# Patient Record
Sex: FEMALE | Race: BLACK OR AFRICAN AMERICAN | NOT HISPANIC OR LATINO | Employment: OTHER | RURAL
[De-identification: names, ages, dates, MRNs, and addresses within clinical notes are randomized per-mention and may not be internally consistent; named-entity substitution may affect disease eponyms.]

---

## 2017-02-17 ENCOUNTER — OFFICE VISIT (OUTPATIENT)
Dept: FAMILY MEDICINE CLINIC | Facility: CLINIC | Age: 42
End: 2017-02-17

## 2017-02-17 VITALS
HEART RATE: 83 BPM | WEIGHT: 289.8 LBS | BODY MASS INDEX: 51.35 KG/M2 | OXYGEN SATURATION: 96 % | DIASTOLIC BLOOD PRESSURE: 80 MMHG | SYSTOLIC BLOOD PRESSURE: 150 MMHG | HEIGHT: 63 IN | TEMPERATURE: 98.3 F

## 2017-02-17 DIAGNOSIS — Z23 ENCOUNTER FOR IMMUNIZATION: ICD-10-CM

## 2017-02-17 DIAGNOSIS — D50.9 IRON DEFICIENCY ANEMIA, UNSPECIFIED IRON DEFICIENCY ANEMIA TYPE: ICD-10-CM

## 2017-02-17 DIAGNOSIS — E78.5 HYPERLIPIDEMIA, UNSPECIFIED HYPERLIPIDEMIA TYPE: ICD-10-CM

## 2017-02-17 DIAGNOSIS — IMO0002 UNCONTROLLED TYPE 2 DIABETES MELLITUS WITH COMPLICATION, WITH LONG-TERM CURRENT USE OF INSULIN: Primary | ICD-10-CM

## 2017-02-17 DIAGNOSIS — Z23 NEED FOR VACCINATION: ICD-10-CM

## 2017-02-17 DIAGNOSIS — E66.01 MORBID OBESITY DUE TO EXCESS CALORIES (HCC): ICD-10-CM

## 2017-02-17 DIAGNOSIS — I10 ESSENTIAL HYPERTENSION: ICD-10-CM

## 2017-02-17 DIAGNOSIS — E55.9 VITAMIN D DEFICIENCY: ICD-10-CM

## 2017-02-17 LAB
25(OH)D3 SERPL-MCNC: 50.6 NG/ML (ref 30–100)
ALBUMIN SERPL-MCNC: 4 G/DL (ref 3.4–4.8)
ALBUMIN UR-MCNC: <0.6 MG/L
ALBUMIN/GLOB SERPL: 1.1 G/DL (ref 1.1–1.8)
ALP SERPL-CCNC: 80 U/L (ref 38–126)
ALT SERPL W P-5'-P-CCNC: 63 U/L (ref 9–52)
ANION GAP SERPL CALCULATED.3IONS-SCNC: 10 MMOL/L (ref 5–15)
ARTICHOKE IGE QN: 43 MG/DL (ref 1–129)
AST SERPL-CCNC: 32 U/L (ref 14–36)
BILIRUB SERPL-MCNC: 0.6 MG/DL (ref 0.2–1.3)
BUN BLD-MCNC: 13 MG/DL (ref 7–21)
BUN/CREAT SERPL: 16.7 (ref 7–25)
CALCIUM SPEC-SCNC: 10 MG/DL (ref 8.4–10.2)
CHLORIDE SERPL-SCNC: 97 MMOL/L (ref 95–110)
CHOLEST SERPL-MCNC: 109 MG/DL (ref 0–199)
CO2 SERPL-SCNC: 33 MMOL/L (ref 22–31)
CREAT BLD-MCNC: 0.78 MG/DL (ref 0.5–1)
CREAT UR-MCNC: 74.3 MG/DL
FERRITIN SERPL-MCNC: 135 NG/ML (ref 6.2–137)
FOLATE SERPL-MCNC: 9.03 NG/ML (ref 2.76–21)
GFR SERPL CREATININE-BSD FRML MDRD: 98 ML/MIN/1.73 (ref 58–135)
GLOBULIN UR ELPH-MCNC: 3.5 GM/DL (ref 2.3–3.5)
GLUCOSE BLD-MCNC: 126 MG/DL (ref 60–100)
HBA1C MFR BLD: 7.87 % (ref 4–5.6)
HDLC SERPL-MCNC: 44 MG/DL (ref 60–200)
IRON 24H UR-MRATE: 84 MCG/DL (ref 37–170)
IRON SATN MFR SERPL: 29 % (ref 15–50)
LDLC/HDLC SERPL: 1.2 {RATIO} (ref 0–3.22)
MICROALBUMIN/CREAT UR: NORMAL MG/G (ref 0–30)
POTASSIUM BLD-SCNC: 4.4 MMOL/L (ref 3.5–5.1)
PROT SERPL-MCNC: 7.5 G/DL (ref 6.3–8.6)
SODIUM BLD-SCNC: 140 MMOL/L (ref 137–145)
TIBC SERPL-MCNC: 286 MCG/DL (ref 265–497)
TRIGL SERPL-MCNC: 61 MG/DL (ref 20–199)
VIT B12 BLD-MCNC: 850 PG/ML (ref 239–931)

## 2017-02-17 PROCEDURE — 80053 COMPREHEN METABOLIC PANEL: CPT | Performed by: FAMILY MEDICINE

## 2017-02-17 PROCEDURE — 83036 HEMOGLOBIN GLYCOSYLATED A1C: CPT | Performed by: FAMILY MEDICINE

## 2017-02-17 PROCEDURE — 82570 ASSAY OF URINE CREATININE: CPT | Performed by: FAMILY MEDICINE

## 2017-02-17 PROCEDURE — 82043 UR ALBUMIN QUANTITATIVE: CPT | Performed by: FAMILY MEDICINE

## 2017-02-17 PROCEDURE — 99213 OFFICE O/P EST LOW 20 MIN: CPT | Performed by: FAMILY MEDICINE

## 2017-02-17 PROCEDURE — 90746 HEPB VACCINE 3 DOSE ADULT IM: CPT | Performed by: FAMILY MEDICINE

## 2017-02-17 PROCEDURE — 90715 TDAP VACCINE 7 YRS/> IM: CPT | Performed by: FAMILY MEDICINE

## 2017-02-17 PROCEDURE — 82607 VITAMIN B-12: CPT | Performed by: FAMILY MEDICINE

## 2017-02-17 PROCEDURE — 83550 IRON BINDING TEST: CPT | Performed by: FAMILY MEDICINE

## 2017-02-17 PROCEDURE — 80061 LIPID PANEL: CPT | Performed by: FAMILY MEDICINE

## 2017-02-17 PROCEDURE — 82306 VITAMIN D 25 HYDROXY: CPT | Performed by: FAMILY MEDICINE

## 2017-02-17 PROCEDURE — 82746 ASSAY OF FOLIC ACID SERUM: CPT | Performed by: FAMILY MEDICINE

## 2017-02-17 PROCEDURE — 90471 IMMUNIZATION ADMIN: CPT | Performed by: FAMILY MEDICINE

## 2017-02-17 PROCEDURE — 82728 ASSAY OF FERRITIN: CPT | Performed by: FAMILY MEDICINE

## 2017-02-17 PROCEDURE — 90472 IMMUNIZATION ADMIN EACH ADD: CPT | Performed by: FAMILY MEDICINE

## 2017-02-17 PROCEDURE — 83540 ASSAY OF IRON: CPT | Performed by: FAMILY MEDICINE

## 2017-02-17 PROCEDURE — 85025 COMPLETE CBC W/AUTO DIFF WBC: CPT | Performed by: FAMILY MEDICINE

## 2017-02-17 RX ORDER — LISINOPRIL AND HYDROCHLOROTHIAZIDE 12.5; 1 MG/1; MG/1
2 TABLET ORAL DAILY
Qty: 60 TABLET | Refills: 11 | Status: SHIPPED | OUTPATIENT
Start: 2017-02-17 | End: 2018-03-04 | Stop reason: SDUPTHER

## 2017-02-17 NOTE — PROGRESS NOTES
Subjective   Lizzy Restrepo is a 41 y.o. female.     Problem List  1. DM type 2 insulin dependent, uncontrolled  2. Obesity  3. Hyperrtension  4. GERD  5. Iron deficiency anemia  6. GERD  7. Vitamin D deficiency  8. Hyperlipidemia/dysplipidemia    Patient is 40 yo AAF with the above medical issues. Is here for recheck    States her sugars have been better controlled . Was started on invokana 300 mg PO q daily. Continues to take Levemir 30 units subq qhs along with humalog 15 units before meals.  States metformin causing GI upset and diarrhea.  Is requesting today to stop it.  Sugars now running  before breakfast and <180 2 hrs after meal.  Last hga1c was >8.0 on last lab draw. Is due for diabetic eye examination.    BP elevated today.  And is running >140/90.  Currently taking lisinopril-HCTZ 10-12.5 mg PO q daily.  Compliant with medication. No chest pain, has occasional headaches, no dizziness. States she has been under stress with work    Regarding iron deficiency anemia.  Currently on iron pills.  Is due for recheck.     Also on Vitamin D pills for deficiency    Pt is due for 3rd Hepatitis B vaccination today as well as Tetanus vaccination     Is currently on aspirin and lipitor and tolerating both medications well. ASCVD risk elevated     Diabetes   She presents for her follow-up diabetic visit. She has type 2 diabetes mellitus. Her disease course has been improving. Hypoglycemia symptoms include headaches. Pertinent negatives for hypoglycemia include no confusion, dizziness, hunger, mood changes, nervousness/anxiousness, pallor, seizures, sleepiness, speech difficulty, sweats or tremors. Pertinent negatives for diabetes include no blurred vision, no chest pain, no fatigue, no foot paresthesias, no foot ulcerations, no polydipsia, no polyphagia, no polyuria, no visual change, no weakness and no weight loss. Pertinent negatives for hypoglycemia complications include no blackouts, no hospitalization, no  nocturnal hypoglycemia, no required assistance and no required glucagon injection. Pertinent negatives for diabetic complications include no autonomic neuropathy, CVA, heart disease, impotence, nephropathy, peripheral neuropathy or retinopathy. Risk factors for coronary artery disease include diabetes mellitus, sedentary lifestyle and post-menopausal. Current diabetic treatment includes insulin injections and oral agent (dual therapy). She is compliant with treatment most of the time. Her weight is stable. She is following a generally unhealthy diet. When asked about meal planning, she reported none. She has not had a previous visit with a dietitian. She never participates in exercise. There is no change in her home blood glucose trend. An ACE inhibitor/angiotensin II receptor blocker is being taken. She does not see a podiatrist.Eye exam is not current.   Hypertension   This is a chronic problem. The current episode started more than 1 year ago. The problem is unchanged. The problem is uncontrolled. Associated symptoms include headaches. Pertinent negatives include no anxiety, blurred vision, chest pain, malaise/fatigue, neck pain, orthopnea, palpitations, peripheral edema, PND, shortness of breath or sweats. There are no associated agents to hypertension. Risk factors for coronary artery disease include obesity, sedentary lifestyle, stress, diabetes mellitus and dyslipidemia. Past treatments include ACE inhibitors and diuretics. The current treatment provides mild improvement. There are no compliance problems.  There is no history of angina, kidney disease, CAD/MI, CVA, heart failure, left ventricular hypertrophy, renovascular disease, retinopathy or a thyroid problem. There is no history of chronic renal disease, coarctation of the aorta, hyperaldosteronism, hypercortisolism, hyperparathyroidism, a hypertension causing med, pheochromocytoma or sleep apnea.          The following portions of the patient's history  "were reviewed and updated as appropriate: allergies, current medications, past family history, past medical history, past social history, past surgical history and problem list.    Review of Systems   Constitutional: Negative.  Negative for fatigue, malaise/fatigue and weight loss.   Eyes: Negative.  Negative for blurred vision.   Respiratory: Negative.  Negative for shortness of breath.    Cardiovascular: Negative.  Negative for chest pain, palpitations, orthopnea and PND.   Gastrointestinal: Positive for diarrhea.   Endocrine: Negative.  Negative for polydipsia, polyphagia and polyuria.   Genitourinary: Negative.  Negative for impotence.   Musculoskeletal: Negative.  Negative for neck pain.   Skin: Negative.  Negative for pallor.   Allergic/Immunologic: Negative.    Neurological: Positive for headaches. Negative for dizziness, tremors, seizures, speech difficulty and weakness.   Hematological: Negative.    Psychiatric/Behavioral: Negative.  Negative for confusion. The patient is not nervous/anxious.        Objective    Visit Vitals   • /80 (BP Location: Right arm, Patient Position: Sitting, Cuff Size: Adult)   • Pulse 83   • Temp 98.3 °F (36.8 °C) (Oral)   • Ht 63\" (160 cm)   • Wt 289 lb 12.8 oz (131 kg)   • SpO2 96%   • BMI 51.34 kg/m2           Chemistry        Component Value Date/Time     11/03/2016 1232    K 4.4 11/03/2016 1232    CL 97 11/03/2016 1232    CO2 33 (H) 11/03/2016 1232    BUN 14 11/03/2016 1232    CREATININE 0.8 11/03/2016 1232        Component Value Date/Time    CALCIUM 9.3 11/03/2016 1232    ALKPHOS 70 11/03/2016 1232    AST 24 11/03/2016 1232    ALT 38 11/03/2016 1232    BILITOT 0.3 11/03/2016 1232        Lab Results   Component Value Date    WBC 10.3 (H) 11/03/2016    HGB 11.7 (L) 11/03/2016    HCT 37.9 11/03/2016    MCV 68.2 (L) 11/03/2016     11/03/2016     No results found for: CHOL  Lab Results   Component Value Date    TRIG 73 11/03/2016    TRIG 114 07/20/2016    TRIG " 128 09/29/2015     Lab Results   Component Value Date    HDL 45 (L) 11/03/2016    HDL 43 (L) 07/20/2016    HDL 40 (L) 09/29/2015     Lab Results   Component Value Date    LDLCALC 45 11/03/2016    LDLCALC 93 07/20/2016    LDLCALC 73 09/29/2015     No results found for: LDL  No results found for: HDLLDLRATIO  No components found for: CHOLHDL  Lab Results   Component Value Date    HGBA1C 8.6 (H) 11/03/2016     Lab Results   Component Value Date    TSH 1.45 07/20/2016     Physical Exam   Constitutional: She is oriented to person, place, and time. She appears well-developed and well-nourished. No distress.   HENT:   Head: Normocephalic and atraumatic.   Right Ear: External ear normal.   Left Ear: External ear normal.   Eyes: Conjunctivae and EOM are normal. Pupils are equal, round, and reactive to light. Right eye exhibits no discharge. Left eye exhibits no discharge. No scleral icterus.   Neck: Normal range of motion. Neck supple. No JVD present. No tracheal deviation present. No thyromegaly present.   Cardiovascular: Normal rate, regular rhythm and normal heart sounds.    Pulmonary/Chest: Effort normal and breath sounds normal. No stridor. No respiratory distress. She has no wheezes.   Abdominal: Soft. Bowel sounds are normal. She exhibits no distension and no mass. There is no tenderness. There is no rebound and no guarding. No hernia.   Obese abdomen    Musculoskeletal: Normal range of motion. She exhibits no edema, tenderness or deformity.   Lymphadenopathy:     She has no cervical adenopathy.   Neurological: She is alert and oriented to person, place, and time. She has normal reflexes. No cranial nerve deficit. Coordination normal.   Skin: Skin is warm and dry. No rash noted. She is not diaphoretic. No erythema. No pallor.   Psychiatric: She has a normal mood and affect. Her behavior is normal. Thought content normal.   Nursing note and vitals reviewed.      Assessment/Plan   Problems Addressed this Visit         Cardiovascular and Mediastinum    Essential hypertension    Relevant Medications    lisinopril-hydrochlorothiazide (PRINZIDE,ZESTORETIC) 10-12.5 MG per tablet    Hyperlipidemia       Digestive    Morbid obesity    Vitamin D deficiency       Endocrine    Diabetes type 2, uncontrolled - Primary    Relevant Orders    CBC Auto Differential    Comprehensive Metabolic Panel    Hemoglobin A1c    Lipid Panel    Vitamin B12    Folate    Ferritin    Iron Profile    Vitamin D 25 Hydroxy    Microalbumin / Creatinine Urine Ratio       Other    Encounter for immunization      Other Visit Diagnoses     Need for vaccination        Relevant Orders    Tdap Vaccine Greater Than or Equal To 8yo IM (Completed)    Hepatitis B Vaccine Adult IM (Completed)    Iron deficiency anemia, unspecified iron deficiency anemia type            -For hypertension will go up on lisionpril -HCTZ 10-12.5 to 20-25 mg PO q daily. Pt to take 2 pills of 10-12.5 mg PO  qdaily.  Advised pt to check BP daily and bring on next visit  - for hyperlipidemia - continue with aspirin and statin medication. Recheck lipid panel  - morbid obesity - discussed high healthy fat diet, low sugar and low carb diet  - DM type 2 - recheck hga1c, microalbumin creatnine ratio, b12,  Lipid panel. Stop metformin since causing GI upset  - iron deficiency anemia - check iron panel, ferritin, folate, b12.  Continue with Iron pills BID  - Vitamin D deficiency check Vitamin D levels  - Pt advised to get diabetic eye examination through regular eye doctor  - Tetanus vaccination today. 3rd Hep B vaccination today. Pt done with series

## 2017-02-17 NOTE — PATIENT INSTRUCTIONS
Take 2 pills of lisionpril- HCTZ 10-12.5 mg daily.  Then  new medicaiton. And continue taking 2 pills a day of lisionpril-HCTZ.  Bring BP readings on next visit  - get labwork  -recheck in 1 month

## 2017-02-20 ENCOUNTER — TELEPHONE (OUTPATIENT)
Dept: FAMILY MEDICINE CLINIC | Facility: CLINIC | Age: 42
End: 2017-02-20

## 2017-02-20 NOTE — TELEPHONE ENCOUNTER
Spoke with the pt , she will be calling her insurance to find out which they cover januvia or tradjenta and then give me a call back to call it into her pharmacy.     Call pt.  Hga1c improved from 8.6 to 7.8.  In addition to Lantus, Humalog and Invokana. Add Januvia 100 mg PO q daily or Tradjenta 5 mg PO q daily. Give 30 pills of either and 11 refills.  May help with weight loss.  Let pt know if this okay.  Have her call me back with any questions. Rest of labs okay except ALT liver enzyme elevated. Will need to observe this. Thanks

## 2017-03-17 ENCOUNTER — OFFICE VISIT (OUTPATIENT)
Dept: FAMILY MEDICINE CLINIC | Facility: CLINIC | Age: 42
End: 2017-03-17

## 2017-03-17 VITALS
SYSTOLIC BLOOD PRESSURE: 120 MMHG | TEMPERATURE: 97.9 F | RESPIRATION RATE: 16 BRPM | WEIGHT: 288 LBS | HEART RATE: 83 BPM | BODY MASS INDEX: 51.03 KG/M2 | DIASTOLIC BLOOD PRESSURE: 80 MMHG | HEIGHT: 63 IN

## 2017-03-17 DIAGNOSIS — E55.9 VITAMIN D DEFICIENCY: ICD-10-CM

## 2017-03-17 DIAGNOSIS — IMO0002 UNCONTROLLED TYPE 2 DIABETES MELLITUS WITH COMPLICATION, WITH LONG-TERM CURRENT USE OF INSULIN: Primary | ICD-10-CM

## 2017-03-17 DIAGNOSIS — E66.01 MORBID OBESITY DUE TO EXCESS CALORIES (HCC): ICD-10-CM

## 2017-03-17 DIAGNOSIS — I10 ESSENTIAL HYPERTENSION: ICD-10-CM

## 2017-03-17 DIAGNOSIS — E78.5 HYPERLIPIDEMIA, UNSPECIFIED HYPERLIPIDEMIA TYPE: ICD-10-CM

## 2017-03-17 PROCEDURE — 99213 OFFICE O/P EST LOW 20 MIN: CPT | Performed by: FAMILY MEDICINE

## 2017-03-17 RX ORDER — PANTOPRAZOLE SODIUM 40 MG/1
40 TABLET, DELAYED RELEASE ORAL DAILY
Qty: 30 TABLET | Refills: 11 | Status: SHIPPED | OUTPATIENT
Start: 2017-03-17 | End: 2017-12-06

## 2017-03-17 NOTE — PATIENT INSTRUCTIONS
"C    DASH Eating Plan  DASH stands for \"Dietary Approaches to Stop Hypertension.\" The DASH eating plan is a healthy eating plan that has been shown to reduce high blood pressure (hypertension). Additional health benefits may include reducing the risk of type 2 diabetes mellitus, heart disease, and stroke. The DASH eating plan may also help with weight loss.  WHAT DO I NEED TO KNOW ABOUT THE DASH EATING PLAN?  For the DASH eating plan, you will follow these general guidelines:  · Choose foods with a percent daily value for sodium of less than 5% (as listed on the food label).  · Use salt-free seasonings or herbs instead of table salt or sea salt.  · Check with your health care provider or pharmacist before using salt substitutes.  · Eat lower-sodium products, often labeled as \"lower sodium\" or \"no salt added.\"  · Eat fresh foods.  · Eat more vegetables, fruits, and low-fat dairy products.  · Choose whole grains. Look for the word \"whole\" as the first word in the ingredient list.  · Choose fish and skinless chicken or turkey more often than red meat. Limit fish, poultry, and meat to 6 oz (170 g) each day.  · Limit sweets, desserts, sugars, and sugary drinks.  · Choose heart-healthy fats.  · Limit cheese to 1 oz (28 g) per day.  · Eat more home-cooked food and less restaurant, buffet, and fast food.  · Limit fried foods.  · Cook foods using methods other than frying.  · Limit canned vegetables. If you do use them, rinse them well to decrease the sodium.  · When eating at a restaurant, ask that your food be prepared with less salt, or no salt if possible.  WHAT FOODS CAN I EAT?  Seek help from a dietitian for individual calorie needs.  Grains  Whole grain or whole wheat bread. Brown rice. Whole grain or whole wheat pasta. Quinoa, bulgur, and whole grain cereals. Low-sodium cereals. Corn or whole wheat flour tortillas. Whole grain cornbread. Whole grain crackers. Low-sodium crackers.  Vegetables  Fresh or frozen " vegetables (raw, steamed, roasted, or grilled). Low-sodium or reduced-sodium tomato and vegetable juices. Low-sodium or reduced-sodium tomato sauce and paste. Low-sodium or reduced-sodium canned vegetables.   Fruits  All fresh, canned (in natural juice), or frozen fruits.  Meat and Other Protein Products  Ground beef (85% or leaner), grass-fed beef, or beef trimmed of fat. Skinless chicken or turkey. Ground chicken or turkey. Pork trimmed of fat. All fish and seafood. Eggs. Dried beans, peas, or lentils. Unsalted nuts and seeds. Unsalted canned beans.  Dairy  Low-fat dairy products, such as skim or 1% milk, 2% or reduced-fat cheeses, low-fat ricotta or cottage cheese, or plain low-fat yogurt. Low-sodium or reduced-sodium cheeses.  Fats and Oils  Tub margarines without trans fats. Light or reduced-fat mayonnaise and salad dressings (reduced sodium). Avocado. Safflower, olive, or canola oils. Natural peanut or almond butter.  Other  Unsalted popcorn and pretzels.  The items listed above may not be a complete list of recommended foods or beverages. Contact your dietitian for more options.  WHAT FOODS ARE NOT RECOMMENDED?  Grains  White bread. White pasta. White rice. Refined cornbread. Bagels and croissants. Crackers that contain trans fat.  Vegetables  Creamed or fried vegetables. Vegetables in a cheese sauce. Regular canned vegetables. Regular canned tomato sauce and paste. Regular tomato and vegetable juices.  Fruits  Dried fruits. Canned fruit in light or heavy syrup. Fruit juice.  Meat and Other Protein Products  Fatty cuts of meat. Ribs, chicken wings, fernandes, sausage, bologna, salami, chitterlings, fatback, hot dogs, bratwurst, and packaged luncheon meats. Salted nuts and seeds. Canned beans with salt.  Dairy  Whole or 2% milk, cream, half-and-half, and cream cheese. Whole-fat or sweetened yogurt. Full-fat cheeses or blue cheese. Nondairy creamers and whipped toppings. Processed cheese, cheese spreads, or cheese  curds.  Condiments  Onion and garlic salt, seasoned salt, table salt, and sea salt. Canned and packaged gravies. Worcestershire sauce. Tartar sauce. Barbecue sauce. Teriyaki sauce. Soy sauce, including reduced sodium. Steak sauce. Fish sauce. Oyster sauce. Cocktail sauce. Horseradish. Ketchup and mustard. Meat flavorings and tenderizers. Bouillon cubes. Hot sauce. Tabasco sauce. Marinades. Taco seasonings. Relishes.  Fats and Oils  Butter, stick margarine, lard, shortening, ghee, and fernandes fat. Coconut, palm kernel, or palm oils. Regular salad dressings.  Other  Pickles and olives. Salted popcorn and pretzels.  The items listed above may not be a complete list of foods and beverages to avoid. Contact your dietitian for more information.  WHERE CAN I FIND MORE INFORMATION?  [employer] [place]: www.nhlbi.nih.gov/health/health-topics/topics/dash/     This information is not intended to replace advice given to you by your health care provider. Make sure you discuss any questions you have with your health care provider.     Document Released: 12/06/2012 Document Revised: 01/08/2016 Document Reviewed: 10/22/2014  [employer] Patient Education ©2016 [employer]

## 2017-03-17 NOTE — PROGRESS NOTES
Subjective   Lizzy Restrepo is a 41 y.o. female.     Problem List  1. DM type 2 insulin dependent, uncontrolled  2. Obesity  3. Hyperrtension  4. GERD  5. Iron deficiency anemia  6. GERD  7. Vitamin D deficiency  8. Hyperlipidemia/dysplipidemia  9. Elevated ALT    Patient is 40 yo AAF with the above medical issues. She is here for followup. States she is doing well. Regarding DM type 2 is taking Januvia 100 mg PO q daily along with Invokana 300 mg PO q daily.  Still on Lantus 20 units subq qhs along with Humalog 15 units before lunch and dinner. States her morning sugars have been in 90s and 2 hours after meal <180.  Has felt better. Has lost 1 lbs since last visit. BMI >30.  States she is going to start exercising soon. Pt has yet to get diabetic eye examination.  Would like to establish with Ophthamologist.  Last hga1c recently was 7.8 down from 8.6.  Currently on aspirin and statin as well.  BP is at goal today as well. Went up on lisinopril-HCTZ 10-12.5 mg one pill a day to twice a day.  BP <140/90 today       Diabetes   She presents for her follow-up diabetic visit. She has type 2 diabetes mellitus. Her disease course has been improving. Pertinent negatives for hypoglycemia include no confusion, dizziness, headaches, hunger, mood changes, nervousness/anxiousness, pallor, seizures, sleepiness, speech difficulty, sweats or tremors. Pertinent negatives for diabetes include no blurred vision, no chest pain, no fatigue, no foot paresthesias, no foot ulcerations, no polydipsia, no polyphagia, no polyuria, no visual change, no weakness and no weight loss. Pertinent negatives for hypoglycemia complications include no blackouts, no hospitalization, no nocturnal hypoglycemia, no required assistance and no required glucagon injection. Symptoms are stable. Pertinent negatives for diabetic complications include no autonomic neuropathy, CVA, heart disease, impotence, nephropathy, peripheral neuropathy, PVD or retinopathy.  Risk factors for coronary artery disease include diabetes mellitus, hypertension, male sex and dyslipidemia. Current diabetic treatment includes insulin injections and oral agent (dual therapy). Her weight is stable. She is following a generally healthy diet. When asked about meal planning, she reported none. She has not had a previous visit with a dietitian. She participates in exercise intermittently. Her home blood glucose trend is decreasing steadily. Her breakfast blood glucose is taken between 8-9 am. Her breakfast blood glucose range is generally  mg/dl. Her lunch blood glucose range is generally 130-140 mg/dl. Her dinner blood glucose range is generally 140-180 mg/dl. An ACE inhibitor/angiotensin II receptor blocker is being taken. She does not see a podiatrist.Eye exam is not current.   Hypertension   This is a chronic problem. The current episode started more than 1 year ago. The problem has been resolved since onset. The problem is controlled. Pertinent negatives include no anxiety, blurred vision, chest pain, headaches, malaise/fatigue, neck pain, orthopnea, palpitations, peripheral edema, PND, shortness of breath or sweats. Risk factors for coronary artery disease include diabetes mellitus, obesity, sedentary lifestyle and dyslipidemia. Past treatments include ACE inhibitors and diuretics. The current treatment provides significant improvement. There are no compliance problems.  There is no history of angina, kidney disease, CAD/MI, CVA, heart failure, left ventricular hypertrophy, PVD, renovascular disease, retinopathy or a thyroid problem. There is no history of chronic renal disease, coarctation of the aorta, hyperaldosteronism, hypercortisolism, hyperparathyroidism, a hypertension causing med, pheochromocytoma or sleep apnea.        The following portions of the patient's history were reviewed and updated as appropriate: allergies, current medications, past family history, past medical  "history, past social history, past surgical history and problem list.    Review of Systems   Constitutional: Negative.  Negative for fatigue, malaise/fatigue and weight loss.   HENT: Negative.    Eyes: Negative.  Negative for blurred vision.   Respiratory: Negative.  Negative for shortness of breath.    Cardiovascular: Negative.  Negative for chest pain, palpitations, orthopnea and PND.   Gastrointestinal: Negative.    Endocrine: Negative.  Negative for polydipsia, polyphagia and polyuria.   Genitourinary: Negative.  Negative for impotence.   Musculoskeletal: Negative for neck pain.   Skin: Negative.  Negative for pallor.   Allergic/Immunologic: Negative.    Neurological: Negative.  Negative for dizziness, tremors, seizures, speech difficulty, weakness and headaches.   Hematological: Negative.    Psychiatric/Behavioral: Negative.  Negative for confusion. The patient is not nervous/anxious.        Objective    Visit Vitals   • /80   • Pulse 83   • Temp 97.9 °F (36.6 °C)   • Resp 16   • Ht 63\" (160 cm)   • Wt 288 lb (131 kg)   • BMI 51.02 kg/m2       Office Visit on 02/17/2017   Component Date Value Ref Range Status   • Glucose 02/17/2017 126* 60 - 100 mg/dL Final   • BUN 02/17/2017 13  7 - 21 mg/dL Final   • Creatinine 02/17/2017 0.78  0.50 - 1.00 mg/dL Final   • Sodium 02/17/2017 140  137 - 145 mmol/L Final   • Potassium 02/17/2017 4.4  3.5 - 5.1 mmol/L Final   • Chloride 02/17/2017 97  95 - 110 mmol/L Final   • CO2 02/17/2017 33.0* 22.0 - 31.0 mmol/L Final   • Calcium 02/17/2017 10.0  8.4 - 10.2 mg/dL Final   • Total Protein 02/17/2017 7.5  6.3 - 8.6 g/dL Final   • Albumin 02/17/2017 4.00  3.40 - 4.80 g/dL Final   • ALT (SGPT) 02/17/2017 63* 9 - 52 U/L Final   • AST (SGOT) 02/17/2017 32  14 - 36 U/L Final   • Alkaline Phosphatase 02/17/2017 80  38 - 126 U/L Final   • Total Bilirubin 02/17/2017 0.6  0.2 - 1.3 mg/dL Final   • eGFR   Amer 02/17/2017 98  58 - 135 mL/min/1.73 Final   • Globulin 02/17/2017 " 3.5  2.3 - 3.5 gm/dL Final   • A/G Ratio 02/17/2017 1.1  1.1 - 1.8 g/dL Final   • BUN/Creatinine Ratio 02/17/2017 16.7  7.0 - 25.0 Final   • Anion Gap 02/17/2017 10.0  5.0 - 15.0 mmol/L Final   • Hemoglobin A1C 02/17/2017 7.87* 4 - 5.6 % Final   • Total Cholesterol 02/17/2017 109  0 - 199 mg/dL Final   • Triglycerides 02/17/2017 61  20 - 199 mg/dL Final   • HDL Cholesterol 02/17/2017 44* 60 - 200 mg/dL Final   • LDL Cholesterol  02/17/2017 43  1 - 129 mg/dL Final   • LDL/HDL Ratio 02/17/2017 1.20  0.00 - 3.22 Final   • Vitamin B-12 02/17/2017 850  239 - 931 pg/mL Final   • Folate 02/17/2017 9.03  2.76 - 21.00 ng/mL Final   • Ferritin 02/17/2017 135.00  6.20 - 137.00 ng/mL Final   • Iron 02/17/2017 84  37 - 170 mcg/dL Final   • TIBC 02/17/2017 286  265 - 497 mcg/dL Final   • Iron Saturation 02/17/2017 29  15 - 50 % Final   • 25 Hydroxy, Vitamin D 02/17/2017 50.6  30.0 - 100.0 ng/ml Final   • Microalbumin/Creatinine Ratio 02/17/2017   0.0 - 30.0 mg/g Final    Unable to calculate   • Creatinine, Urine 02/17/2017 74.3  mg/dL Final   • Microalbumin, Urine 02/17/2017 <0.6  mg/L Final         Chemistry        Component Value Date/Time     02/17/2017 1122     11/03/2016 1232    K 4.4 02/17/2017 1122    K 4.4 11/03/2016 1232    CL 97 02/17/2017 1122    CL 97 11/03/2016 1232    CO2 33.0 (H) 02/17/2017 1122    CO2 33 (H) 11/03/2016 1232    BUN 13 02/17/2017 1122    BUN 14 11/03/2016 1232    CREATININE 0.78 02/17/2017 1122    CREATININE 0.8 11/03/2016 1232        Component Value Date/Time    CALCIUM 10.0 02/17/2017 1122    CALCIUM 9.3 11/03/2016 1232    ALKPHOS 80 02/17/2017 1122    ALKPHOS 70 11/03/2016 1232    AST 32 02/17/2017 1122    AST 24 11/03/2016 1232    ALT 63 (H) 02/17/2017 1122    ALT 38 11/03/2016 1232    BILITOT 0.6 02/17/2017 1122    BILITOT 0.3 11/03/2016 1232        Lab Results   Component Value Date    WBC 9.63 02/17/2017    HGB 12.9 02/17/2017    HCT 41.5 02/17/2017    MCV 68.4 (L) 02/17/2017      02/17/2017     Lab Results   Component Value Date    CHOL 109 02/17/2017     Lab Results   Component Value Date    TRIG 61 02/17/2017    TRIG 73 11/03/2016    TRIG 114 07/20/2016     Lab Results   Component Value Date    HDL 44 (L) 02/17/2017    HDL 45 (L) 11/03/2016    HDL 43 (L) 07/20/2016     Lab Results   Component Value Date    LDLCALC 45 11/03/2016    LDLCALC 93 07/20/2016    LDLCALC 73 09/29/2015     No results found for: LDL  No results found for: HDLLDLRATIO  No components found for: CHOLHDL   Lab Results   Component Value Date    TSH 1.45 07/20/2016     Lab Results   Component Value Date    HGBA1C 7.87 (H) 02/17/2017     Physical Exam   Constitutional: She is oriented to person, place, and time. She appears well-developed and well-nourished. No distress.   HENT:   Head: Normocephalic and atraumatic.   Right Ear: External ear normal.   Eyes: Conjunctivae and EOM are normal. Pupils are equal, round, and reactive to light. Right eye exhibits no discharge. Left eye exhibits no discharge. No scleral icterus.   Neck: Normal range of motion. Neck supple. No JVD present. No tracheal deviation present. No thyromegaly present.   Cardiovascular: Normal rate, regular rhythm and normal heart sounds.    Pulmonary/Chest: Effort normal and breath sounds normal. No stridor. No respiratory distress. She has no wheezes.   Abdominal: Soft. Bowel sounds are normal. She exhibits no distension and no mass. There is no tenderness. There is no rebound and no guarding. No hernia.   Obese abdomen    Musculoskeletal: Normal range of motion. She exhibits no edema, tenderness or deformity.   Lymphadenopathy:     She has no cervical adenopathy.   Neurological: She is alert and oriented to person, place, and time. She has normal reflexes. No cranial nerve deficit. Coordination normal.   Skin: Skin is warm and dry. No rash noted. She is not diaphoretic. No erythema. No pallor.   Psychiatric: She has a normal mood and affect. Her  behavior is normal. Judgment and thought content normal.   Nursing note and vitals reviewed.      Assessment/Plan   Problems Addressed this Visit        Cardiovascular and Mediastinum    Essential hypertension    Hyperlipidemia       Digestive    Morbid obesity    Vitamin D deficiency       Endocrine    Diabetes type 2, uncontrolled - Primary    Relevant Orders    Ambulatory Referral to Ophthalmology        - Essential hypertension - BP at goal continue with lisinopril-HCTZ 10-12.5 mg 2 pills daily  - hyperlipidemia - continue with statin medication. ASCVD Risk high. Continue with aspirin  - morbid obesity - BMI >30.  Pt advised to go see Dietician. Referral was made. DASH diet information given. Counseled on weight loss diet and exercise. Recommend high healthy fat diet, low sugar and low carbs.    - Vitamin D deficiency - continue with Vitamin D once a week  - DM type 2 - will refer to Dr. Kaiser for diabetic eye examination. Also continue with Lantus 20 units at bedtime along with short acting 15 units before meals. Continue with Invokana 300 mg PO q daily along with Januvia 100 mg PO q daily.  - recheck in 3 months for recheck on labs

## 2017-06-05 RX ORDER — INSULIN GLARGINE 100 [IU]/ML
20 INJECTION, SOLUTION SUBCUTANEOUS NIGHTLY
Qty: 3 ML | Refills: 11 | Status: SHIPPED | OUTPATIENT
Start: 2017-06-05 | End: 2017-06-16

## 2017-06-16 ENCOUNTER — OFFICE VISIT (OUTPATIENT)
Dept: FAMILY MEDICINE CLINIC | Facility: CLINIC | Age: 42
End: 2017-06-16

## 2017-06-16 VITALS
SYSTOLIC BLOOD PRESSURE: 120 MMHG | BODY MASS INDEX: 50.99 KG/M2 | WEIGHT: 287.8 LBS | RESPIRATION RATE: 16 BRPM | TEMPERATURE: 97.8 F | DIASTOLIC BLOOD PRESSURE: 80 MMHG | HEART RATE: 83 BPM | HEIGHT: 63 IN

## 2017-06-16 DIAGNOSIS — I10 ESSENTIAL HYPERTENSION: ICD-10-CM

## 2017-06-16 DIAGNOSIS — E78.5 HYPERLIPIDEMIA, UNSPECIFIED HYPERLIPIDEMIA TYPE: ICD-10-CM

## 2017-06-16 DIAGNOSIS — E55.9 VITAMIN D DEFICIENCY: Primary | ICD-10-CM

## 2017-06-16 DIAGNOSIS — E66.01 MORBID OBESITY DUE TO EXCESS CALORIES (HCC): ICD-10-CM

## 2017-06-16 DIAGNOSIS — IMO0002 UNCONTROLLED TYPE 2 DIABETES MELLITUS WITH COMPLICATION, WITH LONG-TERM CURRENT USE OF INSULIN: ICD-10-CM

## 2017-06-16 PROCEDURE — 99213 OFFICE O/P EST LOW 20 MIN: CPT | Performed by: FAMILY MEDICINE

## 2017-06-16 RX ORDER — INSULIN GLARGINE 100 [IU]/ML
20 INJECTION, SOLUTION SUBCUTANEOUS NIGHTLY
Qty: 3 ML | Refills: 11 | Status: SHIPPED | OUTPATIENT
Start: 2017-06-16 | End: 2017-08-04 | Stop reason: SDUPTHER

## 2017-06-16 NOTE — PROGRESS NOTES
Subjective   Lizzy Restrepo is a 41 y.o. female.       Problem List  1. DM type 2 insulin dependent, uncontrolled  2. Obesity  3. Hyperrtension  4. GERD  5. Iron deficiency anemia  6. GERD  7. Vitamin D deficiency  8. Hyperlipidemia/dysplipidemia  9. Elevated ALT    Pt is 42 yo AAF with the above medical issues. Is here for recheck. States her sugars are well controlled with Januvia, Lantus 20 untis at bedtime and Humalog 10 units before meals but does not take invokana 300 mg PO q daily. States it is giving her yeast infections. Last hga1c <7.5.   Takes Invokana 3 times a week.  Recently was switch to Basaglar 20 units at bedtime.  Is due for new labwork today    Has issues with weight has only lost 1 lb since last visit     Still taking Ferrous sulfate for iron deficiency anemia. Is due for recheck    BP at goal today with lisionpril/hctz combination      Diabetes   She presents for her follow-up diabetic visit. She has type 2 diabetes mellitus. Her disease course has been stable. Pertinent negatives for hypoglycemia include no confusion, dizziness, headaches, hunger, mood changes, nervousness/anxiousness, pallor, seizures, sleepiness, speech difficulty, sweats or tremors. Pertinent negatives for diabetes include no blurred vision, no chest pain, no fatigue, no foot paresthesias, no foot ulcerations, no polydipsia, no polyphagia, no polyuria, no visual change, no weakness and no weight loss. Pertinent negatives for hypoglycemia complications include no blackouts, no hospitalization, no nocturnal hypoglycemia, no required assistance and no required glucagon injection. Symptoms are stable. Pertinent negatives for diabetic complications include no autonomic neuropathy, CVA, heart disease, impotence, nephropathy, peripheral neuropathy, PVD or retinopathy. Risk factors for coronary artery disease include diabetes mellitus, obesity, sedentary lifestyle and stress. Current diabetic treatment includes insulin injections  "and oral agent (dual therapy). She is compliant with treatment most of the time. She is following a generally unhealthy diet. When asked about meal planning, she reported none. She has not had a previous visit with a dietitian. An ACE inhibitor/angiotensin II receptor blocker is being taken. She does not see a podiatrist.Eye exam is current.          The following portions of the patient's history were reviewed and updated as appropriate: allergies, current medications, past family history, past medical history, past social history, past surgical history and problem list.    Review of Systems   Constitutional: Negative.  Negative for fatigue and weight loss.   HENT: Negative.    Eyes: Negative.  Negative for blurred vision.   Respiratory: Negative.    Cardiovascular: Negative.  Negative for chest pain.   Gastrointestinal: Negative.    Endocrine: Negative.  Negative for polydipsia, polyphagia and polyuria.   Genitourinary: Negative.  Negative for impotence.   Musculoskeletal: Negative.    Skin: Negative.  Negative for pallor.   Allergic/Immunologic: Negative.    Neurological: Negative.  Negative for dizziness, tremors, seizures, speech difficulty, weakness and headaches.   Hematological: Negative.    Psychiatric/Behavioral: Negative.  Negative for confusion. The patient is not nervous/anxious.        Objective    /80  Pulse 83  Temp 97.8 °F (36.6 °C)  Resp 16  Ht 63\" (160 cm)  Wt 287 lb 12.8 oz (131 kg)  BMI 50.98 kg/m2      Chemistry        Component Value Date/Time     02/17/2017 1122    K 4.4 02/17/2017 1122    CL 97 02/17/2017 1122    CO2 33.0 (H) 02/17/2017 1122    BUN 13 02/17/2017 1122    CREATININE 0.78 02/17/2017 1122        Component Value Date/Time    CALCIUM 10.0 02/17/2017 1122    ALKPHOS 80 02/17/2017 1122    AST 32 02/17/2017 1122    ALT 63 (H) 02/17/2017 1122    BILITOT 0.6 02/17/2017 1122        Lab Results   Component Value Date    WBC 9.63 02/17/2017    HGB 12.9 02/17/2017    HCT " 41.5 02/17/2017    MCV 68.4 (L) 02/17/2017     02/17/2017     Lab Results   Component Value Date    CHOL 109 02/17/2017     Lab Results   Component Value Date    TRIG 61 02/17/2017    TRIG 73 11/03/2016    TRIG 114 07/20/2016     Lab Results   Component Value Date    HDL 44 (L) 02/17/2017    HDL 45 (L) 11/03/2016    HDL 43 (L) 07/20/2016     Lab Results   Component Value Date    LDLCALC 45 11/03/2016    LDLCALC 93 07/20/2016    LDLCALC 73 09/29/2015     No results found for: LDL  No results found for: HDLLDLRATIO  No components found for: CHOLHDL  Lab Results   Component Value Date    HGBA1C 7.87 (H) 02/17/2017     Lab Results   Component Value Date    TSH 1.45 07/20/2016     Physical Exam   Constitutional: She is oriented to person, place, and time. She appears well-developed and well-nourished. No distress.   HENT:   Head: Normocephalic and atraumatic.   Right Ear: External ear normal.   Left Ear: External ear normal.   Eyes: Conjunctivae and EOM are normal. Pupils are equal, round, and reactive to light. Right eye exhibits no discharge. Left eye exhibits no discharge. No scleral icterus.   Neck: Normal range of motion. Neck supple. No JVD present. No tracheal deviation present. No thyromegaly present.   Cardiovascular: Normal rate, regular rhythm and normal heart sounds.    Pulmonary/Chest: Effort normal and breath sounds normal. No stridor. No respiratory distress. She has no wheezes.   Abdominal: Soft. Bowel sounds are normal. She exhibits no distension and no mass. There is no tenderness. There is no rebound and no guarding. No hernia.   Obese abdomen    Musculoskeletal: Normal range of motion. She exhibits no edema, tenderness or deformity.   Lymphadenopathy:     She has no cervical adenopathy.   Neurological: She is alert and oriented to person, place, and time. She has normal reflexes. No cranial nerve deficit. Coordination normal.   Skin: Skin is warm and dry. No rash noted. She is not diaphoretic.  No erythema. No pallor.   Psychiatric: She has a normal mood and affect. Her behavior is normal.   Nursing note and vitals reviewed.      Assessment/Plan   Problems Addressed this Visit        Cardiovascular and Mediastinum    Essential hypertension    Hyperlipidemia       Digestive    Morbid obesity    Vitamin D deficiency - Primary    Relevant Orders    Vitamin D 25 Hydroxy       Endocrine    Diabetes type 2, uncontrolled    Relevant Orders    CBC Auto Differential    Comprehensive Metabolic Panel    Hemoglobin A1c    Lipid Panel    Microalbumin / Creatinine Urine Ratio        - will get labwork recheck hga1c microalbumin creatinine ratio for DM type 2.    - hyperlipidemia - recheck lipid panel. Continue with aspirin and statin  - BP at goal today. Continue with lisionpril - HCTZ  - DM type 2 - continue Basaglar 20 untis at bedtime.  Humalog 10 units before meals and Januvia 100 mg PO q daily and Invokana 300 mg PO q daily.  Advised pt to take Invokana everyday. If pt continues to have persistent yeast infections will stop and start on possible GLP 1 agonist or just discontinue  - recheck in 1 month  - Vitamin D deficiency - recheck Vitamin D levels

## 2017-07-17 NOTE — TELEPHONE ENCOUNTER
Should be self limiting. Push fluids. BRAT diet. Could be viral in nature. If worsens or unresolved, needs to be seen. If no abd pain, does not need ov. UNABLE TO REACH PT L/M

## 2017-07-21 ENCOUNTER — OFFICE VISIT (OUTPATIENT)
Dept: FAMILY MEDICINE CLINIC | Facility: CLINIC | Age: 42
End: 2017-07-21

## 2017-07-21 VITALS
WEIGHT: 292.6 LBS | BODY MASS INDEX: 51.84 KG/M2 | SYSTOLIC BLOOD PRESSURE: 128 MMHG | HEART RATE: 68 BPM | HEIGHT: 63 IN | RESPIRATION RATE: 16 BRPM | TEMPERATURE: 98.6 F | DIASTOLIC BLOOD PRESSURE: 86 MMHG

## 2017-07-21 DIAGNOSIS — E78.5 HYPERLIPIDEMIA, UNSPECIFIED HYPERLIPIDEMIA TYPE: ICD-10-CM

## 2017-07-21 DIAGNOSIS — M79.601 RIGHT ARM PAIN: ICD-10-CM

## 2017-07-21 DIAGNOSIS — M77.01 MEDIAL EPICONDYLITIS, RIGHT: ICD-10-CM

## 2017-07-21 DIAGNOSIS — IMO0002 UNCONTROLLED TYPE 2 DIABETES MELLITUS WITH COMPLICATION, WITH LONG-TERM CURRENT USE OF INSULIN: Primary | ICD-10-CM

## 2017-07-21 DIAGNOSIS — E78.5 HYPERLIPIDEMIA ASSOCIATED WITH TYPE 2 DIABETES MELLITUS (HCC): ICD-10-CM

## 2017-07-21 DIAGNOSIS — E11.69 HYPERLIPIDEMIA ASSOCIATED WITH TYPE 2 DIABETES MELLITUS (HCC): ICD-10-CM

## 2017-07-21 DIAGNOSIS — M25.521 RIGHT ELBOW PAIN: ICD-10-CM

## 2017-07-21 DIAGNOSIS — I10 ESSENTIAL HYPERTENSION: ICD-10-CM

## 2017-07-21 DIAGNOSIS — M25.511 ACUTE PAIN OF RIGHT SHOULDER: ICD-10-CM

## 2017-07-21 DIAGNOSIS — E55.9 VITAMIN D DEFICIENCY: ICD-10-CM

## 2017-07-21 DIAGNOSIS — M77.11 LATERAL EPICONDYLITIS OF RIGHT ELBOW: ICD-10-CM

## 2017-07-21 PROBLEM — M77.00 MEDIAL EPICONDYLITIS: Status: ACTIVE | Noted: 2017-07-21

## 2017-07-21 PROCEDURE — 99214 OFFICE O/P EST MOD 30 MIN: CPT | Performed by: FAMILY MEDICINE

## 2017-07-21 RX ORDER — NAPROXEN 500 MG/1
500 TABLET ORAL 2 TIMES DAILY WITH MEALS
Qty: 60 TABLET | Refills: 6 | Status: SHIPPED | OUTPATIENT
Start: 2017-07-21 | End: 2017-12-06

## 2017-07-21 NOTE — PROGRESS NOTES
Subjective   Lizzy Restrepo is a 41 y.o. female.     Problem List  1. DM type 2 insulin dependent, uncontrolled  2. Obesity  3. Hyperrtension  4. GERD  5. Iron deficiency anemia  6. GERD  7. Vitamin D deficiency  8. Hyperlipidemia/dysplipidemia  9. Elevated ALT    Pt is 40 yo AAF with the above medical issues. Is here for recheck.  Did not get labwork done yet. Has DM type 2 and is on Basaglar 20 units at bedtime along with humalog 10 units before meals. Also takes invokana 300 mg PO q daily along with januvia 100 mg PO q daily.  Regarding hyperlipidemia is on lipitor 40 mg PO q daily.  Also has vitamin D deficiency and is on 50,000 units of vitamin D once a week.      For B currently on lisinopril 10-12.5 mg PO q daily.     Pt also has been having pain in right shoulder, right arm and elbow for past month. Did heavy duty work at a [employer] last few months and lifted heavy objects.  Denies any trauma but pain is mainly on epicondyle on right elbow. Pain is about 6/10 on severity. States she has tried Tylenol with no relief. Is willing to try PT/OT      Diabetes   She presents for her follow-up diabetic visit. She has type 2 diabetes mellitus. Her disease course has been fluctuating. Pertinent negatives for hypoglycemia include no confusion, dizziness, headaches, hunger, mood changes, nervousness/anxiousness, pallor, seizures, sleepiness, speech difficulty, sweats or tremors. Pertinent negatives for diabetes include no blurred vision, no chest pain, no fatigue, no foot paresthesias, no foot ulcerations, no polydipsia, no polyphagia, no polyuria, no visual change and no weakness. Risk factors for coronary artery disease include diabetes mellitus, hypertension, obesity, dyslipidemia and sedentary lifestyle. Current diabetic treatment includes insulin injections and oral agent (dual therapy). She is compliant with treatment most of the time. Her weight is stable. She is following a generally unhealthy diet. When asked  about meal planning, she reported none. She has not had a previous visit with a dietitian. She participates in exercise intermittently. An ACE inhibitor/angiotensin II receptor blocker is being taken. She does not see a podiatrist.Eye exam is current.   Upper Extremity Issue   This is a new problem. The current episode started more than 1 month ago. The problem occurs daily. The problem has been unchanged. Associated symptoms include arthralgias and numbness. Pertinent negatives include no abdominal pain, anorexia, change in bowel habit, chest pain, chills, congestion, coughing, diaphoresis, fatigue, fever, headaches, joint swelling, nausea, neck pain, rash, sore throat, swollen glands, urinary symptoms, vertigo, visual change, vomiting or weakness. The symptoms are aggravated by twisting. She has tried acetaminophen for the symptoms. The treatment provided no relief.   Shoulder Injury    The incident occurred at work. There was no injury mechanism. The quality of the pain is described as aching, cramping and shooting. The pain radiates to the right arm. The pain is at a severity of 5/10. The pain is moderate. Associated symptoms include numbness. Pertinent negatives include no chest pain. The symptoms are aggravated by movement and overhead lifting. She has tried nothing for the symptoms. The treatment provided no relief.   Arm Pain    The incident occurred at work. The injury mechanism is unknown. The pain is present in the right elbow. The quality of the pain is described as aching. The pain is at a severity of 6/10. The pain is moderate. Associated symptoms include numbness. Pertinent negatives include no chest pain. The symptoms are aggravated by movement, lifting and palpation. She has tried acetaminophen for the symptoms. The treatment provided no relief.        The following portions of the patient's history were reviewed and updated as appropriate: allergies, current medications, past family history, past  "medical history, past social history, past surgical history and problem list.    Review of Systems   Constitutional: Negative.  Negative for chills, diaphoresis, fatigue and fever.   HENT: Negative.  Negative for congestion and sore throat.    Eyes: Negative.  Negative for blurred vision.   Respiratory: Negative.  Negative for cough.    Cardiovascular: Negative.  Negative for chest pain.   Gastrointestinal: Negative.  Negative for abdominal pain, anorexia, change in bowel habit, nausea and vomiting.   Endocrine: Negative.  Negative for polydipsia, polyphagia and polyuria.   Genitourinary: Negative.    Musculoskeletal: Positive for arthralgias. Negative for joint swelling and neck pain.   Skin: Negative.  Negative for pallor and rash.   Allergic/Immunologic: Negative.    Neurological: Positive for numbness. Negative for dizziness, vertigo, tremors, seizures, speech difficulty, weakness and headaches.   Hematological: Negative.    Psychiatric/Behavioral: Negative.  Negative for confusion. The patient is not nervous/anxious.        Objective    /86  Pulse 68  Temp 98.6 °F (37 °C)  Resp 16  Ht 63\" (160 cm)  Wt 292 lb 9.6 oz (133 kg)  BMI 51.83 kg/m2      Chemistry        Component Value Date/Time     02/17/2017 1122    K 4.4 02/17/2017 1122    CL 97 02/17/2017 1122    CO2 33.0 (H) 02/17/2017 1122    BUN 13 02/17/2017 1122    CREATININE 0.78 02/17/2017 1122        Component Value Date/Time    CALCIUM 10.0 02/17/2017 1122    ALKPHOS 80 02/17/2017 1122    AST 32 02/17/2017 1122    ALT 63 (H) 02/17/2017 1122    BILITOT 0.6 02/17/2017 1122        Lab Results   Component Value Date    CHOL 109 02/17/2017     Lab Results   Component Value Date    TRIG 61 02/17/2017    TRIG 73 11/03/2016    TRIG 114 07/20/2016     Lab Results   Component Value Date    HDL 44 (L) 02/17/2017    HDL 45 (L) 11/03/2016    HDL 43 (L) 07/20/2016     Lab Results   Component Value Date    LDLCALC 45 11/03/2016    LDLCALC 93 07/20/2016 "    LDLCALC 73 09/29/2015     No results found for: LDL  No results found for: HDLLDLRATIO  No components found for: CHOLHDL  Lab Results   Component Value Date    HGBA1C 7.87 (H) 02/17/2017     Lab Results   Component Value Date    WBC 9.63 02/17/2017    HGB 12.9 02/17/2017    HCT 41.5 02/17/2017    MCV 68.4 (L) 02/17/2017     02/17/2017       Physical Exam   Constitutional: She is oriented to person, place, and time. She appears well-developed and well-nourished. No distress.   HENT:   Head: Normocephalic and atraumatic.   Right Ear: External ear normal.   Left Ear: External ear normal.   Eyes: Conjunctivae and EOM are normal. Pupils are equal, round, and reactive to light. Right eye exhibits no discharge. Left eye exhibits no discharge. No scleral icterus.   Neck: Normal range of motion. Neck supple. No JVD present. No tracheal deviation present. No thyromegaly present.   Cardiovascular: Normal rate, regular rhythm and normal heart sounds.    Pulmonary/Chest: Effort normal and breath sounds normal. No stridor. No respiratory distress. She has no wheezes.   Abdominal: Soft. Bowel sounds are normal. She exhibits no distension and no mass. There is no tenderness. There is no rebound and no guarding. No hernia.   Musculoskeletal: She exhibits tenderness. She exhibits no edema or deformity.        Right shoulder: She exhibits decreased range of motion, tenderness, bony tenderness, pain and spasm.        Right elbow: She exhibits swelling and effusion. Tenderness found. Medial epicondyle and lateral epicondyle tenderness noted.   Lymphadenopathy:     She has no cervical adenopathy.   Neurological: She is alert and oriented to person, place, and time. She has normal reflexes. No cranial nerve deficit. Coordination normal.   Skin: Skin is warm and dry. No rash noted. She is not diaphoretic. No erythema. No pallor.   Psychiatric: She has a normal mood and affect. Her behavior is normal.   Nursing note and vitals  reviewed.      Assessment/Plan   Problems Addressed this Visit        Cardiovascular and Mediastinum    Hyperlipidemia associated with type 2 diabetes mellitus    Essential hypertension    Hyperlipidemia       Digestive    Vitamin D deficiency       Endocrine    Diabetes type 2, uncontrolled - Primary    Relevant Orders    Comprehensive Metabolic Panel    Hemoglobin A1c    CBC Auto Differential    Lipid Panel    Vitamin D 25 Hydroxy    Microalbumin / Creatinine Urine Ratio       Nervous and Auditory    Acute pain of right shoulder    Relevant Orders    XR Shoulder 2+ View Right    Ambulatory Referral to Physical Therapy    Right arm pain    Relevant Orders    Ambulatory Referral to Physical Therapy    Right elbow pain    Relevant Orders    XR elbow 3+ vw right    Ambulatory Referral to Physical Therapy       Musculoskeletal and Integument    Lateral epicondylitis of right elbow    Medial epicondylitis        - Pt did not get labwork today. Advised pt to get that done soon  - DM type 2 - continue insulin Basaglar 20 untis at bedtime and Humalog 10 units before meals.  Continue invokana 300 mg PO q daily along with Januvia 100 mg PO q daily.    - Vitamin D deficiency - recheck Vitamin D levels  - Essential hypertension - continue lisinopril - HCTZ  - hyperlipidemia - recheck lipid panel   -right shoulder pain, right elbow pain/right arm  - pain - will get x-rays of shoulder and elbow. Likely lateral or medial epicondylitis. Will refer to PT/OT for further evaluation.  Gave information on lateral/medial epicondylitis.  Naproxen prescribed 500 mg PO BID. Drug information given. Recommend rest, ice, elevation, compression  - recheck in 2-4 weeks

## 2017-07-21 NOTE — PATIENT INSTRUCTIONS
Medial Epicondylitis With Rehab  Medial epicondylitis involves inflammation and pain around the inner (medial) portion of the elbow. This pain is caused by inflammation of the tendons in the forearm that flex (bring down) the wrist. Medial epicondylitis is also called golfer's elbow, because it is common among golfers. However, it may occur in any individual who flexes the wrist regularly. If medial epicondylitis is left untreated, it may become a chronic problem.  SYMPTOMS   · Pain, tenderness, or inflammation over the inner (medial) side of the elbow.  · Pain or weakness with gripping activities.  · Pain that increases with wrist twisting motions (using a screwdriver, playing golf, bowling).  CAUSES   Medial epicondylitis is caused by inflammation of the tendons that flex the wrist. Causes of injury may include:  · Chronic, repetitive stress and strain to the tendons that run from the wrist and forearm to the elbow.  · Sudden strain on the forearm, including wrist snap when serving balls with racquet sports, or throwing a baseball.  RISK INCREASES WITH:  · Sports or occupations that require repetitive and/or strenuous forearm and wrist movements (pitching a baseball, golfing, carpentry).  · Poor wrist and forearm strength and flexibility.  · Failure to warm up properly before activity.  · Resuming activity before healing, rehabilitation, and conditioning are complete.  PREVENTION   · Warm up and stretch properly before activity.  · Maintain physical fitness:    Strength, flexibility, and endurance.    Cardiovascular fitness.  · Wear and use properly fitted equipment.  · Learn and use proper technique and have a  correct improper technique.  · Wear a tennis elbow (counterforce) brace.  PROGNOSIS   The course of this condition depends on the degree of the injury. If treated properly, acute cases (symptoms lasting less than 4 weeks) are often resolved in 2 to 6 weeks. Chronic (longer lasting cases) often resolve  in 3 to 6 months, but may require physical therapy.  RELATED COMPLICATIONS   · Frequently recurring symptoms, resulting in a chronic problem. Properly treating the problem the first time decreases frequency of recurrence.  · Chronic inflammation, scarring, and partial tendon tear, requiring surgery.  · Delayed healing or resolution of symptoms.  TREATMENT   Treatment first involves the use of ice and medicine, to reduce pain and inflammation. Strengthening and stretching exercises may reduce discomfort, if performed regularly. These exercises may be performed at home, if the condition is an acute injury. Chronic cases may require a referral to a physical therapist for evaluation and treatment. Your caregiver may advise a corticosteroid injection to help reduce inflammation. Rarely, surgery is needed.  MEDICATION  · If pain medicine is needed, nonsteroidal anti-inflammatory medicines (aspirin and ibuprofen), or other minor pain relievers (acetaminophen), are often advised.  · Do not take pain medicine for 7 days before surgery.  · Prescription pain relievers may be given, if your caregiver thinks they are needed. Use only as directed and only as much as you need.  · Corticosteroid injections may be recommended. These injections should be reserved only for the most severe cases, because they can only be given a certain number of times.  HEAT AND COLD  · Cold treatment (icing) should be applied for 10 to 15 minutes every 2 to 3 hours for inflammation and pain, and immediately after activity that aggravates your symptoms. Use ice packs or an ice massage.  · Heat treatment may be used before performing stretching and strengthening activities prescribed by your caregiver, physical therapist, or . Use a heat pack or a warm water soak.  SEEK MEDICAL CARE IF:  Symptoms get worse or do not improve in 2 weeks, despite treatment.  EXERCISES   RANGE OF MOTION (ROM) AND STRETCHING EXERCISES - Epicondylitis, Medial  (Golfer's Elbow)  These exercises may help you when beginning to rehabilitate your injury. Your symptoms may go away with or without further involvement from your physician, physical therapist or . While completing these exercises, remember:   · Restoring tissue flexibility helps normal motion to return to the joints. This allows healthier, less painful movement and activity.  · An effective stretch should be held for at least 30 seconds.  · A stretch should never be painful. You should only feel a gentle lengthening or release in the stretched tissue.  RANGE OF MOTION - Wrist Flexion, Active-Assisted  · Extend your right / left elbow with your fingers pointing down.*  · Gently pull the back of your hand towards you, until you feel a gentle stretch on the top of your forearm.  · Hold this position for __________ seconds.  Repeat __________ times. Complete this exercise __________ times per day.   *If directed by your physician, physical therapist or , complete this stretch with your elbow bent, rather than extended.  RANGE OF MOTION - Wrist Extension, Active-Assisted  · Extend your right / left elbow and turn your palm upwards.*  · Gently pull your palm and fingertips back, so your wrist extends and your fingers point more toward the ground.  · You should feel a gentle stretch on the inside of your forearm.  · Hold this position for __________ seconds.  Repeat __________ times. Complete this exercise __________ times per day.  *If directed by your physician, physical therapist or , complete this stretch with your elbow bent, rather than extended.  STRETCH - Wrist Extension   · Place your right / left fingertips on a tabletop leaving your elbow slightly bent. Your fingers should point backwards.  · Gently press your fingers and palm down onto the table, by straightening your elbow. You should feel a stretch on the inside of your forearm.  · Hold this position for  __________ seconds.  Repeat __________ times. Complete this stretch __________ times per day.   STRENGTHENING EXERCISES - Epicondylitis, Medial (Golfer's Elbow)  These exercises may help you when beginning to rehabilitate your injury. They may resolve your symptoms with or without further involvement from your physician, physical therapist or . While completing these exercises, remember:   · Muscles can gain both the endurance and the strength needed for everyday activities through controlled exercises.  · Complete these exercises as instructed by your physician, physical therapist or . Increase the resistance and repetitions only as guided.  · You may experience muscle soreness or fatigue, but the pain or discomfort you are trying to eliminate should never worsen during these exercises. If this pain does get worse, stop and make sure you are following the directions exactly. If the pain is still present after adjustments, discontinue the exercise until you can discuss the trouble with your caregiver.  STRENGTH - Wrist Flexors  · Sit with your right / left forearm palm-up, and fully supported on a table or countertop. Your elbow should be resting below the height of your shoulder. Allow your wrist to extend over the edge of the surface.  · Loosely holding a __________ weight, or a piece of rubber exercise band or tubing, slowly curl your hand up toward your forearm.  · Hold this position for __________ seconds. Slowly lower the wrist back to the starting position in a controlled manner.  Repeat __________ times. Complete this exercise __________ times per day.   STRENGTH - Wrist Extensors  · Sit with your right / left forearm palm-down and fully supported. Your elbow should be resting below the height of your shoulder. Allow your wrist to extend over the edge of the surface.  · Loosely holding a __________ weight, or a piece of rubber exercise band or tubing, slowly curl your hand up  "toward your forearm.  · Hold this position for __________ seconds. Slowly lower the wrist back to the starting position in a controlled manner.  Repeat __________ times. Complete this exercise __________ times per day.   STRENGTH - Ulnar Deviators  · Stand with a ____________________ weight in your right / left hand, or sit while holding a rubber exercise band or tubing, with your healthy arm supported on a table or countertop.  · Move your wrist so that your pinkie travels toward your forearm and your thumb moves away from your forearm.  · Hold this position for __________ seconds and then slowly lower the wrist back to the starting position.  Repeat __________ times. Complete this exercise __________ times per day  STRENGTH -    · Grasp a tennis ball, a dense sponge, or a large, rolled sock in your hand.  · Squeeze as hard as you can, without increasing any pain.  · Hold this position for __________ seconds. Release your  slowly.  Repeat __________ times. Complete this exercise __________ times per day.   STRENGTH - Forearm Supinators   · Sit with your right / left forearm supported on a table, keeping your elbow below shoulder height. Rest your hand over the edge, palm down.  · Gently  a hammer or a soup ladle.  · Without moving your elbow, slowly turn your palm and hand upward to a \"thumbs-up\" position.  · Hold this position for __________ seconds. Slowly return to the starting position.  Repeat __________ times. Complete this exercise __________ times per day.   STRENGTH - Forearm Pronators  · Sit with your right / left forearm supported on a table, keeping your elbow below shoulder height. Rest your hand over the edge, palm up.  · Gently  a hammer or a soup ladle.  · Without moving your elbow, slowly turn your palm and hand upward to a \"thumbs-up\" position.  · Hold this position for __________ seconds. Slowly return to the starting position.  Repeat __________ times. Complete this exercise " __________ times per day.      This information is not intended to replace advice given to you by your health care provider. Make sure you discuss any questions you have with your health care provider.     Document Released: 12/18/2006 Document Revised: 01/08/2016 Document Reviewed: 08/29/2016  [employer] Interactive Patient Education ©2017 [employer]    Lateral Epicondylitis With Rehab  Lateral epicondylitis involves inflammation and pain around the outer portion of the elbow. The pain is caused by inflammation of the tendons in the forearm that bring back (extend) the wrist. Lateral epicondylitis is also called tennis elbow, because it is very common in tennis players. However, it may occur in any individual who extends the wrist repetitively. If lateral epicondylitis is left untreated, it may become a chronic problem.  SYMPTOMS   · Pain, tenderness, and inflammation on the outer (lateral) side of the elbow.  · Pain or weakness with gripping activities.  · Pain that increases with wrist-twisting motions (playing tennis, using a screwdriver, opening a door or a jar).  · Pain with lifting objects, including a coffee cup.  CAUSES   Lateral epicondylitis is caused by inflammation of the tendons that extend the wrist. Causes of injury may include:  · Repetitive stress and strain on the muscles and tendons that extend the wrist.  · Sudden change in activity level or intensity.  · Incorrect  in racquet sports.  · Incorrect  size of racquet (often too large).  · Incorrect hitting position or technique (usually backhand, leading with the elbow).  · Using a racket that is too heavy.  RISK INCREASES WITH:  · Sports or occupations that require repetitive and/or strenuous forearm and wrist movements (tennis, squash, racquetball, carpentry).  · Poor wrist and forearm strength and flexibility.  · Failure to warm up properly before activity.  · Resuming activity before healing, rehabilitation, and conditioning are  complete.  PREVENTION   · Warm up and stretch properly before activity.  · Maintain physical fitness:    Strength, flexibility, and endurance.    Cardiovascular fitness.  · Wear and use properly fitted equipment.  · Learn and use proper technique and have a  correct improper technique.  · Wear a tennis elbow (counterforce) brace.  PROGNOSIS   The course of this condition depends on the degree of the injury. If treated properly, acute cases (symptoms lasting less than 4 weeks) are often resolved in 2 to 6 weeks. Chronic (longer lasting cases) often resolve in 3 to 6 months but may require physical therapy.  RELATED COMPLICATIONS   · Frequently recurring symptoms, resulting in a chronic problem. Properly treating the problem the first time decreases frequency of recurrence.  · Chronic inflammation, scarring tendon degeneration, and partial tendon tear, requiring surgery.  · Delayed healing or resolution of symptoms.  TREATMENT   Treatment first involves the use of ice and medicine to reduce pain and inflammation. Strengthening and stretching exercises may help reduce discomfort if performed regularly. These exercises may be performed at home if the condition is an acute injury. Chronic cases may require a referral to a physical therapist for evaluation and treatment. Your caregiver may advise a corticosteroid injection to help reduce inflammation. Rarely, surgery is needed.  MEDICATION  · If pain medicine is needed, nonsteroidal anti-inflammatory medicines (aspirin and ibuprofen), or other minor pain relievers (acetaminophen), are often advised.  · Do not take pain medicine for 7 days before surgery.  · Prescription pain relievers may be given, if your caregiver thinks they are needed. Use only as directed and only as much as you need.  · Corticosteroid injections may be recommended. These injections should be reserved only for the most severe cases, because they can only be given a certain number of times.  HEAT  AND COLD  · Cold treatment (icing) should be applied for 10 to 15 minutes every 2 to 3 hours for inflammation and pain, and immediately after activity that aggravates your symptoms. Use ice packs or an ice massage.  · Heat treatment may be used before performing stretching and strengthening activities prescribed by your caregiver, physical therapist, or . Use a heat pack or a warm water soak.  SEEK MEDICAL CARE IF:  Symptoms get worse or do not improve in 2 weeks, despite treatment.  EXERCISES   RANGE OF MOTION (ROM) AND STRETCHING EXERCISES - Epicondylitis, Lateral (Tennis Elbow)  These exercises may help you when beginning to rehabilitate your injury. Your symptoms may go away with or without further involvement from your physician, physical therapist, or . While completing these exercises, remember:   · Restoring tissue flexibility helps normal motion to return to the joints. This allows healthier, less painful movement and activity.  · An effective stretch should be held for at least 30 seconds.  · A stretch should never be painful. You should only feel a gentle lengthening or release in the stretched tissue.  RANGE OF MOTION - Wrist Flexion, Active-Assisted  · Extend your right / left elbow with your fingers pointing down.*  · Gently pull the back of your hand towards you, until you feel a gentle stretch on the top of your forearm.  · Hold this position for __________ seconds.  Repeat __________ times. Complete this exercise __________ times per day.   *If directed by your physician, physical therapist or , complete this stretch with your elbow bent, rather than extended.  RANGE OF MOTION - Wrist Extension, Active-Assisted  · Extend your right / left elbow and turn your palm upwards.*  · Gently pull your palm and fingertips back, so your wrist extends and your fingers point more toward the ground.  · You should feel a gentle stretch on the inside of your  forearm.  · Hold this position for __________ seconds.  Repeat __________ times. Complete this exercise __________ times per day.  *If directed by your physician, physical therapist or , complete this stretch with your elbow bent, rather than extended.  STRETCH - Wrist Flexion  · Place the back of your right / left hand on a tabletop, leaving your elbow slightly bent. Your fingers should point away from your body.  · Gently press the back of your hand down onto the table by straightening your elbow. You should feel a stretch on the top of your forearm.  · Hold this position for __________ seconds.  Repeat __________ times. Complete this stretch __________ times per day.   STRETCH - Wrist Extension   · Place your right / left fingertips on a tabletop, leaving your elbow slightly bent. Your fingers should point backwards.  · Gently press your fingers and palm down onto the table by straightening your elbow. You should feel a stretch on the inside of your forearm.  · Hold this position for __________ seconds.  Repeat __________ times. Complete this stretch __________ times per day.   STRENGTHENING EXERCISES - Epicondylitis, Lateral (Tennis Elbow)  These exercises may help you when beginning to rehabilitate your injury. They may resolve your symptoms with or without further involvement from your physician, physical therapist, or . While completing these exercises, remember:   · Muscles can gain both the endurance and the strength needed for everyday activities through controlled exercises.  · Complete these exercises as instructed by your physician, physical therapist or . Increase the resistance and repetitions only as guided.  · You may experience muscle soreness or fatigue, but the pain or discomfort you are trying to eliminate should never worsen during these exercises. If this pain does get worse, stop and make sure you are following the directions exactly. If the  pain is still present after adjustments, discontinue the exercise until you can discuss the trouble with your caregiver.  STRENGTH - Wrist Flexors  · Sit with your right / left forearm palm-up and fully supported on a table or countertop. Your elbow should be resting below the height of your shoulder. Allow your wrist to extend over the edge of the surface.  · Loosely holding a __________ weight, or a piece of rubber exercise band or tubing, slowly curl your hand up toward your forearm.  · Hold this position for __________ seconds. Slowly lower the wrist back to the starting position in a controlled manner.  Repeat __________ times. Complete this exercise __________ times per day.   STRENGTH - Wrist Extensors  · Sit with your right / left forearm palm-down and fully supported on a table or countertop. Your elbow should be resting below the height of your shoulder. Allow your wrist to extend over the edge of the surface.  · Loosely holding a __________ weight, or a piece of rubber exercise band or tubing, slowly curl your hand up toward your forearm.  · Hold this position for __________ seconds. Slowly lower the wrist back to the starting position in a controlled manner.  Repeat __________ times. Complete this exercise __________ times per day.   STRENGTH - Ulnar Deviators  · Stand with a ____________________ weight in your right / left hand, or sit while holding a rubber exercise band or tubing, with your healthy arm supported on a table or countertop.  · Move your wrist, so that your pinkie travels toward your forearm and your thumb moves away from your forearm.  · Hold this position for __________ seconds and then slowly lower the wrist back to the starting position.  Repeat __________ times. Complete this exercise __________ times per day  STRENGTH - Radial Deviators  · Stand with a ____________________ weight in your right / left hand, or sit while holding a rubber exercise band or tubing, with your injured arm  "supported on a table or countertop.  · Raise your hand upward in front of you or pull up on the rubber tubing.  · Hold this position for __________ seconds and then slowly lower the wrist back to the starting position.  Repeat __________ times. Complete this exercise __________ times per day.  STRENGTH - Forearm Supinators   · Sit with your right / left forearm supported on a table, keeping your elbow below shoulder height. Rest your hand over the edge, palm down.  · Gently  a hammer or a soup ladle.  · Without moving your elbow, slowly turn your palm and hand upward to a \"thumbs-up\" position.  · Hold this position for __________ seconds. Slowly return to the starting position.  Repeat __________ times. Complete this exercise __________ times per day.   STRENGTH - Forearm Pronators   · Sit with your right / left forearm supported on a table, keeping your elbow below shoulder height. Rest your hand over the edge, palm up.  · Gently  a hammer or a soup ladle.  · Without moving your elbow, slowly turn your palm and hand upward to a \"thumbs-up\" position.  · Hold this position for __________ seconds. Slowly return to the starting position.  Repeat __________ times. Complete this exercise __________ times per day.   STRENGTH -   · Grasp a tennis ball, a dense sponge, or a large, rolled sock in your hand.  · Squeeze as hard as you can, without increasing any pain.  · Hold this position for __________ seconds. Release your  slowly.  Repeat __________ times. Complete this exercise __________ times per day.   STRENGTH - Elbow Extensors, Isometric  · Stand or sit upright, on a firm surface. Place your right / left arm so that your palm faces your stomach, and it is at the height of your waist.  · Place your opposite hand on the underside of your forearm. Gently push up as your right / left arm resists. Push as hard as you can with both arms, without causing any pain or movement at your right / left elbow. Hold " this stationary position for __________ seconds.  Gradually release the tension in both arms. Allow your muscles to relax completely before repeating.     This information is not intended to replace advice given to you by your health care provider. Make sure you discuss any questions you have with your health care provider.     Document Released: 12/18/2006 Document Revised: 01/08/2016 Document Reviewed: 09/15/2016  [employer] Patient Education ©2017 [employer]  Naproxen Sodium oral tablet, extended-release  What is this medicine?  NAPROXEN (na PROX en) is a non-steroidal anti-inflammatory drug (NSAID). It is used to reduce swelling and to treat pain. This medicine may be used for dental pain, headache, or painful monthly periods. It is also used for painful joint and muscular problems such as arthritis, tendinitis, bursitis, and gout.  This medicine may be used for other purposes; ask your health care provider or pharmacist if you have questions.  COMMON BRAND NAME(S): Midol Extended Relief, Naprelan Dose Card  What should I tell my health care provider before I take this medicine?  They need to know if you have any of these conditions:  -asthma  -cigarette smoker  -drink more than 3 alcohol containing drinks a day  -heart disease or circulation problems such as heart failure or leg edema (fluid retention)  -high blood pressure  -kidney disease  -liver disease  -stomach bleeding or ulcers  -an unusual or allergic reaction to naproxen, aspirin, other NSAIDs, other medicines, foods, dyes, or preservatives  -pregnant or trying to get pregnant  -breast-feeding  How should I use this medicine?  Take this medicine by mouth with a glass of water. Follow the directions on the prescription label. Take this medicine with food if it upsets your stomach. Try to not lie down for at least 10 minutes after you take it. Take your medicine at regular intervals. Do not take your medicine more often than directed.  Long-term, continuous use may increase the risk of heart attack or stroke.  A special MedGuide will be given to you by the pharmacist with each prescription and refill. Be sure to read this information carefully each time.  Talk to your pediatrician regarding the use of this medicine in children. Special care may be needed.  Overdosage: If you think you have taken too much of this medicine contact a poison control center or emergency room at once.  NOTE: This medicine is only for you. Do not share this medicine with others.  What if I miss a dose?  If you miss a dose, take it as soon as you can. If it is almost time for your next dose, take only that dose. Do not take double or extra doses.  What may interact with this medicine?  -alcohol  -aspirin  -cidofovir  -diuretics  -lithium  -methotrexate  -other drugs for inflammation like ketorolac or prednisone  -pemetrexed  -probenecid  -warfarin  This list may not describe all possible interactions. Give your health care provider a list of all the medicines, herbs, non-prescription drugs, or dietary supplements you use. Also tell them if you smoke, drink alcohol, or use illegal drugs. Some items may interact with your medicine.  What should I watch for while using this medicine?  Tell your doctor or health care professional if your pain does not get better. Talk to your doctor before taking another medicine for pain. Do not treat yourself.  This medicine does not prevent heart attack or stroke. In fact, this medicine may increase the chance of a heart attack or stroke. The chance may increase with longer use of this medicine and in people who have heart disease. If you take aspirin to prevent heart attack or stroke, talk with your doctor or health care professional.  Do not take other medicines that contain aspirin, ibuprofen, or naproxen with this medicine. Side effects such as stomach upset, nausea, or ulcers may be more likely to occur. Many medicines available  without a prescription should not be taken with this medicine.  This medicine can cause ulcers and bleeding in the stomach and intestines at any time during treatment. Do not smoke cigarettes or drink alcohol. These increase irritation to your stomach and can make it more susceptible to damage from this medicine. Ulcers and bleeding can happen without warning symptoms and can cause death.  You may get drowsy or dizzy. Do not drive, use machinery, or do anything that needs mental alertness until you know how this medicine affects you. Do not stand or sit up quickly, especially if you are an older patient. This reduces the risk of dizzy or fainting spells.  This medicine can cause you to bleed more easily. Try to avoid damage to your teeth and gums when you brush or floss your teeth.  What side effects may I notice from receiving this medicine?  Side effects that you should report to your doctor or health care professional as soon as possible:  -black or bloody stools, blood in the urine or vomit  -blurred vision  -chest pain  -difficulty breathing or wheezing  -nausea or vomiting  -severe stomach pain  -skin rash, skin redness, blistering or peeling skin, hives, or itching  -slurred speech or weakness on one side of the body  -swelling of eyelids, throat, lips  -unexplained weight gain or swelling  -unusually weak or tired  -yellowing of eyes or skin  Side effects that usually do not require medical attention (report to your doctor or health care professional if they continue or are bothersome):  -constipation  -headache  -heartburn  This list may not describe all possible side effects. Call your doctor for medical advice about side effects. You may report side effects to FDA at 7-634-FDA-9917.  Where should I keep my medicine?  Keep out of the reach of children.  Store at room temperature between 20 and 25 degrees C (68 and 77 degrees F). Keep container tightly closed. Throw away any unused medicine after the  expiration date.  NOTE: This sheet is a summary. It may not cover all possible information. If you have questions about this medicine, talk to your doctor, pharmacist, or health care provider.     © 2017, [employer]. (2010-12-20 20:26:54)

## 2017-07-24 PROCEDURE — 83036 HEMOGLOBIN GLYCOSYLATED A1C: CPT | Performed by: FAMILY MEDICINE

## 2017-07-24 PROCEDURE — 85025 COMPLETE CBC W/AUTO DIFF WBC: CPT | Performed by: FAMILY MEDICINE

## 2017-07-24 PROCEDURE — 80053 COMPREHEN METABOLIC PANEL: CPT | Performed by: FAMILY MEDICINE

## 2017-07-24 PROCEDURE — 36415 COLL VENOUS BLD VENIPUNCTURE: CPT | Performed by: FAMILY MEDICINE

## 2017-07-24 PROCEDURE — 82043 UR ALBUMIN QUANTITATIVE: CPT | Performed by: FAMILY MEDICINE

## 2017-07-24 PROCEDURE — 82570 ASSAY OF URINE CREATININE: CPT | Performed by: FAMILY MEDICINE

## 2017-07-24 PROCEDURE — 82306 VITAMIN D 25 HYDROXY: CPT | Performed by: FAMILY MEDICINE

## 2017-07-24 PROCEDURE — 80061 LIPID PANEL: CPT | Performed by: FAMILY MEDICINE

## 2017-07-24 PROCEDURE — 85007 BL SMEAR W/DIFF WBC COUNT: CPT | Performed by: FAMILY MEDICINE

## 2017-07-24 RX ORDER — ALBUTEROL SULFATE 90 UG/1
AEROSOL, METERED RESPIRATORY (INHALATION)
Qty: 18 G | Refills: 0 | Status: SHIPPED | OUTPATIENT
Start: 2017-07-24 | End: 2017-08-29 | Stop reason: SDUPTHER

## 2017-07-25 LAB
25(OH)D3 SERPL-MCNC: 55.3 NG/ML (ref 30–100)
ALBUMIN SERPL-MCNC: 3.9 G/DL (ref 3.4–4.8)
ALBUMIN UR-MCNC: 0.7 MG/L
ALBUMIN/GLOB SERPL: 1.2 G/DL (ref 1.1–1.8)
ALP SERPL-CCNC: 64 U/L (ref 38–126)
ALT SERPL W P-5'-P-CCNC: 45 U/L (ref 9–52)
ANION GAP SERPL CALCULATED.3IONS-SCNC: 6 MMOL/L (ref 5–15)
ARTICHOKE IGE QN: 55 MG/DL (ref 1–129)
AST SERPL-CCNC: 26 U/L (ref 14–36)
BASOPHILS # BLD AUTO: 0.07 10*3/MM3 (ref 0–0.2)
BASOPHILS NFR BLD AUTO: 0.8 % (ref 0–2)
BILIRUB SERPL-MCNC: 0.3 MG/DL (ref 0.2–1.3)
BUN BLD-MCNC: 12 MG/DL (ref 7–21)
BUN/CREAT SERPL: 15.6 (ref 7–25)
CALCIUM SPEC-SCNC: 9.4 MG/DL (ref 8.4–10.2)
CHLORIDE SERPL-SCNC: 102 MMOL/L (ref 95–110)
CHOLEST SERPL-MCNC: 120 MG/DL (ref 0–199)
CO2 SERPL-SCNC: 31 MMOL/L (ref 22–31)
CREAT BLD-MCNC: 0.77 MG/DL (ref 0.5–1)
CREAT UR-MCNC: 191.8 MG/DL
DEPRECATED RDW RBC AUTO: 41 FL (ref 36.4–46.3)
EOSINOPHIL # BLD AUTO: 0.57 10*3/MM3 (ref 0–0.7)
EOSINOPHIL NFR BLD AUTO: 6.6 % (ref 0–7)
ERYTHROCYTE [DISTWIDTH] IN BLOOD BY AUTOMATED COUNT: 16.4 % (ref 11.5–14.5)
GFR SERPL CREATININE-BSD FRML MDRD: 100 ML/MIN/1.73 (ref 58–135)
GLOBULIN UR ELPH-MCNC: 3.3 GM/DL (ref 2.3–3.5)
GLUCOSE BLD-MCNC: 134 MG/DL (ref 60–100)
HBA1C MFR BLD: 8.57 % (ref 4–5.6)
HCT VFR BLD AUTO: 38.2 % (ref 35–45)
HDLC SERPL-MCNC: 46 MG/DL (ref 60–200)
HGB BLD-MCNC: 11.2 G/DL (ref 12–15.5)
IMM GRANULOCYTES # BLD: 0.04 10*3/MM3 (ref 0–0.02)
IMM GRANULOCYTES NFR BLD: 0.5 % (ref 0–0.5)
LDLC/HDLC SERPL: 1.2 {RATIO} (ref 0–3.22)
LYMPHOCYTES # BLD AUTO: 3.28 10*3/MM3 (ref 0.6–4.2)
LYMPHOCYTES NFR BLD AUTO: 37.9 % (ref 10–50)
MCH RBC QN AUTO: 20.6 PG (ref 26.5–34)
MCHC RBC AUTO-ENTMCNC: 29.3 G/DL (ref 31.4–36)
MCV RBC AUTO: 70.1 FL (ref 80–98)
MICROALBUMIN/CREAT UR: 3.6 MG/G (ref 0–30)
MONOCYTES # BLD AUTO: 0.53 10*3/MM3 (ref 0–0.9)
MONOCYTES NFR BLD AUTO: 6.1 % (ref 0–12)
NEUTROPHILS # BLD AUTO: 4.16 10*3/MM3 (ref 2–8.6)
NEUTROPHILS NFR BLD AUTO: 48.1 % (ref 37–80)
PLAT MORPH BLD: NORMAL
PLATELET # BLD AUTO: 281 10*3/MM3 (ref 150–450)
PMV BLD AUTO: 11 FL (ref 8–12)
POTASSIUM BLD-SCNC: 4.3 MMOL/L (ref 3.5–5.1)
PROT SERPL-MCNC: 7.2 G/DL (ref 6.3–8.6)
RBC # BLD AUTO: 5.45 10*6/MM3 (ref 3.77–5.16)
RBC MORPH BLD: NORMAL
SODIUM BLD-SCNC: 139 MMOL/L (ref 137–145)
TRIGL SERPL-MCNC: 95 MG/DL (ref 20–199)
WBC MORPH BLD: NORMAL
WBC NRBC COR # BLD: 8.65 10*3/MM3 (ref 3.2–9.8)

## 2017-08-04 ENCOUNTER — OFFICE VISIT (OUTPATIENT)
Dept: FAMILY MEDICINE CLINIC | Facility: CLINIC | Age: 42
End: 2017-08-04

## 2017-08-04 VITALS
WEIGHT: 291.4 LBS | BODY MASS INDEX: 51.63 KG/M2 | HEART RATE: 90 BPM | RESPIRATION RATE: 16 BRPM | HEIGHT: 63 IN | TEMPERATURE: 98.6 F | DIASTOLIC BLOOD PRESSURE: 86 MMHG | SYSTOLIC BLOOD PRESSURE: 126 MMHG

## 2017-08-04 DIAGNOSIS — E55.9 VITAMIN D DEFICIENCY: ICD-10-CM

## 2017-08-04 DIAGNOSIS — I10 ESSENTIAL HYPERTENSION: ICD-10-CM

## 2017-08-04 DIAGNOSIS — Z12.31 ENCOUNTER FOR SCREENING MAMMOGRAM FOR MALIGNANT NEOPLASM OF BREAST: ICD-10-CM

## 2017-08-04 DIAGNOSIS — E78.5 HYPERLIPIDEMIA, UNSPECIFIED HYPERLIPIDEMIA TYPE: ICD-10-CM

## 2017-08-04 DIAGNOSIS — IMO0002 UNCONTROLLED TYPE 2 DIABETES MELLITUS WITH COMPLICATION, WITH LONG-TERM CURRENT USE OF INSULIN: Primary | ICD-10-CM

## 2017-08-04 DIAGNOSIS — D50.9 IRON DEFICIENCY ANEMIA, UNSPECIFIED IRON DEFICIENCY ANEMIA TYPE: ICD-10-CM

## 2017-08-04 PROCEDURE — 99214 OFFICE O/P EST MOD 30 MIN: CPT | Performed by: FAMILY MEDICINE

## 2017-08-04 RX ORDER — LANOLIN ALCOHOL/MO/W.PET/CERES
325 CREAM (GRAM) TOPICAL
Qty: 90 TABLET | Refills: 11 | Status: SHIPPED | OUTPATIENT
Start: 2017-08-04 | End: 2018-03-21 | Stop reason: SDUPTHER

## 2017-08-04 RX ORDER — INSULIN GLARGINE 100 [IU]/ML
20 INJECTION, SOLUTION SUBCUTANEOUS NIGHTLY
Qty: 3 ML | Refills: 11 | Status: SHIPPED | OUTPATIENT
Start: 2017-08-04 | End: 2017-09-14

## 2017-08-04 NOTE — PATIENT INSTRUCTIONS
Take iron pills three times a day. Take with orange juice or vitamin C for better absorption.  May cause black stools or constipation     Bring blood sugar readings on next visit.   Check in mornings and 2 hours after eating twice a day.  Record and bring on next visit    Recheck in 1 month    Iron Deficiency Anemia, Adult  Anemia is a condition in which there are less red blood cells or hemoglobin in the blood than normal. Hemoglobin is the part of red blood cells that carries oxygen. Iron deficiency anemia is anemia caused by too little iron. It is the most common type of anemia. It may leave you tired and short of breath.  CAUSES   · Lack of iron in the diet.  · Poor absorption of iron, as seen with intestinal disorders.  · Intestinal bleeding.  · Heavy periods.  SIGNS AND SYMPTOMS   Mild anemia may not be noticeable. Symptoms may include:  · Fatigue.  · Headache.  · Pale skin.  · Weakness.  · Tiredness.  · Shortness of breath.  · Dizziness.  · Cold hands and feet.  · Fast or irregular heartbeat.  DIAGNOSIS   Diagnosis requires a thorough evaluation and physical exam by your health care provider. Blood tests are generally used to confirm iron deficiency anemia. Additional tests may be done to find the underlying cause of your anemia. These may include:  · Testing for blood in the stool (fecal occult blood test).  · A procedure to see inside the colon and rectum (colonoscopy).  · A procedure to see inside the esophagus and stomach (endoscopy).  TREATMENT   Iron deficiency anemia is treated by correcting the cause of the deficiency. Treatment may involve:  · Adding iron-rich foods to your diet.  · Taking iron supplements. Pregnant or breastfeeding women need to take extra iron because their normal diet usually does not provide the required amount.  · Taking vitamins. Vitamin C improves the absorption of iron. Your health care provider may recommend that you take your iron tablets with a glass of orange juice or  vitamin C supplement.  · Medicines to make heavy menstrual flow lighter.  · Surgery.  HOME CARE INSTRUCTIONS   · Take iron as directed by your health care provider.    If you cannot tolerate taking iron supplements by mouth, talk to your health care provider about taking them through a vein (intravenously) or an injection into a muscle.    For the best iron absorption, iron supplements should be taken on an empty stomach. If you cannot tolerate them on an empty stomach, you may need to take them with food.    Do not drink milk or take antacids at the same time as your iron supplements. Milk and antacids may interfere with the absorption of iron.    Iron supplements can cause constipation. Make sure to include fiber in your diet to prevent constipation. A stool softener may also be recommended.  · Take vitamins as directed by your health care provider.  · Eat a diet rich in iron. Foods high in iron include liver, lean beef, whole-grain bread, eggs, dried fruit, and dark green leafy vegetables.  SEEK IMMEDIATE MEDICAL CARE IF:   · You faint. If this happens, do not drive. Call your local emergency services (911 in [place] if no other help is available.  · You have chest pain.  · You feel nauseous or vomit.  · You have severe or increased shortness of breath with activity.  · You feel weak.  · You have a rapid heartbeat.  · You have unexplained sweating.  · You become light-headed when getting up from a chair or bed.  MAKE SURE YOU:   · Understand these instructions.  · Will watch your condition.  · Will get help right away if you are not doing well or get worse.     This information is not intended to replace advice given to you by your health care provider. Make sure you discuss any questions you have with your health care provider.     Document Released: 12/15/2001 Document Revised: 01/08/2016 Document Reviewed: 08/25/2014  [employer] Patient Education ©2017 [employer]

## 2017-08-04 NOTE — PROGRESS NOTES
Subjective   Lizzy Restrepo is a 41 y.o. female.     History of Present Illness       Problem List  1. DM type 2 insulin dependent, uncontrolled  2. Obesity  3. Hyperrtension  4. GERD  5. Iron deficiency anemia  6. GERD  7. Vitamin D deficiency  8. Hyperlipidemia/dysplipidemia  9. Elevated ALT    Pt is 40 yo AAF who is here for recheck on DM type 2.  Last hga1c recently was 8.5.  Pt states that during June she was not taking Inovkana and insulin regularly.  Prior to this recent hga1c it was 7.8 back in February.  Pt continues  To take invokana 300 mg PO q daily and now drinks a lot of water.  Has not had recent vaginal itching or yeast infections. Pt also takes Januvia 100 mg PO q daily along with Lantus 20 units at bedtime and Novolog 15 units before meals. Pt checks blood sugar and is <130 in mornings now and <180 about 2 hours after meal. Did not bring recordings today    Pt continues to be anemic and is only taking iron pill once a day.  Was supposed to be taking iron pill twice a day. Denies fatigue, dizzines or headaches currently    Pt also due for mammogram screening today    On rest of labwork Vitamin D levels at goal. Takes Vitamin D once a week. Pt has low HDL but LDL at goal. Pt tolerating statin medication     The following portions of the patient's history were reviewed and updated as appropriate: allergies, current medications, past family history, past medical history, past social history, past surgical history and problem list.    Review of Systems   Constitutional: Negative.    HENT: Negative.    Eyes: Negative.    Respiratory: Negative.    Cardiovascular: Negative.    Gastrointestinal: Negative.    Endocrine: Negative.    Genitourinary: Negative.    Musculoskeletal: Negative.    Skin: Negative.    Allergic/Immunologic: Negative.    Neurological: Negative.    Hematological: Negative.    Psychiatric/Behavioral: Negative.        Objective          Chemistry        Component Value Date/Time      07/24/2017 1325    K 4.3 07/24/2017 1325     07/24/2017 1325    CO2 31.0 07/24/2017 1325    BUN 12 07/24/2017 1325    CREATININE 0.77 07/24/2017 1325        Component Value Date/Time    CALCIUM 9.4 07/24/2017 1325    ALKPHOS 64 07/24/2017 1325    AST 26 07/24/2017 1325    ALT 45 07/24/2017 1325    BILITOT 0.3 07/24/2017 1325        Lab Results   Component Value Date    WBC 8.65 07/24/2017    HGB 11.2 (L) 07/24/2017    HCT 38.2 07/24/2017    MCV 70.1 (L) 07/24/2017     07/24/2017     Lab Results   Component Value Date    CHOL 120 07/24/2017    CHOL 109 02/17/2017     Lab Results   Component Value Date    TRIG 95 07/24/2017    TRIG 61 02/17/2017    TRIG 73 11/03/2016     Lab Results   Component Value Date    HDL 46 (L) 07/24/2017    HDL 44 (L) 02/17/2017    HDL 45 (L) 11/03/2016     Lab Results   Component Value Date    LDLCALC 45 11/03/2016    LDLCALC 93 07/20/2016    LDLCALC 73 09/29/2015     No results found for: LDL  No results found for: HDLLDLRATIO  No components found for: CHOLHDL  Lab Results   Component Value Date    HGBA1C 8.57 (H) 07/24/2017     Lab Results   Component Value Date    TSH 1.45 07/20/2016     Office Visit on 06/16/2017   Component Date Value Ref Range Status   • WBC 07/24/2017 8.65  3.20 - 9.80 10*3/mm3 Final   • RBC 07/24/2017 5.45* 3.77 - 5.16 10*6/mm3 Final   • Hemoglobin 07/24/2017 11.2* 12.0 - 15.5 g/dL Final   • Hematocrit 07/24/2017 38.2  35.0 - 45.0 % Final   • MCV 07/24/2017 70.1* 80.0 - 98.0 fL Final   • MCH 07/24/2017 20.6* 26.5 - 34.0 pg Final   • MCHC 07/24/2017 29.3* 31.4 - 36.0 g/dL Final   • RDW 07/24/2017 16.4* 11.5 - 14.5 % Final   • RDW-SD 07/24/2017 41.0  36.4 - 46.3 fl Final   • MPV 07/24/2017 11.0  8.0 - 12.0 fL Final   • Platelets 07/24/2017 281  150 - 450 10*3/mm3 Final   • Neutrophil % 07/24/2017 48.1  37.0 - 80.0 % Final   • Lymphocyte % 07/24/2017 37.9  10.0 - 50.0 % Final   • Monocyte % 07/24/2017 6.1  0.0 - 12.0 % Final   • Eosinophil % 07/24/2017 6.6   0.0 - 7.0 % Final   • Basophil % 07/24/2017 0.8  0.0 - 2.0 % Final   • Immature Grans % 07/24/2017 0.5  0.0 - 0.5 % Final   • Neutrophils, Absolute 07/24/2017 4.16  2.00 - 8.60 10*3/mm3 Final   • Lymphocytes, Absolute 07/24/2017 3.28  0.60 - 4.20 10*3/mm3 Final   • Monocytes, Absolute 07/24/2017 0.53  0.00 - 0.90 10*3/mm3 Final   • Eosinophils, Absolute 07/24/2017 0.57  0.00 - 0.70 10*3/mm3 Final   • Basophils, Absolute 07/24/2017 0.07  0.00 - 0.20 10*3/mm3 Final   • Immature Grans, Absolute 07/24/2017 0.04* 0.00 - 0.02 10*3/mm3 Final   • Glucose 07/24/2017 134* 60 - 100 mg/dL Final   • BUN 07/24/2017 12  7 - 21 mg/dL Final   • Creatinine 07/24/2017 0.77  0.50 - 1.00 mg/dL Final   • Sodium 07/24/2017 139  137 - 145 mmol/L Final   • Potassium 07/24/2017 4.3  3.5 - 5.1 mmol/L Final   • Chloride 07/24/2017 102  95 - 110 mmol/L Final   • CO2 07/24/2017 31.0  22.0 - 31.0 mmol/L Final   • Calcium 07/24/2017 9.4  8.4 - 10.2 mg/dL Final   • Total Protein 07/24/2017 7.2  6.3 - 8.6 g/dL Final   • Albumin 07/24/2017 3.90  3.40 - 4.80 g/dL Final   • ALT (SGPT) 07/24/2017 45  9 - 52 U/L Final   • AST (SGOT) 07/24/2017 26  14 - 36 U/L Final   • Alkaline Phosphatase 07/24/2017 64  38 - 126 U/L Final   • Total Bilirubin 07/24/2017 0.3  0.2 - 1.3 mg/dL Final   • eGFR   Amer 07/24/2017 100  58 - 135 mL/min/1.73 Final   • Globulin 07/24/2017 3.3  2.3 - 3.5 gm/dL Final   • A/G Ratio 07/24/2017 1.2  1.1 - 1.8 g/dL Final   • BUN/Creatinine Ratio 07/24/2017 15.6  7.0 - 25.0 Final   • Anion Gap 07/24/2017 6.0  5.0 - 15.0 mmol/L Final   • Hemoglobin A1C 07/24/2017 8.57* 4 - 5.6 % Final   • Total Cholesterol 07/24/2017 120  0 - 199 mg/dL Final   • Triglycerides 07/24/2017 95  20 - 199 mg/dL Final   • HDL Cholesterol 07/24/2017 46* 60 - 200 mg/dL Final   • LDL Cholesterol  07/24/2017 55  1 - 129 mg/dL Final   • LDL/HDL Ratio 07/24/2017 1.20  0.00 - 3.22 Final   • Microalbumin/Creatinine Ratio 07/24/2017 3.6  0.0 - 30.0 mg/g Final   •  Creatinine, Urine 07/24/2017 191.8  mg/dL Final   • Microalbumin, Urine 07/24/2017 0.7  mg/L Final   • 25 Hydroxy, Vitamin D 07/24/2017 55.3  30.0 - 100.0 ng/ml Final   • RBC Morphology 07/24/2017 Normal  Normal Final   • WBC Morphology 07/24/2017 Normal  Normal Final   • Platelet Morphology 07/24/2017 Normal  Normal Final       Physical Exam   Constitutional: She is oriented to person, place, and time. She appears well-developed and well-nourished. No distress.   HENT:   Head: Normocephalic and atraumatic.   Right Ear: External ear normal.   Left Ear: External ear normal.   Eyes: Conjunctivae and EOM are normal. Pupils are equal, round, and reactive to light. Right eye exhibits no discharge. Left eye exhibits no discharge. No scleral icterus.   Neck: Normal range of motion. Neck supple. No JVD present. No tracheal deviation present. No thyromegaly present.   Cardiovascular: Normal rate, regular rhythm and normal heart sounds.    Pulmonary/Chest: Effort normal. No stridor. No respiratory distress. She has no wheezes.   Abdominal: Soft. Bowel sounds are normal. She exhibits no distension and no mass. There is no tenderness. There is no rebound and no guarding. No hernia.   Obese abdomen    Musculoskeletal: Normal range of motion. She exhibits no edema, tenderness or deformity.   Lymphadenopathy:     She has no cervical adenopathy.   Neurological: She is alert and oriented to person, place, and time. She has normal reflexes. No cranial nerve deficit. Coordination normal.   Skin: Skin is warm and dry. No rash noted. She is not diaphoretic. No erythema. No pallor.   Psychiatric: She has a normal mood and affect. Her behavior is normal.   Nursing note and vitals reviewed.      Assessment/Plan   Problems Addressed this Visit        Cardiovascular and Mediastinum    Essential hypertension    Hyperlipidemia       Digestive    Vitamin D deficiency       Endocrine    Diabetes type 2, uncontrolled - Primary       Hematopoietic  and Hemostatic    Iron deficiency anemia    Relevant Medications    ferrous sulfate 325 (65 FE) MG EC tablet       Other    Encounter for screening mammogram for malignant neoplasm of breast    Relevant Orders    Mammo Screening Bilateral With CAD      - BP at goal today. Continue antihypertensive medication  - will get mammogram screening [facility]  - Vitamin D deficiency - stable - continue with Vitamin D pill once a week  - DM type 2-  Continue with invokana 300 mg PO q daily,  Lantus 20 units at bedtime, Novolog 15 units before meals and januiva 100 mg PO q daily. Pt advised to bring blood sugar recordings on next visit  - recheck in 1 month to see how Blood sugars trending  - for iron deficiency anemia - advised pt to take ferrous sulfate 325 mg PO TID. Refilled prescription.  Advised pt to take with VItamin C for better absorption.  Recheck CBC in 3 months

## 2017-08-09 ENCOUNTER — APPOINTMENT (OUTPATIENT)
Dept: PHYSICAL THERAPY | Facility: HOSPITAL | Age: 42
End: 2017-08-09

## 2017-08-18 ENCOUNTER — APPOINTMENT (OUTPATIENT)
Dept: PHYSICAL THERAPY | Facility: HOSPITAL | Age: 42
End: 2017-08-18

## 2017-08-29 RX ORDER — ALBUTEROL SULFATE 90 UG/1
AEROSOL, METERED RESPIRATORY (INHALATION)
Qty: 1 INHALER | Refills: 0 | Status: SHIPPED | OUTPATIENT
Start: 2017-08-29 | End: 2017-09-25 | Stop reason: SDUPTHER

## 2017-08-31 RX ORDER — ATORVASTATIN CALCIUM 40 MG/1
TABLET, FILM COATED ORAL
Qty: 30 TABLET | Refills: 5 | Status: SHIPPED | OUTPATIENT
Start: 2017-08-31 | End: 2017-11-02

## 2017-09-14 ENCOUNTER — OFFICE VISIT (OUTPATIENT)
Dept: FAMILY MEDICINE CLINIC | Facility: CLINIC | Age: 42
End: 2017-09-14

## 2017-09-14 VITALS
RESPIRATION RATE: 16 BRPM | SYSTOLIC BLOOD PRESSURE: 130 MMHG | TEMPERATURE: 98.6 F | HEIGHT: 63 IN | DIASTOLIC BLOOD PRESSURE: 86 MMHG | HEART RATE: 85 BPM | BODY MASS INDEX: 51.77 KG/M2 | WEIGHT: 292.2 LBS

## 2017-09-14 DIAGNOSIS — IMO0002 UNCONTROLLED TYPE 2 DIABETES MELLITUS WITH COMPLICATION, WITH LONG-TERM CURRENT USE OF INSULIN: Primary | ICD-10-CM

## 2017-09-14 PROCEDURE — 99213 OFFICE O/P EST LOW 20 MIN: CPT | Performed by: FAMILY MEDICINE

## 2017-09-14 RX ORDER — INSULIN GLARGINE 100 [IU]/ML
16 INJECTION, SOLUTION SUBCUTANEOUS NIGHTLY
Qty: 3 ML | Refills: 11 | Status: SHIPPED | OUTPATIENT
Start: 2017-09-14 | End: 2017-11-02 | Stop reason: SDUPTHER

## 2017-09-14 RX ORDER — MONTELUKAST SODIUM 10 MG/1
10 TABLET ORAL NIGHTLY
Qty: 30 TABLET | Refills: 11 | Status: SHIPPED | OUTPATIENT
Start: 2017-09-14 | End: 2018-03-21 | Stop reason: SDUPTHER

## 2017-09-14 RX ORDER — FLUTICASONE PROPIONATE 50 MCG
2 SPRAY, SUSPENSION (ML) NASAL DAILY
Qty: 16 G | Refills: 11 | Status: SHIPPED | OUTPATIENT
Start: 2017-09-14 | End: 2018-11-20 | Stop reason: SDUPTHER

## 2017-09-14 NOTE — PATIENT INSTRUCTIONS
Get influenza vaccine at pharmacy I  Check sugars daily. Record and bring on next visit. Take before breakfast and 2 hours after meal until next visit in 1 month    Stop Lantus Start on Basaglar 16 units at bedtime.     Goal of sugars <130 before breakfast and 2 hours after meal <180    Start flonase 2 sprays in each nostril daily.    Take singulair 10 mg at bedtime    Read drug information on both  nsulin Glargine injection    EAT FAT GET THIN  10 DAY DETOX DIET   BLOOD SUGAR SOLUTION  BY JENNIFER ACEVES MD   Can get on [employer] or ipad or bookstore    Go to [employer].  Search JENNIFER ACEVES FOOD IS MOST POWERFUL DRUG  What is this medicine?  INSULIN GLARGINE (IN watkins iqra GLAR geen) is a human-made form of insulin. This drug lowers the amount of sugar in your blood. It is a long-acting insulin that is usually given once a day.  This medicine may be used for other purposes; ask your health care provider or pharmacist if you have questions.  COMMON BRAND NAME(S): BASAGLAR, Lantus, Lantus SoloStar, Toujeo SoloStar  What should I tell my health care provider before I take this medicine?  They need to know if you have any of these conditions:  -episodes of hypoglycemia  -kidney disease  -liver disease  -an unusual or allergic reaction to insulin, metacresol, other medicines, foods, dyes, or preservatives  -pregnant or trying to get pregnant  -breast-feeding  How should I use this medicine?  This medicine is for injection under the skin. Use this medicine at the same time each day. Use exactly as directed. This insulin should never be mixed in the same syringe with other insulins before injection. Do not vigorously shake before use. You will be taught how to use this medicine and how to adjust doses for activities and illness. Do not use more insulin than prescribed.  Always check the appearance of your insulin before using it. This medicine should be clear and colorless like water. Do not use it if it is cloudy, thickened, colored, or  has solid particles in it.  It is important that you put your used needles and syringes in a special sharps container. Do not put them in a trash can. If you do not have a sharps container, call your pharmacist or healthcare provider to get one.  Talk to your pediatrician regarding the use of this medicine in children. Special care may be needed.  Overdosage: If you think you have taken too much of this medicine contact a poison control center or emergency room at once.  NOTE: This medicine is only for you. Do not share this medicine with others.  What if I miss a dose?  It is important not to miss a dose. Your health care professional or doctor should discuss a plan for missed doses with you. If you do miss a dose, follow their plan. Do not take double doses.  What may interact with this medicine?  -other medicines for diabetes  Many medications may cause an increase or decrease in blood sugar, these include:  -alcohol containing beverages  -aspirin and aspirin-like drugs  -chloramphenicol  -chromium  -diuretics  -female hormones, like estrogens or progestins and birth control pills  -heart medicines  -isoniazid  -male hormones or anabolic steroids  -medicines for weight loss  -medicines for allergies, asthma, cold, or cough  -medicines for mental problems  -medicines called MAO Inhibitors like Nardil, Parnate, Marplan, Eldepryl  -niacin  -NSAIDs, medicines for pain and inflammation, like ibuprofen or naproxen  -pentamidine  -phenytoin  -probenecid  -quinolone antibiotics like ciprofloxacin, levofloxacin, ofloxacin  -some herbal dietary supplements  -steroid medicines like prednisone or cortisone  -thyroid medicine  Some medications can hide the warning symptoms of low blood sugar. You may need to monitor your blood sugar more closely if you are taking one of these medications. These include:  -beta-blockers such as atenolol, metoprolol, propranolol  -clonidine  -guanethidine  -reserpine  This list may not describe  all possible interactions. Give your health care provider a list of all the medicines, herbs, non-prescription drugs, or dietary supplements you use. Also tell them if you smoke, drink alcohol, or use illegal drugs. Some items may interact with your medicine.  What should I watch for while using this medicine?  Visit your health care professional or doctor for regular checks on your progress.  Do not drive, use machinery, or do anything that needs mental alertness until you know how this medicine affects you. Alcohol may interfere with the effect of this medicine. Avoid alcoholic drinks.  A test called the HbA1C (A1C) will be monitored. This is a simple blood test. It measures your blood sugar control over the last 2 to 3 months. You will receive this test every 3 to 6 months.  Learn how to check your blood sugar. Learn the symptoms of low and high blood sugar and how to manage them.  Always carry a quick-source of sugar with you in case you have symptoms of low blood sugar. Examples include hard sugar candy or glucose tablets. Make sure others know that you can choke if you eat or drink when you develop serious symptoms of low blood sugar, such as seizures or unconsciousness. They must get medical help at once.  Tell your doctor or health care professional if you have high blood sugar. You might need to change the dose of your medicine. If you are sick or exercising more than usual, you might need to change the dose of your medicine.  Do not skip meals. Ask your doctor or health care professional if you should avoid alcohol. Many nonprescription cough and cold products contain sugar or alcohol. These can affect blood sugar.  Make sure that you have the right kind of syringe for the type of insulin you use. Try not to change the brand and type of insulin or syringe unless your health care professional or doctor tells you to. Switching insulin brand or type can cause dangerously high or low blood sugar. Always keep  an extra supply of insulin, syringes, and needles on hand. Use a syringe one time only. Throw away syringe and needle in a closed container to prevent accidental needle sticks.  Insulin pens and cartridges should never be shared. Even if the needle is changed, sharing may result in passing of viruses like hepatitis or HIV.  Wear a medical ID bracelet or chain, and carry a card that describes your disease and details of your medicine and dosage times.  What side effects may I notice from receiving this medicine?  Side effects that you should report to your doctor or health care professional as soon as possible:  -allergic reactions like skin rash, itching or hives, swelling of the face, lips, or tongue  -breathing problems  -signs and symptoms of high blood sugar such as dizziness, dry mouth, dry skin, fruity breath, nausea, stomach pain, increased hunger or thirst, increased urination  -signs and symptoms of low blood sugar such as feeling anxious, confusion, dizziness, increased hunger, unusually weak or tired, sweating, shakiness, cold, irritable, headache, blurred vision, fast heartbeat, loss of consciousness  Side effects that usually do not require medical attention (report to your doctor or health care professional if they continue or are bothersome):  -increase or decrease in fatty tissue under the skin due to overuse of a particular injection site  -itching, burning, swelling, or rash at site where injected  This list may not describe all possible side effects. Call your doctor for medical advice about side effects. You may report side effects to FDA at 7-479-FDA-6079.  Where should I keep my medicine?  Keep out of the reach of children.  Store unopened vials in a refrigerator between 2 and 8 degrees C (36 and 46 degrees F). Do not freeze or use if the insulin has been frozen. Opened vials (vials currently in use) may be stored in the refrigerator or at room temperature, at approximately 25 degrees C (77  degrees F) or cooler. Keeping your insulin at room temperature decreases the amount of pain during injection. Once opened, your insulin can be used for 28 days. After 28 days, the vial should be thrown away.  Store Lantus Solostar Pens or Basaglar KwikPens in a refrigerator between 2 and 8 degrees C (36 and 46 degrees F) or at room temperature below 30 degrees C (86 degrees F). Do not freeze or use if the insulin has been frozen. Once opened, the pens should be kept at room temperature. Do not store in the refrigerator once opened. Once opened, the insulin can be used for 28 days. After 28 days, the Lantus Solostar Pen or Basaglar KwikPen should be thrown away.  Store Toujeo Solostar Pens in a refrigerator between 2 and 8 degrees C (36 and 46 degrees F). Do not freeze or use if the insulin has been frozen. Once opened, the pens should be kept at room temperature below 30 degrees C (86 degrees F). Do not store in the refrigerator once opened. Once opened, the insulin can be used for 42 days. After 42 days, the Toujeo Solostar Pen should be thrown away.  Protect all insulin vials and pens from light and excessive heat. Throw away any unused medicine after the expiration date or after the specified time for room temperature storage has passed.  NOTE: This sheet is a summary. It may not cover all possible information. If you have questions about this medicine, talk to your doctor, pharmacist, or health care provider.     © 2017, [employer]. (2017-01-19 10:19:14)  Fluticasone nasal spray  What is this medicine?  FLUTICASONE (floo TIK a sone) is a corticosteroid. This medicine is used to treat the symptoms of allergies like sneezing, itchy red eyes, and itchy, runny, or stuffy nose.  This medicine may be used for other purposes; ask your health care provider or pharmacist if you have questions.  COMMON BRAND NAME(S): Flonase, Flonase Allergy Relief, Flonase Sensimist, Veramyst  What should I tell my health  care provider before I take this medicine?  They need to know if you have any of these conditions:  -infection, like tuberculosis, herpes, or fungal infection  -recent surgery on nose or sinuses  -taking corticosteroid by mouth  -an unusual or allergic reaction to fluticasone, steroids, other medicines, foods, dyes, or preservatives  -pregnant or trying to get pregnant  -breast-feeding  How should I use this medicine?  This medicine is for use in the nose. Follow the directions on your product or prescription label. This medicine works best if used at regular intervals. Do not use more often than directed. Make sure that you are using your nasal spray correctly. After 6 months of daily use without a prescription, talk to your doctor or health care professional before using it for a longer time. Ask your doctor or health care professional if you have any questions.  Talk to your pediatrician regarding the use of this medicine in children. Special care may be needed. This medicine has been used in children as young as 2 years. After two months of daily use without a prescription in a child, talk to your pediatrician before using it for a longer time.  Overdosage: If you think you have taken too much of this medicine contact a poison control center or emergency room at once.  NOTE: This medicine is only for you. Do not share this medicine with others.  What if I miss a dose?  If you miss a dose, use it as soon as you remember. If it is almost time for your next dose, use only that dose and continue with your regular schedule. Do not use double or extra doses.  What may interact with this medicine?  -ketoconazole  -metyrapone  -some medicines for HIV  -vaccines  This list may not describe all possible interactions. Give your health care provider a list of all the medicines, herbs, non-prescription drugs, or dietary supplements you use. Also tell them if you smoke, drink alcohol, or use illegal drugs. Some items may  interact with your medicine.  What should I watch for while using this medicine?  Visit your doctor or health care professional for regular checks on your progress. Some symptoms may improve within 12 hours after starting use. Check with your doctor or health care professional if there is no improvement in your condition after 3 weeks of use.  Do not come in contact with people who have chickenpox or the measles while you are taking this medicine. If you do, call your doctor right away.  What side effects may I notice from receiving this medicine?  Side effects that you should report to your doctor or health care professional as soon as possible:  -allergic reactions like skin rash, itching or hives, swelling of the face, lips, or tongue  -changes in vision  -flu-like symptoms  -white patches or sores in the mouth or nose  Side effects that usually do not require medical attention (report to your doctor or health care professional if they continue or are bothersome):  -burning or irritation inside the nose or throat  -cough  -headache  -nosebleed  -unusual taste or smell  This list may not describe all possible side effects. Call your doctor for medical advice about side effects. You may report side effects to FDA at 1-812-FDA-5890.  Where should I keep my medicine?  Keep out of the reach of children.  Store at room temperature between 15 and 30 degrees C (59 and 86 degrees F). Throw away any unused medicine after the expiration date.  NOTE: This sheet is a summary. It may not cover all possible information. If you have questions about this medicine, talk to your doctor, pharmacist, or health care provider.     © 2017, [employer] Standard. (2015-05-13 09:07:53)  Montelukast oral tablets  What is this medicine?  MONTELUKAST (mon te LOO kast) is used to prevent and treat the symptoms of asthma. It is also used to treat allergies. Do not use for an acute asthma attack.  This medicine may be used for other purposes;  ask your health care provider or pharmacist if you have questions.  COMMON BRAND NAME(S): Singulair  What should I tell my health care provider before I take this medicine?  They need to know if you have any of these conditions:  -liver disease  -an unusual or allergic reaction to montelukast, other medicines, foods, dyes, or preservatives  -pregnant or trying to get pregnant  -breast-feeding  How should I use this medicine?  This medicine should be given by mouth. Follow the directions on the prescription label. Take this medicine at the same time every day. You may take this medicine with or without meals. Do not chew the tablets. Do not stop taking your medicine unless your doctor tells you to.  Talk to your pediatrician regarding the use of this medicine in children. Special care may be needed. While this drug may be prescribed for children as young as 15 years of age for selected conditions, precautions do apply.  Overdosage: If you think you have taken too much of this medicine contact a poison control center or emergency room at once.  NOTE: This medicine is only for you. Do not share this medicine with others.  What if I miss a dose?  If you miss a dose, take it as soon as you can. If it is almost time for your next dose, take only that dose. Do not take double or extra doses.  What may interact with this medicine?  -anti-infectives like rifampin and rifabutin  -medicines for diabetes like rosiglitazone and repaglinide  -medicines for seizures like phenytoin, phenobarbital, and carbamazepine  -paclitaxel  This list may not describe all possible interactions. Give your health care provider a list of all the medicines, herbs, non-prescription drugs, or dietary supplements you use. Also tell them if you smoke, drink alcohol, or use illegal drugs. Some items may interact with your medicine.  What should I watch for while using this medicine?  Visit your doctor or health care professional for regular checks on  your progress. Tell your doctor or health care professional if your allergy or asthma symptoms do not improve. Take your medicine even when you do not have symptoms. Do not stop taking any of your medicine(s) unless your doctor tells you to.  If you have asthma, talk to your doctor about what to do in an acute asthma attack. Always have your inhaled rescue medicine for asthma attacks with you.  Patients and their families should watch for new or worsening thoughts of suicide or depression. Also watch for sudden changes in feelings such as feeling anxious, agitated, panicky, irritable, hostile, aggressive, impulsive, severely restless, overly excited and hyperactive, or not being able to sleep. Any worsening of mood or thoughts of suicide or dying should be reported to your health care professional right away.  What side effects may I notice from receiving this medicine?  Side effects that you should report to your doctor or health care professional as soon as possible:  -allergic reactions like skin rash or hives, or swelling of the face, lips, or tongue  -breathing problems  -confusion  -dark urine  -fever or infection  -flu-like symptoms  -hallucinations  -painful lumps under the skin  -pain, tingling, numbness in the hands or feet  -sinus pain or swelling  -suicidal thoughts or other mood changes  -tremors  -trouble sleeping  -uncontrolled muscle movements  -unusual bleeding or bruising  -yellowing of the eyes or skin  Side effects that usually do not require medical attention (report to your doctor or health care professional if they continue or are bothersome):  -cough  -dizziness  -drowsiness  -headache  -nightmares  -stomach upset  -stuffy nose  This list may not describe all possible side effects. Call your doctor for medical advice about side effects. You may report side effects to FDA at 2-304-FDA-5119.  Where should I keep my medicine?  Keep out of the reach of children.  Store at room temperature between  15 and 30 degrees C (59 and 86 degrees F). Protect from light and moisture. Keep this medicine in the original bottle. Throw away any unused medicine after the expiration date.  NOTE: This sheet is a summary. It may not cover all possible information. If you have questions about this medicine, talk to your doctor, pharmacist, or health care provider.     © 2017, [employer]. (2016-12-19 09:40:44)

## 2017-09-14 NOTE — PROGRESS NOTES
"Subjective   Lizzy Restrepo is a 42 y.o. female.     History of Present Illness     Problem List  1. DM type 2 insulin dependent, uncontrolled  2. Obesity  3. Hyperrtension  4. GERD  5. Iron deficiency anemia  6. GERD  7. Vitamin D deficiency  8. Hyperlipidemia/dysplipidemia  9. Elevated ALT    Pt is 41 yo AAF with the above medical issues. Is here for recheck. Has DM type 2 and last hga1c was 8.5 in July 2017.  Pt currently vibha inovkana 300 mg PO q daily along with Lantus 20 units at bedtime and Humalog 15 units before meals.  Also takes januvia 100 mg PO q daily. States Sugars before breakfast<130 and 2 hours after meal <180.  Has not changed diet and is not lowing weight     For Vitamin D deficiency, pt takes vitamin D once a week    For anemia pt takes ferous sulfate 325 mg PO TID with meal.      The following portions of the patient's history were reviewed and updated as appropriate: allergies, current medications, past family history, past medical history, past social history, past surgical history and problem list.    Review of Systems   Constitutional: Negative.    HENT: Negative.    Eyes: Negative.    Respiratory: Negative.    Cardiovascular: Negative.    Gastrointestinal: Negative.    Endocrine: Negative.    Genitourinary: Negative.    Musculoskeletal: Negative.    Skin: Negative.    Allergic/Immunologic: Negative.    Neurological: Negative.    Hematological: Negative.    Psychiatric/Behavioral: Negative.        Objective    /86  Pulse 85  Temp 98.6 °F (37 °C)  Resp 16  Ht 63\" (160 cm)  Wt 292 lb 3.2 oz (133 kg)  BMI 51.76 kg/m2    Chemistry        Component Value Date/Time     07/24/2017 1325    K 4.3 07/24/2017 1325     07/24/2017 1325    CO2 31.0 07/24/2017 1325    BUN 12 07/24/2017 1325    CREATININE 0.77 07/24/2017 1325        Component Value Date/Time    CALCIUM 9.4 07/24/2017 1325    ALKPHOS 64 07/24/2017 1325    AST 26 07/24/2017 1325    ALT 45 07/24/2017 1325    BILITOT " 0.3 07/24/2017 1325        Lab Results   Component Value Date    WBC 8.65 07/24/2017    HGB 11.2 (L) 07/24/2017    HCT 38.2 07/24/2017    MCV 70.1 (L) 07/24/2017     07/24/2017     Lab Results   Component Value Date    CHOL 120 07/24/2017    CHOL 109 02/17/2017     Lab Results   Component Value Date    TRIG 95 07/24/2017    TRIG 61 02/17/2017    TRIG 73 11/03/2016     Lab Results   Component Value Date    HDL 46 (L) 07/24/2017    HDL 44 (L) 02/17/2017    HDL 45 (L) 11/03/2016     Lab Results   Component Value Date    LDLCALC 45 11/03/2016    LDLCALC 93 07/20/2016    LDLCALC 73 09/29/2015     No results found for: LDL  No results found for: HDLLDLRATIO  No components found for: CHOLHDL  Lab Results   Component Value Date    TSH 1.45 07/20/2016     Lab Results   Component Value Date    HGBA1C 8.57 (H) 07/24/2017     Physical Exam   Constitutional: She is oriented to person, place, and time. She appears well-developed and well-nourished. No distress.   HENT:   Head: Normocephalic and atraumatic.   Right Ear: External ear normal.   Left Ear: External ear normal.   Eyes: Conjunctivae and EOM are normal. Pupils are equal, round, and reactive to light. Right eye exhibits no discharge. Left eye exhibits no discharge. No scleral icterus.   Neck: Normal range of motion. Neck supple. No JVD present. No tracheal deviation present. No thyromegaly present.   Cardiovascular: Normal rate and regular rhythm.    Pulmonary/Chest: Effort normal and breath sounds normal. No stridor. No respiratory distress. She has no wheezes.   Abdominal: Soft. She exhibits no distension and no mass. There is no tenderness. There is no rebound and no guarding. No hernia.   Obese abdomen   Musculoskeletal: Normal range of motion. She exhibits no edema or deformity.   Lymphadenopathy:     She has no cervical adenopathy.   Neurological: She is alert and oriented to person, place, and time. She has normal reflexes.   Skin: Skin is warm and dry. No  rash noted. She is not diaphoretic. No erythema. No pallor.   Psychiatric: She has a normal mood and affect. Her behavior is normal.   Vitals reviewed.      Assessment/Plan   Problems Addressed this Visit        Endocrine    Diabetes type 2, uncontrolled - Primary        - will stop Lantus and switch to Basaglar to 16 units qhs. Advised pt to check sugars daily and bring on next visit. Continue with Invokana, Januvia and novolog before meals   - Advised high healthy fat diet moderate protein, low sugar, low carb diet. Diet information given. Consider stopping insulin if pt changes diet  - recheck in 1 month. Get labwork in 1 month

## 2017-09-25 RX ORDER — ALBUTEROL SULFATE 90 UG/1
AEROSOL, METERED RESPIRATORY (INHALATION)
Qty: 1 INHALER | Refills: 0 | Status: SHIPPED | OUTPATIENT
Start: 2017-09-25 | End: 2018-03-21 | Stop reason: SDUPTHER

## 2017-10-19 ENCOUNTER — OFFICE VISIT (OUTPATIENT)
Dept: FAMILY MEDICINE CLINIC | Facility: CLINIC | Age: 42
End: 2017-10-19

## 2017-10-19 VITALS
RESPIRATION RATE: 16 BRPM | SYSTOLIC BLOOD PRESSURE: 130 MMHG | HEART RATE: 67 BPM | HEIGHT: 63 IN | BODY MASS INDEX: 51.91 KG/M2 | TEMPERATURE: 98.6 F | DIASTOLIC BLOOD PRESSURE: 86 MMHG | WEIGHT: 293 LBS

## 2017-10-19 DIAGNOSIS — IMO0002 UNCONTROLLED TYPE 2 DIABETES MELLITUS WITH COMPLICATION, WITH LONG-TERM CURRENT USE OF INSULIN: Primary | ICD-10-CM

## 2017-10-19 DIAGNOSIS — E66.01 MORBID OBESITY (HCC): ICD-10-CM

## 2017-10-19 DIAGNOSIS — I10 ESSENTIAL HYPERTENSION: ICD-10-CM

## 2017-10-19 DIAGNOSIS — D50.9 IRON DEFICIENCY ANEMIA, UNSPECIFIED IRON DEFICIENCY ANEMIA TYPE: ICD-10-CM

## 2017-10-19 DIAGNOSIS — E55.9 VITAMIN D DEFICIENCY: ICD-10-CM

## 2017-10-19 DIAGNOSIS — J45.20 MILD INTERMITTENT ASTHMA, UNSPECIFIED WHETHER COMPLICATED: ICD-10-CM

## 2017-10-19 DIAGNOSIS — Z13.29 ENCOUNTER FOR SCREENING FOR ENDOCRINE DISORDER: ICD-10-CM

## 2017-10-19 DIAGNOSIS — E78.5 HYPERLIPIDEMIA, UNSPECIFIED HYPERLIPIDEMIA TYPE: ICD-10-CM

## 2017-10-19 LAB
25(OH)D3 SERPL-MCNC: 39.9 NG/ML (ref 30–100)
ALBUMIN SERPL-MCNC: 3.8 G/DL (ref 3.4–4.8)
ALBUMIN/GLOB SERPL: 1.1 G/DL (ref 1.1–1.8)
ALP SERPL-CCNC: 57 U/L (ref 38–126)
ALT SERPL W P-5'-P-CCNC: 36 U/L (ref 9–52)
ANION GAP SERPL CALCULATED.3IONS-SCNC: 12 MMOL/L (ref 5–15)
ARTICHOKE IGE QN: 90 MG/DL (ref 1–129)
AST SERPL-CCNC: 13 U/L (ref 14–36)
BASOPHILS # BLD AUTO: 0.07 10*3/MM3 (ref 0–0.2)
BASOPHILS NFR BLD AUTO: 0.6 % (ref 0–2)
BILIRUB SERPL-MCNC: 0.3 MG/DL (ref 0.2–1.3)
BUN BLD-MCNC: 18 MG/DL (ref 7–21)
BUN/CREAT SERPL: 17.8 (ref 7–25)
CALCIUM SPEC-SCNC: 9.8 MG/DL (ref 8.4–10.2)
CHLORIDE SERPL-SCNC: 100 MMOL/L (ref 95–110)
CHOLEST SERPL-MCNC: 160 MG/DL (ref 0–199)
CO2 SERPL-SCNC: 28 MMOL/L (ref 22–31)
CREAT BLD-MCNC: 1.01 MG/DL (ref 0.5–1)
DEPRECATED RDW RBC AUTO: 41.2 FL (ref 36.4–46.3)
EOSINOPHIL # BLD AUTO: 0.55 10*3/MM3 (ref 0–0.7)
EOSINOPHIL NFR BLD AUTO: 4.4 % (ref 0–7)
ERYTHROCYTE [DISTWIDTH] IN BLOOD BY AUTOMATED COUNT: 16.6 % (ref 11.5–14.5)
FERRITIN SERPL-MCNC: 90.1 NG/ML (ref 6.2–137)
GFR SERPL CREATININE-BSD FRML MDRD: 73 ML/MIN/1.73 (ref 58–135)
GLOBULIN UR ELPH-MCNC: 3.4 GM/DL (ref 2.3–3.5)
GLUCOSE BLD-MCNC: 146 MG/DL (ref 60–100)
HBA1C MFR BLD: 8.7 % (ref 4–5.6)
HCT VFR BLD AUTO: 40.3 % (ref 35–45)
HDLC SERPL-MCNC: 49 MG/DL (ref 60–200)
HGB BLD-MCNC: 12 G/DL (ref 12–15.5)
IMM GRANULOCYTES # BLD: 0.08 10*3/MM3 (ref 0–0.02)
IMM GRANULOCYTES NFR BLD: 0.6 % (ref 0–0.5)
IRON 24H UR-MRATE: 59 MCG/DL (ref 37–170)
IRON SATN MFR SERPL: 23 % (ref 15–50)
LDLC/HDLC SERPL: 1.76 {RATIO} (ref 0–3.22)
LYMPHOCYTES # BLD AUTO: 4.14 10*3/MM3 (ref 0.6–4.2)
LYMPHOCYTES NFR BLD AUTO: 33.5 % (ref 10–50)
MCH RBC QN AUTO: 20.6 PG (ref 26.5–34)
MCHC RBC AUTO-ENTMCNC: 29.8 G/DL (ref 31.4–36)
MCV RBC AUTO: 69.1 FL (ref 80–98)
MONOCYTES # BLD AUTO: 0.91 10*3/MM3 (ref 0–0.9)
MONOCYTES NFR BLD AUTO: 7.4 % (ref 0–12)
NEUTROPHILS # BLD AUTO: 6.61 10*3/MM3 (ref 2–8.6)
NEUTROPHILS NFR BLD AUTO: 53.5 % (ref 37–80)
PLATELET # BLD AUTO: 291 10*3/MM3 (ref 150–450)
PMV BLD AUTO: 10.8 FL (ref 8–12)
POTASSIUM BLD-SCNC: 4.6 MMOL/L (ref 3.5–5.1)
PROT SERPL-MCNC: 7.2 G/DL (ref 6.3–8.6)
RBC # BLD AUTO: 5.83 10*6/MM3 (ref 3.77–5.16)
SODIUM BLD-SCNC: 140 MMOL/L (ref 137–145)
T4 FREE SERPL-MCNC: 0.99 NG/DL (ref 0.78–2.19)
TIBC SERPL-MCNC: 254 MCG/DL (ref 265–497)
TRIGL SERPL-MCNC: 123 MG/DL (ref 20–199)
TSH SERPL DL<=0.05 MIU/L-ACNC: 2.71 MIU/ML (ref 0.46–4.68)
VIT B12 BLD-MCNC: 805 PG/ML (ref 239–931)
WBC NRBC COR # BLD: 12.36 10*3/MM3 (ref 3.2–9.8)

## 2017-10-19 PROCEDURE — 82607 VITAMIN B-12: CPT | Performed by: FAMILY MEDICINE

## 2017-10-19 PROCEDURE — 80053 COMPREHEN METABOLIC PANEL: CPT | Performed by: FAMILY MEDICINE

## 2017-10-19 PROCEDURE — 83036 HEMOGLOBIN GLYCOSYLATED A1C: CPT | Performed by: FAMILY MEDICINE

## 2017-10-19 PROCEDURE — 80061 LIPID PANEL: CPT | Performed by: FAMILY MEDICINE

## 2017-10-19 PROCEDURE — 84443 ASSAY THYROID STIM HORMONE: CPT | Performed by: FAMILY MEDICINE

## 2017-10-19 PROCEDURE — 99214 OFFICE O/P EST MOD 30 MIN: CPT | Performed by: FAMILY MEDICINE

## 2017-10-19 PROCEDURE — 82728 ASSAY OF FERRITIN: CPT | Performed by: FAMILY MEDICINE

## 2017-10-19 PROCEDURE — 83550 IRON BINDING TEST: CPT | Performed by: FAMILY MEDICINE

## 2017-10-19 PROCEDURE — 85025 COMPLETE CBC W/AUTO DIFF WBC: CPT | Performed by: FAMILY MEDICINE

## 2017-10-19 PROCEDURE — 36415 COLL VENOUS BLD VENIPUNCTURE: CPT | Performed by: FAMILY MEDICINE

## 2017-10-19 PROCEDURE — 84439 ASSAY OF FREE THYROXINE: CPT | Performed by: FAMILY MEDICINE

## 2017-10-19 PROCEDURE — 82306 VITAMIN D 25 HYDROXY: CPT | Performed by: FAMILY MEDICINE

## 2017-10-19 PROCEDURE — 83540 ASSAY OF IRON: CPT | Performed by: FAMILY MEDICINE

## 2017-10-19 PROCEDURE — 84481 FREE ASSAY (FT-3): CPT | Performed by: FAMILY MEDICINE

## 2017-10-19 RX ORDER — ALBUTEROL SULFATE 1.25 MG/3ML
1 SOLUTION RESPIRATORY (INHALATION) EVERY 4 HOURS PRN
Qty: 3 ML | Refills: 11 | Status: SHIPPED | OUTPATIENT
Start: 2017-10-19 | End: 2018-11-20 | Stop reason: SDUPTHER

## 2017-10-19 NOTE — PROGRESS NOTES
Subjective   Lizzy Restrepo is a 42 y.o. female.       Problem List  1. DM type 2 insulin dependent, uncontrolled  2. Obesity BMI >40   3. Hyperrtension  4. GERD  5. Iron deficiency anemia  6. GERD  7. Vitamin D deficiency  8. Hyperlipidemia/dysplipidemia  9. Elevated ALT  10. Mild persistent asthma     Pt is 41 yo AAF with the above medical issues. Is here for recheck. Has DM type 2 and last hga1c >7.0.  Pt currently takes invokana 300 mg PO q daily along with januvia 100 mg PO q daily.  Also takes Basaglar insulin 16 units at bedtime along with humalog 15 units subq before meals.  Pt has history of noncompliance. For hyperlipidemia pt is on lipitor Lizzy Restrepo went to [facility] ER recently for breathing issues. States there was something in the vents that caused symptoms and pt went to [facility] ER and was treated with steroids. She got better and was discharged home the same day.   She usually takes albuterol inhaler along with nebulizer treatment. Today breathing is stable. Currently has a headache.  Sugars are better controlled and is <130 before breakfast and <180 2 hours after meal       Diabetes   She presents for her follow-up diabetic visit. She has type 2 diabetes mellitus. Her disease course has been stable. Pertinent negatives for hypoglycemia include no confusion, dizziness, headaches, hunger, mood changes, nervousness/anxiousness, pallor, seizures, sleepiness, speech difficulty, sweats or tremors. Associated symptoms include fatigue. Pertinent negatives for diabetes include no blurred vision, no chest pain, no foot paresthesias, no foot ulcerations, no polydipsia, no polyphagia, no polyuria, no visual change, no weakness and no weight loss. Pertinent negatives for hypoglycemia complications include no blackouts, no hospitalization, no nocturnal hypoglycemia, no required assistance and no required glucagon injection. Symptoms are stable. Pertinent negatives for diabetic complications include no autonomic  "neuropathy, CVA, heart disease, nephropathy, peripheral neuropathy, PVD or retinopathy. Risk factors for coronary artery disease include diabetes mellitus, dyslipidemia and obesity. Current diabetic treatment includes insulin injections. She is following a diabetic diet. When asked about meal planning, she reported none. She has not had a previous visit with a dietitian. She participates in exercise intermittently. She does not see a podiatrist.Eye exam is not current.          The following portions of the patient's history were reviewed and updated as appropriate: allergies, current medications, past family history, past medical history, past social history, past surgical history and problem list.    Review of Systems   Constitutional: Positive for activity change and fatigue. Negative for weight loss.   HENT: Negative.    Eyes: Negative.  Negative for blurred vision.   Respiratory: Positive for shortness of breath.    Cardiovascular: Negative.  Negative for chest pain.   Gastrointestinal: Negative.    Endocrine: Negative.  Negative for polydipsia, polyphagia and polyuria.   Genitourinary: Negative.    Musculoskeletal: Negative.    Skin: Negative.  Negative for pallor.   Allergic/Immunologic: Positive for environmental allergies.   Neurological: Negative.  Negative for dizziness, tremors, seizures, speech difficulty, weakness and headaches.   Hematological: Negative.    Psychiatric/Behavioral: Negative.  Negative for confusion. The patient is not nervous/anxious.        Objective    /86  Pulse 67  Temp 98.6 °F (37 °C)  Resp 16  Ht 63\" (160 cm)  Wt 296 lb 12.8 oz (135 kg)  BMI 52.58 kg/m2      Chemistry        Component Value Date/Time     07/24/2017 1325    K 4.3 07/24/2017 1325     07/24/2017 1325    CO2 31.0 07/24/2017 1325    BUN 12 07/24/2017 1325    CREATININE 0.77 07/24/2017 1325        Component Value Date/Time    CALCIUM 9.4 07/24/2017 1325    ALKPHOS 64 07/24/2017 1325    AST 26 " 07/24/2017 1325    ALT 45 07/24/2017 1325    BILITOT 0.3 07/24/2017 1325        Lab Results   Component Value Date    WBC 8.65 07/24/2017    HGB 11.2 (L) 07/24/2017    HCT 38.2 07/24/2017    MCV 70.1 (L) 07/24/2017     07/24/2017     Lab Results   Component Value Date    CHOL 120 07/24/2017    CHLPL 105 11/03/2016    TRIG 95 07/24/2017    HDL 46 (L) 07/24/2017    LDLCALC 45 11/03/2016    LDLDIRECT 55 07/24/2017     Lab Results   Component Value Date    HGBA1C 8.57 (H) 07/24/2017     Lab Results   Component Value Date    TSH 1.45 07/20/2016     Office Visit on 06/16/2017   Component Date Value Ref Range Status   • WBC 07/24/2017 8.65  3.20 - 9.80 10*3/mm3 Final   • RBC 07/24/2017 5.45* 3.77 - 5.16 10*6/mm3 Final   • Hemoglobin 07/24/2017 11.2* 12.0 - 15.5 g/dL Final   • Hematocrit 07/24/2017 38.2  35.0 - 45.0 % Final   • MCV 07/24/2017 70.1* 80.0 - 98.0 fL Final   • MCH 07/24/2017 20.6* 26.5 - 34.0 pg Final   • MCHC 07/24/2017 29.3* 31.4 - 36.0 g/dL Final   • RDW 07/24/2017 16.4* 11.5 - 14.5 % Final   • RDW-SD 07/24/2017 41.0  36.4 - 46.3 fl Final   • MPV 07/24/2017 11.0  8.0 - 12.0 fL Final   • Platelets 07/24/2017 281  150 - 450 10*3/mm3 Final   • Neutrophil % 07/24/2017 48.1  37.0 - 80.0 % Final   • Lymphocyte % 07/24/2017 37.9  10.0 - 50.0 % Final   • Monocyte % 07/24/2017 6.1  0.0 - 12.0 % Final   • Eosinophil % 07/24/2017 6.6  0.0 - 7.0 % Final   • Basophil % 07/24/2017 0.8  0.0 - 2.0 % Final   • Immature Grans % 07/24/2017 0.5  0.0 - 0.5 % Final   • Neutrophils, Absolute 07/24/2017 4.16  2.00 - 8.60 10*3/mm3 Final   • Lymphocytes, Absolute 07/24/2017 3.28  0.60 - 4.20 10*3/mm3 Final   • Monocytes, Absolute 07/24/2017 0.53  0.00 - 0.90 10*3/mm3 Final   • Eosinophils, Absolute 07/24/2017 0.57  0.00 - 0.70 10*3/mm3 Final   • Basophils, Absolute 07/24/2017 0.07  0.00 - 0.20 10*3/mm3 Final   • Immature Grans, Absolute 07/24/2017 0.04* 0.00 - 0.02 10*3/mm3 Final   • Glucose 07/24/2017 134* 60 - 100 mg/dL  Final   • BUN 07/24/2017 12  7 - 21 mg/dL Final   • Creatinine 07/24/2017 0.77  0.50 - 1.00 mg/dL Final   • Sodium 07/24/2017 139  137 - 145 mmol/L Final   • Potassium 07/24/2017 4.3  3.5 - 5.1 mmol/L Final   • Chloride 07/24/2017 102  95 - 110 mmol/L Final   • CO2 07/24/2017 31.0  22.0 - 31.0 mmol/L Final   • Calcium 07/24/2017 9.4  8.4 - 10.2 mg/dL Final   • Total Protein 07/24/2017 7.2  6.3 - 8.6 g/dL Final   • Albumin 07/24/2017 3.90  3.40 - 4.80 g/dL Final   • ALT (SGPT) 07/24/2017 45  9 - 52 U/L Final   • AST (SGOT) 07/24/2017 26  14 - 36 U/L Final   • Alkaline Phosphatase 07/24/2017 64  38 - 126 U/L Final   • Total Bilirubin 07/24/2017 0.3  0.2 - 1.3 mg/dL Final   • eGFR   Amer 07/24/2017 100  58 - 135 mL/min/1.73 Final   • Globulin 07/24/2017 3.3  2.3 - 3.5 gm/dL Final   • A/G Ratio 07/24/2017 1.2  1.1 - 1.8 g/dL Final   • BUN/Creatinine Ratio 07/24/2017 15.6  7.0 - 25.0 Final   • Anion Gap 07/24/2017 6.0  5.0 - 15.0 mmol/L Final   • Hemoglobin A1C 07/24/2017 8.57* 4 - 5.6 % Final   • Total Cholesterol 07/24/2017 120  0 - 199 mg/dL Final   • Triglycerides 07/24/2017 95  20 - 199 mg/dL Final   • HDL Cholesterol 07/24/2017 46* 60 - 200 mg/dL Final   • LDL Cholesterol  07/24/2017 55  1 - 129 mg/dL Final   • LDL/HDL Ratio 07/24/2017 1.20  0.00 - 3.22 Final   • Microalbumin/Creatinine Ratio 07/24/2017 3.6  0.0 - 30.0 mg/g Final   • Creatinine, Urine 07/24/2017 191.8  mg/dL Final   • Microalbumin, Urine 07/24/2017 0.7  mg/L Final   • 25 Hydroxy, Vitamin D 07/24/2017 55.3  30.0 - 100.0 ng/ml Final   • RBC Morphology 07/24/2017 Normal  Normal Final   • WBC Morphology 07/24/2017 Normal  Normal Final   • Platelet Morphology 07/24/2017 Normal  Normal Final       Physical Exam   Constitutional: She is oriented to person, place, and time. She appears well-developed and well-nourished. No distress.   HENT:   Head: Normocephalic and atraumatic.   Right Ear: External ear normal.   Left Ear: External ear normal.    Eyes: Conjunctivae and EOM are normal. Pupils are equal, round, and reactive to light. Right eye exhibits no discharge. Left eye exhibits no discharge. No scleral icterus.   Neck: Normal range of motion. Neck supple. No JVD present. No tracheal deviation present. No thyromegaly present.   Cardiovascular: Normal rate and regular rhythm.    Pulmonary/Chest: Effort normal and breath sounds normal. No stridor. No respiratory distress. She has no wheezes.   Abdominal: Soft. She exhibits no distension and no mass. There is no tenderness. There is no rebound and no guarding. No hernia.   Obese abdomen   Musculoskeletal: Normal range of motion. She exhibits no edema or deformity.   Lymphadenopathy:     She has no cervical adenopathy.   Neurological: She is alert and oriented to person, place, and time. She has normal reflexes.   Skin: Skin is warm and dry. No rash noted. She is not diaphoretic. No erythema. No pallor.   Psychiatric: She has a normal mood and affect. Her behavior is normal.   Vitals reviewed.      Assessment/Plan   Problems Addressed this Visit        Cardiovascular and Mediastinum    Essential hypertension    Hyperlipidemia       Respiratory    Mild intermittent asthma    Relevant Medications    albuterol (ACCUNEB) 1.25 MG/3ML nebulizer solution       Digestive    Morbid obesity    Vitamin D deficiency       Endocrine    Diabetes type 2, uncontrolled - Primary    Relevant Orders    CBC Auto Differential    Comprehensive Metabolic Panel    Hemoglobin A1c    TSH    T4, Free    T3, Free    Vitamin D 25 Hydroxy    Ferritin    Iron Profile    Vitamin B12    Lipid Panel       Hematopoietic and Hemostatic    Iron deficiency anemia       Other    Encounter for screening for endocrine disorder        - hyperlipidemia-  Recheck lipid panel The current medical regimen is effective;  continue present plan and medications.  - DM type 2-  Recheck hga1c, vitamin B12, Adjust medication if needed.  Recheck in 2 weeks.  Refilled diabetic testing strips and lancet  - vitamin D deficiency -recheck Vitamin D levels   - thyroid studies for endocrine disorder screening  - iron deficiency anemia - recheck CBC and iron panel, ferritin  - mild intermittent asthma - need last records from [facility] this past Monday. Also refilled albuterol nebulizer machine to use q4hrs PRN  - recheck in 2 weeks

## 2017-10-20 LAB — T3FREE SERPL-MCNC: 2.3 PG/ML (ref 2–4.4)

## 2017-11-02 ENCOUNTER — OFFICE VISIT (OUTPATIENT)
Dept: FAMILY MEDICINE CLINIC | Facility: CLINIC | Age: 42
End: 2017-11-02

## 2017-11-02 VITALS
BODY MASS INDEX: 51.91 KG/M2 | SYSTOLIC BLOOD PRESSURE: 128 MMHG | DIASTOLIC BLOOD PRESSURE: 80 MMHG | HEART RATE: 94 BPM | HEIGHT: 63 IN | TEMPERATURE: 98.6 F | WEIGHT: 293 LBS

## 2017-11-02 DIAGNOSIS — E11.69 HYPERLIPIDEMIA ASSOCIATED WITH TYPE 2 DIABETES MELLITUS (HCC): Primary | ICD-10-CM

## 2017-11-02 DIAGNOSIS — E78.5 HYPERLIPIDEMIA ASSOCIATED WITH TYPE 2 DIABETES MELLITUS (HCC): Primary | ICD-10-CM

## 2017-11-02 DIAGNOSIS — Z23 ENCOUNTER FOR IMMUNIZATION: ICD-10-CM

## 2017-11-02 DIAGNOSIS — E55.9 VITAMIN D DEFICIENCY: ICD-10-CM

## 2017-11-02 DIAGNOSIS — J45.20 MILD INTERMITTENT ASTHMA, UNSPECIFIED WHETHER COMPLICATED: ICD-10-CM

## 2017-11-02 DIAGNOSIS — I10 ESSENTIAL HYPERTENSION: ICD-10-CM

## 2017-11-02 DIAGNOSIS — IMO0002 UNCONTROLLED TYPE 2 DIABETES MELLITUS WITH COMPLICATION, WITH LONG-TERM CURRENT USE OF INSULIN: ICD-10-CM

## 2017-11-02 PROCEDURE — 99214 OFFICE O/P EST MOD 30 MIN: CPT | Performed by: FAMILY MEDICINE

## 2017-11-02 RX ORDER — INSULIN GLARGINE 100 [IU]/ML
20 INJECTION, SOLUTION SUBCUTANEOUS NIGHTLY
Qty: 3 ML | Refills: 11 | Status: SHIPPED | OUTPATIENT
Start: 2017-11-02 | End: 2017-12-06

## 2017-11-02 NOTE — PROGRESS NOTES
Subjective   Lizzy Restrepo is a 42 y.o. female.       Problem List  1. DM type 2 insulin dependent, uncontrolled  2. Obesity BMI >40   3. Hyperrtension  4. GERD  5. Iron deficiency anemia  6. GERD  7. Vitamin D deficiency  8. Hyperlipidemia/dysplipidemia  9. Elevated ALT  10. Mild persistent asthma     Pt is 43 yo AAF with the above medical issues. Is here for recheck. Has DM type 2 and last hga1c >7.0.  Pt currently takes invokana 300 mg PO q daily along with januvia 100 mg PO q daily.  Also takes Basaglar insulin 16 units at bedtime along with humalog 15 units subq before meals.  Pt has history of noncompliance. For hyperlipidemia pt is on lipitor Lizzy Restrepo went to [facility] ER recently for breathing issues. States there was something in the vents that caused symptoms and pt went to [facility] ER and was treated with steroids. She got better and was discharged home the same day.   She usually takes albuterol inhaler along with nebulizer treatment. Today breathing is stable. Currently has a headache.  Sugars are better controlled and is <130 before breakfast and <180 2 hours after meal.      Had labwork gaby that showed hga1c was at 8.7. She conitnues to take Basaglar 16 units at bedtiem along with humalog 15 units before meals.  Pt also takes invokana 300 mg PO q daily along with januvia 100 mg PO q daily  Pt has iron deficiency anemia and hemoglobin has improved to 12.0 from  11.2. She is taking iron pill TID. Thyroid studies were normal. Vitamin D levels are in good range. Pt takes vitamin D pill once a week. On lipid panel.  LDL was <100 but HDL at 49. She is taking lipitor 40 mg PO qhs       Diabetes   She presents for her follow-up diabetic visit. She has type 2 diabetes mellitus. Her disease course has been stable. Pertinent negatives for hypoglycemia include no confusion, dizziness, headaches, hunger, mood changes, nervousness/anxiousness, pallor, seizures, sleepiness, speech difficulty, sweats or tremors.  Associated symptoms include fatigue. Pertinent negatives for diabetes include no blurred vision, no chest pain, no foot paresthesias, no foot ulcerations, no polydipsia, no polyphagia, no polyuria, no visual change, no weakness and no weight loss. Pertinent negatives for hypoglycemia complications include no blackouts, no hospitalization, no nocturnal hypoglycemia, no required assistance and no required glucagon injection. Symptoms are stable. Pertinent negatives for diabetic complications include no autonomic neuropathy, CVA, heart disease, nephropathy, peripheral neuropathy, PVD or retinopathy. Risk factors for coronary artery disease include diabetes mellitus, dyslipidemia and obesity. Current diabetic treatment includes insulin injections. She is following a diabetic diet. When asked about meal planning, she reported none. She has not had a previous visit with a dietitian. She participates in exercise intermittently. She does not see a podiatrist.Eye exam is not current.          The following portions of the patient's history were reviewed and updated as appropriate: allergies, current medications, past family history, past medical history, past social history, past surgical history and problem list.    Review of Systems   Constitutional: Positive for activity change and fatigue. Negative for weight loss.   HENT: Negative.    Eyes: Negative.  Negative for blurred vision.   Respiratory: Positive for shortness of breath.    Cardiovascular: Negative.  Negative for chest pain.   Gastrointestinal: Negative.    Endocrine: Negative.  Negative for polydipsia, polyphagia and polyuria.   Genitourinary: Negative.    Musculoskeletal: Negative.    Skin: Negative.  Negative for pallor.   Allergic/Immunologic: Positive for environmental allergies.   Neurological: Negative.  Negative for dizziness, tremors, seizures, speech difficulty, weakness and headaches.   Hematological: Negative.    Psychiatric/Behavioral: Negative.   "Negative for confusion. The patient is not nervous/anxious.        Objective    /80  Pulse 94  Temp 98.6 °F (37 °C)  Ht 63\" (160 cm)  Wt 294 lb (133 kg)  BMI 52.08 kg/m2      Chemistry        Component Value Date/Time     10/19/2017 0946    K 4.6 10/19/2017 0946     10/19/2017 0946    CO2 28.0 10/19/2017 0946    BUN 18 10/19/2017 0946    CREATININE 1.01 (H) 10/19/2017 0946        Component Value Date/Time    CALCIUM 9.8 10/19/2017 0946    ALKPHOS 57 10/19/2017 0946    AST 13 (L) 10/19/2017 0946    ALT 36 10/19/2017 0946    BILITOT 0.3 10/19/2017 0946        Lab Results   Component Value Date    WBC 12.36 (H) 10/19/2017    HGB 12.0 10/19/2017    HCT 40.3 10/19/2017    MCV 69.1 (L) 10/19/2017     10/19/2017     Lab Results   Component Value Date    CHOL 160 10/19/2017    CHLPL 105 11/03/2016    TRIG 123 10/19/2017    HDL 49 (L) 10/19/2017    LDLCALC 45 11/03/2016    LDLDIRECT 90 10/19/2017     Lab Results   Component Value Date    HGBA1C 8.7 (H) 10/19/2017     Lab Results   Component Value Date    TSH 2.710 10/19/2017     Office Visit on 10/19/2017   Component Date Value Ref Range Status   • WBC 10/19/2017 12.36* 3.20 - 9.80 10*3/mm3 Final   • RBC 10/19/2017 5.83* 3.77 - 5.16 10*6/mm3 Final   • Hemoglobin 10/19/2017 12.0  12.0 - 15.5 g/dL Final   • Hematocrit 10/19/2017 40.3  35.0 - 45.0 % Final   • MCV 10/19/2017 69.1* 80.0 - 98.0 fL Final   • MCH 10/19/2017 20.6* 26.5 - 34.0 pg Final   • MCHC 10/19/2017 29.8* 31.4 - 36.0 g/dL Final   • RDW 10/19/2017 16.6* 11.5 - 14.5 % Final   • RDW-SD 10/19/2017 41.2  36.4 - 46.3 fl Final   • MPV 10/19/2017 10.8  8.0 - 12.0 fL Final   • Platelets 10/19/2017 291  150 - 450 10*3/mm3 Final   • Neutrophil % 10/19/2017 53.5  37.0 - 80.0 % Final   • Lymphocyte % 10/19/2017 33.5  10.0 - 50.0 % Final   • Monocyte % 10/19/2017 7.4  0.0 - 12.0 % Final   • Eosinophil % 10/19/2017 4.4  0.0 - 7.0 % Final   • Basophil % 10/19/2017 0.6  0.0 - 2.0 % Final   • " Immature Grans % 10/19/2017 0.6* 0.0 - 0.5 % Final   • Neutrophils, Absolute 10/19/2017 6.61  2.00 - 8.60 10*3/mm3 Final   • Lymphocytes, Absolute 10/19/2017 4.14  0.60 - 4.20 10*3/mm3 Final   • Monocytes, Absolute 10/19/2017 0.91* 0.00 - 0.90 10*3/mm3 Final   • Eosinophils, Absolute 10/19/2017 0.55  0.00 - 0.70 10*3/mm3 Final   • Basophils, Absolute 10/19/2017 0.07  0.00 - 0.20 10*3/mm3 Final   • Immature Grans, Absolute 10/19/2017 0.08* 0.00 - 0.02 10*3/mm3 Final   • Glucose 10/19/2017 146* 60 - 100 mg/dL Final   • BUN 10/19/2017 18  7 - 21 mg/dL Final   • Creatinine 10/19/2017 1.01* 0.50 - 1.00 mg/dL Final   • Sodium 10/19/2017 140  137 - 145 mmol/L Final   • Potassium 10/19/2017 4.6  3.5 - 5.1 mmol/L Final   • Chloride 10/19/2017 100  95 - 110 mmol/L Final   • CO2 10/19/2017 28.0  22.0 - 31.0 mmol/L Final   • Calcium 10/19/2017 9.8  8.4 - 10.2 mg/dL Final   • Total Protein 10/19/2017 7.2  6.3 - 8.6 g/dL Final   • Albumin 10/19/2017 3.80  3.40 - 4.80 g/dL Final   • ALT (SGPT) 10/19/2017 36  9 - 52 U/L Final   • AST (SGOT) 10/19/2017 13* 14 - 36 U/L Final   • Alkaline Phosphatase 10/19/2017 57  38 - 126 U/L Final   • Total Bilirubin 10/19/2017 0.3  0.2 - 1.3 mg/dL Final   • eGFR   Amer 10/19/2017 73  58 - 135 mL/min/1.73 Final   • Globulin 10/19/2017 3.4  2.3 - 3.5 gm/dL Final   • A/G Ratio 10/19/2017 1.1  1.1 - 1.8 g/dL Final   • BUN/Creatinine Ratio 10/19/2017 17.8  7.0 - 25.0 Final   • Anion Gap 10/19/2017 12.0  5.0 - 15.0 mmol/L Final   • Hemoglobin A1C 10/19/2017 8.7* 4 - 5.6 % Final   • TSH 10/19/2017 2.710  0.460 - 4.680 mIU/mL Final   • Free T4 10/19/2017 0.99  0.78 - 2.19 ng/dL Final   • T3, Free 10/19/2017 2.3  2.0 - 4.4 pg/mL Final   • 25 Hydroxy, Vitamin D 10/19/2017 39.9  30.0 - 100.0 ng/ml Final   • Ferritin 10/19/2017 90.10  6.20 - 137.00 ng/mL Final   • Iron 10/19/2017 59  37 - 170 mcg/dL Final   • TIBC 10/19/2017 254* 265 - 497 mcg/dL Final   • Iron Saturation 10/19/2017 23  15 - 50 % Final    • Vitamin B-12 10/19/2017 805  239 - 931 pg/mL Final   • Total Cholesterol 10/19/2017 160  0 - 199 mg/dL Final   • Triglycerides 10/19/2017 123  20 - 199 mg/dL Final   • HDL Cholesterol 10/19/2017 49* 60 - 200 mg/dL Final   • LDL Cholesterol  10/19/2017 90  1 - 129 mg/dL Final   • LDL/HDL Ratio 10/19/2017 1.76  0.00 - 3.22 Final       Physical Exam   Constitutional: She is oriented to person, place, and time. She appears well-developed and well-nourished. No distress.   HENT:   Head: Normocephalic and atraumatic.   Right Ear: External ear normal.   Left Ear: External ear normal.   Eyes: Conjunctivae and EOM are normal. Pupils are equal, round, and reactive to light. Right eye exhibits no discharge. Left eye exhibits no discharge. No scleral icterus.   Neck: Normal range of motion. Neck supple. No JVD present. No tracheal deviation present. No thyromegaly present.   Cardiovascular: Normal rate and regular rhythm.    Pulmonary/Chest: Effort normal and breath sounds normal. No stridor. No respiratory distress. She has no wheezes.   Abdominal: Soft. She exhibits no distension and no mass. There is no tenderness. There is no rebound and no guarding. No hernia.   Obese abdomen   Musculoskeletal: Normal range of motion. She exhibits no edema or deformity.   Lymphadenopathy:     She has no cervical adenopathy.   Neurological: She is alert and oriented to person, place, and time. She has normal reflexes.   Skin: Skin is warm and dry. No rash noted. She is not diaphoretic. No erythema. No pallor.   Psychiatric: She has a normal mood and affect. Her behavior is normal.   Vitals reviewed.      Assessment/Plan   Problems Addressed this Visit        Cardiovascular and Mediastinum    Hyperlipidemia associated with type 2 diabetes mellitus - Primary    Relevant Medications    Insulin Glargine (BASAGLAR KWIKPEN) 100 UNIT/ML injection pen    Dulaglutide 1.5 MG/0.5ML solution pen-injector    Essential hypertension       Respiratory     Mild intermittent asthma       Digestive    Vitamin D deficiency       Endocrine    Diabetes type 2, uncontrolled    Relevant Medications    Insulin Glargine (BASAGLAR KWIKPEN) 100 UNIT/ML injection pen    Dulaglutide 1.5 MG/0.5ML solution pen-injector       Other    Encounter for immunization        - hyperlipidemia-  Recheck lipid panel The current medical regimen is effective;  continue present plan and medications.  - DM type 2-  Recheck hga1c, vitamin B12, Adjust medication if needed.   Refilled diabetic testing strips and lancet. Continue with invokana 300 mg PO q daily, basaglar insulin 20 units at bedtime, humalog 15 units before meals and januvia 100 mg Po q daily Will add Trulicity 1.5 mg/0.5 mg injection once a week.  Gave samples to go home with   - vitamin D deficiency -continue vitamin D deficiency   - thyroid studies for endocrine disorder screening  - iron deficiency anemia - recheck CBC and iron panel, ferritin  - mild intermittent asthma - need last records from [facility] ER this past Monday. Also refilled albuterol nebulizer machine to use q4hrs PRN  - recheck in 1 month for medication recheck

## 2017-11-20 RX ORDER — CANAGLIFLOZIN 300 MG/1
TABLET, FILM COATED ORAL
Qty: 30 TABLET | Refills: 11 | Status: SHIPPED | OUTPATIENT
Start: 2017-11-20 | End: 2017-12-06

## 2017-12-06 ENCOUNTER — OFFICE VISIT (OUTPATIENT)
Dept: FAMILY MEDICINE CLINIC | Facility: CLINIC | Age: 42
End: 2017-12-06

## 2017-12-06 VITALS
HEIGHT: 63 IN | BODY MASS INDEX: 49.47 KG/M2 | RESPIRATION RATE: 16 BRPM | SYSTOLIC BLOOD PRESSURE: 120 MMHG | TEMPERATURE: 98.6 F | DIASTOLIC BLOOD PRESSURE: 80 MMHG | HEART RATE: 76 BPM | WEIGHT: 279.2 LBS

## 2017-12-06 DIAGNOSIS — E55.9 VITAMIN D DEFICIENCY: ICD-10-CM

## 2017-12-06 DIAGNOSIS — J45.20 MILD INTERMITTENT ASTHMA, UNSPECIFIED WHETHER COMPLICATED: ICD-10-CM

## 2017-12-06 DIAGNOSIS — K21.9 GASTROESOPHAGEAL REFLUX DISEASE, ESOPHAGITIS PRESENCE NOT SPECIFIED: ICD-10-CM

## 2017-12-06 DIAGNOSIS — E78.5 HYPERLIPIDEMIA ASSOCIATED WITH TYPE 2 DIABETES MELLITUS (HCC): ICD-10-CM

## 2017-12-06 DIAGNOSIS — M25.561 PAIN IN BOTH KNEES, UNSPECIFIED CHRONICITY: ICD-10-CM

## 2017-12-06 DIAGNOSIS — E78.5 HYPERLIPIDEMIA, UNSPECIFIED HYPERLIPIDEMIA TYPE: ICD-10-CM

## 2017-12-06 DIAGNOSIS — E11.69 HYPERLIPIDEMIA ASSOCIATED WITH TYPE 2 DIABETES MELLITUS (HCC): ICD-10-CM

## 2017-12-06 DIAGNOSIS — D50.9 IRON DEFICIENCY ANEMIA, UNSPECIFIED IRON DEFICIENCY ANEMIA TYPE: ICD-10-CM

## 2017-12-06 DIAGNOSIS — E66.01 MORBID OBESITY (HCC): ICD-10-CM

## 2017-12-06 DIAGNOSIS — I10 ESSENTIAL HYPERTENSION: Primary | ICD-10-CM

## 2017-12-06 DIAGNOSIS — M25.562 PAIN IN BOTH KNEES, UNSPECIFIED CHRONICITY: ICD-10-CM

## 2017-12-06 PROCEDURE — 99214 OFFICE O/P EST MOD 30 MIN: CPT | Performed by: FAMILY MEDICINE

## 2017-12-06 RX ORDER — ESOMEPRAZOLE MAGNESIUM 40 MG/1
40 CAPSULE, DELAYED RELEASE ORAL
Qty: 30 CAPSULE | Refills: 11 | Status: SHIPPED | OUTPATIENT
Start: 2017-12-06 | End: 2018-03-21 | Stop reason: SDUPTHER

## 2017-12-06 NOTE — PATIENT INSTRUCTIONS
Stop protonix. Start nexium. Will give samples today    Stop insulin and invokana      Foods to stay away from. Bread, pasta, wheat, flour, dairy, frozen food canned food, fast foods    Food to eat all vegetables excep potatoes  -fruits are okay berries   -nuts/seeds anything except honey roasted  Meat - beef, chicken pork, fish,(salmon) eggs       EAT FAT GET THIN  10 DAY DETOX DIET   BLOOD SUGAR SOLUTION  BY JENNIFER ACEVES MD   Can get on [employer] or ipad or bookstore     Go to [employer].  Search JENNIFER ACEVES FOOD IS MOST POWERFUL DRUG    Continue with sugar checks Diclofenac sodium enteric-coated tablets  What is this medicine?  DICLOFENAC (dye KLOE fen ak) is a non-steroidal anti-inflammatory drug (NSAID). It is used to reduce swelling and to treat pain. It may be used to treat osteoarthritis, rheumatoid arthritis, and ankylosing spondylitis.  This medicine may be used for other purposes; ask your health care provider or pharmacist if you have questions.  COMMON BRAND NAME(S): Swethaaren  What should I tell my health care provider before I take this medicine?  They need to know if you have any of these conditions:  -asthma, especially aspirin sensitive asthma  -coronary artery bypass graft (CABG) surgery within the past 2 weeks  -drink more than 3 alcohol containing drinks a day  -heart disease or circulation problems like heart failure or leg edema (fluid retention)  -high blood pressure  -kidney disease  -liver disease  -stomach bleeding or ulcers  -an unusual or allergic reaction to diclofenac, aspirin, other NSAIDs, other medicines, foods, dyes, or preservatives  -pregnant or trying to get pregnant  -breast-feeding  How should I use this medicine?  Take this medicine by mouth with food and with a full glass of water. Do not crush or chew the medicine. Follow the directions on the prescription label. Take your medicine at regular intervals. Do not take your medicine more often than directed. Long-term, continuous use  may increase the risk of heart attack or stroke.  A special MedGuide will be given to you by the pharmacist with each prescription and refill. Be sure to read this information carefully each time.  Talk to your pediatrician regarding the use of this medicine in children. Special care may be needed.  Elderly patients over 65 years old may have a stronger reaction and need a smaller dose.  Overdosage: If you think you have taken too much of this medicine contact a poison control center or emergency room at once.  NOTE: This medicine is only for you. Do not share this medicine with others.  What if I miss a dose?  If you miss a dose, take it as soon as you can. If it is almost time for your next dose, take only that dose. Do not take double or extra doses.  What may interact with this medicine?  Do not take this medicine with any of the following medications:  -cidofovir  -ketorolac  -methotrexate  This medicine may also interact with the following medications:  -alcohol  -aspirin and aspirin like medicines  -cyclosporine  -diuretics  -lithium  -medicines for blood pressure  -medicines for osteoporosis  -medicines that affect platelets  -medicines that treat or prevent blood clots like warfarin  -NSAIDs, medicines for pain and inflammation, like ibuprofen or naproxen  -pemetrexed  -steroid medicines like prednisone or cortisone  This list may not describe all possible interactions. Give your health care provider a list of all the medicines, herbs, non-prescription drugs, or dietary supplements you use. Also tell them if you smoke, drink alcohol, or use illegal drugs. Some items may interact with your medicine.  What should I watch for while using this medicine?  Tell your doctor or health care professional if your pain does not get better. Talk to your doctor before taking another medicine for pain. Do not treat yourself.  This medicine does not prevent heart attack or stroke. In fact, this medicine may increase the  chance of a heart attack or stroke. The chance may increase with longer use of this medicine and in people who have heart disease. If you take aspirin to prevent heart attack or stroke, talk with your doctor or health care professional.  Do not take medicines such as ibuprofen and naproxen with this medicine. Side effects such as stomach upset, nausea, or ulcers may be more likely to occur. Many medicines available without a prescription should not be taken with this medicine.  This medicine can cause ulcers and bleeding in the stomach and intestines at any time during treatment. Do not smoke cigarettes or drink alcohol. These increase irritation to your stomach and can make it more susceptible to damage from this medicine. Ulcers and bleeding can happen without warning symptoms and can cause death.  You may get drowsy or dizzy. Do not drive, use machinery, or do anything that needs mental alertness until you know how this medicine affects you. Do not stand or sit up quickly, especially if you are an older patient. This reduces the risk of dizzy or fainting spells.  This medicine can cause you to bleed more easily. Try to avoid damage to your teeth and gums when you brush or floss your teeth.  What side effects may I notice from receiving this medicine?  Side effects that you should report to your doctor or health care professional as soon as possible:  -allergic reactions like skin rash, itching or hives, swelling of the face, lips, or tongue  -black or bloody stools, blood in the urine or vomit  -blurred vision  -chest pain  -difficulty breathing or wheezing  -nausea or vomiting  -slurred speech or weakness on one side of the body  -unexplained weight gain or swelling  -unusually weak or tired  -yellowing of eyes or skin  Side effects that usually do not require medical attention (report to your doctor or health care professional if they continue or are  bothersome):  -constipation  -diarrhea  -dizziness  -headache  -heartburn  This list may not describe all possible side effects. Call your doctor for medical advice about side effects. You may report side effects to FDA at 7-271-FDA-0453.  Where should I keep my medicine?  Keep out of the reach of children.  Store at room temperature below 30 degrees C (86 degrees F). Protect from moisture. Keep container tightly closed. Throw away any unused medicine after the expiration date.  NOTE: This sheet is a summary. It may not cover all possible information. If you have questions about this medicine, talk to your doctor, pharmacist, or health care provider.     © 2017, [employer]. (2010-05-07 14:42:31)  Diclofenac skin gel  What is this medicine?  DICLOFENAC (dye KLOE fen ak) is a non-steroidal anti-inflammatory drug (NSAID). The 1% skin gel is used to treat osteoarthritis of the hands or knees. The 3% skin gel is used to treat actinic keratosis.  This medicine may be used for other purposes; ask your health care provider or pharmacist if you have questions.  COMMON BRAND NAME(S): DSG Yeison, Solaraze, Voltaren Gel  What should I tell my health care provider before I take this medicine?  They need to know if you have any of these conditions:  -asthma  -bleeding problems  -coronary artery bypass graft (CABG) surgery within the past 2 weeks  -heart disease  -high blood pressure  -if you frequently drink alcohol containing drinks  -kidney disease  -liver disease  -open or infected skin  -stomach problems  -an unusual or allergic reaction to diclofenac, aspirin, other NSAIDs, other medicines, benzyl alcohol (3% gel only), foods, dyes, or preservatives  -pregnant or trying to get pregnant  -breast-feeding  How should I use this medicine?  This medicine is for external use only. Follow the directions on the prescription label. Wash hands before and after use. Do not get this medicine in your eyes. If you do, rinse out  with plenty of cool tap water. Use your doses at regular intervals. Do not use your medicine more often than directed.  A special MedGuide will be given to you by the pharmacist with each prescription and refill of the 1% gel. Be sure to read this information carefully each time.  Talk to your pediatrician regarding the use of this medicine in children. Special care may be needed. The 3% gel is not approved for use in children.  Overdosage: If you think you have taken too much of this medicine contact a poison control center or emergency room at once.  NOTE: This medicine is only for you. Do not share this medicine with others.  What if I miss a dose?  If you miss a dose, use it as soon as you can. If it is almost time for your next dose, use only that dose. Do not use double or extra doses.  What may interact with this medicine?  -aspirin  -NSAIDs, medicines for pain and inflammation, like ibuprofen or naproxen  Do not use any other skin products without telling your doctor or health care professional.  This list may not describe all possible interactions. Give your health care provider a list of all the medicines, herbs, non-prescription drugs, or dietary supplements you use. Also tell them if you smoke, drink alcohol, or use illegal drugs. Some items may interact with your medicine.  What should I watch for while using this medicine?  Tell your doctor or healthcare professional if your symptoms do not start to get better or if they get worse. You will need to follow up with your health care provider to monitor your progress. You may need to be treated for up to 3 months if you are using the 3% gel, but the full effect may not occur until 1 month after stopping treatment. If you develop a severe skin reaction, contact your doctor or health care professional immediately.  This medicine can make you more sensitive to the sun. Keep out of the sun. If you cannot avoid being in the sun, wear protective clothing and use  sunscreen. Do not use sun lamps or tanning beds/booths.  Do not take medicines such as ibuprofen and naproxen with this medicine. Side effects such as stomach upset, nausea, or ulcers may be more likely to occur. Many medicines available without a prescription should not be taken with this medicine.  This medicine does not prevent heart attack or stroke. In fact, this medicine may increase the chance of a heart attack or stroke. The chance may increase with longer use of this medicine and in people who have heart disease. If you take aspirin to prevent heart attack or stroke, talk with your doctor or health care professional.  This medicine can cause ulcers and bleeding in the stomach and intestines at any time during treatment. Do not smoke cigarettes or drink alcohol. These increase irritation to your stomach and can make it more susceptible to damage from this medicine. Ulcers and bleeding can happen without warning symptoms and can cause death.  You may get drowsy or dizzy. Do not drive, use machinery, or do anything that needs mental alertness until you know how this medicine affects you. Do not stand or sit up quickly, especially if you are an older patient. This reduces the risk of dizzy or fainting spells.  This medicine can cause you to bleed more easily. Try to avoid damage to your teeth and gums when you brush or floss your teeth.  What side effects may I notice from receiving this medicine?  Side effects that you should report to your doctor or health care professional as soon as possible:  -allergic reactions like skin rash, itching or hives, swelling of the face, lips, or tongue  -black or bloody stools, blood in the urine or vomit  -blurred vision  -chest pain  -difficulty breathing or wheezing  -nausea or vomiting  -redness, blistering, peeling or loosening of the skin, including inside the mouth  -slurred speech or weakness on one side of the body  -trouble passing urine or change in the amount of  urine  -unexplained weight gain or swelling  -unusually weak or tired  -yellowing of eyes or skin  Side effects that usually do not require medical attention (report to your doctor or health care professional if they continue or are bothersome):  -dizziness  -dry skin  -headache  -heartburn  -increased sensitivity to the sun  -stomach pain  -tingling at the application site  This list may not describe all possible side effects. Call your doctor for medical advice about side effects. You may report side effects to FDA at 9-644-OIK-8407.  Where should I keep my medicine?  Keep out of the reach of children.  Store the 1% gel at room temperature between 15 and 30 degrees C (59 and 86 degrees F). Store the 3% gel at room temperature between 20 and 25 degrees C (68 and 77 degrees F). Protect from light. Throw away any unused medicine after the expiration date.  NOTE: This sheet is a summary. It may not cover all possible information. If you have questions about this medicine, talk to your doctor, pharmacist, or health care provider.     © 2017, [employer]. (2009-04-20 16:35:07)

## 2017-12-06 NOTE — PROGRESS NOTES
Subjective   Lizzy Restrepo is a 42 y.o. female.       Problem List  1. DM type 2 insulin dependent, uncontrolled  2. Obesity BMI >40   3. Hyperrtension  4. GERD  5. Iron deficiency anemia  6. GERD  7. Vitamin D deficiency  8. Hyperlipidemia/dysplipidemia  9. Elevated ALT  10. Mild persistent asthma       Pt is 43 yo AAF with the above medical issues. Is here for recheck. Has DM type 2 and last hga1c >7.0.  Pt currently takes invokana 300 mg PO q daily along with januvia 100 mg PO q daily.  Also takes Basaglar insulin 20 units at bedtime along with humalog 15 units subq before meals.  Pt has history of noncompliance. For hyperlipidemia pt is on lipitor Lizzy Restrepo went to [facility] recently for breathing issues. States there was something in the vents that caused symptoms and pt went to [facility] and was treated with steroids. She got better and was discharged home the same day.   She usually takes albuterol inhaler along with nebulizer treatment. Today breathing is stable. Currently has a headache.  Sugars are better controlled and is <130 before breakfast and <180 2 hours after meal.      Had labwork gaby that showed hga1c was at 8.7. She was taking Basaglar 20 units at bedtiem along with humalog 15 units before meals.  Pt also was taking invokana 300 mg PO q daily along with januvia 100 mg PO q daily. On last visit pt was started on trulicity 1.5 mg injection once a week. Since taking medication she has saw improvement on sugars in the am and after meals. She has stopped taking invokana, novolog and Lantus.  Pt has also lost 15 lbs since last visit. She is feeling happier and heathier.    Pt has iron deficiency anemia and hemoglobin has improved to 12.0 from  11.2. She is taking iron pill TID. Thyroid studies were normal. Vitamin D levels are in good range. Pt takes vitamin D pill once a week. On lipid panel.  LDL was <100 but HDL at 49. She is taking lipitor 40 mg PO qhs     She also needs a new medication  instead of protonix for reflux and GERD. Pt also has pain in both knees and is requesting Voltaren gel to help with pain.        Diabetes   She presents for her follow-up diabetic visit. She has type 2 diabetes mellitus. Her disease course has been stable. Pertinent negatives for hypoglycemia include no confusion, dizziness, headaches, hunger, mood changes, nervousness/anxiousness, pallor, seizures, sleepiness, speech difficulty, sweats or tremors. Associated symptoms include fatigue. Pertinent negatives for diabetes include no blurred vision, no chest pain, no foot paresthesias, no foot ulcerations, no polydipsia, no polyphagia, no polyuria, no visual change, no weakness and no weight loss. Pertinent negatives for hypoglycemia complications include no blackouts, no hospitalization, no nocturnal hypoglycemia, no required assistance and no required glucagon injection. Symptoms are stable. Pertinent negatives for diabetic complications include no autonomic neuropathy, CVA, heart disease, nephropathy, peripheral neuropathy, PVD or retinopathy. Risk factors for coronary artery disease include diabetes mellitus, dyslipidemia and obesity. Current diabetic treatment includes insulin injections. She is following a diabetic diet. When asked about meal planning, she reported none. She has not had a previous visit with a dietitian. She participates in exercise intermittently. She does not see a podiatrist.Eye exam is not current.          The following portions of the patient's history were reviewed and updated as appropriate: allergies, current medications, past family history, past medical history, past social history, past surgical history and problem list.    Review of Systems   Constitutional: Positive for activity change and fatigue. Negative for weight loss.   HENT: Negative.    Eyes: Negative.  Negative for blurred vision.   Respiratory: Positive for shortness of breath.    Cardiovascular: Negative.  Negative for chest  "pain.   Gastrointestinal: Negative.    Endocrine: Negative.  Negative for polydipsia, polyphagia and polyuria.   Genitourinary: Negative.    Musculoskeletal: Positive for arthralgias.   Skin: Negative.  Negative for pallor.   Allergic/Immunologic: Positive for environmental allergies.   Neurological: Negative.  Negative for dizziness, tremors, seizures, speech difficulty, weakness and headaches.   Hematological: Negative.    Psychiatric/Behavioral: Negative.  Negative for confusion. The patient is not nervous/anxious.        Objective    /80  Pulse 76  Temp 98.6 °F (37 °C)  Resp 16  Ht 160 cm (63\")  Wt 127 kg (279 lb 3.2 oz)  BMI 49.46 kg/m2      Chemistry        Component Value Date/Time     10/19/2017 0946    K 4.6 10/19/2017 0946     10/19/2017 0946    CO2 28.0 10/19/2017 0946    BUN 18 10/19/2017 0946    CREATININE 1.01 (H) 10/19/2017 0946        Component Value Date/Time    CALCIUM 9.8 10/19/2017 0946    ALKPHOS 57 10/19/2017 0946    AST 13 (L) 10/19/2017 0946    ALT 36 10/19/2017 0946    BILITOT 0.3 10/19/2017 0946        Lab Results   Component Value Date    WBC 12.36 (H) 10/19/2017    HGB 12.0 10/19/2017    HCT 40.3 10/19/2017    MCV 69.1 (L) 10/19/2017     10/19/2017     Lab Results   Component Value Date    CHOL 160 10/19/2017    CHLPL 105 11/03/2016    TRIG 123 10/19/2017    HDL 49 (L) 10/19/2017    LDLCALC 45 11/03/2016    LDLDIRECT 90 10/19/2017     Lab Results   Component Value Date    HGBA1C 8.7 (H) 10/19/2017     Lab Results   Component Value Date    TSH 2.710 10/19/2017     Office Visit on 10/19/2017   Component Date Value Ref Range Status   • WBC 10/19/2017 12.36* 3.20 - 9.80 10*3/mm3 Final   • RBC 10/19/2017 5.83* 3.77 - 5.16 10*6/mm3 Final   • Hemoglobin 10/19/2017 12.0  12.0 - 15.5 g/dL Final   • Hematocrit 10/19/2017 40.3  35.0 - 45.0 % Final   • MCV 10/19/2017 69.1* 80.0 - 98.0 fL Final   • MCH 10/19/2017 20.6* 26.5 - 34.0 pg Final   • MCHC 10/19/2017 29.8* 31.4 " - 36.0 g/dL Final   • RDW 10/19/2017 16.6* 11.5 - 14.5 % Final   • RDW-SD 10/19/2017 41.2  36.4 - 46.3 fl Final   • MPV 10/19/2017 10.8  8.0 - 12.0 fL Final   • Platelets 10/19/2017 291  150 - 450 10*3/mm3 Final   • Neutrophil % 10/19/2017 53.5  37.0 - 80.0 % Final   • Lymphocyte % 10/19/2017 33.5  10.0 - 50.0 % Final   • Monocyte % 10/19/2017 7.4  0.0 - 12.0 % Final   • Eosinophil % 10/19/2017 4.4  0.0 - 7.0 % Final   • Basophil % 10/19/2017 0.6  0.0 - 2.0 % Final   • Immature Grans % 10/19/2017 0.6* 0.0 - 0.5 % Final   • Neutrophils, Absolute 10/19/2017 6.61  2.00 - 8.60 10*3/mm3 Final   • Lymphocytes, Absolute 10/19/2017 4.14  0.60 - 4.20 10*3/mm3 Final   • Monocytes, Absolute 10/19/2017 0.91* 0.00 - 0.90 10*3/mm3 Final   • Eosinophils, Absolute 10/19/2017 0.55  0.00 - 0.70 10*3/mm3 Final   • Basophils, Absolute 10/19/2017 0.07  0.00 - 0.20 10*3/mm3 Final   • Immature Grans, Absolute 10/19/2017 0.08* 0.00 - 0.02 10*3/mm3 Final   • Glucose 10/19/2017 146* 60 - 100 mg/dL Final   • BUN 10/19/2017 18  7 - 21 mg/dL Final   • Creatinine 10/19/2017 1.01* 0.50 - 1.00 mg/dL Final   • Sodium 10/19/2017 140  137 - 145 mmol/L Final   • Potassium 10/19/2017 4.6  3.5 - 5.1 mmol/L Final   • Chloride 10/19/2017 100  95 - 110 mmol/L Final   • CO2 10/19/2017 28.0  22.0 - 31.0 mmol/L Final   • Calcium 10/19/2017 9.8  8.4 - 10.2 mg/dL Final   • Total Protein 10/19/2017 7.2  6.3 - 8.6 g/dL Final   • Albumin 10/19/2017 3.80  3.40 - 4.80 g/dL Final   • ALT (SGPT) 10/19/2017 36  9 - 52 U/L Final   • AST (SGOT) 10/19/2017 13* 14 - 36 U/L Final   • Alkaline Phosphatase 10/19/2017 57  38 - 126 U/L Final   • Total Bilirubin 10/19/2017 0.3  0.2 - 1.3 mg/dL Final   • eGFR   Amer 10/19/2017 73  58 - 135 mL/min/1.73 Final   • Globulin 10/19/2017 3.4  2.3 - 3.5 gm/dL Final   • A/G Ratio 10/19/2017 1.1  1.1 - 1.8 g/dL Final   • BUN/Creatinine Ratio 10/19/2017 17.8  7.0 - 25.0 Final   • Anion Gap 10/19/2017 12.0  5.0 - 15.0 mmol/L Final   •  Hemoglobin A1C 10/19/2017 8.7* 4 - 5.6 % Final   • TSH 10/19/2017 2.710  0.460 - 4.680 mIU/mL Final   • Free T4 10/19/2017 0.99  0.78 - 2.19 ng/dL Final   • T3, Free 10/19/2017 2.3  2.0 - 4.4 pg/mL Final   • 25 Hydroxy, Vitamin D 10/19/2017 39.9  30.0 - 100.0 ng/ml Final   • Ferritin 10/19/2017 90.10  6.20 - 137.00 ng/mL Final   • Iron 10/19/2017 59  37 - 170 mcg/dL Final   • TIBC 10/19/2017 254* 265 - 497 mcg/dL Final   • Iron Saturation 10/19/2017 23  15 - 50 % Final   • Vitamin B-12 10/19/2017 805  239 - 931 pg/mL Final   • Total Cholesterol 10/19/2017 160  0 - 199 mg/dL Final   • Triglycerides 10/19/2017 123  20 - 199 mg/dL Final   • HDL Cholesterol 10/19/2017 49* 60 - 200 mg/dL Final   • LDL Cholesterol  10/19/2017 90  1 - 129 mg/dL Final   • LDL/HDL Ratio 10/19/2017 1.76  0.00 - 3.22 Final       Physical Exam   Constitutional: She is oriented to person, place, and time. She appears well-developed and well-nourished. No distress.   HENT:   Head: Normocephalic and atraumatic.   Right Ear: External ear normal.   Left Ear: External ear normal.   Eyes: Conjunctivae and EOM are normal. Pupils are equal, round, and reactive to light. Right eye exhibits no discharge. Left eye exhibits no discharge. No scleral icterus.   Neck: Normal range of motion. Neck supple. No JVD present. No tracheal deviation present. No thyromegaly present.   Cardiovascular: Normal rate and regular rhythm.    Pulmonary/Chest: Effort normal and breath sounds normal. No stridor. No respiratory distress. She has no wheezes.   Abdominal: Soft. She exhibits no distension and no mass. There is no tenderness. There is no rebound and no guarding. No hernia.   Obese abdomen   Musculoskeletal: She exhibits no edema or deformity.        Right knee: She exhibits decreased range of motion and swelling. Tenderness found. Medial joint line and lateral joint line tenderness noted.        Left knee: She exhibits decreased range of motion and swelling.  Tenderness found. Medial joint line and lateral joint line tenderness noted.   Lymphadenopathy:     She has no cervical adenopathy.   Neurological: She is alert and oriented to person, place, and time. She has normal reflexes.   Skin: Skin is warm and dry. No rash noted. She is not diaphoretic. No erythema. No pallor.   Psychiatric: She has a normal mood and affect. Her behavior is normal.   Vitals reviewed.      Assessment/Plan   Problems Addressed this Visit        Cardiovascular and Mediastinum    Hyperlipidemia associated with type 2 diabetes mellitus    Essential hypertension - Primary    Hyperlipidemia       Respiratory    Mild intermittent asthma       Digestive    Morbid obesity    Vitamin D deficiency    Gastroesophageal reflux disease    Relevant Medications    esomeprazole (NEXIUM) 40 MG capsule       Musculoskeletal and Integument    Pain in both knees       Hematopoietic and Hemostatic    Iron deficiency anemia      - Essential Hypertension - BP at goal. Continue with current medicaitons   - hyperlipidemia-  Recheck lipid panel The current medical regimen is effective;  continue present plan and medications.  - DM type 2-  Sugars improved with Trulicity. Will stop invokana, lantus and novolog.  Continue with januvia.    - vitamin D deficiency -continue vitamin D pill once a week. Last labwork pt had stable vitamin D  - morbid obesity - BMI >40 - pt lost 15 lbs since last visit. Continue with ketogenic diet.  Gave diet information to go home with   - iron deficiency anemia - recheck CBC and iron panel, ferritin. Continue with ironp ill   - mild intermittent asthma - currently stable on albuterol   - for bilateral knee pain - voltaren gel to apply to both knees as needed. Drug infromatio ngiven    - GERD - will stop protonx and switch to nexium 40 mg PO q daily   - recheck in 2 month for medication recheck and labwork

## 2018-03-02 ENCOUNTER — OFFICE VISIT (OUTPATIENT)
Dept: FAMILY MEDICINE CLINIC | Facility: CLINIC | Age: 43
End: 2018-03-02

## 2018-03-02 VITALS
TEMPERATURE: 98.6 F | SYSTOLIC BLOOD PRESSURE: 120 MMHG | BODY MASS INDEX: 48.83 KG/M2 | HEIGHT: 63 IN | DIASTOLIC BLOOD PRESSURE: 80 MMHG | HEART RATE: 87 BPM | RESPIRATION RATE: 16 BRPM | WEIGHT: 275.6 LBS

## 2018-03-02 DIAGNOSIS — I10 ESSENTIAL HYPERTENSION: ICD-10-CM

## 2018-03-02 DIAGNOSIS — R19.7 DIARRHEA, UNSPECIFIED TYPE: ICD-10-CM

## 2018-03-02 DIAGNOSIS — IMO0002 UNCONTROLLED TYPE 2 DIABETES MELLITUS WITH COMPLICATION, WITH LONG-TERM CURRENT USE OF INSULIN: Primary | ICD-10-CM

## 2018-03-02 DIAGNOSIS — E78.5 HYPERLIPIDEMIA ASSOCIATED WITH TYPE 2 DIABETES MELLITUS (HCC): ICD-10-CM

## 2018-03-02 DIAGNOSIS — D50.9 IRON DEFICIENCY ANEMIA, UNSPECIFIED IRON DEFICIENCY ANEMIA TYPE: ICD-10-CM

## 2018-03-02 DIAGNOSIS — E55.9 VITAMIN D DEFICIENCY: ICD-10-CM

## 2018-03-02 DIAGNOSIS — J45.20 MILD INTERMITTENT ASTHMA, UNSPECIFIED WHETHER COMPLICATED: ICD-10-CM

## 2018-03-02 DIAGNOSIS — K21.9 GASTROESOPHAGEAL REFLUX DISEASE, ESOPHAGITIS PRESENCE NOT SPECIFIED: ICD-10-CM

## 2018-03-02 DIAGNOSIS — E66.01 MORBID OBESITY (HCC): ICD-10-CM

## 2018-03-02 DIAGNOSIS — E78.5 HYPERLIPIDEMIA, UNSPECIFIED HYPERLIPIDEMIA TYPE: ICD-10-CM

## 2018-03-02 DIAGNOSIS — E11.69 HYPERLIPIDEMIA ASSOCIATED WITH TYPE 2 DIABETES MELLITUS (HCC): ICD-10-CM

## 2018-03-02 LAB
ALBUMIN UR-MCNC: 0.6 MG/L
BASOPHILS # BLD AUTO: 0.03 10*3/MM3 (ref 0–0.2)
BASOPHILS NFR BLD AUTO: 0.3 % (ref 0–2)
CREAT UR-MCNC: 177.9 MG/DL
DEPRECATED RDW RBC AUTO: 42.3 FL (ref 36.4–46.3)
EOSINOPHIL # BLD AUTO: 0.56 10*3/MM3 (ref 0–0.7)
EOSINOPHIL NFR BLD AUTO: 5.1 % (ref 0–7)
ERYTHROCYTE [DISTWIDTH] IN BLOOD BY AUTOMATED COUNT: 16.8 % (ref 11.5–14.5)
HBA1C MFR BLD: 6.7 % (ref 4–5.6)
HCT VFR BLD AUTO: 36.2 % (ref 35–45)
HGB BLD-MCNC: 10.7 G/DL (ref 12–15.5)
IMM GRANULOCYTES # BLD: 0.05 10*3/MM3 (ref 0–0.02)
IMM GRANULOCYTES NFR BLD: 0.5 % (ref 0–0.5)
LYMPHOCYTES # BLD AUTO: 2.79 10*3/MM3 (ref 0.6–4.2)
LYMPHOCYTES NFR BLD AUTO: 25.5 % (ref 10–50)
MCH RBC QN AUTO: 20.4 PG (ref 26.5–34)
MCHC RBC AUTO-ENTMCNC: 29.6 G/DL (ref 31.4–36)
MCV RBC AUTO: 69 FL (ref 80–98)
MICROALBUMIN/CREAT UR: 3.4 MG/G (ref 0–30)
MONOCYTES # BLD AUTO: 0.63 10*3/MM3 (ref 0–0.9)
MONOCYTES NFR BLD AUTO: 5.8 % (ref 0–12)
NEUTROPHILS # BLD AUTO: 6.86 10*3/MM3 (ref 2–8.6)
NEUTROPHILS NFR BLD AUTO: 62.8 % (ref 37–80)
PLATELET # BLD AUTO: 312 10*3/MM3 (ref 150–450)
PMV BLD AUTO: 10.9 FL (ref 8–12)
RBC # BLD AUTO: 5.25 10*6/MM3 (ref 3.77–5.16)
WBC NRBC COR # BLD: 10.92 10*3/MM3 (ref 3.2–9.8)

## 2018-03-02 PROCEDURE — 80053 COMPREHEN METABOLIC PANEL: CPT | Performed by: FAMILY MEDICINE

## 2018-03-02 PROCEDURE — 83036 HEMOGLOBIN GLYCOSYLATED A1C: CPT | Performed by: FAMILY MEDICINE

## 2018-03-02 PROCEDURE — 82043 UR ALBUMIN QUANTITATIVE: CPT | Performed by: FAMILY MEDICINE

## 2018-03-02 PROCEDURE — 85025 COMPLETE CBC W/AUTO DIFF WBC: CPT | Performed by: FAMILY MEDICINE

## 2018-03-02 PROCEDURE — 99214 OFFICE O/P EST MOD 30 MIN: CPT | Performed by: FAMILY MEDICINE

## 2018-03-02 PROCEDURE — 82570 ASSAY OF URINE CREATININE: CPT | Performed by: FAMILY MEDICINE

## 2018-03-02 PROCEDURE — 80061 LIPID PANEL: CPT | Performed by: FAMILY MEDICINE

## 2018-03-02 PROCEDURE — 82306 VITAMIN D 25 HYDROXY: CPT | Performed by: FAMILY MEDICINE

## 2018-03-02 RX ORDER — LANCETS
EACH MISCELLANEOUS
COMMUNITY
Start: 2017-12-02 | End: 2022-03-18 | Stop reason: SDUPTHER

## 2018-03-02 RX ORDER — CYCLOBENZAPRINE HCL 10 MG
10 TABLET ORAL DAILY
COMMUNITY
Start: 2018-01-23 | End: 2018-06-22 | Stop reason: SDUPTHER

## 2018-03-02 RX ORDER — ONDANSETRON 4 MG/1
4 TABLET, ORALLY DISINTEGRATING ORAL
COMMUNITY
Start: 2017-12-29 | End: 2018-07-27

## 2018-03-02 RX ORDER — IBUPROFEN 600 MG/1
600 TABLET ORAL
COMMUNITY
Start: 2018-01-23 | End: 2018-04-06

## 2018-03-02 NOTE — PATIENT INSTRUCTIONS
"  Take a probiotic supplement over the counter     pedialyte/gatorade    DASH Eating Plan  DASH stands for \"Dietary Approaches to Stop Hypertension.\" The DASH eating plan is a healthy eating plan that has been shown to reduce high blood pressure (hypertension). It may also reduce your risk for type 2 diabetes, heart disease, and stroke. The DASH eating plan may also help with weight loss.  What are tips for following this plan?  General guidelines   · Avoid eating more than 2,300 mg (milligrams) of salt (sodium) a day. If you have hypertension, you may need to reduce your sodium intake to 1,500 mg a day.  · Limit alcohol intake to no more than 1 drink a day for nonpregnant women and 2 drinks a day for men. One drink equals 12 oz of beer, 5 oz of wine, or 1½ oz of hard liquor.  · Work with your health care provider to maintain a healthy body weight or to lose weight. Ask what an ideal weight is for you.  · Get at least 30 minutes of exercise that causes your heart to beat faster (aerobic exercise) most days of the week. Activities may include walking, swimming, or biking.  · Work with your health care provider or diet and nutrition specialist (dietitian) to adjust your eating plan to your individual calorie needs.  Reading food labels   · Check food labels for the amount of sodium per serving. Choose foods with less than 5 percent of the Daily Value of sodium. Generally, foods with less than 300 mg of sodium per serving fit into this eating plan.  · To find whole grains, look for the word \"whole\" as the first word in the ingredient list.  Shopping   · Buy products labeled as \"low-sodium\" or \"no salt added.\"  · Buy fresh foods. Avoid canned foods and premade or frozen meals.  Cooking   · Avoid adding salt when cooking. Use salt-free seasonings or herbs instead of table salt or sea salt. Check with your health care provider or pharmacist before using salt substitutes.  · Do not roy foods. Cook foods using healthy " methods such as baking, boiling, grilling, and broiling instead.  · Cook with heart-healthy oils, such as olive, canola, soybean, or sunflower oil.  Meal planning     · Eat a balanced diet that includes:  ¨ 5 or more servings of fruits and vegetables each day. At each meal, try to fill half of your plate with fruits and vegetables.  ¨ Up to 6-8 servings of whole grains each day.  ¨ Less than 6 oz of lean meat, poultry, or fish each day. A 3-oz serving of meat is about the same size as a deck of cards. One egg equals 1 oz.  ¨ 2 servings of low-fat dairy each day.  ¨ A serving of nuts, seeds, or beans 5 times each week.  ¨ Heart-healthy fats. Healthy fats called Omega-3 fatty acids are found in foods such as flaxseeds and coldwater fish, like sardines, salmon, and mackerel.  · Limit how much you eat of the following:  ¨ Canned or prepackaged foods.  ¨ Food that is high in trans fat, such as fried foods.  ¨ Food that is high in saturated fat, such as fatty meat.  ¨ Sweets, desserts, sugary drinks, and other foods with added sugar.  ¨ Full-fat dairy products.  · Do not salt foods before eating.  · Try to eat at least 2 vegetarian meals each week.  · Eat more home-cooked food and less restaurant, buffet, and fast food.  · When eating at a restaurant, ask that your food be prepared with less salt or no salt, if possible.  What foods are recommended?  The items listed may not be a complete list. Talk with your dietitian about what dietary choices are best for you.  Grains   Whole-grain or whole-wheat bread. Whole-grain or whole-wheat pasta. Brown rice. Oatmeal. Quinoa. Bulgur. Whole-grain and low-sodium cereals. Rossana bread. Low-fat, low-sodium crackers. Whole-wheat flour tortillas.  Vegetables   Fresh or frozen vegetables (raw, steamed, roasted, or grilled). Low-sodium or reduced-sodium tomato and vegetable juice. Low-sodium or reduced-sodium tomato sauce and tomato paste. Low-sodium or reduced-sodium canned  vegetables.  Fruits   All fresh, dried, or frozen fruit. Canned fruit in natural juice (without added sugar).  Meat and other protein foods   Skinless chicken or turkey. Ground chicken or turkey. Pork with fat trimmed off. Fish and seafood. Egg whites. Dried beans, peas, or lentils. Unsalted nuts, nut butters, and seeds. Unsalted canned beans. Lean cuts of beef with fat trimmed off. Low-sodium, lean deli meat.  Dairy   Low-fat (1%) or fat-free (skim) milk. Fat-free, low-fat, or reduced-fat cheeses. Nonfat, low-sodium ricotta or cottage cheese. Low-fat or nonfat yogurt. Low-fat, low-sodium cheese.  Fats and oils   Soft margarine without trans fats. Vegetable oil. Low-fat, reduced-fat, or light mayonnaise and salad dressings (reduced-sodium). Canola, safflower, olive, soybean, and sunflower oils. Avocado.  Seasoning and other foods   Herbs. Spices. Seasoning mixes without salt. Unsalted popcorn and pretzels. Fat-free sweets.  What foods are not recommended?  The items listed may not be a complete list. Talk with your dietitian about what dietary choices are best for you.  Grains   Baked goods made with fat, such as croissants, muffins, or some breads. Dry pasta or rice meal packs.  Vegetables   Creamed or fried vegetables. Vegetables in a cheese sauce. Regular canned vegetables (not low-sodium or reduced-sodium). Regular canned tomato sauce and paste (not low-sodium or reduced-sodium). Regular tomato and vegetable juice (not low-sodium or reduced-sodium). Pickles. Olives.  Fruits   Canned fruit in a light or heavy syrup. Fried fruit. Fruit in cream or butter sauce.  Meat and other protein foods   Fatty cuts of meat. Ribs. Fried meat. [place]. Sausage. Bologna and other processed lunch meats. Salami. Fatback. Hotdogs. Bratwurst. Salted nuts and seeds. Canned beans with added salt. Canned or smoked fish. Whole eggs or egg yolks. Chicken or turkey with skin.  Dairy   Whole or 2% milk, cream, and half-and-half. Whole or  full-fat cream cheese. Whole-fat or sweetened yogurt. Full-fat cheese. Nondairy creamers. Whipped toppings. Processed cheese and cheese spreads.  Fats and oils   Butter. Stick margarine. Lard. Shortening. Ghee. [place] fat. Tropical oils, such as coconut, palm kernel, or palm oil.  Seasoning and other foods   Salted popcorn and pretzels. Onion salt, garlic salt, seasoned salt, table salt, and sea salt. Worcestershire sauce. Tartar sauce. Barbecue sauce. Teriyaki sauce. Soy sauce, including reduced-sodium. Steak sauce. Canned and packaged gravies. Fish sauce. Oyster sauce. Cocktail sauce. Horseradish that you find on the shelf. Ketchup. Mustard. Meat flavorings and tenderizers. Bouillon cubes. Hot sauce and Tabasco sauce. Premade or packaged marinades. Premade or packaged taco seasonings. Relishes. Regular salad dressings.  Where to find more information:  · National Heart, Lung, and [employer] [place]: www.nhlbi.nih.gov  · American Heart Association: www.heart.org  Summary  · The DASH eating plan is a healthy eating plan that has been shown to reduce high blood pressure (hypertension). It may also reduce your risk for type 2 diabetes, heart disease, and stroke.  · With the DASH eating plan, you should limit salt (sodium) intake to 2,300 mg a day. If you have hypertension, you may need to reduce your sodium intake to 1,500 mg a day.  · When on the DASH eating plan, aim to eat more fresh fruits and vegetables, whole grains, lean proteins, low-fat dairy, and heart-healthy fats.  · Work with your health care provider or diet and nutrition specialist (dietitian) to adjust your eating plan to your individual calorie needs.  This information is not intended to replace advice given to you by your health care provider. Make sure you discuss any questions you have with your health care provider.  Document Released: 12/06/2012 Document Revised: 12/11/2017 Document Reviewed: 12/11/2017  [employer] Interactive Patient Education © 2017  [employer]    Calorie Counting for Weight Loss  Calories are units of energy. Your body needs a certain amount of calories from food to keep you going throughout the day. When you eat more calories than your body needs, your body stores the extra calories as fat. When you eat fewer calories than your body needs, your body burns fat to get the energy it needs.  Calorie counting means keeping track of how many calories you eat and drink each day. Calorie counting can be helpful if you need to lose weight. If you make sure to eat fewer calories than your body needs, you should lose weight. Ask your health care provider what a healthy weight is for you.  For calorie counting to work, you will need to eat the right number of calories in a day in order to lose a healthy amount of weight per week. A dietitian can help you determine how many calories you need in a day and will give you suggestions on how to reach your calorie goal.  · A healthy amount of weight to lose per week is usually 1-2 lb (0.5-0.9 kg). This usually means that your daily calorie intake should be reduced by 500-750 calories.  · Eating 1,200 - 1,500 calories per day can help most women lose weight.  · Eating 1,500 - 1,800 calories per day can help most men lose weight.  What is my plan?  My goal is to have __________ calories per day.  If I have this many calories per day, I should lose around __________ pounds per week.  What do I need to know about calorie counting?  In order to meet your daily calorie goal, you will need to:  · Find out how many calories are in each food you would like to eat. Try to do this before you eat.  · Decide how much of the food you plan to eat.  · Write down what you ate and how many calories it had. Doing this is called keeping a food log.  To successfully lose weight, it is important to balance calorie counting with a healthy lifestyle that includes regular activity. Aim for 150 minutes of moderate exercise (such as  walking) or 75 minutes of vigorous exercise (such as running) each week.  Where do I find calorie information?     The number of calories in a food can be found on a Nutrition Facts label. If a food does not have a Nutrition Facts label, try to look up the calories online or ask your dietitian for help.  Remember that calories are listed per serving. If you choose to have more than one serving of a food, you will have to multiply the calories per serving by the amount of servings you plan to eat. For example, the label on a package of bread might say that a serving size is 1 slice and that there are 90 calories in a serving. If you eat 1 slice, you will have eaten 90 calories. If you eat 2 slices, you will have eaten 180 calories.  How do I keep a food log?  Immediately after each meal, record the following information in your food log:  · What you ate. Don't forget to include toppings, sauces, and other extras on the food.  · How much you ate. This can be measured in cups, ounces, or number of items.  · How many calories each food and drink had.  · The total number of calories in the meal.  Keep your food log near you, such as in a small notebook in your pocket, or use a mobile nette or website. Some programs will calculate calories for you and show you how many calories you have left for the day to meet your goal.  What are some calorie counting tips?  · Use your calories on foods and drinks that will fill you up and not leave you hungry:  ¨ Some examples of foods that fill you up are nuts and nut butters, vegetables, lean proteins, and high-fiber foods like whole grains. High-fiber foods are foods with more than 5 g fiber per serving.  ¨ Drinks such as sodas, specialty coffee drinks, alcohol, and juices have a lot of calories, yet do not fill you up.  · Eat nutritious foods and avoid empty calories. Empty calories are calories you get from foods or beverages that do not have many vitamins or protein, such as candy,  "sweets, and soda. It is better to have a nutritious high-calorie food (such as an avocado) than a food with few nutrients (such as a bag of chips).  · Know how many calories are in the foods you eat most often. This will help you calculate calorie counts faster.  · Pay attention to calories in drinks. Low-calorie drinks include water and unsweetened drinks.  · Pay attention to nutrition labels for \"low fat\" or \"fat free\" foods. These foods sometimes have the same amount of calories or more calories than the full fat versions. They also often have added sugar, starch, or salt, to make up for flavor that was removed with the fat.  · Find a way of tracking calories that works for you. Get creative. Try different apps or programs if writing down calories does not work for you.  What are some portion control tips?  · Know how many calories are in a serving. This will help you know how many servings of a certain food you can have.  · Use a measuring cup to measure serving sizes. You could also try weighing out portions on a kitchen scale. With time, you will be able to estimate serving sizes for some foods.  · Take some time to put servings of different foods on your favorite plates, bowls, and cups so you know what a serving looks like.  · Try not to eat straight from a bag or box. Doing this can lead to overeating. Put the amount you would like to eat in a cup or on a plate to make sure you are eating the right portion.  · Use smaller plates, glasses, and bowls to prevent overeating.  · Try not to multitask (for example, watch TV or use your computer) while eating. If it is time to eat, sit down at a table and enjoy your food. This will help you to know when you are full. It will also help you to be aware of what you are eating and how much you are eating.  What are tips for following this plan?  Reading food labels   · Check the calorie count compared to the serving size. The serving size may be smaller than what you " are used to eating.  · Check the source of the calories. Make sure the food you are eating is high in vitamins and protein and low in saturated and trans fats.  Shopping   · Read nutrition labels while you shop. This will help you make healthy decisions before you decide to purchase your food.  · Make a grocery list and stick to it.  Cooking   · Try to cook your favorite foods in a healthier way. For example, try baking instead of frying.  · Use low-fat dairy products.  Meal planning   · Use more fruits and vegetables. Half of your plate should be fruits and vegetables.  · Include lean proteins like poultry and fish.  How do I count calories when eating out?  · Ask for smaller portion sizes.  · Consider sharing an entree and sides instead of getting your own entree.  · If you get your own entree, eat only half. Ask for a box at the beginning of your meal and put the rest of your entree in it so you are not tempted to eat it.  · If calories are listed on the menu, choose the lower calorie options.  · Choose dishes that include vegetables, fruits, whole grains, low-fat dairy products, and lean protein.  · Choose items that are boiled, broiled, grilled, or steamed. Stay away from items that are buttered, battered, fried, or served with cream sauce. Items labeled “crispy” are usually fried, unless stated otherwise.  · Choose water, low-fat milk, unsweetened iced tea, or other drinks without added sugar. If you want an alcoholic beverage, choose a lower calorie option such as a glass of wine or light beer.  · Ask for dressings, sauces, and syrups on the side. These are usually high in calories, so you should limit the amount you eat.  · If you want a salad, choose a garden salad and ask for grilled meats. Avoid extra toppings like fernandes, cheese, or fried items. Ask for the dressing on the side, or ask for olive oil and vinegar or lemon to use as dressing.  · Estimate how many servings of a food you are given. For  example, a serving of cooked rice is ½ cup or about the size of half a baseball. Knowing serving sizes will help you be aware of how much food you are eating at restaurants. The list below tells you how big or small some common portion sizes are based on everyday objects:  ¨ 1 oz--4 stacked dice.  ¨ 3 oz--1 deck of cards.  ¨ 1 tsp--1 die.  ¨ 1 Tbsp--½ a ping-pong ball.  ¨ 2 Tbsp--1 ping-pong ball.  ¨ ½ cup--½ baseball.  ¨ 1 cup--1 baseball.  Summary  · Calorie counting means keeping track of how many calories you eat and drink each day. If you eat fewer calories than your body needs, you should lose weight.  · A healthy amount of weight to lose per week is usually 1-2 lb (0.5-0.9 kg). This usually means reducing your daily calorie intake by 500-750 calories.  · The number of calories in a food can be found on a Nutrition Facts label. If a food does not have a Nutrition Facts label, try to look up the calories online or ask your dietitian for help.  · Use your calories on foods and drinks that will fill you up, and not on foods and drinks that will leave you hungry.  · Use smaller plates, glasses, and bowls to prevent overeating.  This information is not intended to replace advice given to you by your health care provider. Make sure you discuss any questions you have with your health care provider.  Document Released: 12/18/2006 Document Revised: 11/17/2017 Document Reviewed: 11/17/2017  [employer] Interactive Patient Education © 2017 [employer]    Exercising to Lose Weight  Exercising can help you to lose weight. In order to lose weight through exercise, you need to do vigorous-intensity exercise. You can tell that you are exercising with vigorous intensity if you are breathing very hard and fast and cannot hold a conversation while exercising.  Moderate-intensity exercise helps to maintain your current weight. You can tell that you are exercising at a moderate level if you have a higher heart rate and faster  breathing, but you are still able to hold a conversation.  How often should I exercise?  Choose an activity that you enjoy and set realistic goals. Your health care provider can help you to make an activity plan that works for you. Exercise regularly as directed by your health care provider. This may include:  · Doing resistance training twice each week, such as:  ¨ Push-ups.  ¨ Sit-ups.  ¨ Lifting weights.  ¨ Using resistance bands.  · Doing a given intensity of exercise for a given amount of time. Choose from these options:  ¨ 150 minutes of moderate-intensity exercise every week.  ¨ 75 minutes of vigorous-intensity exercise every week.  ¨ A mix of moderate-intensity and vigorous-intensity exercise every week.  Children, pregnant women, people who are out of shape, people who are overweight, and older adults may need to consult a health care provider for individual recommendations. If you have any sort of medical condition, be sure to consult your health care provider before starting a new exercise program.  What are some activities that can help me to lose weight?  · Walking at a rate of at least 4.5 miles an hour.  · Jogging or running at a rate of 5 miles per hour.  · Biking at a rate of at least 10 miles per hour.  · Lap swimming.  · Roller-skating or in-line skating.  · Cross-country skiing.  · Vigorous competitive sports, such as football, basketball, and soccer.  · Jumping rope.  · Aerobic dancing.  How can I be more active in my day-to-day activities?  · Use the stairs instead of the elevator.  · Take a walk during your lunch break.  · If you drive, park your car farther away from work or school.  · If you take public transportation, get off one stop early and walk the rest of the way.  · Make all of your phone calls while standing up and walking around.  · Get up, stretch, and walk around every 30 minutes throughout the day.  What guidelines should I follow while exercising?  · Do not exercise so much  that you hurt yourself, feel dizzy, or get very short of breath.  · Consult your health care provider prior to starting a new exercise program.  · Wear comfortable clothes and shoes with good support.  · Drink plenty of water while you exercise to prevent dehydration or heat stroke. Body water is lost during exercise and must be replaced.  · Work out until you breathe faster and your heart beats faster.  This information is not intended to replace advice given to you by your health care provider. Make sure you discuss any questions you have with your health care provider.  Document Released: 01/20/2012 Document Revised: 05/25/2017 Document Reviewed: 05/21/2015  [employer] Interactive Patient Education © 2017 [employer]

## 2018-03-02 NOTE — PROGRESS NOTES
Subjective   Lizzy Restrepo is a 42 y.o. female.       Problem List  1. DM type 2 insulin dependent, uncontrolled  2. Obesity BMI >40   3. Hyperrtension  4. GERD  5. Iron deficiency anemia  6. GERD  7. Vitamin D deficiency  8. Hyperlipidemia/dysplipidemia  9. Elevated ALT  10. Mild persistent asthma       12/06/18 Pt is 41 yo AAF with the above medical issues. Is here for recheck. Has DM type 2 and last hga1c >7.0.  Pt currently takes invokana 300 mg PO q daily along with januvia 100 mg PO q daily.  Also takes Basaglar insulin 20 units at bedtime along with humalog 15 units subq before meals.  Pt has history of noncompliance. For hyperlipidemia pt is on lipitor Lizzy Restrepo went to [facility] recently for breathing issues. States there was something in the vents that caused symptoms and pt went to [facility] ER and was treated with steroids. She got better and was discharged home the same day.   She usually takes albuterol inhaler along with nebulizer treatment. Today breathing is stable. Currently has a headache.  Sugars are better controlled and is <130 before breakfast and <180 2 hours after meal.      Had labwork gaby that showed hga1c was at 8.7. She was taking Basaglar 20 units at bedtiem along with humalog 15 units before meals.  Pt also was taking invokana 300 mg PO q daily along with januvia 100 mg PO q daily. On last visit pt was started on trulicity 1.5 mg injection once a week. Since taking medication she has saw improvement on sugars in the am and after meals. She has stopped taking invokana, novolog and Lantus.  Pt has also lost 15 lbs since last visit. She is feeling happier and heathier.    Pt has iron deficiency anemia and hemoglobin has improved to 12.0 from  11.2. She is taking iron pill TID. Thyroid studies were normal. Vitamin D levels are in good range. Pt takes vitamin D pill once a week. On lipid panel.  LDL was <100 but HDL at 49. She is taking lipitor 40 mg PO qhs     She also needs a new  medication instead of protonix for reflux and GERD. Pt also has pain in both knees and is requesting Voltaren gel to help with pain.     3/2/18.  Pt is here for recheck  She is due for new labwork. She recently went to [facility] for stomach issues. And had CT scan of abdomen and labwork. Do not have results here. She has had loose stools.She was given IV fluids and medication but does not know what it was. She is feeling better today. She denies any fever or nausea. Pt has been recently stressed out with stress of his brother who has health issues in [facility].. She is due for new labwork and sugars are better controlled with Trulicity       Diabetes   She presents for her follow-up diabetic visit. She has type 2 diabetes mellitus. Her disease course has been stable. Pertinent negatives for hypoglycemia include no confusion, dizziness, headaches, hunger, mood changes, nervousness/anxiousness, pallor, seizures, sleepiness, speech difficulty, sweats or tremors. Associated symptoms include fatigue. Pertinent negatives for diabetes include no blurred vision, no chest pain, no foot paresthesias, no foot ulcerations, no polydipsia, no polyphagia, no polyuria, no visual change, no weakness and no weight loss. Pertinent negatives for hypoglycemia complications include no blackouts, no hospitalization, no nocturnal hypoglycemia, no required assistance and no required glucagon injection. Symptoms are stable. Pertinent negatives for diabetic complications include no autonomic neuropathy, CVA, heart disease, nephropathy, peripheral neuropathy, PVD or retinopathy. Risk factors for coronary artery disease include diabetes mellitus, dyslipidemia and obesity. Current diabetic treatment includes insulin injections. She is following a diabetic diet. When asked about meal planning, she reported none. She has not had a previous visit with a dietitian. She participates in exercise intermittently. She does not see a podiatrist.Eye exam is not  "current.          The following portions of the patient's history were reviewed and updated as appropriate: allergies, current medications, past family history, past medical history, past social history, past surgical history and problem list.    Review of Systems   Constitutional: Positive for activity change and fatigue. Negative for weight loss.   HENT: Negative.    Eyes: Negative.  Negative for blurred vision.   Respiratory: Positive for shortness of breath.    Cardiovascular: Negative.  Negative for chest pain.   Gastrointestinal: Positive for diarrhea.   Endocrine: Negative.  Negative for polydipsia, polyphagia and polyuria.   Genitourinary: Negative.    Musculoskeletal: Positive for arthralgias.   Skin: Negative.  Negative for pallor.   Allergic/Immunologic: Positive for environmental allergies.   Neurological: Negative.  Negative for dizziness, tremors, seizures, speech difficulty, weakness and headaches.   Hematological: Negative.    Psychiatric/Behavioral: Positive for agitation and decreased concentration. Negative for confusion. The patient is not nervous/anxious.        Objective    /80  Pulse 87  Temp 98.6 °F (37 °C)  Resp 16  Ht 160 cm (63\")  Wt 125 kg (275 lb 9.6 oz)  BMI 48.82 kg/m2      Chemistry        Component Value Date/Time     10/19/2017 0946    K 4.6 10/19/2017 0946     10/19/2017 0946    CO2 28.0 10/19/2017 0946    BUN 18 10/19/2017 0946    CREATININE 1.01 (H) 10/19/2017 0946        Component Value Date/Time    CALCIUM 9.8 10/19/2017 0946    ALKPHOS 57 10/19/2017 0946    AST 13 (L) 10/19/2017 0946    ALT 36 10/19/2017 0946    BILITOT 0.3 10/19/2017 0946        Lab Results   Component Value Date    WBC 12.36 (H) 10/19/2017    HGB 12.0 10/19/2017    HCT 40.3 10/19/2017    MCV 69.1 (L) 10/19/2017     10/19/2017     Lab Results   Component Value Date    CHOL 160 10/19/2017    CHLPL 105 11/03/2016    TRIG 123 10/19/2017    HDL 49 (L) 10/19/2017    LDL 90 10/19/2017 "     Lab Results   Component Value Date    HGBA1C 8.7 (H) 10/19/2017     Lab Results   Component Value Date    TSH 2.710 10/19/2017     Office Visit on 10/19/2017   Component Date Value Ref Range Status   • WBC 10/19/2017 12.36* 3.20 - 9.80 10*3/mm3 Final   • RBC 10/19/2017 5.83* 3.77 - 5.16 10*6/mm3 Final   • Hemoglobin 10/19/2017 12.0  12.0 - 15.5 g/dL Final   • Hematocrit 10/19/2017 40.3  35.0 - 45.0 % Final   • MCV 10/19/2017 69.1* 80.0 - 98.0 fL Final   • MCH 10/19/2017 20.6* 26.5 - 34.0 pg Final   • MCHC 10/19/2017 29.8* 31.4 - 36.0 g/dL Final   • RDW 10/19/2017 16.6* 11.5 - 14.5 % Final   • RDW-SD 10/19/2017 41.2  36.4 - 46.3 fl Final   • MPV 10/19/2017 10.8  8.0 - 12.0 fL Final   • Platelets 10/19/2017 291  150 - 450 10*3/mm3 Final   • Neutrophil % 10/19/2017 53.5  37.0 - 80.0 % Final   • Lymphocyte % 10/19/2017 33.5  10.0 - 50.0 % Final   • Monocyte % 10/19/2017 7.4  0.0 - 12.0 % Final   • Eosinophil % 10/19/2017 4.4  0.0 - 7.0 % Final   • Basophil % 10/19/2017 0.6  0.0 - 2.0 % Final   • Immature Grans % 10/19/2017 0.6* 0.0 - 0.5 % Final   • Neutrophils, Absolute 10/19/2017 6.61  2.00 - 8.60 10*3/mm3 Final   • Lymphocytes, Absolute 10/19/2017 4.14  0.60 - 4.20 10*3/mm3 Final   • Monocytes, Absolute 10/19/2017 0.91* 0.00 - 0.90 10*3/mm3 Final   • Eosinophils, Absolute 10/19/2017 0.55  0.00 - 0.70 10*3/mm3 Final   • Basophils, Absolute 10/19/2017 0.07  0.00 - 0.20 10*3/mm3 Final   • Immature Grans, Absolute 10/19/2017 0.08* 0.00 - 0.02 10*3/mm3 Final   • Glucose 10/19/2017 146* 60 - 100 mg/dL Final   • BUN 10/19/2017 18  7 - 21 mg/dL Final   • Creatinine 10/19/2017 1.01* 0.50 - 1.00 mg/dL Final   • Sodium 10/19/2017 140  137 - 145 mmol/L Final   • Potassium 10/19/2017 4.6  3.5 - 5.1 mmol/L Final   • Chloride 10/19/2017 100  95 - 110 mmol/L Final   • CO2 10/19/2017 28.0  22.0 - 31.0 mmol/L Final   • Calcium 10/19/2017 9.8  8.4 - 10.2 mg/dL Final   • Total Protein 10/19/2017 7.2  6.3 - 8.6 g/dL Final   • Albumin  10/19/2017 3.80  3.40 - 4.80 g/dL Final   • ALT (SGPT) 10/19/2017 36  9 - 52 U/L Final   • AST (SGOT) 10/19/2017 13* 14 - 36 U/L Final   • Alkaline Phosphatase 10/19/2017 57  38 - 126 U/L Final   • Total Bilirubin 10/19/2017 0.3  0.2 - 1.3 mg/dL Final   • eGFR   Amer 10/19/2017 73  58 - 135 mL/min/1.73 Final   • Globulin 10/19/2017 3.4  2.3 - 3.5 gm/dL Final   • A/G Ratio 10/19/2017 1.1  1.1 - 1.8 g/dL Final   • BUN/Creatinine Ratio 10/19/2017 17.8  7.0 - 25.0 Final   • Anion Gap 10/19/2017 12.0  5.0 - 15.0 mmol/L Final   • Hemoglobin A1C 10/19/2017 8.7* 4 - 5.6 % Final   • TSH 10/19/2017 2.710  0.460 - 4.680 mIU/mL Final   • Free T4 10/19/2017 0.99  0.78 - 2.19 ng/dL Final   • T3, Free 10/19/2017 2.3  2.0 - 4.4 pg/mL Final   • 25 Hydroxy, Vitamin D 10/19/2017 39.9  30.0 - 100.0 ng/ml Final   • Ferritin 10/19/2017 90.10  6.20 - 137.00 ng/mL Final   • Iron 10/19/2017 59  37 - 170 mcg/dL Final   • TIBC 10/19/2017 254* 265 - 497 mcg/dL Final   • Iron Saturation 10/19/2017 23  15 - 50 % Final   • Vitamin B-12 10/19/2017 805  239 - 931 pg/mL Final   • Total Cholesterol 10/19/2017 160  0 - 199 mg/dL Final   • Triglycerides 10/19/2017 123  20 - 199 mg/dL Final   • HDL Cholesterol 10/19/2017 49* 60 - 200 mg/dL Final   • LDL Cholesterol  10/19/2017 90  1 - 129 mg/dL Final   • LDL/HDL Ratio 10/19/2017 1.76  0.00 - 3.22 Final       Physical Exam   Constitutional: She is oriented to person, place, and time. She appears well-developed and well-nourished. No distress.   Appears sad,tearful    HENT:   Head: Normocephalic and atraumatic.   Right Ear: External ear normal.   Left Ear: External ear normal.   Eyes: Conjunctivae and EOM are normal. Pupils are equal, round, and reactive to light. Right eye exhibits no discharge. Left eye exhibits no discharge. No scleral icterus.   Neck: Normal range of motion. Neck supple. No JVD present. No tracheal deviation present. No thyromegaly present.   Cardiovascular: Normal rate and  regular rhythm.    Pulmonary/Chest: Effort normal and breath sounds normal. No stridor. No respiratory distress. She has no wheezes.   Abdominal: Soft. She exhibits no distension and no mass. There is tenderness. There is no rebound and no guarding. No hernia.   Obese abdomen   Musculoskeletal: She exhibits no edema, tenderness or deformity.        Right knee: She exhibits decreased range of motion and swelling.        Left knee: She exhibits decreased range of motion and swelling.   Lymphadenopathy:     She has no cervical adenopathy.   Neurological: She is alert and oriented to person, place, and time. She has normal reflexes.   Skin: Skin is warm and dry. No rash noted. She is not diaphoretic. No erythema. No pallor.   Psychiatric: She has a normal mood and affect. Her behavior is normal.   Vitals reviewed.      Assessment/Plan   Problems Addressed this Visit        Cardiovascular and Mediastinum    Hyperlipidemia associated with type 2 diabetes mellitus    Relevant Medications    Dulaglutide (TRULICITY) 0.75 MG/0.5ML solution pen-injector    Essential hypertension    Hyperlipidemia       Respiratory    Mild intermittent asthma       Digestive    Morbid obesity    Vitamin D deficiency    Gastroesophageal reflux disease       Endocrine    Diabetes type 2, uncontrolled - Primary    Relevant Medications    Dulaglutide (TRULICITY) 0.75 MG/0.5ML solution pen-injector    Other Relevant Orders    CBC Auto Differential    Comprehensive Metabolic Panel    Hemoglobin A1c    Lipid Panel    Vitamin D 25 Hydroxy    Microalbumin / Creatinine Urine Ratio - Urine, Clean Catch    Gastrointestinal Panel, PCR - Stool, Per Rectum       Hematopoietic and Hemostatic    Iron deficiency anemia      Other Visit Diagnoses     Diarrhea, unspecified type        Relevant Orders    Gastrointestinal Panel, PCR - Stool, Per Rectum        - will get labwork to check lipid panel, vitamin D\, hga1c and lipid panel   - Essential Hypertension - BP  at goal. Continue with current medicaitons   - hyperlipidemia-  Recheck lipid panel The current medical regimen is effective;  continue present plan and medications.  - DM type 2-  Sugars improved with Trulicity. Will stop invokana, lantus and novolog.  Continue with januvia.    - vitamin D deficiency -continue vitamin D pill once a week.   - morbid obesity - BMI >40 - pt gained 4 lbs since last visit. Continue with ketogenic diet.  Gave diet information to go home with   - iron deficiency anemia - recheck CBC and iron panel, ferritin. Continue with ironp ill   - mild intermittent asthma - currently stable on albuterol   - for bilateral knee pain - voltaren gel to apply to both knees as needed. Drug infromatio ngiven    - GERD - will stop protonx and switch to nexium 40 mg PO q daily   - for diarrhea - will get gastrointestinal PCR.  Will need recent records from [place] [facility]  - recheck in 1  month for medication recheck and labwork. Discuss about counseling on next visit regarding brother's health issues

## 2018-03-05 ENCOUNTER — TELEPHONE (OUTPATIENT)
Dept: FAMILY MEDICINE CLINIC | Facility: CLINIC | Age: 43
End: 2018-03-05

## 2018-03-05 LAB
25(OH)D3 SERPL-MCNC: 56.4 NG/ML (ref 30–100)
ALBUMIN SERPL-MCNC: 3.7 G/DL (ref 3.4–4.8)
ALBUMIN/GLOB SERPL: 1.1 G/DL (ref 1.1–1.8)
ALP SERPL-CCNC: 70 U/L (ref 38–126)
ALT SERPL W P-5'-P-CCNC: 30 U/L (ref 9–52)
ANION GAP SERPL CALCULATED.3IONS-SCNC: 14 MMOL/L (ref 5–15)
ARTICHOKE IGE QN: 81 MG/DL (ref 1–129)
AST SERPL-CCNC: 15 U/L (ref 14–36)
BILIRUB SERPL-MCNC: <0.1 MG/DL (ref 0.2–1.3)
BUN BLD-MCNC: 11 MG/DL (ref 7–21)
BUN/CREAT SERPL: 12.1 (ref 7–25)
CALCIUM SPEC-SCNC: 9.2 MG/DL (ref 8.4–10.2)
CHLORIDE SERPL-SCNC: 101 MMOL/L (ref 95–110)
CHOLEST SERPL-MCNC: 141 MG/DL (ref 0–199)
CO2 SERPL-SCNC: 25 MMOL/L (ref 22–31)
CREAT BLD-MCNC: 0.91 MG/DL (ref 0.5–1)
GFR SERPL CREATININE-BSD FRML MDRD: 82 ML/MIN/1.73 (ref 58–135)
GLOBULIN UR ELPH-MCNC: 3.4 GM/DL (ref 2.3–3.5)
GLUCOSE BLD-MCNC: 87 MG/DL (ref 60–100)
HDLC SERPL-MCNC: 34 MG/DL (ref 60–200)
LDLC/HDLC SERPL: 2.58 {RATIO} (ref 0–3.22)
POTASSIUM BLD-SCNC: 4.3 MMOL/L (ref 3.5–5.1)
PROT SERPL-MCNC: 7.1 G/DL (ref 6.3–8.6)
SODIUM BLD-SCNC: 140 MMOL/L (ref 137–145)
TRIGL SERPL-MCNC: 97 MG/DL (ref 20–199)

## 2018-03-05 RX ORDER — SITAGLIPTIN 100 MG/1
TABLET, FILM COATED ORAL
Qty: 30 TABLET | Refills: 11 | Status: SHIPPED | OUTPATIENT
Start: 2018-03-05 | End: 2018-03-21 | Stop reason: SDUPTHER

## 2018-03-05 RX ORDER — LISINOPRIL AND HYDROCHLOROTHIAZIDE 12.5; 1 MG/1; MG/1
TABLET ORAL
Qty: 60 TABLET | Refills: 11 | Status: SHIPPED | OUTPATIENT
Start: 2018-03-05 | End: 2018-03-21 | Stop reason: SDUPTHER

## 2018-03-21 RX ORDER — ALBUTEROL SULFATE 90 UG/1
2 AEROSOL, METERED RESPIRATORY (INHALATION) EVERY 4 HOURS PRN
Qty: 4 INHALER | Refills: 3 | Status: SHIPPED | OUTPATIENT
Start: 2018-03-21 | End: 2018-11-20 | Stop reason: SDUPTHER

## 2018-03-21 RX ORDER — ESOMEPRAZOLE MAGNESIUM 40 MG/1
40 CAPSULE, DELAYED RELEASE ORAL
Qty: 90 CAPSULE | Refills: 3 | Status: SHIPPED | OUTPATIENT
Start: 2018-03-21 | End: 2018-04-06

## 2018-03-21 RX ORDER — MONTELUKAST SODIUM 10 MG/1
10 TABLET ORAL NIGHTLY
Qty: 90 TABLET | Refills: 3 | Status: SHIPPED | OUTPATIENT
Start: 2018-03-21 | End: 2018-11-20 | Stop reason: SDUPTHER

## 2018-03-21 RX ORDER — LISINOPRIL AND HYDROCHLOROTHIAZIDE 12.5; 1 MG/1; MG/1
2 TABLET ORAL DAILY
Qty: 180 TABLET | Refills: 3 | Status: SHIPPED | OUTPATIENT
Start: 2018-03-21 | End: 2018-11-20 | Stop reason: SDUPTHER

## 2018-03-21 RX ORDER — LANOLIN ALCOHOL/MO/W.PET/CERES
325 CREAM (GRAM) TOPICAL
Qty: 90 TABLET | Refills: 11 | Status: SHIPPED | OUTPATIENT
Start: 2018-03-21 | End: 2018-11-20 | Stop reason: SDUPTHER

## 2018-03-21 RX ORDER — ATORVASTATIN CALCIUM 40 MG/1
40 TABLET, FILM COATED ORAL DAILY
Qty: 90 TABLET | Refills: 3 | Status: SHIPPED | OUTPATIENT
Start: 2018-03-21 | End: 2018-11-20 | Stop reason: SDUPTHER

## 2018-04-02 RX ORDER — ALBUTEROL SULFATE 90 UG/1
AEROSOL, METERED RESPIRATORY (INHALATION)
Qty: 1 INHALER | Refills: 0 | Status: SHIPPED | OUTPATIENT
Start: 2018-04-02 | End: 2019-10-31 | Stop reason: SDUPTHER

## 2018-04-06 ENCOUNTER — OFFICE VISIT (OUTPATIENT)
Dept: FAMILY MEDICINE CLINIC | Facility: CLINIC | Age: 43
End: 2018-04-06

## 2018-04-06 VITALS
BODY MASS INDEX: 48.48 KG/M2 | DIASTOLIC BLOOD PRESSURE: 76 MMHG | SYSTOLIC BLOOD PRESSURE: 128 MMHG | HEART RATE: 76 BPM | HEIGHT: 63 IN | RESPIRATION RATE: 16 BRPM | TEMPERATURE: 98.6 F | WEIGHT: 273.6 LBS

## 2018-04-06 DIAGNOSIS — D50.9 IRON DEFICIENCY ANEMIA, UNSPECIFIED IRON DEFICIENCY ANEMIA TYPE: ICD-10-CM

## 2018-04-06 DIAGNOSIS — J45.20 MILD INTERMITTENT ASTHMA, UNSPECIFIED WHETHER COMPLICATED: ICD-10-CM

## 2018-04-06 DIAGNOSIS — D64.9 ANEMIA, UNSPECIFIED TYPE: ICD-10-CM

## 2018-04-06 DIAGNOSIS — E55.9 VITAMIN D DEFICIENCY: ICD-10-CM

## 2018-04-06 DIAGNOSIS — K21.9 GASTROESOPHAGEAL REFLUX DISEASE, ESOPHAGITIS PRESENCE NOT SPECIFIED: ICD-10-CM

## 2018-04-06 DIAGNOSIS — E11.8 CONTROLLED TYPE 2 DIABETES MELLITUS WITH COMPLICATION, WITHOUT LONG-TERM CURRENT USE OF INSULIN (HCC): ICD-10-CM

## 2018-04-06 DIAGNOSIS — M25.561 RIGHT KNEE PAIN, UNSPECIFIED CHRONICITY: Primary | ICD-10-CM

## 2018-04-06 DIAGNOSIS — E78.5 HYPERLIPIDEMIA, UNSPECIFIED HYPERLIPIDEMIA TYPE: ICD-10-CM

## 2018-04-06 DIAGNOSIS — E66.01 MORBID OBESITY (HCC): ICD-10-CM

## 2018-04-06 DIAGNOSIS — I10 ESSENTIAL HYPERTENSION: ICD-10-CM

## 2018-04-06 LAB
HCT VFR BLD AUTO: 37.3 % (ref 35–45)
HGB BLD-MCNC: 11.1 G/DL (ref 12–15.5)

## 2018-04-06 PROCEDURE — 85014 HEMATOCRIT: CPT | Performed by: FAMILY MEDICINE

## 2018-04-06 PROCEDURE — 85018 HEMOGLOBIN: CPT | Performed by: FAMILY MEDICINE

## 2018-04-06 PROCEDURE — 99214 OFFICE O/P EST MOD 30 MIN: CPT | Performed by: FAMILY MEDICINE

## 2018-04-06 RX ORDER — MELOXICAM 15 MG/1
15 TABLET ORAL DAILY
Qty: 30 TABLET | Refills: 3 | Status: SHIPPED | OUTPATIENT
Start: 2018-04-06 | End: 2018-08-24 | Stop reason: SDUPTHER

## 2018-04-06 RX ORDER — PANTOPRAZOLE SODIUM 40 MG/1
40 TABLET, DELAYED RELEASE ORAL DAILY
Qty: 30 TABLET | Refills: 3
Start: 2018-04-06 | End: 2018-06-14 | Stop reason: SDUPTHER

## 2018-04-06 NOTE — PATIENT INSTRUCTIONS
Stop ibuprofen and stop voltaren .try mobic 15 mg daily. Read drug information    Get labwork and x-ray    If anemia is worse will get full anemia workup     Try vaseline or lotion or eucerin for feet    Meloxicam tablets  What is this medicine?  MELOXICAM (panda OX i cam) is a non-steroidal anti-inflammatory drug (NSAID). It is used to reduce swelling and to treat pain. It may be used for osteoarthritis, rheumatoid arthritis, or juvenile rheumatoid arthritis.  This medicine may be used for other purposes; ask your health care provider or pharmacist if you have questions.  COMMON BRAND NAME(S): Mobic  What should I tell my health care provider before I take this medicine?  They need to know if you have any of these conditions:  -bleeding disorders  -cigarette smoker  -coronary artery bypass graft (CABG) surgery within the past 2 weeks  -drink more than 3 alcohol-containing drinks per day  -heart disease  -high blood pressure  -history of stomach bleeding  -kidney disease  -liver disease  -lung or breathing disease, like asthma  -stomach or intestine problems  -an unusual or allergic reaction to meloxicam, aspirin, other NSAIDs, other medicines, foods, dyes, or preservatives  -pregnant or trying to get pregnant  -breast-feeding  How should I use this medicine?  Take this medicine by mouth with a full glass of water. Follow the directions on the prescription label. You can take it with or without food. If it upsets your stomach, take it with food. Take your medicine at regular intervals. Do not take it more often than directed. Do not stop taking except on your doctor's advice.  A special MedGuide will be given to you by the pharmacist with each prescription and refill. Be sure to read this information carefully each time.  Talk to your pediatrician regarding the use of this medicine in children. While this drug may be prescribed for selected conditions, precautions do apply.  Patients over 65 years old may have a  stronger reaction and need a smaller dose.  Overdosage: If you think you have taken too much of this medicine contact a poison control center or emergency room at once.  NOTE: This medicine is only for you. Do not share this medicine with others.  What if I miss a dose?  If you miss a dose, take it as soon as you can. If it is almost time for your next dose, take only that dose. Do not take double or extra doses.  What may interact with this medicine?  Do not take this medicine with any of the following medications:  -cidofovir  -ketorolac  This medicine may also interact with the following medications:  -aspirin and aspirin-like medicines  -certain medicines for blood pressure, heart disease, irregular heart beat  -certain medicines for depression, anxiety, or psychotic disturbances  -certain medicines that treat or prevent blood clots like warfarin, enoxaparin, dalteparin, apixaban, dabigatran, rivaroxaban  -cyclosporine  -digoxin  -diuretics  -methotrexate  -other NSAIDs, medicines for pain and inflammation, like ibuprofen and naproxen  -pemetrexed  This list may not describe all possible interactions. Give your health care provider a list of all the medicines, herbs, non-prescription drugs, or dietary supplements you use. Also tell them if you smoke, drink alcohol, or use illegal drugs. Some items may interact with your medicine.  What should I watch for while using this medicine?  Tell your doctor or healthcare professional if your symptoms do not start to get better or if they get worse.  Do not take other medicines that contain aspirin, ibuprofen, or naproxen with this medicine. Side effects such as stomach upset, nausea, or ulcers may be more likely to occur. Many medicines available without a prescription should not be taken with this medicine.  This medicine can cause ulcers and bleeding in the stomach and intestines at any time during treatment. This can happen with no warning and may cause death. There  is increased risk with taking this medicine for a long time. Smoking, drinking alcohol, older age, and poor health can also increase risks. Call your doctor right away if you have stomach pain or blood in your vomit or stool.  This medicine does not prevent heart attack or stroke. In fact, this medicine may increase the chance of a heart attack or stroke. The chance may increase with longer use of this medicine and in people who have heart disease. If you take aspirin to prevent heart attack or stroke, talk with your doctor or health care professional.  What side effects may I notice from receiving this medicine?  Side effects that you should report to your doctor or health care professional as soon as possible:  -allergic reactions like skin rash, itching or hives, swelling of the face, lips, or tongue  -nausea, vomiting  -signs and symptoms of a blood clot such as breathing problems; changes in vision; chest pain; severe, sudden headache; pain, swelling, warmth in the leg; trouble speaking; sudden numbness or weakness of the face, arm, or leg  -signs and symptoms of bleeding such as bloody or black, tarry stools; red or dark-brown urine; spitting up blood or brown material that looks like coffee grounds; red spots on the skin; unusual bruising or bleeding from the eye, gums, or nose  -signs and symptoms of liver injury like dark yellow or brown urine; general ill feeling or flu-like symptoms; light-colored stools; loss of appetite; nausea; right upper belly pain; unusually weak or tired; yellowing of the eyes or skin  -signs and symptoms of stroke like changes in vision; confusion; trouble speaking or understanding; severe headaches; sudden numbness or weakness of the face, arm, or leg; trouble walking; dizziness; loss of balance or coordination  Side effects that usually do not require medical attention (report to your doctor or health care professional if they continue or are  bothersome):  -constipation  -diarrhea  -gas  This list may not describe all possible side effects. Call your doctor for medical advice about side effects. You may report side effects to FDA at 2-073-YBS-8667.  Where should I keep my medicine?  Keep out of the reach of children.  Store at room temperature between 15 and 30 degrees C (59 and 86 degrees F). Throw away any unused medicine after the expiration date.  NOTE: This sheet is a summary. It may not cover all possible information. If you have questions about this medicine, talk to your doctor, pharmacist, or health care provider.  © 2018 [employer] (2017-01-18 19:28:16)

## 2018-04-06 NOTE — PROGRESS NOTES
Subjective   Lizzy Restrepo is a 42 y.o. female.       Problem List  1. DM type 2 insulin dependent, uncontrolled  2. Obesity BMI >40   3. Hyperrtension  4. GERD  5. Iron deficiency anemia  6. GERD  7. Vitamin D deficiency  8. Hyperlipidemia/dysplipidemia  9. Elevated ALT  10. Mild persistent asthma   11. Leukocytosis  12. Arthritis of right knee    PSHx  -left knee surgery . Has screws in left knee      12/06/18 Pt is 43 yo AAF with the above medical issues. Is here for recheck. Has DM type 2 and last hga1c >7.0.  Pt currently takes invokana 300 mg PO q daily along with januvia 100 mg PO q daily.  Also takes Basaglar insulin 20 units at bedtime along with humalog 15 units subq before meals.  Pt has history of noncompliance. For hyperlipidemia pt is on lipitor Lizzy Restrepo went to [facility] ER recently for breathing issues. States there was something in the vents that caused symptoms and pt went to [facility] ER and was treated with steroids. She got better and was discharged home the same day.   She usually takes albuterol inhaler along with nebulizer treatment. Today breathing is stable. Currently has a headache.  Sugars are better controlled and is <130 before breakfast and <180 2 hours after meal.      Had labwork gaby that showed hga1c was at 8.7. She was taking Basaglar 20 units at bedtiem along with humalog 15 units before meals.  Pt also was taking invokana 300 mg PO q daily along with januvia 100 mg PO q daily. On last visit pt was started on trulicity 1.5 mg injection once a week. Since taking medication she has saw improvement on sugars in the am and after meals. She has stopped taking invokana, novolog and Lantus.  Pt has also lost 15 lbs since last visit. She is feeling happier and heathier.    Pt has iron deficiency anemia and hemoglobin has improved to 12.0 from  11.2. She is taking iron pill TID. Thyroid studies were normal. Vitamin D levels are in good range. Pt takes vitamin D pill once a week. On lipid  panel.  LDL was <100 but HDL at 49. She is taking lipitor 40 mg PO qhs     She also needs a new medication instead of protonix for reflux and GERD. Pt also has pain in both knees and is requesting Voltaren gel to help with pain.     3/2/18.  Pt is here for recheck  She is due for new labwork. She recently went to [facility] for stomach issues. And had CT scan of abdomen and labwork. Do not have results here. She has had loose stools.She was given IV fluids and medication but does not know what it was. She is feeling better today. She denies any fever or nausea. Pt has been recently stressed out with stress of his brother who has health issues in [facility].. She is due for new labwork and sugars are better controlled with Trulicity     4/6/18 pt is here for recheck and followup. She is due for diabetic foot exam. Since last visit she has labwork that showed normal microalbumin/creatinine ratio and normal Vitamin D levels on lipid panel Total cholesterol was 1241 and HDL at 34 and LDL at 8. She is taking lipitor 40 mg daily. Last hga1c improved to 6.7 from 8.7. She is taking januvia, trulicity  On her recent CBC wbc count was at 109.2 and hemoglobin at 10.7 .from 12.0. She is taking iron pill three times a day with orange juice. Weight is 273 lbs. Her weight was 275 lbs.  Pt denies any fatigue. She continues to have pain in right knee.        Diabetes   She presents for her follow-up diabetic visit. She has type 2 diabetes mellitus. Her disease course has been stable. Pertinent negatives for hypoglycemia include no confusion, dizziness, headaches, hunger, mood changes, nervousness/anxiousness, pallor, seizures, sleepiness, speech difficulty, sweats or tremors. Associated symptoms include fatigue. Pertinent negatives for diabetes include no blurred vision, no chest pain, no foot paresthesias, no foot ulcerations, no polydipsia, no polyphagia, no polyuria, no visual change, no weakness and no weight loss. Pertinent negatives  for hypoglycemia complications include no blackouts, no hospitalization, no nocturnal hypoglycemia, no required assistance and no required glucagon injection. Symptoms are stable. Pertinent negatives for diabetic complications include no autonomic neuropathy, CVA, heart disease, nephropathy, peripheral neuropathy, PVD or retinopathy. Risk factors for coronary artery disease include diabetes mellitus, dyslipidemia and obesity. Current diabetic treatment includes insulin injections. She is following a diabetic diet. When asked about meal planning, she reported none. She has not had a previous visit with a dietitian. She participates in exercise intermittently. She does not see a podiatrist.Eye exam is not current.   Knee Pain    The incident occurred more than 1 week ago. The incident occurred at the gym. There was no injury mechanism. The pain is present in the right knee. The quality of the pain is described as aching. The pain is at a severity of 5/10. The pain is moderate. The pain has been constant since onset. Pertinent negatives include no inability to bear weight, loss of motion, loss of sensation, muscle weakness, numbness or tingling. She reports no foreign bodies present. The symptoms are aggravated by movement and palpation. She has tried nothing for the symptoms. The treatment provided no relief.          The following portions of the patient's history were reviewed and updated as appropriate: allergies, current medications, past family history, past medical history, past social history, past surgical history and problem list.      Active Ambulatory Problems     Diagnosis Date Noted   • Encounter for immunization 08/23/2016   • Diabetes type 2, uncontrolled 08/23/2016   • Hyperlipidemia associated with type 2 diabetes mellitus 08/23/2016   • Morbid obesity 08/23/2016   • Vitamin D deficiency 08/23/2016   • Essential hypertension 09/23/2016   • Hyperlipidemia 09/23/2016   • Urinary tract infection  11/04/2016   • Acute pain of right shoulder 07/21/2017   • Right arm pain 07/21/2017   • Right elbow pain 07/21/2017   • Lateral epicondylitis of right elbow 07/21/2017   • Medial epicondylitis 07/21/2017   • Encounter for screening mammogram for malignant neoplasm of breast 08/04/2017   • Iron deficiency anemia 08/04/2017   • Mild intermittent asthma 10/19/2017   • Encounter for screening for endocrine disorder 10/19/2017   • Gastroesophageal reflux disease 12/06/2017   • Pain in both knees 12/06/2017   • Controlled type 2 diabetes mellitus with complication, without long-term current use of insulin 04/06/2018   • Anemia 04/06/2018   • Right knee pain 04/06/2018     Resolved Ambulatory Problems     Diagnosis Date Noted   • No Resolved Ambulatory Problems     Past Medical History:   Diagnosis Date   • Abdominal pain    • Benign hypertension    • Essential hypertension    • Gastro-esophageal reflux disease with esophagitis    • GERD (gastroesophageal reflux disease)    • Headache    • Iron deficiency anemia    • Morbid obesity due to excess calories    • Type 2 diabetes mellitus    • Urinary tract infectious disease    • Vitamin D deficiency    • Weight loss        Current Outpatient Prescriptions on File Prior to Visit   Medication Sig Dispense Refill   • albuterol (ACCUNEB) 1.25 MG/3ML nebulizer solution Take 3 mL by nebulization Every 4 (Four) Hours As Needed for Wheezing. 3 mL 11   • albuterol (VENTOLIN HFA) 108 (90 Base) MCG/ACT inhaler Inhale 2 puffs Every 4 (Four) Hours As Needed for Wheezing. 4 inhaler 3   • aspirin 81 MG chewable tablet Chew 81 mg daily.     • atorvastatin (LIPITOR) 40 MG tablet Take 1 tablet by mouth Daily. 90 tablet 3   • cholecalciferol (OPTIMAL-D) 63916 units capsule Take 1 capsule by mouth 1 (One) Time Per Week. 12 capsule 3   • cyclobenzaprine (FLEXERIL) 10 MG tablet Take 10 mg by mouth Daily.     • diclofenac (VOLTAREN) 1 % gel gel Apply 4 g topically 2 (Two) Times a Day. 100 g 3   •  "Dulaglutide (TRULICITY) 0.75 MG/0.5ML solution pen-injector Inject 0.75 mg under the skin 1 (One) Time Per Week. 12 pen 3   • EQ LORATADINE 10 MG tablet TAKE ONE TABLET BY MOUTH ONCE DAILY 30 tablet 11   • esomeprazole (NEXIUM) 40 MG capsule Take 1 capsule by mouth Every Morning Before Breakfast. 90 capsule 3   • ferrous sulfate 325 (65 FE) MG EC tablet Take 1 tablet by mouth 3 (Three) Times a Day With Meals. 90 tablet 11   • fluticasone (FLONASE) 50 MCG/ACT nasal spray 2 sprays into each nostril Daily. 16 g 11   • ibuprofen (ADVIL,MOTRIN) 600 MG tablet Take 600 mg by mouth.     • Insulin Pen Needle (PEN NEEDLES) 31G X 8 MM misc      • Insulin Syringe-Needle U-100 30G X 5/16\" 1 ML misc      • lisinopril-hydrochlorothiazide (PRINZIDE,ZESTORETIC) 10-12.5 MG per tablet Take 2 tablets by mouth Daily. 180 tablet 3   • montelukast (SINGULAIR) 10 MG tablet Take 1 tablet by mouth Every Night. 90 tablet 3   • ondansetron ODT (ZOFRAN-ODT) 4 MG disintegrating tablet Take 4 mg by mouth.     • ReliOn Ultra Thin Lancets misc      • SITagliptin (JANUVIA) 100 MG tablet Take 1 tablet by mouth Daily. 90 tablet 3   • VENTOLIN  (90 Base) MCG/ACT inhaler INHALE TWO PUFFS BY MOUTH EVERY 4 HOURS AS NEEDED 1 inhaler 0     No current facility-administered medications on file prior to visit.      Review of Systems   Constitutional: Positive for activity change and fatigue. Negative for weight loss.   HENT: Negative.    Eyes: Negative.  Negative for blurred vision.   Respiratory: Positive for shortness of breath.    Cardiovascular: Negative.  Negative for chest pain.   Gastrointestinal: Positive for diarrhea.   Endocrine: Negative.  Negative for polydipsia, polyphagia and polyuria.   Genitourinary: Negative.    Musculoskeletal: Positive for arthralgias.   Skin: Negative.  Negative for pallor.   Allergic/Immunologic: Positive for environmental allergies.   Neurological: Negative.  Negative for dizziness, tingling, tremors, seizures, " "speech difficulty, weakness, numbness and headaches.   Hematological: Negative.    Psychiatric/Behavioral: Positive for agitation and decreased concentration. Negative for confusion. The patient is not nervous/anxious.        Objective    /76   Pulse 76   Temp 98.6 °F (37 °C)   Resp 16   Ht 160 cm (63\")   Wt 124 kg (273 lb 9.6 oz)   BMI 48.47 kg/m²     /76   Pulse 76   Temp 98.6 °F (37 °C)   Resp 16   Ht 160 cm (63\")   Wt 124 kg (273 lb 9.6 oz)   BMI 48.47 kg/m²       Chemistry        Component Value Date/Time     03/02/2018 1034    K 4.3 03/02/2018 1034     03/02/2018 1034    CO2 25.0 03/02/2018 1034    BUN 11 03/02/2018 1034    CREATININE 0.91 03/02/2018 1034        Component Value Date/Time    CALCIUM 9.2 03/02/2018 1034    ALKPHOS 70 03/02/2018 1034    AST 15 03/02/2018 1034    ALT 30 03/02/2018 1034    BILITOT <0.1 (L) 03/02/2018 1034        Lab Results   Component Value Date    WBC 10.92 (H) 03/02/2018    HGB 10.7 (L) 03/02/2018    HCT 36.2 03/02/2018    MCV 69.0 (L) 03/02/2018     03/02/2018     Lab Results   Component Value Date    CHOL 141 03/02/2018    CHLPL 105 11/03/2016    TRIG 97 03/02/2018    HDL 34 (L) 03/02/2018    LDL 81 03/02/2018     Lab Results   Component Value Date    HGBA1C 6.7 (H) 03/02/2018     Lab Results   Component Value Date    TSH 2.710 10/19/2017     Office Visit on 03/02/2018   Component Date Value Ref Range Status   • WBC 03/02/2018 10.92* 3.20 - 9.80 10*3/mm3 Final   • RBC 03/02/2018 5.25* 3.77 - 5.16 10*6/mm3 Final   • Hemoglobin 03/02/2018 10.7* 12.0 - 15.5 g/dL Final   • Hematocrit 03/02/2018 36.2  35.0 - 45.0 % Final   • MCV 03/02/2018 69.0* 80.0 - 98.0 fL Final   • MCH 03/02/2018 20.4* 26.5 - 34.0 pg Final   • MCHC 03/02/2018 29.6* 31.4 - 36.0 g/dL Final   • RDW 03/02/2018 16.8* 11.5 - 14.5 % Final   • RDW-SD 03/02/2018 42.3  36.4 - 46.3 fl Final   • MPV 03/02/2018 10.9  8.0 - 12.0 fL Final   • Platelets 03/02/2018 312  150 - 450 " 10*3/mm3 Final   • Neutrophil % 03/02/2018 62.8  37.0 - 80.0 % Final   • Lymphocyte % 03/02/2018 25.5  10.0 - 50.0 % Final   • Monocyte % 03/02/2018 5.8  0.0 - 12.0 % Final   • Eosinophil % 03/02/2018 5.1  0.0 - 7.0 % Final   • Basophil % 03/02/2018 0.3  0.0 - 2.0 % Final   • Immature Grans % 03/02/2018 0.5  0.0 - 0.5 % Final   • Neutrophils, Absolute 03/02/2018 6.86  2.00 - 8.60 10*3/mm3 Final   • Lymphocytes, Absolute 03/02/2018 2.79  0.60 - 4.20 10*3/mm3 Final   • Monocytes, Absolute 03/02/2018 0.63  0.00 - 0.90 10*3/mm3 Final   • Eosinophils, Absolute 03/02/2018 0.56  0.00 - 0.70 10*3/mm3 Final   • Basophils, Absolute 03/02/2018 0.03  0.00 - 0.20 10*3/mm3 Final   • Immature Grans, Absolute 03/02/2018 0.05* 0.00 - 0.02 10*3/mm3 Final   • Glucose 03/05/2018 87  60 - 100 mg/dL Final   • BUN 03/05/2018 11  7 - 21 mg/dL Final   • Creatinine 03/05/2018 0.91  0.50 - 1.00 mg/dL Final   • Sodium 03/05/2018 140  137 - 145 mmol/L Final   • Potassium 03/05/2018 4.3  3.5 - 5.1 mmol/L Final   • Chloride 03/05/2018 101  95 - 110 mmol/L Final   • CO2 03/05/2018 25.0  22.0 - 31.0 mmol/L Final   • Calcium 03/05/2018 9.2  8.4 - 10.2 mg/dL Final   • Total Protein 03/05/2018 7.1  6.3 - 8.6 g/dL Final   • Albumin 03/05/2018 3.70  3.40 - 4.80 g/dL Final   • ALT (SGPT) 03/05/2018 30  9 - 52 U/L Final   • AST (SGOT) 03/05/2018 15  14 - 36 U/L Final   • Alkaline Phosphatase 03/05/2018 70  38 - 126 U/L Final   • Total Bilirubin 03/05/2018 <0.1* 0.2 - 1.3 mg/dL Final   • eGFR   Amer 03/05/2018 82  58 - 135 mL/min/1.73 Final   • Globulin 03/05/2018 3.4  2.3 - 3.5 gm/dL Final   • A/G Ratio 03/05/2018 1.1  1.1 - 1.8 g/dL Final   • BUN/Creatinine Ratio 03/05/2018 12.1  7.0 - 25.0 Final   • Anion Gap 03/05/2018 14.0  5.0 - 15.0 mmol/L Final   • Hemoglobin A1C 03/02/2018 6.7* 4 - 5.6 % Final   • Total Cholesterol 03/05/2018 141  0 - 199 mg/dL Final   • Triglycerides 03/05/2018 97  20 - 199 mg/dL Final   • HDL Cholesterol 03/05/2018 34* 60  - 200 mg/dL Final   • LDL Cholesterol  03/05/2018 81  1 - 129 mg/dL Final   • LDL/HDL Ratio 03/05/2018 2.58  0.00 - 3.22 Final   • 25 Hydroxy, Vitamin D 03/05/2018 56.4  30.0 - 100.0 ng/ml Final   • Microalbumin/Creatinine Ratio 03/02/2018 3.4  0.0 - 30.0 mg/g Final   • Creatinine, Urine 03/02/2018 177.9  mg/dL Final   • Microalbumin, Urine 03/02/2018 0.6  mg/L Final       Physical Exam   Constitutional: She is oriented to person, place, and time. She appears well-developed and well-nourished. No distress.   Appears sad,tearful    HENT:   Head: Normocephalic and atraumatic.   Right Ear: External ear normal.   Left Ear: External ear normal.   Eyes: Conjunctivae and EOM are normal. Pupils are equal, round, and reactive to light. Right eye exhibits no discharge. Left eye exhibits no discharge. No scleral icterus.   Neck: Normal range of motion. Neck supple. No JVD present. No tracheal deviation present. No thyromegaly present.   Cardiovascular: Normal rate and regular rhythm.    Pulmonary/Chest: Effort normal and breath sounds normal. No stridor. No respiratory distress. She has no wheezes.   Abdominal: Soft. She exhibits no distension and no mass. There is tenderness. There is no rebound and no guarding. No hernia.   Obese abdomen   Musculoskeletal: She exhibits no edema, tenderness or deformity.        Right knee: She exhibits decreased range of motion and swelling.        Left knee: She exhibits decreased range of motion and swelling.      During the foot exam she had a monofilament test performed.    Neurological Sensory Findings - Unaltered hot/cold right ankle/foot discrimination and unaltered hot/cold left ankle/foot discrimination. Unaltered sharp/dull right ankle/foot discrimination and unaltered sharp/dull left ankle/foot discrimination. No right ankle/foot altered proprioception and no left ankle/foot altered proprioception  Vascular Status -  Her right foot exhibits normal foot vasculature  and no edema.  Her left foot exhibits normal foot vasculature  and no edema.     Lymphadenopathy:     She has no cervical adenopathy.   Neurological: She is alert and oriented to person, place, and time. She has normal reflexes.   Skin: Skin is warm and dry. No rash noted. She is not diaphoretic. No erythema. No pallor.   Psychiatric: She has a normal mood and affect. Her behavior is normal.   Vitals reviewed.      Assessment/Plan   Problems Addressed this Visit        Cardiovascular and Mediastinum    Essential hypertension    Hyperlipidemia       Respiratory    Mild intermittent asthma       Digestive    Morbid obesity    Vitamin D deficiency    Gastroesophageal reflux disease       Endocrine    Controlled type 2 diabetes mellitus with complication, without long-term current use of insulin - Primary       Musculoskeletal and Integument    Right knee pain       Hematopoietic and Hemostatic    Iron deficiency anemia    Anemia    Relevant Orders    Hemoglobin and hematocrit, blood      Other Visit Diagnoses    None.         - Essential Hypertension - BP at goal. Continue with current medicaitons   - hyperlipidemia-  Recheck lipid panel The current medical regimen is effective;  continue present plan and medications.  - DM type 2-  Sugars improved with Trulicity. Will stop invokana, lantus and novolog.  Continue with januvia.  Hga1c improved. Performed diabetic foot exam today. Pt has dry feet on soles. Will give diabetic foot care informaiton. Recommend Eucerin or vaseline   - vitamin D deficiency -continue vitamin D pill once a week.  - morbid obesity - BMI >40 - pt gained 4 lbs since last visit. Continue with ketogenic diet.  Gave diet information to go home with . Pt declines bariatric weight loss surgery   - iron deficiency anemia - recheck CBC and iron panel, ferritin. Continue with iron pill.  Recheck Hand H if low will get full anemia workup   - mild intermittent asthma - currently stable on albuterol   - for bilateral knee  pain - will stop voltaren and try mobic 15 mg daily  Will get x-ray of right knee.  Consider PT/OT or Orthopedic Surgeon if not helping   - GERD - pt could not afford nexium will continue on protonix  - recheck in6  month for medication recheck and labwork. Discuss about counseling on next visit regarding brother's health issues          This document has been electronically signed by Solitario Li MD on April 6, 2018 3:32 PM                 Review of Systems   Constitutional: Positive for activity change and fatigue. Negative for unexpected weight loss.   HENT: Negative.    Eyes: Negative.  Negative for blurred vision.   Respiratory: Positive for shortness of breath.    Cardiovascular: Negative.  Negative for chest pain.   Gastrointestinal: Positive for diarrhea.   Endocrine: Negative.  Negative for polydipsia, polyphagia and polyuria.   Genitourinary: Negative.    Musculoskeletal: Positive for arthralgias.   Skin: Negative.  Negative for pallor.   Allergic/Immunologic: Positive for environmental allergies.   Neurological: Negative.  Negative for dizziness, tingling, tremors, seizures, speech difficulty, weakness, numbness, headaches and confusion.   Hematological: Negative.    Psychiatric/Behavioral: Positive for agitation and decreased concentration. The patient is not nervous/anxious.        Physical Exam   Constitutional: She is oriented to person, place, and time. She appears well-developed and well-nourished. No distress.   Appears sad,tearful    HENT:   Head: Normocephalic and atraumatic.   Right Ear: External ear normal.   Left Ear: External ear normal.   Eyes: Conjunctivae and EOM are normal. Pupils are equal, round, and reactive to light. Right eye exhibits no discharge. Left eye exhibits no discharge. No scleral icterus.   Neck: Normal range of motion. Neck supple. No JVD present. No tracheal deviation present. No thyromegaly present.   Cardiovascular: Normal rate and regular rhythm.     Pulmonary/Chest: Effort normal and breath sounds normal. No stridor. No respiratory distress. She has no wheezes.   Abdominal: Soft. She exhibits no distension and no mass. There is tenderness. There is no rebound and no guarding. No hernia.   Obese abdomen   Musculoskeletal: She exhibits no edema, tenderness or deformity.        Right knee: She exhibits decreased range of motion and swelling.        Left knee: She exhibits decreased range of motion and swelling.      During the foot exam she had a monofilament test performed.    Neurological Sensory Findings - Unaltered hot/cold right ankle/foot discrimination and unaltered hot/cold left ankle/foot discrimination. Unaltered sharp/dull right ankle/foot discrimination and unaltered sharp/dull left ankle/foot discrimination. No right ankle/foot altered proprioception and no left ankle/foot altered proprioception  Vascular Status -  Her right foot exhibits normal foot vasculature  and no edema. Her left foot exhibits normal foot vasculature  and no edema.     Lymphadenopathy:     She has no cervical adenopathy.   Neurological: She is alert and oriented to person, place, and time. She has normal reflexes.   Skin: Skin is warm and dry. No rash noted. She is not diaphoretic. No erythema. No pallor.   Psychiatric: She has a normal mood and affect. Her behavior is normal.   Vitals reviewed.

## 2018-05-18 ENCOUNTER — OFFICE VISIT (OUTPATIENT)
Dept: FAMILY MEDICINE CLINIC | Facility: CLINIC | Age: 43
End: 2018-05-18

## 2018-05-18 VITALS
SYSTOLIC BLOOD PRESSURE: 130 MMHG | BODY MASS INDEX: 48.66 KG/M2 | WEIGHT: 274.6 LBS | DIASTOLIC BLOOD PRESSURE: 84 MMHG | HEART RATE: 71 BPM | HEIGHT: 63 IN | TEMPERATURE: 98.6 F | RESPIRATION RATE: 16 BRPM

## 2018-05-18 DIAGNOSIS — Z79.4 CONTROLLED TYPE 2 DIABETES MELLITUS WITH COMPLICATION, WITH LONG-TERM CURRENT USE OF INSULIN (HCC): ICD-10-CM

## 2018-05-18 DIAGNOSIS — D50.9 IRON DEFICIENCY ANEMIA, UNSPECIFIED IRON DEFICIENCY ANEMIA TYPE: ICD-10-CM

## 2018-05-18 DIAGNOSIS — E11.8 CONTROLLED TYPE 2 DIABETES MELLITUS WITH COMPLICATION, WITH LONG-TERM CURRENT USE OF INSULIN (HCC): ICD-10-CM

## 2018-05-18 DIAGNOSIS — K21.9 GASTROESOPHAGEAL REFLUX DISEASE, ESOPHAGITIS PRESENCE NOT SPECIFIED: ICD-10-CM

## 2018-05-18 DIAGNOSIS — E55.9 VITAMIN D DEFICIENCY: ICD-10-CM

## 2018-05-18 DIAGNOSIS — Z00.00 ANNUAL PHYSICAL EXAM: ICD-10-CM

## 2018-05-18 DIAGNOSIS — E78.5 HYPERLIPIDEMIA, UNSPECIFIED HYPERLIPIDEMIA TYPE: ICD-10-CM

## 2018-05-18 DIAGNOSIS — E66.01 MORBID OBESITY (HCC): ICD-10-CM

## 2018-05-18 DIAGNOSIS — I10 ESSENTIAL HYPERTENSION: ICD-10-CM

## 2018-05-18 DIAGNOSIS — D64.9 ANEMIA, UNSPECIFIED TYPE: ICD-10-CM

## 2018-05-18 DIAGNOSIS — Z00.00 GENERAL MEDICAL EXAMINATION: Primary | ICD-10-CM

## 2018-05-18 PROCEDURE — 99214 OFFICE O/P EST MOD 30 MIN: CPT | Performed by: FAMILY MEDICINE

## 2018-05-18 NOTE — PROGRESS NOTES
Subjective   Lizzy Restrepo is a 42 y.o. female.     History of Present Illness     {Common H&P Review Areas:18584}    Review of Systems    Objective   Physical Exam      Assessment/Plan   {Assess/PlanSmartLinks:48927}

## 2018-05-18 NOTE — PROGRESS NOTES
Subjective   Lizzy Restrepo is a 42 y.o. female.       Problem List  1. DM type 2 insulin dependent, uncontrolled  2. Obesity BMI >40   3. Hyperrtension  4. GERD  5. Iron deficiency anemia  6. GERD  7. Vitamin D deficiency  8. Hyperlipidemia/dysplipidemia  9. Elevated ALT  10. Mild persistent asthma   11. Leukocytosis  12. Arthritis of right knee    PSHx  -left knee surgery . Has screws in left knee      12/06/18 Pt is 41 yo AAF with the above medical issues. Is here for recheck. Has DM type 2 and last hga1c >7.0.  Pt currently takes invokana 300 mg PO q daily along with januvia 100 mg PO q daily.  Also takes Basaglar insulin 20 units at bedtime along with humalog 15 units subq before meals.  Pt has history of noncompliance. For hyperlipidemia pt is on lipitor Lizzy Restrepo went to [facility] ER recently for breathing issues. States there was something in the vents that caused symptoms and pt went to [facility] ER and was treated with steroids. She got better and was discharged home the same day.   She usually takes albuterol inhaler along with nebulizer treatment. Today breathing is stable. Currently has a headache.  Sugars are better controlled and is <130 before breakfast and <180 2 hours after meal.      Had labwork gaby that showed hga1c was at 8.7. She was taking Basaglar 20 units at bedtiem along with humalog 15 units before meals.  Pt also was taking invokana 300 mg PO q daily along with januvia 100 mg PO q daily. On last visit pt was started on trulicity 1.5 mg injection once a week. Since taking medication she has saw improvement on sugars in the am and after meals. She has stopped taking invokana, novolog and Lantus.  Pt has also lost 15 lbs since last visit. She is feeling happier and heathier.    Pt has iron deficiency anemia and hemoglobin has improved to 12.0 from  11.2. She is taking iron pill TID. Thyroid studies were normal. Vitamin D levels are in good range. Pt takes vitamin D pill once a week. On lipid  panel.  LDL was <100 but HDL at 49. She is taking lipitor 40 mg PO qhs     She also needs a new medication instead of protonix for reflux and GERD. Pt also has pain in both knees and is requesting Voltaren gel to help with pain.     3/2/18.  Pt is here for recheck  She is due for new labwork. She recently went to [facility] for stomach issues. And had CT scan of abdomen and labwork. Do not have results here. She has had loose stools.She was given IV fluids and medication but does not know what it was. She is feeling better today. She denies any fever or nausea. Pt has been recently stressed out with stress of his brother who has health issues in [facility].. She is due for new labwork and sugars are better controlled with Trulicity     4/6/18 pt is here for recheck and followup. She is due for diabetic foot exam. Since last visit she has labwork that showed normal microalbumin/creatinine ratio and normal Vitamin D levels on lipid panel Total cholesterol was 1241 and HDL at 34 and LDL at 8. She is taking lipitor 40 mg daily. Last hga1c improved to 6.7 from 8.7. She is taking januvia, trulicity  On her recent CBC wbc count was at 109.2 and hemoglobin at 10.7 .from 12.0. She is taking iron pill three times a day with orange juice. Weight is 273 lbs. Her weight was 275 lbs.  Pt denies any fatigue. She continues to have pain in right knee.      5/18/18 pt is here for followup and recheck.  Pt is doing well ..  She is taking trulicity 0.75 mg sub q weekly along with januvia 100 mg PO q daily. Her last hga1c  Was <7.0.  She also takes aspirin and lipitor for hyperlipidemia. SHe is also on protonix for GERD. She has yet to get diabetic eye examinaiton       Diabetes   She presents for her follow-up diabetic visit. She has type 2 diabetes mellitus. Her disease course has been stable. Pertinent negatives for hypoglycemia include no confusion, dizziness, headaches, hunger, mood changes, nervousness/anxiousness, pallor, seizures,  sleepiness, speech difficulty, sweats or tremors. Associated symptoms include fatigue. Pertinent negatives for diabetes include no blurred vision, no chest pain, no foot paresthesias, no foot ulcerations, no polydipsia, no polyphagia, no polyuria, no visual change, no weakness and no weight loss. Pertinent negatives for hypoglycemia complications include no blackouts, no hospitalization, no nocturnal hypoglycemia, no required assistance and no required glucagon injection. Symptoms are stable. Pertinent negatives for diabetic complications include no autonomic neuropathy, CVA, heart disease, nephropathy, peripheral neuropathy, PVD or retinopathy. Risk factors for coronary artery disease include diabetes mellitus, dyslipidemia and obesity. Current diabetic treatment includes insulin injections. She is following a diabetic diet. When asked about meal planning, she reported none. She has not had a previous visit with a dietitian. She participates in exercise intermittently. She does not see a podiatrist.Eye exam is not current.   Knee Pain    The incident occurred more than 1 week ago. The incident occurred at the gym. There was no injury mechanism. The pain is present in the right knee. The quality of the pain is described as aching. The pain is at a severity of 5/10. The pain is moderate. The pain has been constant since onset. Pertinent negatives include no inability to bear weight, loss of motion, loss of sensation, muscle weakness, numbness or tingling. She reports no foreign bodies present. The symptoms are aggravated by movement and palpation. She has tried nothing for the symptoms. The treatment provided no relief.          The following portions of the patient's history were reviewed and updated as appropriate: allergies, current medications, past family history, past medical history, past social history, past surgical history and problem list.      Active Ambulatory Problems     Diagnosis Date Noted   •  Encounter for immunization 08/23/2016   • Diabetes type 2, uncontrolled 08/23/2016   • Hyperlipidemia associated with type 2 diabetes mellitus 08/23/2016   • Morbid obesity 08/23/2016   • Vitamin D deficiency 08/23/2016   • Essential hypertension 09/23/2016   • Hyperlipidemia 09/23/2016   • Urinary tract infection 11/04/2016   • Acute pain of right shoulder 07/21/2017   • Right arm pain 07/21/2017   • Right elbow pain 07/21/2017   • Lateral epicondylitis of right elbow 07/21/2017   • Medial epicondylitis 07/21/2017   • Encounter for screening mammogram for malignant neoplasm of breast 08/04/2017   • Iron deficiency anemia 08/04/2017   • Mild intermittent asthma 10/19/2017   • Encounter for screening for endocrine disorder 10/19/2017   • Gastroesophageal reflux disease 12/06/2017   • Pain in both knees 12/06/2017   • Controlled type 2 diabetes mellitus with complication, without long-term current use of insulin 04/06/2018   • Anemia 04/06/2018   • Right knee pain 04/06/2018   • Controlled type 2 diabetes mellitus with complication, with long-term current use of insulin 05/18/2018   • General medical examination 05/18/2018     Resolved Ambulatory Problems     Diagnosis Date Noted   • No Resolved Ambulatory Problems     Past Medical History:   Diagnosis Date   • Abdominal pain    • Benign hypertension    • Essential hypertension    • Gastro-esophageal reflux disease with esophagitis    • GERD (gastroesophageal reflux disease)    • Headache    • Iron deficiency anemia    • Morbid obesity due to excess calories    • Type 2 diabetes mellitus    • Urinary tract infectious disease    • Vitamin D deficiency    • Weight loss        Current Outpatient Prescriptions on File Prior to Visit   Medication Sig Dispense Refill   • albuterol (ACCUNEB) 1.25 MG/3ML nebulizer solution Take 3 mL by nebulization Every 4 (Four) Hours As Needed for Wheezing. 3 mL 11   • albuterol (VENTOLIN HFA) 108 (90 Base) MCG/ACT inhaler Inhale 2 puffs  "Every 4 (Four) Hours As Needed for Wheezing. 4 inhaler 3   • aspirin 81 MG chewable tablet Chew 81 mg daily.     • atorvastatin (LIPITOR) 40 MG tablet Take 1 tablet by mouth Daily. 90 tablet 3   • cholecalciferol (OPTIMAL-D) 13179 units capsule Take 1 capsule by mouth 1 (One) Time Per Week. 12 capsule 3   • cyclobenzaprine (FLEXERIL) 10 MG tablet Take 10 mg by mouth Daily.     • diclofenac (VOLTAREN) 1 % gel gel Apply 4 g topically 2 (Two) Times a Day. 100 g 3   • Dulaglutide (TRULICITY) 0.75 MG/0.5ML solution pen-injector Inject 0.75 mg under the skin 1 (One) Time Per Week. 12 pen 3   • EQ LORATADINE 10 MG tablet TAKE ONE TABLET BY MOUTH ONCE DAILY 30 tablet 11   • ferrous sulfate 325 (65 FE) MG EC tablet Take 1 tablet by mouth 3 (Three) Times a Day With Meals. 90 tablet 11   • fluticasone (FLONASE) 50 MCG/ACT nasal spray 2 sprays into each nostril Daily. 16 g 11   • Insulin Pen Needle (PEN NEEDLES) 31G X 8 MM misc      • Insulin Syringe-Needle U-100 30G X 5/16\" 1 ML misc      • lisinopril-hydrochlorothiazide (PRINZIDE,ZESTORETIC) 10-12.5 MG per tablet Take 2 tablets by mouth Daily. 180 tablet 3   • meloxicam (MOBIC) 15 MG tablet Take 1 tablet by mouth Daily. 30 tablet 3   • montelukast (SINGULAIR) 10 MG tablet Take 1 tablet by mouth Every Night. 90 tablet 3   • ondansetron ODT (ZOFRAN-ODT) 4 MG disintegrating tablet Take 4 mg by mouth.     • pantoprazole (PROTONIX) 40 MG EC tablet Take 1 tablet by mouth Daily. 30 tablet 3   • ReliOn Ultra Thin Lancets misc      • SITagliptin (JANUVIA) 100 MG tablet Take 1 tablet by mouth Daily. 90 tablet 3   • VENTOLIN  (90 Base) MCG/ACT inhaler INHALE TWO PUFFS BY MOUTH EVERY 4 HOURS AS NEEDED 1 inhaler 0     No current facility-administered medications on file prior to visit.      Review of Systems   Constitutional: Positive for activity change and fatigue. Negative for weight loss.   HENT: Negative.    Eyes: Negative.  Negative for blurred vision.   Respiratory: Positive " "for shortness of breath.    Cardiovascular: Negative.  Negative for chest pain.   Gastrointestinal: Positive for diarrhea.   Endocrine: Negative.  Negative for polydipsia, polyphagia and polyuria.   Genitourinary: Negative.    Musculoskeletal: Positive for arthralgias.   Skin: Negative.  Negative for pallor.   Allergic/Immunologic: Positive for environmental allergies.   Neurological: Negative.  Negative for dizziness, tingling, tremors, seizures, speech difficulty, weakness, numbness and headaches.   Hematological: Negative.    Psychiatric/Behavioral: Positive for agitation and decreased concentration. Negative for confusion. The patient is not nervous/anxious.        Objective    /84   Pulse 71   Temp 98.6 °F (37 °C)   Resp 16   Ht 160 cm (63\")   Wt 125 kg (274 lb 9.6 oz)   LMP  (LMP Unknown)   BMI 48.64 kg/m²           Chemistry        Component Value Date/Time     03/02/2018 1034    K 4.3 03/02/2018 1034     03/02/2018 1034    CO2 25.0 03/02/2018 1034    BUN 11 03/02/2018 1034    CREATININE 0.91 03/02/2018 1034        Component Value Date/Time    CALCIUM 9.2 03/02/2018 1034    ALKPHOS 70 03/02/2018 1034    AST 15 03/02/2018 1034    ALT 30 03/02/2018 1034    BILITOT <0.1 (L) 03/02/2018 1034        Lab Results   Component Value Date    WBC 10.92 (H) 03/02/2018    HGB 11.1 (L) 04/06/2018    HCT 37.3 04/06/2018    MCV 69.0 (L) 03/02/2018     03/02/2018     Lab Results   Component Value Date    CHOL 141 03/02/2018    CHLPL 105 11/03/2016    TRIG 97 03/02/2018    HDL 34 (L) 03/02/2018    LDL 81 03/02/2018     Lab Results   Component Value Date    HGBA1C 6.7 (H) 03/02/2018     Lab Results   Component Value Date    TSH 2.710 10/19/2017     Office Visit on 04/06/2018   Component Date Value Ref Range Status   • Hemoglobin 04/06/2018 11.1* 12.0 - 15.5 g/dL Final   • Hematocrit 04/06/2018 37.3  35.0 - 45.0 % Final       Physical Exam   Constitutional: She is oriented to person, place, and " time. She appears well-developed and well-nourished. No distress.   Appears sad,tearful    HENT:   Head: Normocephalic and atraumatic.   Right Ear: External ear normal.   Left Ear: External ear normal.   Eyes: Conjunctivae and EOM are normal. Pupils are equal, round, and reactive to light. Right eye exhibits no discharge. Left eye exhibits no discharge. No scleral icterus.   Neck: Normal range of motion. Neck supple. No JVD present. No tracheal deviation present. No thyromegaly present.   Cardiovascular: Normal rate and regular rhythm.    Pulmonary/Chest: Effort normal and breath sounds normal. No stridor. No respiratory distress. She has no wheezes.   Abdominal: Soft. She exhibits no distension and no mass. There is tenderness. There is no rebound and no guarding. No hernia.   Obese abdomen   Musculoskeletal: She exhibits no edema, tenderness or deformity.        Right knee: She exhibits decreased range of motion and swelling.        Left knee: She exhibits decreased range of motion and swelling.      During the foot exam she had a monofilament test performed.    Neurological Sensory Findings - Unaltered hot/cold right ankle/foot discrimination and unaltered hot/cold left ankle/foot discrimination. Unaltered sharp/dull right ankle/foot discrimination and unaltered sharp/dull left ankle/foot discrimination. No right ankle/foot altered proprioception and no left ankle/foot altered proprioception  Vascular Status -  Her right foot exhibits normal foot vasculature  and no edema. Her left foot exhibits normal foot vasculature  and no edema.     Lymphadenopathy:     She has no cervical adenopathy.   Neurological: She is alert and oriented to person, place, and time. She has normal reflexes.   Skin: Skin is warm and dry. No rash noted. She is not diaphoretic. No erythema. No pallor.   Psychiatric: She has a normal mood and affect. Her behavior is normal.   Vitals reviewed.      Assessment/Plan   Problems Addressed this  Visit        Cardiovascular and Mediastinum    Essential hypertension    Hyperlipidemia    Relevant Orders    Lipid Panel       Digestive    Morbid obesity    Vitamin D deficiency    Gastroesophageal reflux disease       Endocrine    Controlled type 2 diabetes mellitus with complication, with long-term current use of insulin    Relevant Orders    Hemoglobin A1c    Vitamin B12    MicroAlbumin, Urine, Random - Urine, Clean Catch       Hematopoietic and Hemostatic    Iron deficiency anemia    Anemia       Other    General medical examination - Primary    Relevant Orders    CBC Auto Differential    Hemoglobin A1c    Comprehensive Metabolic Panel    Lipid Panel    Vitamin D 25 Hydroxy    Vitamin B12    MicroAlbumin, Urine, Random - Urine, Clean Catch      Other Visit Diagnoses     Annual physical exam            - will get labwork in a few weeks  - Essential Hypertension - BP at goal. Continue with current medicaitons   - hyperlipidemia-  Recheck lipid panel The current medical regimen is effective;  continue present plan and medications.  - DM type 2-  Sugars improved with Trulicity. Will stop invokana, lantus and novolog.  Continue with januvia.  Hga1c improved. Performed diabetic foot exam today. Pt has dry feet on soles. Will give diabetic foot care informaiton. Recommend Eucerin or vaseline.  - vitamin D deficiency -continue vitamin D pill once a week.  - morbid obesity - BMI >40 - pt gained 4 lbs since last visit. Continue with ketogenic diet.  Gave diet information to go home with . Pt declines bariatric weight loss surgery   - iron deficiency anemia - recheck CBC and iron panel, ferritin. Continue with iron pill.  Recheck Hand H if low will get full anemia workup   - mild intermittent asthma - currently stable on albuterol   - for bilateral knee pain - will stop voltaren and try mobic 15 mg daily  Will get x-ray of right knee.  Consider PT/OT or Orthopedic Surgeon if not helping   - GERD - pt could not afford  nexium will continue on protonix  - recheck in6  month for medication recheck and labwork. Discuss about counseling on next visit regarding brother's health issues  -I advised pt to be safe and call with any questions or concerns. All questions addressed today    - will fill out FMLA form today           This document has been electronically signed by Solitario Li MD on May 18, 2018 11:02 AM                 Review of Systems   Constitutional: Positive for activity change and fatigue. Negative for unexpected weight loss.   HENT: Negative.    Eyes: Negative.  Negative for blurred vision.   Respiratory: Positive for shortness of breath.    Cardiovascular: Negative.  Negative for chest pain.   Gastrointestinal: Positive for diarrhea.   Endocrine: Negative.  Negative for polydipsia, polyphagia and polyuria.   Genitourinary: Negative.    Musculoskeletal: Positive for arthralgias.   Skin: Negative.  Negative for pallor.   Allergic/Immunologic: Positive for environmental allergies.   Neurological: Negative.  Negative for dizziness, tingling, tremors, seizures, speech difficulty, weakness, numbness and confusion.   Hematological: Negative.    Psychiatric/Behavioral: Positive for agitation and decreased concentration. The patient is not nervous/anxious.        Physical Exam   Constitutional: She is oriented to person, place, and time. She appears well-developed and well-nourished. No distress.   Appears sad,tearful    HENT:   Head: Normocephalic and atraumatic.   Right Ear: External ear normal.   Left Ear: External ear normal.   Eyes: Conjunctivae and EOM are normal. Pupils are equal, round, and reactive to light. Right eye exhibits no discharge. Left eye exhibits no discharge. No scleral icterus.   Neck: Normal range of motion. Neck supple. No JVD present. No tracheal deviation present. No thyromegaly present.   Cardiovascular: Normal rate and regular rhythm.    Pulmonary/Chest: Effort normal and breath sounds normal. No  stridor. No respiratory distress. She has no wheezes.   Abdominal: Soft. She exhibits no distension and no mass. There is tenderness. There is no rebound and no guarding. No hernia.   Obese abdomen   Musculoskeletal: She exhibits no edema, tenderness or deformity.        Right knee: She exhibits decreased range of motion and swelling.        Left knee: She exhibits decreased range of motion and swelling.      During the foot exam she had a monofilament test performed.    Neurological Sensory Findings - Unaltered hot/cold right ankle/foot discrimination and unaltered hot/cold left ankle/foot discrimination. Unaltered sharp/dull right ankle/foot discrimination and unaltered sharp/dull left ankle/foot discrimination. No right ankle/foot altered proprioception and no left ankle/foot altered proprioception  Vascular Status -  Her right foot exhibits normal foot vasculature  and no edema. Her left foot exhibits normal foot vasculature  and no edema.     Lymphadenopathy:     She has no cervical adenopathy.   Neurological: She is alert and oriented to person, place, and time. She has normal reflexes.   Skin: Skin is warm and dry. No rash noted. She is not diaphoretic. No erythema. No pallor.   Psychiatric: She has a normal mood and affect. Her behavior is normal.   Vitals reviewed.

## 2018-05-21 ENCOUNTER — PRIOR AUTHORIZATION (OUTPATIENT)
Dept: FAMILY MEDICINE CLINIC | Facility: CLINIC | Age: 43
End: 2018-05-21

## 2018-06-14 RX ORDER — PANTOPRAZOLE SODIUM 40 MG/1
40 TABLET, DELAYED RELEASE ORAL DAILY
Qty: 30 TABLET | Refills: 3
Start: 2018-06-14 | End: 2018-11-20 | Stop reason: SDUPTHER

## 2018-06-21 NOTE — PROGRESS NOTES
Subjective   Lizzy Restrepo is a 42 y.o. female.       Problem List  1. DM type 2 insulin dependent, uncontrolled  2. Obesity BMI >40   3. Hyperrtension  4. GERD  5. Iron deficiency anemia  6. GERD  7. Vitamin D deficiency  8. Hyperlipidemia/dysplipidemia  9. Elevated ALT  10. Mild persistent asthma   11. Leukocytosis  12. Arthritis of right knee    PSHx  -left knee surgery . Has screws in left knee      12/06/18 Pt is 41 yo AAF with the above medical issues. Is here for recheck. Has DM type 2 and last hga1c >7.0.  Pt currently takes invokana 300 mg PO q daily along with januvia 100 mg PO q daily.  Also takes Basaglar insulin 20 units at bedtime along with humalog 15 units subq before meals.  Pt has history of noncompliance. For hyperlipidemia pt is on lipitor Lizzy Restrepo went to [facility] ER recently for breathing issues. States there was something in the vents that caused symptoms and pt went to [facility] ER and was treated with steroids. She got better and was discharged home the same day.   She usually takes albuterol inhaler along with nebulizer treatment. Today breathing is stable. Currently has a headache.  Sugars are better controlled and is <130 before breakfast and <180 2 hours after meal.      Had labwork gaby that showed hga1c was at 8.7. She was taking Basaglar 20 units at bedtiem along with humalog 15 units before meals.  Pt also was taking invokana 300 mg PO q daily along with januvia 100 mg PO q daily. On last visit pt was started on trulicity 1.5 mg injection once a week. Since taking medication she has saw improvement on sugars in the am and after meals. She has stopped taking invokana, novolog and Lantus.  Pt has also lost 15 lbs since last visit. She is feeling happier and heathier.    Pt has iron deficiency anemia and hemoglobin has improved to 12.0 from  11.2. She is taking iron pill TID. Thyroid studies were normal. Vitamin D levels are in good range. Pt takes vitamin D pill once a week. On lipid  panel.  LDL was <100 but HDL at 49. She is taking lipitor 40 mg PO qhs     She also needs a new medication instead of protonix for reflux and GERD. Pt also has pain in both knees and is requesting Voltaren gel to help with pain.     3/2/18.  Pt is here for recheck  She is due for new labwork. She recently went to [facility] for stomach issues. And had CT scan of abdomen and labwork. Do not have results here. She has had loose stools.She was given IV fluids and medication but does not know what it was. She is feeling better today. She denies any fever or nausea. Pt has been recently stressed out with stress of his brother who has health issues in [facility].. She is due for new labwork and sugars are better controlled with Trulicity     4/6/18 pt is here for recheck and followup. She is due for diabetic foot exam. Since last visit she has labwork that showed normal microalbumin/creatinine ratio and normal Vitamin D levels on lipid panel Total cholesterol was 1241 and HDL at 34 and LDL at 8. She is taking lipitor 40 mg daily. Last hga1c improved to 6.7 from 8.7. She is taking januvia, trulicity  On her recent CBC wbc count was at 109.2 and hemoglobin at 10.7 .from 12.0. She is taking iron pill three times a day with orange juice. Weight is 273 lbs. Her weight was 275 lbs.  Pt denies any fatigue. She continues to have pain in right knee.      5/18/18 pt is here for followup and recheck.  Pt is doing well ..  She is taking trulicity 0.75 mg sub q weekly along with januvia 100 mg PO q daily. Her last hga1c  Was <7.0.  She also takes aspirin and lipitor for hyperlipidemia. SHe is also on protonix for GERD. She has yet to get diabetic eye examinaiton     6/22/18 pt is here for recheck and followup. She has yet to get labwork. She will do soon to recheck her lipid panel, hga1c, cbc and cmp and vitamin D levels. She has seen counseling with daughter at least one session which is helping.  She is smiling today.    Her BP is at goal  today with lisionpril-hctz 10-12.5 mg PO q daily. For hyperlipidemia she is on aspirin and lipitor.  For DM Type 2 she is on januvia 100 mg daily and trulicity 0.75 mg sub q weekly. She states her sugars are running better and run  and 2 hours after meal it is <180. Her last hga1c was 6.7 .She continues to take iron pill for deficiency     Diabetes   She presents for her follow-up diabetic visit. She has type 2 diabetes mellitus. Her disease course has been stable. Pertinent negatives for hypoglycemia include no confusion, dizziness, headaches, hunger, mood changes, nervousness/anxiousness, pallor, seizures, sleepiness, speech difficulty, sweats or tremors. Associated symptoms include fatigue. Pertinent negatives for diabetes include no blurred vision, no chest pain, no foot paresthesias, no foot ulcerations, no polydipsia, no polyphagia, no polyuria, no visual change, no weakness and no weight loss. Pertinent negatives for hypoglycemia complications include no blackouts, no hospitalization, no nocturnal hypoglycemia, no required assistance and no required glucagon injection. Symptoms are stable. Pertinent negatives for diabetic complications include no autonomic neuropathy, CVA, heart disease, nephropathy, peripheral neuropathy, PVD or retinopathy. Risk factors for coronary artery disease include diabetes mellitus, dyslipidemia and obesity. Current diabetic treatment includes insulin injections. She is following a diabetic diet. When asked about meal planning, she reported none. She has not had a previous visit with a dietitian. She participates in exercise intermittently. She does not see a podiatrist.Eye exam is not current.   Knee Pain    The incident occurred more than 1 week ago. The incident occurred at the gym. There was no injury mechanism. The pain is present in the right knee. The quality of the pain is described as aching. The pain is at a severity of 5/10. The pain is moderate. The pain has been  constant since onset. Pertinent negatives include no inability to bear weight, loss of motion, loss of sensation, muscle weakness, numbness or tingling. She reports no foreign bodies present. The symptoms are aggravated by movement and palpation. She has tried nothing for the symptoms. The treatment provided no relief.   Obesity   This is a chronic problem. The current episode started more than 1 year ago. The problem has been unchanged. Associated symptoms include arthralgias, fatigue and nausea. Pertinent negatives include no abdominal pain, anorexia, change in bowel habit, chest pain, chills, congestion, coughing, fever, headaches, joint swelling, myalgias, neck pain, numbness, rash, sore throat, swollen glands, urinary symptoms, vertigo, visual change, vomiting or weakness. Nothing aggravates the symptoms. She has tried nothing for the symptoms. The treatment provided no relief.          The following portions of the patient's history were reviewed and updated as appropriate: allergies, current medications, past family history, past medical history, past social history, past surgical history and problem list.      Active Ambulatory Problems     Diagnosis Date Noted   • Encounter for immunization 08/23/2016   • Diabetes type 2, uncontrolled 08/23/2016   • Hyperlipidemia associated with type 2 diabetes mellitus 08/23/2016   • Morbid obesity 08/23/2016   • Vitamin D deficiency 08/23/2016   • Essential hypertension 09/23/2016   • Hyperlipidemia 09/23/2016   • Urinary tract infection 11/04/2016   • Acute pain of right shoulder 07/21/2017   • Right arm pain 07/21/2017   • Right elbow pain 07/21/2017   • Lateral epicondylitis of right elbow 07/21/2017   • Medial epicondylitis 07/21/2017   • Encounter for screening mammogram for malignant neoplasm of breast 08/04/2017   • Iron deficiency anemia 08/04/2017   • Mild intermittent asthma 10/19/2017   • Encounter for screening for endocrine disorder 10/19/2017   •  Gastroesophageal reflux disease 12/06/2017   • Pain in both knees 12/06/2017   • Controlled type 2 diabetes mellitus with complication, without long-term current use of insulin 04/06/2018   • Anemia 04/06/2018   • Right knee pain 04/06/2018   • Controlled type 2 diabetes mellitus with complication, with long-term current use of insulin 05/18/2018   • General medical examination 05/18/2018     Resolved Ambulatory Problems     Diagnosis Date Noted   • No Resolved Ambulatory Problems     Past Medical History:   Diagnosis Date   • Abdominal pain    • Benign hypertension    • Essential hypertension    • Gastro-esophageal reflux disease with esophagitis    • GERD (gastroesophageal reflux disease)    • Headache    • Iron deficiency anemia    • Morbid obesity due to excess calories    • Type 2 diabetes mellitus    • Urinary tract infectious disease    • Vitamin D deficiency    • Weight loss        Current Outpatient Prescriptions on File Prior to Visit   Medication Sig Dispense Refill   • albuterol (ACCUNEB) 1.25 MG/3ML nebulizer solution Take 3 mL by nebulization Every 4 (Four) Hours As Needed for Wheezing. 3 mL 11   • albuterol (VENTOLIN HFA) 108 (90 Base) MCG/ACT inhaler Inhale 2 puffs Every 4 (Four) Hours As Needed for Wheezing. 4 inhaler 3   • aspirin 81 MG chewable tablet Chew 81 mg daily.     • atorvastatin (LIPITOR) 40 MG tablet Take 1 tablet by mouth Daily. 90 tablet 3   • cholecalciferol (OPTIMAL-D) 63669 units capsule Take 1 capsule by mouth 1 (One) Time Per Week. 12 capsule 3   • cyclobenzaprine (FLEXERIL) 10 MG tablet Take 10 mg by mouth Daily.     • diclofenac (VOLTAREN) 1 % gel gel Apply 4 g topically 2 (Two) Times a Day. 100 g 3   • Dulaglutide (TRULICITY) 0.75 MG/0.5ML solution pen-injector Inject 0.75 mg under the skin 1 (One) Time Per Week. 12 pen 3   • EQ LORATADINE 10 MG tablet TAKE ONE TABLET BY MOUTH ONCE DAILY 30 tablet 11   • ferrous sulfate 325 (65 FE) MG EC tablet Take 1 tablet by mouth 3 (Three)  "Times a Day With Meals. 90 tablet 11   • fluticasone (FLONASE) 50 MCG/ACT nasal spray 2 sprays into each nostril Daily. 16 g 11   • Insulin Pen Needle (PEN NEEDLES) 31G X 8 MM misc      • Insulin Syringe-Needle U-100 30G X 5/16\" 1 ML misc      • lisinopril-hydrochlorothiazide (PRINZIDE,ZESTORETIC) 10-12.5 MG per tablet Take 2 tablets by mouth Daily. 180 tablet 3   • meloxicam (MOBIC) 15 MG tablet Take 1 tablet by mouth Daily. 30 tablet 3   • montelukast (SINGULAIR) 10 MG tablet Take 1 tablet by mouth Every Night. 90 tablet 3   • ondansetron ODT (ZOFRAN-ODT) 4 MG disintegrating tablet Take 4 mg by mouth.     • pantoprazole (PROTONIX) 40 MG EC tablet Take 1 tablet by mouth Daily. 30 tablet 3   • ReliOn Ultra Thin Lancets misc      • SITagliptin (JANUVIA) 100 MG tablet Take 1 tablet by mouth Daily. 90 tablet 3   • VENTOLIN  (90 Base) MCG/ACT inhaler INHALE TWO PUFFS BY MOUTH EVERY 4 HOURS AS NEEDED 1 inhaler 0     No current facility-administered medications on file prior to visit.      Review of Systems   Constitutional: Positive for activity change and fatigue. Negative for chills, fever and weight loss.   HENT: Negative.  Negative for congestion and sore throat.    Eyes: Negative.  Negative for blurred vision.   Respiratory: Positive for shortness of breath. Negative for cough.    Cardiovascular: Negative.  Negative for chest pain.   Gastrointestinal: Positive for diarrhea and nausea. Negative for abdominal pain, anorexia, change in bowel habit and vomiting.   Endocrine: Negative.  Negative for polydipsia, polyphagia and polyuria.   Genitourinary: Negative.    Musculoskeletal: Positive for arthralgias. Negative for joint swelling, myalgias and neck pain.   Skin: Negative.  Negative for pallor and rash.   Allergic/Immunologic: Positive for environmental allergies.   Neurological: Negative.  Negative for dizziness, vertigo, tingling, tremors, seizures, speech difficulty, weakness, numbness and headaches. " "  Hematological: Negative.    Psychiatric/Behavioral: Positive for agitation and decreased concentration. Negative for confusion. The patient is not nervous/anxious.        Objective    /80   Pulse 61   Temp 98.6 °F (37 °C)   Ht 160 cm (63\")   Wt 125 kg (274 lb 9.6 oz)   SpO2 98%   BMI 48.64 kg/m²           Chemistry        Component Value Date/Time     03/02/2018 1034    K 4.3 03/02/2018 1034     03/02/2018 1034    CO2 25.0 03/02/2018 1034    BUN 11 03/02/2018 1034    CREATININE 0.91 03/02/2018 1034        Component Value Date/Time    CALCIUM 9.2 03/02/2018 1034    ALKPHOS 70 03/02/2018 1034    AST 15 03/02/2018 1034    ALT 30 03/02/2018 1034    BILITOT <0.1 (L) 03/02/2018 1034        Lab Results   Component Value Date    WBC 10.92 (H) 03/02/2018    HGB 11.1 (L) 04/06/2018    HCT 37.3 04/06/2018    MCV 69.0 (L) 03/02/2018     03/02/2018     Lab Results   Component Value Date    CHOL 141 03/02/2018    CHLPL 105 11/03/2016    TRIG 97 03/02/2018    HDL 34 (L) 03/02/2018    LDL 81 03/02/2018     Lab Results   Component Value Date    HGBA1C 6.7 (H) 03/02/2018     Lab Results   Component Value Date    TSH 2.710 10/19/2017     Office Visit on 04/06/2018   Component Date Value Ref Range Status   • Hemoglobin 04/06/2018 11.1* 12.0 - 15.5 g/dL Final   • Hematocrit 04/06/2018 37.3  35.0 - 45.0 % Final       Physical Exam   Constitutional: She is oriented to person, place, and time. She appears well-developed and well-nourished. No distress.   Appears sad,tearful    HENT:   Head: Normocephalic and atraumatic.   Right Ear: External ear normal.   Left Ear: External ear normal.   Eyes: Conjunctivae and EOM are normal. Pupils are equal, round, and reactive to light. Right eye exhibits no discharge. Left eye exhibits no discharge. No scleral icterus.   Neck: Normal range of motion. Neck supple. No JVD present. No tracheal deviation present. No thyromegaly present.   Cardiovascular: Normal rate and " regular rhythm.    Pulmonary/Chest: Effort normal and breath sounds normal. No stridor. No respiratory distress. She has no wheezes.   Abdominal: Soft. She exhibits no distension and no mass. There is tenderness. There is no rebound and no guarding. No hernia.   Obese abdomen   Musculoskeletal: She exhibits no edema, tenderness or deformity.        Right knee: She exhibits decreased range of motion and swelling.        Left knee: She exhibits decreased range of motion and swelling.      During the foot exam she had a monofilament test performed.    Neurological Sensory Findings - Unaltered hot/cold right ankle/foot discrimination and unaltered hot/cold left ankle/foot discrimination. Unaltered sharp/dull right ankle/foot discrimination and unaltered sharp/dull left ankle/foot discrimination. No right ankle/foot altered proprioception and no left ankle/foot altered proprioception  Vascular Status -  Her right foot exhibits normal foot vasculature  and no edema. Her left foot exhibits normal foot vasculature  and no edema.     Lymphadenopathy:     She has no cervical adenopathy.   Neurological: She is alert and oriented to person, place, and time. She has normal reflexes.   Skin: Skin is warm and dry. No rash noted. She is not diaphoretic. No erythema. No pallor.   Psychiatric: She has a normal mood and affect. Her behavior is normal.   Vitals reviewed.      Assessment/Plan   Problems Addressed this Visit        Cardiovascular and Mediastinum    Essential hypertension    Hyperlipidemia    Relevant Orders    Lipid Panel       Digestive    Morbid obesity    Relevant Orders    Vitamin D 25 Hydroxy    Vitamin D deficiency    Gastroesophageal reflux disease       Hematopoietic and Hemostatic    Iron deficiency anemia    Anemia       Other    General medical examination - Primary    Relevant Orders    CBC Auto Differential    Hemoglobin A1c    Lipid Panel    Comprehensive Metabolic Panel    Vitamin D 25 Hydroxy    Vitamin  B12    Microalbumin / Creatinine Urine Ratio - Urine, Clean Catch      Other Visit Diagnoses     Controlled diabetes mellitus type 1 with complications        Relevant Orders    Hemoglobin A1c    Vitamin B12    Microalbumin / Creatinine Urine Ratio - Urine, Clean Catch        - will get labwork to check cbc, cmp lipid panel , hga1c, iron studies and vitamin D along with microalbumin creatinine ratio   - Essential Hypertension - BP at goal. Continue with current medicaitons   - hyperlipidemia-  Recheck lipid panel The current medical regimen is effective;  continue present plan and medications.  - DM type 2-  Sugars improved with Trulicity. Will stop invokana, lantus and novolog.  Continue with januvia.  Hga1c improved. Performed diabetic foot exam today. Pt has dry feet on soles. Will give diabetic foot care informaiton. Recommend Eucerin or vaseline. Recheck hga1c   - vitamin D deficiency -continue vitamin D pill once a week.  - morbid obesity - BMI >40 - pt gained 4 lbs since last visit. Continue with ketogenic diet.  Gave diet information to go. Gave bariatric weight loss surgery to go home with . Pt declines bariatric weight loss surgery. She will keep in contact if she would like the referral   - iron deficiency anemia - recheck CBC and iron panel, ferritin. Continue with iron pill.  Recheck Hand H if low will get full anemia workup   -lekuocytosis - recheck cbc   - mild intermittent asthma - currently stable on albuterol   - GERD - continue on protonix  - recheck in 1 month to go over labwork   -I advised pt to be safe and call with any questions or concerns. All questions addressed today          This document has been electronically signed by Solitario Li MD on June 22, 2018 10:44 AM                   Review of Systems   Constitutional: Positive for activity change and fatigue. Negative for chills, fever and unexpected weight loss.   HENT: Negative.  Negative for congestion and sore throat.    Eyes:  Negative.  Negative for blurred vision.   Respiratory: Positive for shortness of breath. Negative for cough.    Cardiovascular: Negative.  Negative for chest pain.   Gastrointestinal: Positive for diarrhea and nausea. Negative for abdominal pain, anorexia, change in bowel habit and vomiting.   Endocrine: Negative.  Negative for polydipsia, polyphagia and polyuria.   Genitourinary: Negative.    Musculoskeletal: Positive for arthralgias. Negative for joint swelling, myalgias and neck pain.   Skin: Negative.  Negative for pallor and rash.   Allergic/Immunologic: Positive for environmental allergies.   Neurological: Negative.  Negative for dizziness, vertigo, tingling, tremors, seizures, speech difficulty, weakness, numbness and confusion.   Hematological: Negative.    Psychiatric/Behavioral: Positive for agitation and decreased concentration. The patient is not nervous/anxious.        Physical Exam   Constitutional: She is oriented to person, place, and time. She appears well-developed and well-nourished. No distress.   Appears sad,tearful    HENT:   Head: Normocephalic and atraumatic.   Right Ear: External ear normal.   Left Ear: External ear normal.   Eyes: Conjunctivae and EOM are normal. Pupils are equal, round, and reactive to light. Right eye exhibits no discharge. Left eye exhibits no discharge. No scleral icterus.   Neck: Normal range of motion. Neck supple. No JVD present. No tracheal deviation present. No thyromegaly present.   Cardiovascular: Normal rate and regular rhythm.    Pulmonary/Chest: Effort normal and breath sounds normal. No stridor. No respiratory distress. She has no wheezes.   Abdominal: Soft. She exhibits no distension and no mass. There is tenderness. There is no rebound and no guarding. No hernia.   Obese abdomen   Musculoskeletal: She exhibits no edema, tenderness or deformity.        Right knee: She exhibits decreased range of motion and swelling.        Left knee: She exhibits decreased  range of motion and swelling.      During the foot exam she had a monofilament test performed.    Neurological Sensory Findings - Unaltered hot/cold right ankle/foot discrimination and unaltered hot/cold left ankle/foot discrimination. Unaltered sharp/dull right ankle/foot discrimination and unaltered sharp/dull left ankle/foot discrimination. No right ankle/foot altered proprioception and no left ankle/foot altered proprioception  Vascular Status -  Her right foot exhibits normal foot vasculature  and no edema. Her left foot exhibits normal foot vasculature  and no edema.     Lymphadenopathy:     She has no cervical adenopathy.   Neurological: She is alert and oriented to person, place, and time. She has normal reflexes.   Skin: Skin is warm and dry. No rash noted. She is not diaphoretic. No erythema. No pallor.   Psychiatric: She has a normal mood and affect. Her behavior is normal.   Vitals reviewed.

## 2018-06-22 ENCOUNTER — OFFICE VISIT (OUTPATIENT)
Dept: FAMILY MEDICINE CLINIC | Facility: CLINIC | Age: 43
End: 2018-06-22

## 2018-06-22 VITALS
TEMPERATURE: 98.6 F | OXYGEN SATURATION: 98 % | HEIGHT: 63 IN | SYSTOLIC BLOOD PRESSURE: 130 MMHG | HEART RATE: 61 BPM | WEIGHT: 274.6 LBS | BODY MASS INDEX: 48.66 KG/M2 | DIASTOLIC BLOOD PRESSURE: 80 MMHG

## 2018-06-22 DIAGNOSIS — K21.9 GASTROESOPHAGEAL REFLUX DISEASE, ESOPHAGITIS PRESENCE NOT SPECIFIED: ICD-10-CM

## 2018-06-22 DIAGNOSIS — Z00.00 GENERAL MEDICAL EXAMINATION: Primary | ICD-10-CM

## 2018-06-22 DIAGNOSIS — I10 ESSENTIAL HYPERTENSION: ICD-10-CM

## 2018-06-22 DIAGNOSIS — D50.9 IRON DEFICIENCY ANEMIA, UNSPECIFIED IRON DEFICIENCY ANEMIA TYPE: ICD-10-CM

## 2018-06-22 DIAGNOSIS — D72.829 LEUKOCYTOSIS, UNSPECIFIED TYPE: ICD-10-CM

## 2018-06-22 DIAGNOSIS — J45.20 MILD INTERMITTENT ASTHMA, UNSPECIFIED WHETHER COMPLICATED: ICD-10-CM

## 2018-06-22 DIAGNOSIS — E66.01 MORBID OBESITY (HCC): ICD-10-CM

## 2018-06-22 DIAGNOSIS — D64.9 ANEMIA, UNSPECIFIED TYPE: ICD-10-CM

## 2018-06-22 DIAGNOSIS — E10.8 CONTROLLED DIABETES MELLITUS TYPE 1 WITH COMPLICATIONS (HCC): ICD-10-CM

## 2018-06-22 DIAGNOSIS — E78.5 HYPERLIPIDEMIA, UNSPECIFIED HYPERLIPIDEMIA TYPE: ICD-10-CM

## 2018-06-22 DIAGNOSIS — E55.9 VITAMIN D DEFICIENCY: ICD-10-CM

## 2018-06-22 PROCEDURE — 99214 OFFICE O/P EST MOD 30 MIN: CPT | Performed by: FAMILY MEDICINE

## 2018-06-22 RX ORDER — CYCLOBENZAPRINE HCL 10 MG
10 TABLET ORAL NIGHTLY PRN
Qty: 30 TABLET | Refills: 3 | Status: SHIPPED | OUTPATIENT
Start: 2018-06-22 | End: 2018-11-20 | Stop reason: SDUPTHER

## 2018-06-22 NOTE — PATIENT INSTRUCTIONS
1. Get labwork fasting  2. Call for diabetic eye examination with Dr. Stein     Eating Plan After Bariatric Surgery, Stage 3  A diet after weight loss surgery (bariatric surgery) should provide plenty of fluids and nutrients while promoting weight loss and healing. Each stage of recovery has a different set of food and drink recommendations. Your health care provider may recommend that you work with a diet and nutrition specialist (dietitian) to make a staged eating plan that is right for you.  You may start to transition to a stage 3 diet about 5-6 weeks after surgery. During this stage, you will be allowed to eat foods of various textures. Continue to follow the guidelines below until your health care provider or dietitian approves eating a regular weight-maintenance diet.  What are tips for following this plan?  Cooking  · Use low-fat cooking methods, such as baking, broiling, boiling, or grilling.  · Cook using healthy fats, such as olive, sunflower, grapeseed, or canola oil.  · Cook and bake using no-calorie sweeteners.  · Avoid adding extra salt, sugar, or fat to foods when cooking.  Meal planning  · Eat 3 meals and 2 snacks, or 5 small meals each day.  · Eat at set times. Allow 30-45 minutes for each meal.  · Do not skip meals or go a long time without eating (fast). If you are having a hard time eating, talk to your health care provider or dietitian.  · Limit food intake to ¾-1 cup per meal. As you heal and advance, you may be able to eat a little more with each meal. Always listen to your body.  · Eat foods from all food groups. This includes fruits and vegetables, grains, dairy, and meat and other proteins.  · Limit carbohydrate intake to no more than 30 g per meal or 130 g per day. There are about 15-20 g of carbohydrates in 1 piece of bread or a medium piece of fruit. Half of your total grains should be whole grains.  · Include a protein-rich food at every meal and snack, and eat the protein food  first. Eat 60-80 g of protein a day when possible.  General instructions  · Work with your dietitian to slowly add new foods to your diet.  · Stay hydrated. Drink at least 48-64 oz of noncarbonated, zero-calorie fluid per day. Water is the best choice.  · Limit alcohol intake as directed by your health care provider.  · Avoid foods that are high in fat or have added sugar.  · Take any vitamin supplements as directed by your health care provider.  · Ask your dietitian to help you with meal planning and strategies to continue to lose weight and maintain weight loss.  Recommended foods  Grains  · Whole wheat bread, crackers, and pasta.  · Rice.  · Unsweetened hot and cold cereals.  Vegetables  · All fresh, frozen, or canned vegetables.  Fruits  · All fresh, frozen, or canned fruit.  Meats and other protein foods  · Lean meat, poultry, and fish.  · Eggs and egg substitutes.  · Beans and lentils. Smooth nut butters.  · Soy products, such as tempeh and tofu.  Dairy  · Low or nonfat milk, cheese, and yogurt.  · Sugar-free pudding.  · Cottage cheese.  Beverages  · Decaffeinated coffee and tea.  · Sugar-free, caffeine-free soft drinks.  Fats and oils  · Avocado.  · Olive, sunflower, grapeseed, or canola oil.  Sweets and desserts  · Low-fat, sugar-free desserts.  Seasoning and other foods    · Low-fat, low-sodium condiments.  Foods to avoid  · Hard-to-digest foods, including:  ? Popcorn.  ? Nuts and seeds.  ? Celery.  ? White parts of citrus fruits (pith).  · Caffeinated drinks, including:  ? Coffee and tea.  ? Energy drinks.  · Sweets and desserts with more than 25 g of sugar per serving.  Summary  · Stage 3 diet starts about 5-6 weeks after surgery. Continue to follow stage 3 guidelines until your health care provider or dietitian approves eating a regular weight-maintenance diet.  · Stage 3 diet involves eating a balanced, healthy diet with foods of various textures. This diet helps you continue to lose weight and maintain  weight loss.  · Eating enough protein and drinking plenty of fluids is important for promoting weight loss and healing after surgery.  This information is not intended to replace advice given to you by your health care provider. Make sure you discuss any questions you have with your health care provider.  Document Released: 04/23/2018 Document Revised: 04/23/2018 Document Reviewed: 04/23/2018  [employer] Interactive Patient Education © 2018 [employer]  Bariatric Surgery, Care After  Refer to this sheet in the next few weeks. These instructions provide you with information on caring for yourself after your procedure. Your health care provider may also give you more specific instructions. Your treatment has been planned according to current medical practices, but problems sometimes occur. Call your health care provider if you have any problems or questions after your procedure.  What can I expect after the procedure?  After your procedure, it is typical to have the following sensations:  · Soreness.  · Sluggishness.  · Tiredness.  · Moodiness.  · Chilliness.    It is not unusual to have dry skin and some hair loss after bariatric surgery.  Follow these instructions at home:  · Do not drink alcohol, use public transportation, or sign important papers for at least 1 day following surgery.  · Do not resume physical activities or drive until directed by your surgeon.  · Avoid lifting anything over 10 pounds (4.5 kg) for 6 weeks following surgery, or until approved by your surgeon.  · Only take over-the-counter or prescription medicines for pain, discomfort, or fever as directed by your surgeon.  · Resume your diet as directed by your surgeon. You will resume eating with liquids and pureed foods, then soft foods, and progress to a more normal diet over time. Follow your surgeon or dietitian’s guidelines for what and how much to eat and drink. You will need to eat slowly, so as not to cause discomfort and vomiting.  · Use  showers for bathing as directed by your surgeon.  · Change dressings as directed by your surgeon.  · Schedule a follow-up appointment with your surgeon as directed.  Where to find more information:  American Society for Bariatric Surgery: www.asbs.org  Weight-control Information Network (WIN): johanne.niddk.nih.gov  Contact a health care provider if:  · There is redness, swelling, or increasing pain in the wound.  · There is pus coming from the wound.  · There is drainage from the wound lasting longer than 1 day.  · An unexplained oral temperature above 102°F (38.9°C) develops.  · You notice a foul smell coming from the wound or dressing.  · There is a breaking open of the wound (edges not staying together) after stitches have been removed.  · You notice increasing pain in the shoulder-strap areas.  · You develop episodes of dizziness or faint while standing.  · You develop shortness of breath.  · You develop persistent nausea or vomiting.  · Your soreness seems to be getting worse rather than better.  Get help right away if:  · You develop a rash.  · You have difficulty breathing.  · You develop, or feel you are developing, any reaction or side effects to medicines.  This information is not intended to replace advice given to you by your health care provider. Make sure you discuss any questions you have with your health care provider.  Document Released: 12/18/2006 Document Revised: 05/31/2017 Document Reviewed: 06/18/2014  [employer] Interactive Patient Education © 2017 [employer]    Bariatric Surgery Information  Bariatric surgery, also called weight loss surgery, is a procedure that helps you lose weight. You may consider or your health care provider may suggest bariatric surgery if:  · You are severely obese and have been unable to lose weight through diet and exercise.  · You have health problems related to obesity, such as:  ? Type 2 diabetes.  ? Heart disease.  ? Lung disease.    How does bariatric surgery help  me lose weight?  Bariatric surgery helps you lose weight by decreasing how much food your body absorbs. This is done by closing off part of your stomach to make it smaller. This restricts the amount of food your stomach can hold. Bariatric surgery can also change your body’s regular digestive process, so that food bypasses the parts of your body that absorb calories and nutrients.  If you decide to have bariatric surgery, it is important to continue to eat a healthy diet and exercise regularly after the surgery.  What are the different kinds of bariatric surgery?  There are two kinds of bariatric surgeries:  · Restrictive surgeries make your stomach smaller. They do not change your digestive process. The smaller the size of your new stomach, the less food you can eat. There are different types of restrictive surgeries.  · Malabsorptive surgeries both make your stomach smaller and alter your digestive process so that your body processes less calories and nutrients. These are the most common kind of bariatric surgery. There are different types of malabsorptive surgeries.    What are the different types of restrictive surgery?  Adjustable Gastric Banding  In this procedure, an inflatable band is placed around your stomach near the upper end. This makes the passageway for food into the rest of your stomach much smaller. The band can be adjusted, making it tighter or looser, by filling it with salt solution. Your surgeon can adjust the band based on how are you feeling and how much weight you are losing. The band can be removed in the future.  Vertical Banded Gastroplasty  In this procedure, staples are used to separate your stomach into two parts, a small upper pouch and a bigger lower pouch. This decreases how much food you can eat.  Sleeve Gastrectomy  In this procedure, your stomach is made smaller. This is done by surgically removing a large part of your stomach. When your stomach is smaller, you feel full more  quickly and reduce how much you eat.  What are the different types of malabsorptive surgery?  Vi-en-Y Gastric Bypass (RGB)  This is the most common weight loss surgery. In this procedure, a small stomach pouch is created in the upper part of your stomach. Next, this small stomach pouch is attached directly to the middle part of your small intestine. The farther down your small intestine the new connection is made, the fewer calories and nutrients you will absorb.  Biliopancreatic Diversion with Duodenal Switch (BPD/DS)  This is a multi-step procedure. In this procedure, a large part of your stomach is removed, making your stomach smaller. Next, this smaller stomach is attached to the lower part of your small intestine. Like the RGB surgery, you absorb fewer calories and nutrients the farther down your small intestine the attachment is made.  What are the risks of bariatric surgery?  As with any surgical procedure, each type of bariatric surgery has its own risks. These risks also depend on your age, your overall health, and any other medical conditions you may have. When deciding on bariatric surgery, it is very important to:  · Talk to your health care provider and choose the surgery that is best for you.  · Ask your health care provider about specific risks for the surgery you choose.    Where to find more information:  · American Society for Metabolic & Bariatric Surgery: www.asmbs.org  · Weight-control Information Network (WIN): win.niddk.nih.gov  This information is not intended to replace advice given to you by your health care provider. Make sure you discuss any questions you have with your health care provider.  Document Released: 12/18/2006 Document Revised: 05/25/2017 Document Reviewed: 06/18/2014  [employer] Interactive Patient Education © 2017 [employer]  Diet Following Bariatric Surgery  The bariatric diet is designed to provide fluids and nourishment while promoting weight loss and healing after  bariatric surgery. The diet is divided into 3 stages. Progress to each stage of the diet with your health care provider’s approval.  What do I need to know about my diet following bariatric surgery?  Your surgeon may have individual guidelines for you about specific foods or the progression of your diet. Follow your surgeon's guidelines. You will follow these general guidelines during all stages of your diet:  · Eat at set times.  · Allow 30-45 minutes for each meal.  · Take small bites. Chew your food until it is almost a liquid before swallowing it. Try setting down your utensils between bites to help yourself eat slower or make an “eat slowly” reminder sign.  · Do not drink liquids for 30 minutes before meals and for 30 minutes after meals.  · Drink between meals.  · Stop eating when you are full. If you feel tightness or pressure in your chest, that means you are full. Wait 30 minutes before you try to eat again.  · Take a chewable multivitamin daily in addition to other supplements as directed by your health care provider.  · Sip at least 48-64 oz of liquid, preferably water, each day.  · Stay away from concentrated sweets with more than 10 g of sugar per serving.  · Protein is a very important part of the diet. Have protein with every meal when possible. Try eating your protein food first.    Stage 1 bariatric diet  Stage 1 will begin after surgery and last until about 2 weeks after surgery or as directed by your health care provider. You will be on clear liquids immediately after surgery. After your health care provider approves, you will move to full liquids. You will eat at scheduled times during the day (for example at 8 AM, 12 noon, or 5 PM). You will also take a liquid protein supplement as recommended by your dietitian. Your dietitian will let you know how much and how often you can eat.  Diet Guidelines  · Limit intake to ¼ cup of solid foods and ½ cup of beverages per meal.  · You will need at least  60-80 g of protein daily or as determined by your dietitian. Guidelines for choosing a liquid protein supplement include:  ? At least 15 g of protein per 8 oz serving.  ? Less than 20 g total carbohydrate per 8 oz serving.  ? Less than 5 g fat per 8 oz serving.  · Drink at least 48 oz (1440 mL) of fluid daily, which includes your protein supplement.  · To get more protein, you can add 1 Tbsp non-fat dry milk powder to each ¼ cup skim milk.  · Avoid carbonated beverages, caffeine, alcohol, and concentrated sweets such as sugar, cakes, and cookies.  · Take a chewable multivitamin complete with iron. Discuss additional supplements with your health care provider or dietitian.  Beverages (½ cup total per meal)  · Decaffeinated coffee or tea.  · Drinks with less than 25 g of sugar per serving.  · Diet or sugar-free drinks.  · Powdered drinks.  · Sugar-free iced tea.  · Broth.  · Skim milk or lactose-free milk.  · Unsweetened, plain soy milk.  · Sugar-free gelatin dessert or frozen ice pops.  Full Liquid Foods (¼ cup total per meal)  · Protein-rich liquids (limit added protein powder to 25-30 g per serving).  · Low-fat cream soup or soup that has been blended.  · Artificially sweetened yogurt.  · Sugar-free pudding.  · Blended low-fat cottage cheese.  · Plain yogurt or Greek yogurt (low-fat).  · Unsweetened applesauce.  · Hot wheat cereal, cream of rice, grits.  Stage 2 bariatric diet (soft or pureed diet)  Stage 2 starts about 2 weeks after surgery and lasts until about 4 weeks after surgery. During this stage, you will eat soft, moist, ground, diced, or pureed foods in small meals, 3-6 times a day. Focus on protein-rich foods. You will also drink a liquid protein supplement between meals 2 times a day. After a week of soft protein foods, you can begin to add other soft foods in addition to soft proteins. You should meet with your dietitian at this stage to begin preparation for Stage 3 of the bariatric diet.  Diet  Guidelines  · Meals should not exceed ¾-1 cup total per meal.  · You will need to blend solid foods to the consistency of applesauce.  · Choose low-fat foods. Low-fat foods are foods with less than 5 g of fat per serving.  · Include a protein with every meal and snack. Eat the protein food first. Try to eat 60-80 g of protein per day when possible.  · Choose grains made from white or refined grain. Choices should have no more than 2 g of fiber per serving.  · Continue to eat mindfully and slowly and always listen to your body.  · Staying hydrated is very important during this stage. Full liquids from Stage 1 may be used for a meal or snack replacement.  · Slowly add other soft foods to your diet. Examples of soft foods that can be added to your diet are listed in the following section.  Soft Protein Foods  · Well-cooked beans and lentils.  · Eggs (scrambled, soft-boiled).  · Tofu and other soft soy products (tempeh or bean veggie burgers).  · Fish.  · Lean poultry, well cooked and soft. Can also try baby food chicken or turkey.  · Ground meats.  · Low-fat cottage cheese.  · Hummus.  · Fat-free or low-fat yogurt.  · Gravy and light mayonnaise (to help moisten).  Other Soft Foods  · Soft fruit. This includes soft canned fruit in light syrup or natural juice, bananas, melons, peaches, pears, and strawberries.  · Well-cooked vegetables.  · Toast or crackers. Make sure these become soft by chewing them at least 20 times.  · Hot wheat cereal.  · Unflavored oatmeal.  · Baby food or toddler fruits and vegetables.  · Chicken or vegetable broth.  · Blended fruit smoothies.  Stage 3 bariatric diet (regular diet)  This stage starts about 6-8 weeks after surgery and will continue to promote weight loss. You will be allowed to eat foods of various textures. Ask your dietitian to assist you in meal planning and additional behavioral strategies to make this final stage a long-term success.  Diet Guidelines  · Meals should not  exceed ¾-1 cup. As you heal and advance, you may be able to eat a little more with each meal. Always listen to your body.  · Your diet should include foods from all the food groups.  · Slowly add recommended foods to your diet. See the following section for a list of recommended foods.  · Eat only at your chosen meal times.  · When you feel full, stop eating.  · Carbohydrates should be limited. Eat no more than 30 g per meal or 130 g per day. There are about 15-20 g of carbohydrates in 1 piece of bread or a medium piece of fruit.  · Stay hydrated. Drink at least 48-64 oz of noncarbonated, zero-calorie fluid per day. Water is the best choice.  · At first, avoid hard-to-digest foods like popcorn, nuts, celery, seeds, and the white parts of citrus fruits. With time you may be able to eat these foods.  · Take any vitamin supplements as directed by your health care provider.  · Do not fast or skip meals. If you are having a hard time eating, talk to your health care provider or dietitian.  What foods can I eat in stage 3?  Grains  Choose whole grains when possible; aim to get half of your total grains as whole grains. These include whole wheat breads, crackers, and pastas. Cold or hot cereals with no added sugars. Rice (brown or white).  Vegetables  Choose a variety of vegetables. All are allowed.  Fruits  Choose a variety of fruits. All are allowed.  Meat and Other Protein Foods  Choose lean sources of protein such as poultry, fish, and eggs. You may need to cook meats to tender at first. Smooth nut butters. Beans.  Dairy  Choose low-fat or nonfat dairy items (such as cheese, milk, and yogurt).  Beverages  Decaffeinated coffee. Caffeine-free tea. Sugar-free soft drinks without caffeine. Limit alcohol.  Condiments  All are acceptable. Choose low-fat and low-sodium when possible.  Sweets and Desserts  Low-fat, low-sugar options. As part of a healthy diet, everyone should limit added sugars.  Fat and Oil  Choose healthy  fats such as olive oil, canola oil, and avocados.  The items listed above may not be a complete list of recommended foods or beverages. Contact your dietitian for more options.  This information is not intended to replace advice given to you by your health care provider. Make sure you discuss any questions you have with your health care provider.  Document Released: 06/23/2004 Document Revised: 05/25/2017 Document Reviewed: 10/22/2014  [employer] Interactive Patient Education © 2018 [employer]

## 2018-06-26 LAB
25(OH)D3 SERPL-MCNC: 58.7 NG/ML (ref 30–100)
ALBUMIN SERPL-MCNC: 3.8 G/DL (ref 3.4–4.8)
ALBUMIN UR-MCNC: <0.6 MG/L
ALBUMIN UR-MCNC: <0.6 MG/L
ALBUMIN/GLOB SERPL: 1.2 G/DL (ref 1.1–1.8)
ALP SERPL-CCNC: 49 U/L (ref 38–126)
ALT SERPL W P-5'-P-CCNC: 31 U/L (ref 9–52)
ANION GAP SERPL CALCULATED.3IONS-SCNC: 10 MMOL/L (ref 5–15)
ANISOCYTOSIS BLD QL: NORMAL
ARTICHOKE IGE QN: 71 MG/DL (ref 1–129)
AST SERPL-CCNC: 19 U/L (ref 14–36)
BASOPHILS # BLD AUTO: 0.05 10*3/MM3 (ref 0–0.2)
BASOPHILS NFR BLD AUTO: 0.5 % (ref 0–2)
BILIRUB SERPL-MCNC: 0.2 MG/DL (ref 0.2–1.3)
BUN BLD-MCNC: 16 MG/DL (ref 7–21)
BUN/CREAT SERPL: 19.8 (ref 7–25)
CALCIUM SPEC-SCNC: 9.4 MG/DL (ref 8.4–10.2)
CHLORIDE SERPL-SCNC: 101 MMOL/L (ref 95–110)
CHOLEST SERPL-MCNC: 137 MG/DL (ref 0–199)
CO2 SERPL-SCNC: 30 MMOL/L (ref 22–31)
CREAT BLD-MCNC: 0.81 MG/DL (ref 0.5–1)
CREAT UR-MCNC: 176.4 MG/DL
DEPRECATED RDW RBC AUTO: 39.8 FL (ref 36.4–46.3)
EOSINOPHIL # BLD AUTO: 0.63 10*3/MM3 (ref 0–0.7)
EOSINOPHIL NFR BLD AUTO: 6.4 % (ref 0–7)
ERYTHROCYTE [DISTWIDTH] IN BLOOD BY AUTOMATED COUNT: 16.2 % (ref 11.5–14.5)
FERRITIN SERPL-MCNC: 132 NG/ML (ref 6.2–137)
GFR SERPL CREATININE-BSD FRML MDRD: 94 ML/MIN/1.73 (ref 58–135)
GLOBULIN UR ELPH-MCNC: 3.2 GM/DL (ref 2.3–3.5)
GLUCOSE BLD-MCNC: 97 MG/DL (ref 60–100)
HBA1C MFR BLD: 6.8 % (ref 4–5.6)
HCT VFR BLD AUTO: 36.7 % (ref 35–45)
HDLC SERPL-MCNC: 43 MG/DL (ref 60–200)
HGB BLD-MCNC: 11.1 G/DL (ref 12–15.5)
IMM GRANULOCYTES # BLD: 0.02 10*3/MM3 (ref 0–0.02)
IMM GRANULOCYTES NFR BLD: 0.2 % (ref 0–0.5)
IRON 24H UR-MRATE: 49 MCG/DL (ref 37–170)
IRON SATN MFR SERPL: 19 % (ref 15–50)
LDLC/HDLC SERPL: 1.82 {RATIO} (ref 0–3.22)
LYMPHOCYTES # BLD AUTO: 3.35 10*3/MM3 (ref 0.6–4.2)
LYMPHOCYTES NFR BLD AUTO: 33.9 % (ref 10–50)
MCH RBC QN AUTO: 20.6 PG (ref 26.5–34)
MCHC RBC AUTO-ENTMCNC: 30.2 G/DL (ref 31.4–36)
MCV RBC AUTO: 68.2 FL (ref 80–98)
MICROALBUMIN/CREAT UR: NORMAL MG/G (ref 0–30)
MONOCYTES # BLD AUTO: 0.65 10*3/MM3 (ref 0–0.9)
MONOCYTES NFR BLD AUTO: 6.6 % (ref 0–12)
NEUTROPHILS # BLD AUTO: 5.17 10*3/MM3 (ref 2–8.6)
NEUTROPHILS NFR BLD AUTO: 52.4 % (ref 37–80)
PLATELET # BLD AUTO: 278 10*3/MM3 (ref 150–450)
PMV BLD AUTO: 10.7 FL (ref 8–12)
POTASSIUM BLD-SCNC: 3.8 MMOL/L (ref 3.5–5.1)
PROT SERPL-MCNC: 7 G/DL (ref 6.3–8.6)
RBC # BLD AUTO: 5.38 10*6/MM3 (ref 3.77–5.16)
SMALL PLATELETS BLD QL SMEAR: ADEQUATE
SODIUM BLD-SCNC: 141 MMOL/L (ref 137–145)
TIBC SERPL-MCNC: 259 MCG/DL (ref 265–497)
TRIGL SERPL-MCNC: 79 MG/DL (ref 20–199)
VIT B12 BLD-MCNC: 690 PG/ML (ref 239–931)
WBC MORPH BLD: NORMAL
WBC NRBC COR # BLD: 9.87 10*3/MM3 (ref 3.2–9.8)

## 2018-06-26 PROCEDURE — 80061 LIPID PANEL: CPT | Performed by: FAMILY MEDICINE

## 2018-06-26 PROCEDURE — 82306 VITAMIN D 25 HYDROXY: CPT | Performed by: FAMILY MEDICINE

## 2018-06-26 PROCEDURE — 82728 ASSAY OF FERRITIN: CPT | Performed by: FAMILY MEDICINE

## 2018-06-26 PROCEDURE — 80053 COMPREHEN METABOLIC PANEL: CPT | Performed by: FAMILY MEDICINE

## 2018-06-26 PROCEDURE — 85025 COMPLETE CBC W/AUTO DIFF WBC: CPT | Performed by: FAMILY MEDICINE

## 2018-06-26 PROCEDURE — 83550 IRON BINDING TEST: CPT | Performed by: FAMILY MEDICINE

## 2018-06-26 PROCEDURE — 83036 HEMOGLOBIN GLYCOSYLATED A1C: CPT | Performed by: FAMILY MEDICINE

## 2018-06-26 PROCEDURE — 82043 UR ALBUMIN QUANTITATIVE: CPT | Performed by: FAMILY MEDICINE

## 2018-06-26 PROCEDURE — 83540 ASSAY OF IRON: CPT | Performed by: FAMILY MEDICINE

## 2018-06-26 PROCEDURE — 82570 ASSAY OF URINE CREATININE: CPT | Performed by: FAMILY MEDICINE

## 2018-06-26 PROCEDURE — 85007 BL SMEAR W/DIFF WBC COUNT: CPT | Performed by: FAMILY MEDICINE

## 2018-06-26 PROCEDURE — 82607 VITAMIN B-12: CPT | Performed by: FAMILY MEDICINE

## 2018-06-26 PROCEDURE — 36415 COLL VENOUS BLD VENIPUNCTURE: CPT | Performed by: FAMILY MEDICINE

## 2018-07-06 ENCOUNTER — TELEPHONE (OUTPATIENT)
Dept: FAMILY MEDICINE CLINIC | Facility: CLINIC | Age: 43
End: 2018-07-06

## 2018-07-06 NOTE — TELEPHONE ENCOUNTER
Unable to contact pt by phone and no response to voicemails.  Letter mailed to pt with lab results and sleep study recommendation. Ptt to call if any questions or discuss at next appt.
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
## 2018-07-27 ENCOUNTER — OFFICE VISIT (OUTPATIENT)
Dept: FAMILY MEDICINE CLINIC | Facility: CLINIC | Age: 43
End: 2018-07-27

## 2018-07-27 VITALS
BODY MASS INDEX: 48.34 KG/M2 | OXYGEN SATURATION: 98 % | HEIGHT: 63 IN | HEART RATE: 67 BPM | SYSTOLIC BLOOD PRESSURE: 124 MMHG | TEMPERATURE: 98.6 F | WEIGHT: 272.8 LBS | DIASTOLIC BLOOD PRESSURE: 78 MMHG

## 2018-07-27 DIAGNOSIS — D50.9 IRON DEFICIENCY ANEMIA, UNSPECIFIED IRON DEFICIENCY ANEMIA TYPE: ICD-10-CM

## 2018-07-27 DIAGNOSIS — G89.29 CHRONIC BILATERAL LOW BACK PAIN WITH SCIATICA, SCIATICA LATERALITY UNSPECIFIED: ICD-10-CM

## 2018-07-27 DIAGNOSIS — E66.01 MORBID OBESITY (HCC): ICD-10-CM

## 2018-07-27 DIAGNOSIS — J45.20 MILD INTERMITTENT ASTHMA, UNSPECIFIED WHETHER COMPLICATED: ICD-10-CM

## 2018-07-27 DIAGNOSIS — D64.9 ANEMIA, UNSPECIFIED TYPE: ICD-10-CM

## 2018-07-27 DIAGNOSIS — E78.5 HYPERLIPIDEMIA ASSOCIATED WITH TYPE 2 DIABETES MELLITUS (HCC): ICD-10-CM

## 2018-07-27 DIAGNOSIS — E11.8 CONTROLLED TYPE 2 DIABETES MELLITUS WITH COMPLICATION, WITH LONG-TERM CURRENT USE OF INSULIN (HCC): Primary | ICD-10-CM

## 2018-07-27 DIAGNOSIS — K21.9 GASTROESOPHAGEAL REFLUX DISEASE, ESOPHAGITIS PRESENCE NOT SPECIFIED: ICD-10-CM

## 2018-07-27 DIAGNOSIS — E78.5 HYPERLIPIDEMIA, UNSPECIFIED HYPERLIPIDEMIA TYPE: ICD-10-CM

## 2018-07-27 DIAGNOSIS — I10 ESSENTIAL HYPERTENSION: ICD-10-CM

## 2018-07-27 DIAGNOSIS — E11.69 HYPERLIPIDEMIA ASSOCIATED WITH TYPE 2 DIABETES MELLITUS (HCC): ICD-10-CM

## 2018-07-27 DIAGNOSIS — M54.40 CHRONIC BILATERAL LOW BACK PAIN WITH SCIATICA, SCIATICA LATERALITY UNSPECIFIED: ICD-10-CM

## 2018-07-27 DIAGNOSIS — D72.829 LEUKOCYTOSIS, UNSPECIFIED TYPE: ICD-10-CM

## 2018-07-27 DIAGNOSIS — E55.9 VITAMIN D DEFICIENCY: ICD-10-CM

## 2018-07-27 DIAGNOSIS — Z79.4 CONTROLLED TYPE 2 DIABETES MELLITUS WITH COMPLICATION, WITH LONG-TERM CURRENT USE OF INSULIN (HCC): Primary | ICD-10-CM

## 2018-07-27 PROCEDURE — 99214 OFFICE O/P EST MOD 30 MIN: CPT | Performed by: FAMILY MEDICINE

## 2018-08-24 RX ORDER — MELOXICAM 15 MG/1
TABLET ORAL
Qty: 30 TABLET | Refills: 3 | Status: SHIPPED | OUTPATIENT
Start: 2018-08-24 | End: 2018-11-20 | Stop reason: SDUPTHER
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
## 2018-11-20 ENCOUNTER — OFFICE VISIT (OUTPATIENT)
Dept: FAMILY MEDICINE CLINIC | Facility: CLINIC | Age: 43
End: 2018-11-20

## 2018-11-20 VITALS
WEIGHT: 284.4 LBS | SYSTOLIC BLOOD PRESSURE: 130 MMHG | OXYGEN SATURATION: 96 % | BODY MASS INDEX: 50.39 KG/M2 | HEIGHT: 63 IN | HEART RATE: 72 BPM | TEMPERATURE: 97.9 F | DIASTOLIC BLOOD PRESSURE: 80 MMHG

## 2018-11-20 DIAGNOSIS — Z12.31 ENCOUNTER FOR SCREENING MAMMOGRAM FOR MALIGNANT NEOPLASM OF BREAST: ICD-10-CM

## 2018-11-20 DIAGNOSIS — R53.83 OTHER FATIGUE: ICD-10-CM

## 2018-11-20 DIAGNOSIS — R40.0 DAYTIME SLEEPINESS: ICD-10-CM

## 2018-11-20 DIAGNOSIS — Z00.00 GENERAL MEDICAL EXAMINATION: ICD-10-CM

## 2018-11-20 DIAGNOSIS — D50.9 IRON DEFICIENCY ANEMIA, UNSPECIFIED IRON DEFICIENCY ANEMIA TYPE: ICD-10-CM

## 2018-11-20 DIAGNOSIS — D64.9 ANEMIA, UNSPECIFIED TYPE: ICD-10-CM

## 2018-11-20 DIAGNOSIS — E78.5 HYPERLIPIDEMIA, UNSPECIFIED HYPERLIPIDEMIA TYPE: ICD-10-CM

## 2018-11-20 DIAGNOSIS — I10 ESSENTIAL HYPERTENSION: ICD-10-CM

## 2018-11-20 DIAGNOSIS — E55.9 VITAMIN D DEFICIENCY: ICD-10-CM

## 2018-11-20 DIAGNOSIS — J45.20 MILD INTERMITTENT ASTHMA, UNSPECIFIED WHETHER COMPLICATED: ICD-10-CM

## 2018-11-20 DIAGNOSIS — E11.8 CONTROLLED TYPE 2 DIABETES MELLITUS WITH COMPLICATION, WITHOUT LONG-TERM CURRENT USE OF INSULIN (HCC): Primary | ICD-10-CM

## 2018-11-20 PROCEDURE — 99214 OFFICE O/P EST MOD 30 MIN: CPT | Performed by: FAMILY MEDICINE

## 2018-11-20 RX ORDER — CYCLOBENZAPRINE HCL 10 MG
10 TABLET ORAL NIGHTLY PRN
Qty: 30 TABLET | Refills: 3 | Status: SHIPPED | OUTPATIENT
Start: 2018-11-20 | End: 2020-04-20 | Stop reason: SDUPTHER

## 2018-11-20 RX ORDER — ALBUTEROL SULFATE 90 UG/1
2 AEROSOL, METERED RESPIRATORY (INHALATION) EVERY 4 HOURS PRN
Qty: 1 INHALER | Refills: 6 | Status: SHIPPED | OUTPATIENT
Start: 2018-11-20 | End: 2019-06-07 | Stop reason: SDUPTHER

## 2018-11-20 RX ORDER — LANOLIN ALCOHOL/MO/W.PET/CERES
325 CREAM (GRAM) TOPICAL
Qty: 90 TABLET | Refills: 6 | Status: SHIPPED | OUTPATIENT
Start: 2018-11-20 | End: 2019-10-31 | Stop reason: SDUPTHER

## 2018-11-20 RX ORDER — MONTELUKAST SODIUM 10 MG/1
10 TABLET ORAL NIGHTLY
Qty: 30 TABLET | Refills: 6 | Status: SHIPPED | OUTPATIENT
Start: 2018-11-20 | End: 2019-10-31 | Stop reason: SDUPTHER

## 2018-11-20 RX ORDER — PANTOPRAZOLE SODIUM 40 MG/1
40 TABLET, DELAYED RELEASE ORAL DAILY
Qty: 30 TABLET | Refills: 6
Start: 2018-11-20 | End: 2019-05-23

## 2018-11-20 RX ORDER — ASPIRIN 81 MG/1
81 TABLET, CHEWABLE ORAL DAILY
Qty: 30 TABLET | Refills: 6 | Status: SHIPPED | OUTPATIENT
Start: 2018-11-20 | End: 2019-10-31 | Stop reason: SDUPTHER

## 2018-11-20 RX ORDER — ATORVASTATIN CALCIUM 40 MG/1
40 TABLET, FILM COATED ORAL DAILY
Qty: 90 TABLET | Refills: 3 | Status: SHIPPED | OUTPATIENT
Start: 2018-11-20 | End: 2019-10-31 | Stop reason: SDUPTHER

## 2018-11-20 RX ORDER — ALBUTEROL SULFATE 1.25 MG/3ML
1 SOLUTION RESPIRATORY (INHALATION) EVERY 4 HOURS PRN
Qty: 3 ML | Refills: 6 | Status: SHIPPED | OUTPATIENT
Start: 2018-11-20 | End: 2019-06-07 | Stop reason: SDUPTHER

## 2018-11-20 RX ORDER — FLUTICASONE PROPIONATE 50 MCG
2 SPRAY, SUSPENSION (ML) NASAL DAILY
Qty: 16 G | Refills: 6 | Status: SHIPPED | OUTPATIENT
Start: 2018-11-20 | End: 2019-10-31 | Stop reason: SDUPTHER

## 2018-11-20 RX ORDER — LISINOPRIL AND HYDROCHLOROTHIAZIDE 12.5; 1 MG/1; MG/1
2 TABLET ORAL DAILY
Qty: 60 TABLET | Refills: 6 | Status: SHIPPED | OUTPATIENT
Start: 2018-11-20 | End: 2019-03-29

## 2018-11-20 RX ORDER — MELOXICAM 15 MG/1
15 TABLET ORAL DAILY
Qty: 30 TABLET | Refills: 6 | Status: SHIPPED | OUTPATIENT
Start: 2018-11-20 | End: 2020-06-12

## 2018-11-20 NOTE — PATIENT INSTRUCTIONS
Get labwork fasting soon next month  Will refer to sleep study  -recheck in March/April next year   Sleep Apnea  Sleep apnea is a condition in which breathing pauses or becomes shallow during sleep. Episodes of sleep apnea usually last 10 seconds or longer, and they may occur as many as 20 times an hour. Sleep apnea disrupts your sleep and keeps your body from getting the rest that it needs. This condition can increase your risk of certain health problems, including:  · Heart attack.  · Stroke.  · Obesity.  · Diabetes.  · Heart failure.  · Irregular heartbeat.    There are three kinds of sleep apnea:  · Obstructive sleep apnea. This kind is caused by a blocked or collapsed airway.  · Central sleep apnea. This kind happens when the part of the brain that controls breathing does not send the correct signals to the muscles that control breathing.  · Mixed sleep apnea. This is a combination of obstructive and central sleep apnea.    What are the causes?  The most common cause of this condition is a collapsed or blocked airway. An airway can collapse or become blocked if:  · Your throat muscles are abnormally relaxed.  · Your tongue and tonsils are larger than normal.  · You are overweight.  · Your airway is smaller than normal.    What increases the risk?  This condition is more likely to develop in people who:  · Are overweight.  · Smoke.  · Have a smaller than normal airway.  · Are elderly.  · Are male.  · Drink alcohol.  · Take sedatives or tranquilizers.  · Have a family history of sleep apnea.    What are the signs or symptoms?  Symptoms of this condition include:  · Trouble staying asleep.  · Daytime sleepiness and tiredness.  · Irritability.  · Loud snoring.  · Morning headaches.  · Trouble concentrating.  · Forgetfulness.  · Decreased interest in sex.  · Unexplained sleepiness.  · Mood swings.  · Personality changes.  · Feelings of depression.  · Waking up often during the night to urinate.  · Dry mouth.  · Sore  throat.    How is this diagnosed?  This condition may be diagnosed with:  · A medical history.  · A physical exam.  · A series of tests that are done while you are sleeping (sleep study). These tests are usually done in a sleep lab, but they may also be done at home.    How is this treated?  Treatment for this condition aims to restore normal breathing and to ease symptoms during sleep. It may involve managing health issues that can affect breathing, such as high blood pressure or obesity. Treatment may include:  · Sleeping on your side.  · Using a decongestant if you have nasal congestion.  · Avoiding the use of depressants, including alcohol, sedatives, and narcotics.  · Losing weight if you are overweight.  · Making changes to your diet.  · Quitting smoking.  · Using a device to open your airway while you sleep, such as:  ? An oral appliance. This is a custom-made mouthpiece that shifts your lower jaw forward.  ? A continuous positive airway pressure (CPAP) device. This device delivers oxygen to your airway through a mask.  ? A nasal expiratory positive airway pressure (EPAP) device. This device has valves that you put into each nostril.  ? A bi-level positive airway pressure (BPAP) device. This device delivers oxygen to your airway through a mask.  · Surgery if other treatments do not work. During surgery, excess tissue is removed to create a wider airway.    It is important to get treatment for sleep apnea. Without treatment, this condition can lead to:  · High blood pressure.  · Coronary artery disease.  · (Men) An inability to achieve or maintain an erection (impotence).  · Reduced thinking abilities.    Follow these instructions at home:  · Make any lifestyle changes that your health care provider recommends.  · Eat a healthy, well-balanced diet.  · Take over-the-counter and prescription medicines only as told by your health care provider.  · Avoid using depressants, including alcohol, sedatives, and  narcotics.  · Take steps to lose weight if you are overweight.  · If you were given a device to open your airway while you sleep, use it only as told by your health care provider.  · Do not use any tobacco products, such as cigarettes, chewing tobacco, and e-cigarettes. If you need help quitting, ask your health care provider.  · Keep all follow-up visits as told by your health care provider. This is important.  Contact a health care provider if:  · The device that you received to open your airway during sleep is uncomfortable or does not seem to be working.  · Your symptoms do not improve.  · Your symptoms get worse.  Get help right away if:  · You develop chest pain.  · You develop shortness of breath.  · You develop discomfort in your back, arms, or stomach.  · You have trouble speaking.  · You have weakness on one side of your body.  · You have drooping in your face.  These symptoms may represent a serious problem that is an emergency. Do not wait to see if the symptoms will go away. Get medical help right away. Call your local emergency services (911 in the [place] Do not drive yourself to the hospital.  This information is not intended to replace advice given to you by your health care provider. Make sure you discuss any questions you have with your health care provider.  Document Released: 12/08/2003 Document Revised: 08/13/2017 Document Reviewed: 09/26/2016  [employer] Interactive Patient Education © 2018 [employer]    Sleep Apnea  Sleep apnea is a condition that affects breathing. People with sleep apnea have moments during sleep when their breathing pauses briefly or gets shallow. Sleep apnea can cause these symptoms:  · Trouble staying asleep.  · Sleepiness or tiredness during the day.  · Irritability.  · Loud snoring.  · Morning headaches.  · Trouble concentrating.  · Forgetting things.  · Less interest in sex.  · Being sleepy for no reason.  · Mood swings.  · Personality  changes.  · Depression.  · Waking up a lot during the night to pee (urinate).  · Dry mouth.  · Sore throat.    Follow these instructions at home:  · Make any changes in your routine that your doctor recommends.  · Eat a healthy, well-balanced diet.  · Take over-the-counter and prescription medicines only as told by your doctor.  · Avoid using alcohol, calming medicines (sedatives), and narcotic medicines.  · Take steps to lose weight if you are overweight.  · If you were given a machine (device) to use while you sleep, use it only as told by your doctor.  · Do not use any tobacco products, such as cigarettes, chewing tobacco, and e-cigarettes. If you need help quitting, ask your doctor.  · Keep all follow-up visits as told by your doctor. This is important.  Contact a doctor if:  · The machine that you were given to use during sleep is uncomfortable or does not seem to be working.  · Your symptoms do not get better.  · Your symptoms get worse.  Get help right away if:  · Your chest hurts.  · You have trouble breathing in enough air (shortness of breath).  · You have an uncomfortable feeling in your back, arms, or stomach.  · You have trouble talking.  · One side of your body feels weak.  · A part of your face is hanging down (drooping).  These symptoms may be an emergency. Do not wait to see if the symptoms will go away. Get medical help right away. Call your local emergency services (911 in the [place] Do not drive yourself to the hospital.  This information is not intended to replace advice given to you by your health care provider. Make sure you discuss any questions you have with your health care provider.  Document Released: 09/26/2009 Document Revised: 08/13/2017 Document Reviewed: 09/26/2016  [employer] Interactive Patient Education © 2018 [employer]    Sleep Apnea  Sleep apnea is a condition in which breathing pauses or becomes shallow during sleep. Episodes of sleep apnea usually last 10 seconds or longer,  and they may occur as many as 20 times an hour. Sleep apnea disrupts your sleep and keeps your body from getting the rest that it needs. This condition can increase your risk of certain health problems, including:  · Heart attack.  · Stroke.  · Obesity.  · Diabetes.  · Heart failure.  · Irregular heartbeat.    There are three kinds of sleep apnea:  · Obstructive sleep apnea. This kind is caused by a blocked or collapsed airway.  · Central sleep apnea. This kind happens when the part of the brain that controls breathing does not send the correct signals to the muscles that control breathing.  · Mixed sleep apnea. This is a combination of obstructive and central sleep apnea.    What are the causes?  The most common cause of this condition is a collapsed or blocked airway. An airway can collapse or become blocked if:  · Your throat muscles are abnormally relaxed.  · Your tongue and tonsils are larger than normal.  · You are overweight.  · Your airway is smaller than normal.    What increases the risk?  This condition is more likely to develop in people who:  · Are overweight.  · Smoke.  · Have a smaller than normal airway.  · Are elderly.  · Are male.  · Drink alcohol.  · Take sedatives or tranquilizers.  · Have a family history of sleep apnea.    What are the signs or symptoms?  Symptoms of this condition include:  · Trouble staying asleep.  · Daytime sleepiness and tiredness.  · Irritability.  · Loud snoring.  · Morning headaches.  · Trouble concentrating.  · Forgetfulness.  · Decreased interest in sex.  · Unexplained sleepiness.  · Mood swings.  · Personality changes.  · Feelings of depression.  · Waking up often during the night to urinate.  · Dry mouth.  · Sore throat.    How is this diagnosed?  This condition may be diagnosed with:  · A medical history.  · A physical exam.  · A series of tests that are done while you are sleeping (sleep study). These tests are usually done in a sleep lab, but they may also be  done at home.    How is this treated?  Treatment for this condition aims to restore normal breathing and to ease symptoms during sleep. It may involve managing health issues that can affect breathing, such as high blood pressure or obesity. Treatment may include:  · Sleeping on your side.  · Using a decongestant if you have nasal congestion.  · Avoiding the use of depressants, including alcohol, sedatives, and narcotics.  · Losing weight if you are overweight.  · Making changes to your diet.  · Quitting smoking.  · Using a device to open your airway while you sleep, such as:  ? An oral appliance. This is a custom-made mouthpiece that shifts your lower jaw forward.  ? A continuous positive airway pressure (CPAP) device. This device delivers oxygen to your airway through a mask.  ? A nasal expiratory positive airway pressure (EPAP) device. This device has valves that you put into each nostril.  ? A bi-level positive airway pressure (BPAP) device. This device delivers oxygen to your airway through a mask.  · Surgery if other treatments do not work. During surgery, excess tissue is removed to create a wider airway.    It is important to get treatment for sleep apnea. Without treatment, this condition can lead to:  · High blood pressure.  · Coronary artery disease.  · (Men) An inability to achieve or maintain an erection (impotence).  · Reduced thinking abilities.    Follow these instructions at home:  · Make any lifestyle changes that your health care provider recommends.  · Eat a healthy, well-balanced diet.  · Take over-the-counter and prescription medicines only as told by your health care provider.  · Avoid using depressants, including alcohol, sedatives, and narcotics.  · Take steps to lose weight if you are overweight.  · If you were given a device to open your airway while you sleep, use it only as told by your health care provider.  · Do not use any tobacco products, such as cigarettes, chewing tobacco, and  e-cigarettes. If you need help quitting, ask your health care provider.  · Keep all follow-up visits as told by your health care provider. This is important.  Contact a health care provider if:  · The device that you received to open your airway during sleep is uncomfortable or does not seem to be working.  · Your symptoms do not improve.  · Your symptoms get worse.  Get help right away if:  · You develop chest pain.  · You develop shortness of breath.  · You develop discomfort in your back, arms, or stomach.  · You have trouble speaking.  · You have weakness on one side of your body.  · You have drooping in your face.  These symptoms may represent a serious problem that is an emergency. Do not wait to see if the symptoms will go away. Get medical help right away. Call your local emergency services (911 in the [place] Do not drive yourself to the hospital.  This information is not intended to replace advice given to you by your health care provider. Make sure you discuss any questions you have with your health care provider.  Document Released: 12/08/2003 Document Revised: 08/13/2017 Document Reviewed: 09/26/2016  [employer] Interactive Patient Education © 2018 [employer]

## 2019-03-22 ENCOUNTER — LAB (OUTPATIENT)
Dept: LAB | Facility: HOSPITAL | Age: 44
End: 2019-03-22

## 2019-03-22 DIAGNOSIS — E55.9 VITAMIN D DEFICIENCY: ICD-10-CM

## 2019-03-22 DIAGNOSIS — Z00.00 GENERAL MEDICAL EXAMINATION: ICD-10-CM

## 2019-03-22 DIAGNOSIS — E11.8 CONTROLLED TYPE 2 DIABETES MELLITUS WITH COMPLICATION, WITHOUT LONG-TERM CURRENT USE OF INSULIN (HCC): ICD-10-CM

## 2019-03-22 DIAGNOSIS — I10 ESSENTIAL HYPERTENSION: ICD-10-CM

## 2019-03-22 DIAGNOSIS — D50.9 IRON DEFICIENCY ANEMIA, UNSPECIFIED IRON DEFICIENCY ANEMIA TYPE: ICD-10-CM

## 2019-03-22 DIAGNOSIS — D64.9 ANEMIA, UNSPECIFIED TYPE: ICD-10-CM

## 2019-03-22 DIAGNOSIS — E78.5 HYPERLIPIDEMIA, UNSPECIFIED HYPERLIPIDEMIA TYPE: ICD-10-CM

## 2019-03-22 PROBLEM — Z12.4 ENCOUNTER FOR PAPANICOLAOU SMEAR OF CERVIX: Status: ACTIVE | Noted: 2019-03-22

## 2019-03-22 PROBLEM — Z12.31 SCREENING MAMMOGRAM, ENCOUNTER FOR: Status: ACTIVE | Noted: 2019-03-22

## 2019-03-22 LAB
25(OH)D3 SERPL-MCNC: 57.3 NG/ML (ref 30–100)
ALBUMIN SERPL-MCNC: 4 G/DL (ref 3.4–4.8)
ALBUMIN UR-MCNC: 0.6 MG/L
ALBUMIN/GLOB SERPL: 1.2 G/DL (ref 1.1–1.8)
ALP SERPL-CCNC: 60 U/L (ref 38–126)
ALT SERPL W P-5'-P-CCNC: 30 U/L (ref 9–52)
ANION GAP SERPL CALCULATED.3IONS-SCNC: 8 MMOL/L (ref 5–15)
ARTICHOKE IGE QN: 98 MG/DL (ref 1–129)
AST SERPL-CCNC: 18 U/L (ref 14–36)
BASOPHILS # BLD AUTO: 0.07 10*3/MM3 (ref 0–0.2)
BASOPHILS NFR BLD AUTO: 0.6 % (ref 0–1.5)
BILIRUB SERPL-MCNC: 0.3 MG/DL (ref 0.2–1.3)
BUN BLD-MCNC: 15 MG/DL (ref 7–21)
BUN/CREAT SERPL: 18.8 (ref 7–25)
CALCIUM SPEC-SCNC: 10 MG/DL (ref 8.4–10.2)
CHLORIDE SERPL-SCNC: 96 MMOL/L (ref 95–110)
CHOLEST SERPL-MCNC: 165 MG/DL (ref 0–199)
CO2 SERPL-SCNC: 30 MMOL/L (ref 22–31)
CREAT BLD-MCNC: 0.8 MG/DL (ref 0.5–1)
CREAT UR-MCNC: 56.6 MG/DL
DEPRECATED RDW RBC AUTO: 42.5 FL (ref 37–54)
EOSINOPHIL # BLD AUTO: 0.75 10*3/MM3 (ref 0–0.4)
EOSINOPHIL NFR BLD AUTO: 6.6 % (ref 0.3–6.2)
ERYTHROCYTE [DISTWIDTH] IN BLOOD BY AUTOMATED COUNT: 17.6 % (ref 12.3–15.4)
FERRITIN SERPL-MCNC: 162 NG/ML (ref 6.2–137)
GFR SERPL CREATININE-BSD FRML MDRD: 94 ML/MIN/1.73 (ref 58–135)
GLOBULIN UR ELPH-MCNC: 3.3 GM/DL (ref 2.3–3.5)
GLUCOSE BLD-MCNC: 119 MG/DL (ref 60–100)
HBA1C MFR BLD: 7.9 % (ref 4–5.6)
HCT VFR BLD AUTO: 37.7 % (ref 34–46.6)
HDLC SERPL-MCNC: 44 MG/DL (ref 60–200)
HGB BLD-MCNC: 11.2 G/DL (ref 12–15.9)
IMM GRANULOCYTES # BLD AUTO: 0.04 10*3/MM3 (ref 0–0.05)
IMM GRANULOCYTES NFR BLD AUTO: 0.4 % (ref 0–0.5)
IRON 24H UR-MRATE: 46 MCG/DL (ref 37–170)
IRON SATN MFR SERPL: 18 % (ref 15–50)
LARGE PLATELETS: NORMAL
LDLC/HDLC SERPL: 2.3 {RATIO} (ref 0–3.22)
LYMPHOCYTES # BLD AUTO: 3.87 10*3/MM3 (ref 0.7–3.1)
LYMPHOCYTES NFR BLD AUTO: 34.2 % (ref 19.6–45.3)
MCH RBC QN AUTO: 20.7 PG (ref 26.6–33)
MCHC RBC AUTO-ENTMCNC: 29.7 G/DL (ref 31.5–35.7)
MCV RBC AUTO: 69.8 FL (ref 79–97)
MICROALBUMIN/CREAT UR: 10.6 MG/G (ref 0–30)
MONOCYTES # BLD AUTO: 0.59 10*3/MM3 (ref 0.1–0.9)
MONOCYTES NFR BLD AUTO: 5.2 % (ref 5–12)
NEUTROPHILS # BLD AUTO: 5.98 10*3/MM3 (ref 1.4–7)
NEUTROPHILS NFR BLD AUTO: 53 % (ref 42.7–76)
NRBC BLD AUTO-RTO: 0 /100 WBC (ref 0–0)
PLATELET # BLD AUTO: 286 10*3/MM3 (ref 140–450)
PMV BLD AUTO: 11.2 FL (ref 6–12)
POTASSIUM BLD-SCNC: 4.4 MMOL/L (ref 3.5–5.1)
PROT SERPL-MCNC: 7.3 G/DL (ref 6.3–8.6)
RBC # BLD AUTO: 5.4 10*6/MM3 (ref 3.77–5.28)
RBC MORPH BLD: NORMAL
SMALL PLATELETS BLD QL SMEAR: ADEQUATE
SODIUM BLD-SCNC: 134 MMOL/L (ref 137–145)
TIBC SERPL-MCNC: 254 MCG/DL (ref 265–497)
TRIGL SERPL-MCNC: 100 MG/DL (ref 20–199)
VIT B12 BLD-MCNC: 795 PG/ML (ref 239–931)
WBC MORPH BLD: NORMAL
WBC NRBC COR # BLD: 11.3 10*3/MM3 (ref 3.4–10.8)

## 2019-03-22 PROCEDURE — 82306 VITAMIN D 25 HYDROXY: CPT

## 2019-03-22 PROCEDURE — 82043 UR ALBUMIN QUANTITATIVE: CPT

## 2019-03-22 PROCEDURE — 85007 BL SMEAR W/DIFF WBC COUNT: CPT

## 2019-03-22 PROCEDURE — 80053 COMPREHEN METABOLIC PANEL: CPT

## 2019-03-22 PROCEDURE — 83036 HEMOGLOBIN GLYCOSYLATED A1C: CPT

## 2019-03-22 PROCEDURE — 82570 ASSAY OF URINE CREATININE: CPT

## 2019-03-22 PROCEDURE — 82728 ASSAY OF FERRITIN: CPT

## 2019-03-22 PROCEDURE — 83540 ASSAY OF IRON: CPT

## 2019-03-22 PROCEDURE — 85025 COMPLETE CBC W/AUTO DIFF WBC: CPT

## 2019-03-22 PROCEDURE — 80061 LIPID PANEL: CPT

## 2019-03-22 PROCEDURE — 82607 VITAMIN B-12: CPT

## 2019-03-22 PROCEDURE — 83550 IRON BINDING TEST: CPT

## 2019-03-25 ENCOUNTER — TELEPHONE (OUTPATIENT)
Dept: FAMILY MEDICINE CLINIC | Facility: CLINIC | Age: 44
End: 2019-03-25

## 2019-03-28 ENCOUNTER — TELEPHONE (OUTPATIENT)
Dept: FAMILY MEDICINE CLINIC | Facility: CLINIC | Age: 44
End: 2019-03-28

## 2019-03-28 NOTE — TELEPHONE ENCOUNTER
----- Message from Solitario Li MD sent at 3/23/2019  1:07 PM CDT -----  Call pt    Her hga1c is worse from last visit from 6.8 to 7.9. May need to adjust diabetic medicaiotn and diet    Her anemia is stable but her WBC continues to be high. May need to get US of uterus unless she had hyterectomy or stool studies. May need to refer to Hematologist    Kidney function, Vitamin D and Vitamin b12 levels normal     Recheck on next visit. Thanks  Called pt

## 2019-03-29 ENCOUNTER — OFFICE VISIT (OUTPATIENT)
Dept: FAMILY MEDICINE CLINIC | Facility: CLINIC | Age: 44
End: 2019-03-29

## 2019-03-29 VITALS
TEMPERATURE: 98.6 F | HEART RATE: 100 BPM | SYSTOLIC BLOOD PRESSURE: 130 MMHG | WEIGHT: 280.6 LBS | OXYGEN SATURATION: 96 % | HEIGHT: 63 IN | DIASTOLIC BLOOD PRESSURE: 84 MMHG | BODY MASS INDEX: 49.72 KG/M2

## 2019-03-29 DIAGNOSIS — K21.9 GASTROESOPHAGEAL REFLUX DISEASE, ESOPHAGITIS PRESENCE NOT SPECIFIED: ICD-10-CM

## 2019-03-29 DIAGNOSIS — I10 ESSENTIAL HYPERTENSION: ICD-10-CM

## 2019-03-29 DIAGNOSIS — D72.829 LEUKOCYTOSIS, UNSPECIFIED TYPE: ICD-10-CM

## 2019-03-29 DIAGNOSIS — E66.01 MORBID OBESITY (HCC): ICD-10-CM

## 2019-03-29 DIAGNOSIS — E55.9 VITAMIN D DEFICIENCY: ICD-10-CM

## 2019-03-29 DIAGNOSIS — J45.20 MILD INTERMITTENT ASTHMA, UNSPECIFIED WHETHER COMPLICATED: ICD-10-CM

## 2019-03-29 DIAGNOSIS — E78.5 HYPERLIPIDEMIA ASSOCIATED WITH TYPE 2 DIABETES MELLITUS (HCC): ICD-10-CM

## 2019-03-29 DIAGNOSIS — Z12.4 ENCOUNTER FOR PAPANICOLAOU SMEAR OF CERVIX: ICD-10-CM

## 2019-03-29 DIAGNOSIS — E11.65 UNCONTROLLED TYPE 2 DIABETES MELLITUS WITH HYPERGLYCEMIA (HCC): ICD-10-CM

## 2019-03-29 DIAGNOSIS — E11.69 HYPERLIPIDEMIA ASSOCIATED WITH TYPE 2 DIABETES MELLITUS (HCC): ICD-10-CM

## 2019-03-29 DIAGNOSIS — D50.9 IRON DEFICIENCY ANEMIA, UNSPECIFIED IRON DEFICIENCY ANEMIA TYPE: ICD-10-CM

## 2019-03-29 DIAGNOSIS — E78.5 HYPERLIPIDEMIA, UNSPECIFIED HYPERLIPIDEMIA TYPE: ICD-10-CM

## 2019-03-29 DIAGNOSIS — Z12.31 SCREENING MAMMOGRAM, ENCOUNTER FOR: Primary | ICD-10-CM

## 2019-03-29 PROCEDURE — 99214 OFFICE O/P EST MOD 30 MIN: CPT | Performed by: FAMILY MEDICINE

## 2019-03-29 RX ORDER — LISINOPRIL 20 MG/1
20 TABLET ORAL DAILY
Qty: 30 TABLET | Refills: 3 | Status: SHIPPED | OUTPATIENT
Start: 2019-03-29 | End: 2019-08-05 | Stop reason: SDUPTHER

## 2019-03-29 NOTE — PROGRESS NOTES
Subjective:  Lizzy Restrepo is a 43 y.o. female who presents for       Patient Active Problem List   Diagnosis   • Encounter for immunization   • Diabetes type 2, uncontrolled (CMS/HCC)   • Hyperlipidemia associated with type 2 diabetes mellitus (CMS/HCC)   • Morbid obesity (CMS/HCC)   • Vitamin D deficiency   • Essential hypertension   • Hyperlipidemia   • Urinary tract infection   • Acute pain of right shoulder   • Right arm pain   • Right elbow pain   • Lateral epicondylitis of right elbow   • Medial epicondylitis   • Encounter for screening mammogram for malignant neoplasm of breast   • Iron deficiency anemia   • Mild intermittent asthma   • Encounter for screening for endocrine disorder   • Gastroesophageal reflux disease   • Pain in both knees   • Controlled type 2 diabetes mellitus with complication, without long-term current use of insulin (CMS/HCC)   • Anemia   • Right knee pain   • Controlled type 2 diabetes mellitus with complication, with long-term current use of insulin (CMS/[facility])   • General medical examination   • Leukocytosis   • Chronic bilateral low back pain with sciatica   • Encounter for Papanicolaou smear of cervix   • Screening mammogram, encounter for           Current Outpatient Medications:   •  albuterol (ACCUNEB) 1.25 MG/3ML nebulizer solution, Take 3 mL by nebulization Every 4 (Four) Hours As Needed for Wheezing., Disp: 3 mL, Rfl: 6  •  albuterol (VENTOLIN HFA) 108 (90 Base) MCG/ACT inhaler, Inhale 2 puffs Every 4 (Four) Hours As Needed for Wheezing., Disp: 1 inhaler, Rfl: 6  •  aspirin 81 MG chewable tablet, Chew 1 tablet Daily., Disp: 30 tablet, Rfl: 6  •  atorvastatin (LIPITOR) 40 MG tablet, Take 1 tablet by mouth Daily., Disp: 90 tablet, Rfl: 3  •  cholecalciferol (OPTIMAL-D) 21082 units capsule, Take 1 capsule by mouth 1 (One) Time Per Week., Disp: 12 capsule, Rfl: 3  •  cyclobenzaprine (FLEXERIL) 10 MG tablet, Take 1 tablet by mouth At Night As Needed for Muscle Spasms., Disp: 30  "tablet, Rfl: 3  •  Dulaglutide (TRULICITY) 0.75 MG/0.5ML solution pen-injector, Inject 0.75 mg under the skin into the appropriate area as directed 1 (One) Time Per Week., Disp: 12 pen, Rfl: 3  •  Empagliflozin (JARDIANCE) 25 MG tablet, Take 25 mg by mouth Daily., Disp: 30 tablet, Rfl: 3  •  EQ LORATADINE 10 MG tablet, TAKE ONE TABLET BY MOUTH ONCE DAILY, Disp: 30 tablet, Rfl: 11  •  ferrous sulfate 325 (65 FE) MG EC tablet, Take 1 tablet by mouth 3 (Three) Times a Day With Meals., Disp: 90 tablet, Rfl: 6  •  fluticasone (FLONASE) 50 MCG/ACT nasal spray, 2 sprays into the nostril(s) as directed by provider Daily., Disp: 16 g, Rfl: 6  •  Insulin Pen Needle (PEN NEEDLES) 31G X 8 MM misc, , Disp: , Rfl:   •  Insulin Syringe-Needle U-100 30G X 5/16\" 1 ML misc, , Disp: , Rfl:   •  lisinopril (PRINIVIL,ZESTRIL) 20 MG tablet, Take 1 tablet by mouth Daily., Disp: 30 tablet, Rfl: 3  •  meloxicam (MOBIC) 15 MG tablet, Take 1 tablet by mouth Daily., Disp: 30 tablet, Rfl: 6  •  montelukast (SINGULAIR) 10 MG tablet, Take 1 tablet by mouth Every Night., Disp: 30 tablet, Rfl: 6  •  pantoprazole (PROTONIX) 40 MG EC tablet, Take 1 tablet by mouth Daily., Disp: 30 tablet, Rfl: 6  •  ReliOn Ultra Thin Lancets misc, , Disp: , Rfl:   •  VENTOLIN  (90 Base) MCG/ACT inhaler, INHALE TWO PUFFS BY MOUTH EVERY 4 HOURS AS NEEDED, Disp: 1 inhaler, Rfl: 0    HPI     3/29/19  Pt is 42 yo female with PMH of DM type 2, Asthma, Morbid obesity, HTN, HL:P, coming for recheck and followup. She just had  labwork and is  mammogram and pap smea. Labs shows hemoglobin at 11.2 with WBC at 12.0. CMP showed stable renal function and lvier function. Vitamin D and Vitamin B12 levels are normal. hga1c is at 7.9 from 6.8  About 9 months ago. LIpid panel shows LDL <100 and HDL low at 44.  She was working in [place] for 2 months. She would have to commuted between [place] and [place] 5 times a day.  This caused her to be stressed.  She does not work " Miguel missed several injection. She is doing well now and continues to work at [employer].  No chest pain, no shortness of air, no dizziness.  She does not work in [place] anymore.        Diabetes   She presents for her follow-up diabetic visit. She has type 2 diabetes mellitus. Her disease course has been stable. Pertinent negatives for hypoglycemia include no confusion, dizziness, headaches, hunger, mood changes, nervousness/anxiousness, pallor, seizures, sleepiness, speech difficulty, sweats or tremors. Associated symptoms include fatigue. Pertinent negatives for diabetes include no blurred vision, no chest pain, no foot paresthesias, no foot ulcerations, no polydipsia, no polyphagia, no polyuria, no visual change, no weakness and no weight loss. Pertinent negatives for hypoglycemia complications include no blackouts, no hospitalization, no nocturnal hypoglycemia, no required assistance and no required glucagon injection. Symptoms are stable. Pertinent negatives for diabetic complications include no autonomic neuropathy, CVA, heart disease, nephropathy, peripheral neuropathy, PVD or retinopathy. Risk factors for coronary artery disease include diabetes mellitus, dyslipidemia and obesity. Current diabetic treatment includes insulin injections. She is following a diabetic diet. When asked about meal planning, she reported none. She has not had a previous visit with a dietitian. She participates in exercise intermittently. She does not see a podiatrist.Eye exam is not current.   Knee Pain    The incident occurred more than 1 week ago. The incident occurred at the gym. There was no injury mechanism. The pain is present in the right knee. The quality of the pain is described as aching. The pain is at a severity of 5/10. The pain is moderate. The pain has been constant since onset. Pertinent negatives include no inability to bear weight, loss of motion, loss of sensation, muscle weakness, numbness or tingling. She  reports no foreign bodies present. The symptoms are aggravated by movement and palpation. She has tried nothing for the symptoms. The treatment provided no relief.   Obesity   This is a chronic problem. The current episode started more than 1 year ago. The problem has been unchanged. Associated symptoms include arthralgias, fatigue and nausea. Pertinent negatives include no abdominal pain, anorexia, change in bowel habit, chest pain, chills, congestion, coughing, fever, headaches, joint swelling, myalgias, neck pain, numbness, rash, sore throat, swollen glands, urinary symptoms, vertigo, visual change, vomiting or weakness. Nothing aggravates the symptoms. She has tried nothing for the symptoms. The treatment provided no relief.       Review of Systems  Review of Systems   Constitutional: Positive for activity change and fatigue. Negative for appetite change, chills, diaphoresis and fever.   HENT: Negative for congestion, postnasal drip, rhinorrhea, sinus pressure, sinus pain, sneezing, sore throat, trouble swallowing and voice change.    Respiratory: Positive for chest tightness and shortness of breath. Negative for cough, choking, wheezing and stridor.    Cardiovascular: Negative for chest pain.   Gastrointestinal: Negative for diarrhea, nausea and vomiting.   Musculoskeletal: Positive for arthralgias.   Neurological: Negative for weakness and headaches.       Patient Active Problem List   Diagnosis   • Encounter for immunization   • Diabetes type 2, uncontrolled (CMS/HCC)   • Hyperlipidemia associated with type 2 diabetes mellitus (CMS/HCC)   • Morbid obesity (CMS/HCC)   • Vitamin D deficiency   • Essential hypertension   • Hyperlipidemia   • Urinary tract infection   • Acute pain of right shoulder   • Right arm pain   • Right elbow pain   • Lateral epicondylitis of right elbow   • Medial epicondylitis   • Encounter for screening mammogram for malignant neoplasm of breast   • Iron deficiency anemia   • Mild  intermittent asthma   • Encounter for screening for endocrine disorder   • Gastroesophageal reflux disease   • Pain in both knees   • Controlled type 2 diabetes mellitus with complication, without long-term current use of insulin (CMS/HCC)   • Anemia   • Right knee pain   • Controlled type 2 diabetes mellitus with complication, with long-term current use of insulin (CMS/[facility])   • General medical examination   • Leukocytosis   • Chronic bilateral low back pain with sciatica   • Encounter for Papanicolaou smear of cervix   • Screening mammogram, encounter for     Past Surgical History:   Procedure Laterality Date   •  SECTION  10/30/2000   • CHOLECYSTECTOMY  1995   • COLONOSCOPY  2013    normal per pt   • CYSTOSCOPY  2016    And bilateral retrograde pyelograms. Performed at [facility].   • TONSILLECTOMY  1985   • UMBILICAL HERNIA REPAIR  2015   • UMBILICAL HERNIA REPAIR  2015     Social History     Socioeconomic History   • Marital status:      Spouse name: Not on file   • Number of children: Not on file   • Years of education: Not on file   • Highest education level: Not on file   Tobacco Use   • Smoking status: Never Smoker   • Smokeless tobacco: Never Used   Substance and Sexual Activity   • Alcohol use: No   • Drug use: No     Family History   Problem Relation Age of Onset   • Diabetes Mother    • Heart disease Mother    • Heart failure Mother         congestive   • Hypertension Mother    • Asthma Father    • Osteoarthritis Father      Lab on 2019   Component Date Value Ref Range Status   • WBC 2019 11.30* 3.40 - 10.80 10*3/mm3 Final   • RBC 2019 5.40* 3.77 - 5.28 10*6/mm3 Final   • Hemoglobin 2019 11.2* 12.0 - 15.9 g/dL Final   • Hematocrit 2019 37.7  34.0 - 46.6 % Final   • MCV 2019 69.8* 79.0 - 97.0 fL Final   • MCH 2019 20.7* 26.6 - 33.0 pg Final   • MCHC 2019 29.7* 31.5 - 35.7 g/dL Final   • RDW 2019  17.6* 12.3 - 15.4 % Final   • RDW-SD 03/22/2019 42.5  37.0 - 54.0 fl Final   • MPV 03/22/2019 11.2  6.0 - 12.0 fL Final   • Platelets 03/22/2019 286  140 - 450 10*3/mm3 Final    SPECIMEN CHECKED FOR CLOT     • Neutrophil % 03/22/2019 53.0  42.7 - 76.0 % Final   • Lymphocyte % 03/22/2019 34.2  19.6 - 45.3 % Final   • Monocyte % 03/22/2019 5.2  5.0 - 12.0 % Final   • Eosinophil % 03/22/2019 6.6* 0.3 - 6.2 % Final   • Basophil % 03/22/2019 0.6  0.0 - 1.5 % Final   • Immature Grans % 03/22/2019 0.4  0.0 - 0.5 % Final   • Neutrophils, Absolute 03/22/2019 5.98  1.40 - 7.00 10*3/mm3 Final   • Lymphocytes, Absolute 03/22/2019 3.87* 0.70 - 3.10 10*3/mm3 Final   • Monocytes, Absolute 03/22/2019 0.59  0.10 - 0.90 10*3/mm3 Final   • Eosinophils, Absolute 03/22/2019 0.75* 0.00 - 0.40 10*3/mm3 Final   • Basophils, Absolute 03/22/2019 0.07  0.00 - 0.20 10*3/mm3 Final   • Immature Grans, Absolute 03/22/2019 0.04  0.00 - 0.05 10*3/mm3 Final   • nRBC 03/22/2019 0.0  0.0 - 0.0 /100 WBC Final   • Glucose 03/22/2019 119* 60 - 100 mg/dL Final   • BUN 03/22/2019 15  7 - 21 mg/dL Final   • Creatinine 03/22/2019 0.80  0.50 - 1.00 mg/dL Final   • Sodium 03/22/2019 134* 137 - 145 mmol/L Final   • Potassium 03/22/2019 4.4  3.5 - 5.1 mmol/L Final   • Chloride 03/22/2019 96  95 - 110 mmol/L Final   • CO2 03/22/2019 30.0  22.0 - 31.0 mmol/L Final   • Calcium 03/22/2019 10.0  8.4 - 10.2 mg/dL Final   • Total Protein 03/22/2019 7.3  6.3 - 8.6 g/dL Final   • Albumin 03/22/2019 4.00  3.40 - 4.80 g/dL Final   • ALT (SGPT) 03/22/2019 30  9 - 52 U/L Final   • AST (SGOT) 03/22/2019 18  14 - 36 U/L Final   • Alkaline Phosphatase 03/22/2019 60  38 - 126 U/L Final   • Total Bilirubin 03/22/2019 0.3  0.2 - 1.3 mg/dL Final   • eGFR   Amer 03/22/2019 94  58 - 135 mL/min/1.73 Final   • Globulin 03/22/2019 3.3  2.3 - 3.5 gm/dL Final   • A/G Ratio 03/22/2019 1.2  1.1 - 1.8 g/dL Final   • BUN/Creatinine Ratio 03/22/2019 18.8  7.0 - 25.0 Final   • Anion Gap  03/22/2019 8.0  5.0 - 15.0 mmol/L Final   • Hemoglobin A1C 03/22/2019 7.9* 4 - 5.6 % Final   • Total Cholesterol 03/22/2019 165  0 - 199 mg/dL Final   • Triglycerides 03/22/2019 100  20 - 199 mg/dL Final   • HDL Cholesterol 03/22/2019 44* 60 - 200 mg/dL Final   • LDL Cholesterol  03/22/2019 98  1 - 129 mg/dL Final   • LDL/HDL Ratio 03/22/2019 2.30  0.00 - 3.22 Final   • 25 Hydroxy, Vitamin D 03/22/2019 57.3  30.0 - 100.0 ng/ml Final   • Vitamin B-12 03/22/2019 795  239 - 931 pg/mL Final   • Microalbumin/Creatinine Ratio 03/22/2019 10.6  0.0 - 30.0 mg/g Final    Unable to calculate   • Creatinine, Urine 03/22/2019 56.6  mg/dL Final   • Microalbumin, Urine 03/22/2019 0.6  mg/L Final   • Iron 03/22/2019 46  37 - 170 mcg/dL Final   • TIBC 03/22/2019 254* 265 - 497 mcg/dL Final   • Iron Saturation 03/22/2019 18  15 - 50 % Final   • Ferritin 03/22/2019 162.00* 6.20 - 137.00 ng/mL Final   • RBC Morphology 03/22/2019 Normal  Normal Final   • WBC Morphology 03/22/2019 Normal  Normal Final   • Platelet Estimate 03/22/2019 Adequate  Normal Final   • Large Platelets 03/22/2019 Slight/1+  None Seen Final    SOME LARGE PLATELETS SEEN        XR elbow 3+ vw right  Narrative: Patient Name:  CATHY MANUEL  Patient ID:  [id number]   Ordering:  DILCIA PICKERING  Attending:  DILCIA PICKERING  Referring:  DILCIA PICKERING  ------------------------------------------------    Three view right elbow    HISTORY: Right elbow pain    AP, lateral and oblique views obtained.    COMPARISON: None    No fracture or dislocation.  Very small olecranon spur.  No joint effusion.  No other osseous or articular abnormality.  Impression: CONCLUSION:  Very small olecranon spur.    [id number]    Electronically signed by:  Elvis Mancera MD  7/24/2017 8:19 PM CDT  Workstation: [email or web address]  XR Shoulder 2+ View Right  Narrative: PROCEDURE: Three views right shoulder    COMPARISON: No comparison.    HISTORY: Right shoulder pain    FINDINGS:   Three views of the right  "shoulder were obtained.  There is soft tissue calcification adjacent to the humeral head  seen on the scapular Y view only, of uncertain significance,  possibly due to calcific bursitis.  There is normal positioning of the humeral head within the  glenoid.   The acromioclavicular joint is grossly intact  There is no acute fracture or subluxation.   The visualized right lung is clear.  Impression: CONCLUSION:    There is soft tissue calcification adjacent to the humeral head  seen on the scapular Y view only, of uncertain significance,  possibly due to calcific bursitis.    Electronically signed by:  Navneet Ramirez MD  7/24/2017 7:03 PM CDT  Workstation: [id number]    [unfilled]  Immunization History   Administered Date(s) Administered   • Hepatitis B 07/19/2016, 08/23/2016, 02/17/2017   • Tdap 02/17/2017       The following portions of the patient's history were reviewed and updated as appropriate: allergies, current medications, past family history, past medical history, past social history, past surgical history and problem list.        Physical Exam  /84   Pulse 100   Temp 98.6 °F (37 °C)   Ht 160 cm (63\")   Wt 127 kg (280 lb 9.6 oz)   SpO2 96%   BMI 49.71 kg/m²     Physical Exam   Constitutional: She is oriented to person, place, and time. She appears well-developed and well-nourished.   HENT:   Head: Normocephalic and atraumatic.   Right Ear: External ear normal.   Eyes: Conjunctivae and EOM are normal. Pupils are equal, round, and reactive to light.   Neck: Normal range of motion. Neck supple.   Cardiovascular: Normal rate, regular rhythm and normal heart sounds.   No murmur heard.  Pulmonary/Chest: Effort normal and breath sounds normal. No respiratory distress.   Abdominal: Soft. Bowel sounds are normal. She exhibits no distension. There is no tenderness.   Obese abdomen    Musculoskeletal: Normal range of motion. She exhibits no edema or deformity.   Neurological: She is alert and oriented to " person, place, and time. No cranial nerve deficit.   Skin: Skin is warm. No rash noted. She is not diaphoretic. No erythema. No pallor.   Psychiatric: She has a normal mood and affect. Her behavior is normal.   Nursing note and vitals reviewed.      Assessment/Plan   Problems Addressed this Visit        Cardiovascular and Mediastinum    Hyperlipidemia associated with type 2 diabetes mellitus (CMS/HCC)    Relevant Medications    Empagliflozin (JARDIANCE) 25 MG tablet    Essential hypertension    Relevant Medications    lisinopril (PRINIVIL,ZESTRIL) 20 MG tablet    Hyperlipidemia       Respiratory    Mild intermittent asthma       Digestive    Morbid obesity (CMS/HCC)    Vitamin D deficiency    Gastroesophageal reflux disease       Endocrine    Diabetes type 2, uncontrolled (CMS/[facility])    Relevant Medications    Empagliflozin (JARDIANCE) 25 MG tablet       Immune and Lymphatic    Leukocytosis       Hematopoietic and Hemostatic    Iron deficiency anemia       Other    Encounter for Papanicolaou smear of cervix    Relevant Orders    Ambulatory Referral to Obstetrics / Gynecology    Screening mammogram, encounter for - Primary    Relevant Orders    Mammo Screening Bilateral With CAD      -went over labwork today  -for fatigue - suspect MIGEL. Recommend sleep study with Dr. Ramsey. Consultation appreciated   - Essential Hypertension - BP at goal. Continue with current medicaitons. Stop lisinopril-HCTZ. Start on lisinopril 20 mg daily.    - hyperlipidemia-  Recheck lipid panel The current medical regimen is effective;  continue present plan and medications.  - DM type 2-  Sugars improved with Trulicity. 0.75 mg weekly stopped lantus and insulin   stop januvia Hga1c improved. Performed diabetic foot exam today. Pt has dry feet on soles. Will give diabetic foot care informaiton. Recent hga1c>7.0. Recommend starting jardiance 25 mg PO q daily. Samples and drug information provided today along with coupon cards  -recommend pap  smear and mammogram screening  - vitamin D deficiency -continue vitamin D pill once a week.  - morbid obesity - BMI >40 - pt gained lbs since last visit. Continue with ketogenic diet.  Gave diet information to go. Gave bariatric weight loss surgery to go home with . Pt declines bariatric weight loss surgery. She will keep in contact if she would like the referral. Pt agrees if she cannot lose weight in 6 months consider Bariatric weight loss toney.  He weight today is 280 lbs.  Recheck in 1 month  -referral to [facility] for pap smear   -for back pain - referral to PT/OT. Services appreciated. Continue muscle relaxant. Also consider x-ray of lumbar spine. Continue mobic and flexeril   - iron deficiency anemia -continue on iron pill TID. Her last CBC showed stable hemoglobin  -lekuocytosis - slightly improved from last visit. Will continue to monitor. Consider Hematology refrral   - mild intermittent asthma - currently stable on albuterol   - GERD - continue on protonix  - recheck in 5  month or earlier if any problems  -I advised pt to be safe and call with any questions or concerns. All questions addressed today          This document has been electronically signed by Solitario Li MD on March 29, 2019 12:06 PM                      This document has been electronically signed by Solitario Li MD on March 29, 2019 12:06 PM

## 2019-03-29 NOTE — PATIENT INSTRUCTIONS
Start on jardiance 25 mg daily.  Drink a lot of water. May cause UTI and yeast infeciton call me if that occurs    Stop januvia    Stop lisinopril-HCTZ Blood pressure    Start on lisionpril 20 mg daily    Bring Blood pressure readings and blood sugar readings next visit    Recheck in 1 month for medication recheck

## 2019-04-26 ENCOUNTER — PRIOR AUTHORIZATION (OUTPATIENT)
Dept: FAMILY MEDICINE CLINIC | Facility: CLINIC | Age: 44
End: 2019-04-26

## 2019-04-26 NOTE — TELEPHONE ENCOUNTER
Call pt    Sent Steglatro 15 mg daily to pharmacy with 30 pills and 3 refills    Please advised pt to drink with plenty of water and may cause UTI or yeast infection. Have her call us back if she experiences these symptoms. Thanks

## 2019-04-26 NOTE — TELEPHONE ENCOUNTER
Jardiance was approved by [employer] plan.  However, jardiance was denied by secondary, [employer].  Per [employer] pt needs trial and failure of steglatro or segluromet OR. Please advise.

## 2019-05-02 PROBLEM — R53.83 OTHER FATIGUE: Status: ACTIVE | Noted: 2019-05-02

## 2019-05-02 NOTE — PROGRESS NOTES
Subjective:  Lizzy Restrepo is a 43 y.o. female who presents for       Patient Active Problem List   Diagnosis   • Encounter for immunization   • Diabetes type 2, uncontrolled (CMS/HCC)   • Hyperlipidemia associated with type 2 diabetes mellitus (CMS/HCC)   • Morbid obesity (CMS/HCC)   • Vitamin D deficiency   • Essential hypertension   • Hyperlipidemia   • Urinary tract infection   • Acute pain of right shoulder   • Right arm pain   • Right elbow pain   • Lateral epicondylitis of right elbow   • Medial epicondylitis   • Encounter for screening mammogram for malignant neoplasm of breast   • Iron deficiency anemia   • Mild intermittent asthma   • Encounter for screening for endocrine disorder   • Gastroesophageal reflux disease   • Pain in both knees   • Controlled type 2 diabetes mellitus with complication, without long-term current use of insulin (CMS/HCC)   • Anemia   • Right knee pain   • Controlled type 2 diabetes mellitus with complication, with long-term current use of insulin (CMS/HCC)   • General medical examination   • Leukocytosis   • Chronic bilateral low back pain with sciatica   • Encounter for Papanicolaou smear of cervix   • Screening mammogram, encounter for   • Other fatigue           Current Outpatient Medications:   •  albuterol (ACCUNEB) 1.25 MG/3ML nebulizer solution, Take 3 mL by nebulization Every 4 (Four) Hours As Needed for Wheezing., Disp: 3 mL, Rfl: 6  •  albuterol (VENTOLIN HFA) 108 (90 Base) MCG/ACT inhaler, Inhale 2 puffs Every 4 (Four) Hours As Needed for Wheezing., Disp: 1 inhaler, Rfl: 6  •  aspirin 81 MG chewable tablet, Chew 1 tablet Daily., Disp: 30 tablet, Rfl: 6  •  atorvastatin (LIPITOR) 40 MG tablet, Take 1 tablet by mouth Daily., Disp: 90 tablet, Rfl: 3  •  cholecalciferol (OPTIMAL-D) 10780 units capsule, Take 1 capsule by mouth 1 (One) Time Per Week., Disp: 12 capsule, Rfl: 3  •  cyclobenzaprine (FLEXERIL) 10 MG tablet, Take 1 tablet by mouth At Night As Needed for Muscle  "Spasms., Disp: 30 tablet, Rfl: 3  •  Dulaglutide (TRULICITY) 0.75 MG/0.5ML solution pen-injector, Inject 0.75 mg under the skin into the appropriate area as directed 1 (One) Time Per Week., Disp: 12 pen, Rfl: 3  •  EQ LORATADINE 10 MG tablet, TAKE ONE TABLET BY MOUTH ONCE DAILY, Disp: 30 tablet, Rfl: 11  •  Ertugliflozin L-PyroglutamicAc (STEGLATRO) 15 MG tablet, Take 1 tablet by mouth Every Morning., Disp: 30 tablet, Rfl: 3  •  ferrous sulfate 325 (65 FE) MG EC tablet, Take 1 tablet by mouth 3 (Three) Times a Day With Meals., Disp: 90 tablet, Rfl: 6  •  fluticasone (FLONASE) 50 MCG/ACT nasal spray, 2 sprays into the nostril(s) as directed by provider Daily., Disp: 16 g, Rfl: 6  •  Insulin Pen Needle (PEN NEEDLES) 31G X 8 MM misc, , Disp: , Rfl:   •  Insulin Syringe-Needle U-100 30G X 5/16\" 1 ML misc, , Disp: , Rfl:   •  lisinopril (PRINIVIL,ZESTRIL) 20 MG tablet, Take 1 tablet by mouth Daily., Disp: 30 tablet, Rfl: 3  •  meloxicam (MOBIC) 15 MG tablet, Take 1 tablet by mouth Daily., Disp: 30 tablet, Rfl: 6  •  montelukast (SINGULAIR) 10 MG tablet, Take 1 tablet by mouth Every Night., Disp: 30 tablet, Rfl: 6  •  pantoprazole (PROTONIX) 40 MG EC tablet, Take 1 tablet by mouth Daily., Disp: 30 tablet, Rfl: 6  •  ReliOn Ultra Thin Lancets misc, , Disp: , Rfl:   •  VENTOLIN  (90 Base) MCG/ACT inhaler, INHALE TWO PUFFS BY MOUTH EVERY 4 HOURS AS NEEDED, Disp: 1 inhaler, Rfl: 0    HPI     3/29/19  Pt is 44 yo female with PMH of DM type 2, Asthma, Morbid obesity, HTN, HL:P, coming for recheck and followup. She just had  labwork and is  mammogram and pap smea. Labs shows hemoglobin at 11.2 with WBC at 12.0. CMP showed stable renal function and lvier function. Vitamin D and Vitamin B12 levels are normal. hga1c is at 7.9 from 6.8  About 9 months ago. LIpid panel shows LDL <100 and HDL low at 44.  She was working in [place] for 2 months. She would have to commuted between [place] and [place] 5 times a day.  This " caused her to be stressed.  She does not work thereShe missed several injection. She is doing well now and continues to work at [employer].  No chest pain, no shortness of air, no dizziness.  She does not work in [place] anymore.      5/2/19 pt is here for recheck and followup.  She has yet to get mammogram and pap smear She is due for diabetic eye exam.  She is feeling a little down today.  She is going through a lot         Diabetes   She presents for her follow-up diabetic visit. She has type 2 diabetes mellitus. Her disease course has been stable. Pertinent negatives for hypoglycemia include no confusion, dizziness, headaches, hunger, mood changes, nervousness/anxiousness, pallor, seizures, sleepiness, speech difficulty, sweats or tremors. Associated symptoms include fatigue. Pertinent negatives for diabetes include no blurred vision, no chest pain, no foot paresthesias, no foot ulcerations, no polydipsia, no polyphagia, no polyuria, no visual change, no weakness and no weight loss. Pertinent negatives for hypoglycemia complications include no blackouts, no hospitalization, no nocturnal hypoglycemia, no required assistance and no required glucagon injection. Symptoms are stable. Pertinent negatives for diabetic complications include no autonomic neuropathy, CVA, heart disease, nephropathy, peripheral neuropathy, PVD or retinopathy. Risk factors for coronary artery disease include diabetes mellitus, dyslipidemia and obesity. Current diabetic treatment includes insulin injections. She is following a diabetic diet. When asked about meal planning, she reported none. She has not had a previous visit with a dietitian. She participates in exercise intermittently. She does not see a podiatrist.Eye exam is not current.   Knee Pain    The incident occurred more than 1 week ago. The incident occurred at the gym. There was no injury mechanism. The pain is present in the right knee. The quality of the pain is described  as aching. The pain is at a severity of 5/10. The pain is moderate. The pain has been constant since onset. Pertinent negatives include no inability to bear weight, loss of motion, loss of sensation, muscle weakness, numbness or tingling. She reports no foreign bodies present. The symptoms are aggravated by movement and palpation. She has tried nothing for the symptoms. The treatment provided no relief.   Obesity   This is a chronic problem. The current episode started more than 1 year ago. The problem has been unchanged. Associated symptoms include arthralgias, fatigue and nausea. Pertinent negatives include no abdominal pain, anorexia, change in bowel habit, chest pain, chills, congestion, coughing, fever, headaches, joint swelling, myalgias, neck pain, numbness, rash, sore throat, swollen glands, urinary symptoms, vertigo, visual change, vomiting or weakness. Nothing aggravates the symptoms. She has tried nothing for the symptoms. The treatment provided no relief.     Review of Systems  Review of Systems   Constitutional: Positive for activity change and fatigue. Negative for appetite change, chills, diaphoresis and fever.   HENT: Negative for congestion, postnasal drip, rhinorrhea, sinus pressure, sinus pain, sneezing, sore throat, trouble swallowing and voice change.    Respiratory: Positive for shortness of breath. Negative for cough, choking, chest tightness, wheezing and stridor.    Cardiovascular: Negative for chest pain.   Gastrointestinal: Negative for diarrhea, nausea and vomiting.   Allergic/Immunologic: Positive for environmental allergies.   Neurological: Negative for weakness and headaches.       Patient Active Problem List   Diagnosis   • Encounter for immunization   • Diabetes type 2, uncontrolled (CMS/HCC)   • Hyperlipidemia associated with type 2 diabetes mellitus (CMS/HCC)   • Morbid obesity (CMS/HCC)   • Vitamin D deficiency   • Essential hypertension   • Hyperlipidemia   • Urinary tract  infection   • Acute pain of right shoulder   • Right arm pain   • Right elbow pain   • Lateral epicondylitis of right elbow   • Medial epicondylitis   • Encounter for screening mammogram for malignant neoplasm of breast   • Iron deficiency anemia   • Mild intermittent asthma   • Encounter for screening for endocrine disorder   • Gastroesophageal reflux disease   • Pain in both knees   • Controlled type 2 diabetes mellitus with complication, without long-term current use of insulin (CMS/HCC)   • Anemia   • Right knee pain   • Controlled type 2 diabetes mellitus with complication, with long-term current use of insulin (CMS/HCC)   • General medical examination   • Leukocytosis   • Chronic bilateral low back pain with sciatica   • Encounter for Papanicolaou smear of cervix   • Screening mammogram, encounter for   • Other fatigue     Past Surgical History:   Procedure Laterality Date   •  SECTION  10/30/2000   • CHOLECYSTECTOMY  1995   • COLONOSCOPY  2013    normal per pt   • CYSTOSCOPY  2016    And bilateral retrograde pyelograms. Performed at [facility].   • TONSILLECTOMY  1985   • UMBILICAL HERNIA REPAIR  2015   • UMBILICAL HERNIA REPAIR  2015     Social History     Socioeconomic History   • Marital status:      Spouse name: Not on file   • Number of children: Not on file   • Years of education: Not on file   • Highest education level: Not on file   Tobacco Use   • Smoking status: Never Smoker   • Smokeless tobacco: Never Used   Substance and Sexual Activity   • Alcohol use: No   • Drug use: No     Family History   Problem Relation Age of Onset   • Diabetes Mother    • Heart disease Mother    • Heart failure Mother         congestive   • Hypertension Mother    • Asthma Father    • Osteoarthritis Father      Lab on 2019   Component Date Value Ref Range Status   • WBC 2019 11.30* 3.40 - 10.80 10*3/mm3 Final   • RBC 2019 5.40* 3.77 - 5.28  10*6/mm3 Final   • Hemoglobin 03/22/2019 11.2* 12.0 - 15.9 g/dL Final   • Hematocrit 03/22/2019 37.7  34.0 - 46.6 % Final   • MCV 03/22/2019 69.8* 79.0 - 97.0 fL Final   • MCH 03/22/2019 20.7* 26.6 - 33.0 pg Final   • MCHC 03/22/2019 29.7* 31.5 - 35.7 g/dL Final   • RDW 03/22/2019 17.6* 12.3 - 15.4 % Final   • RDW-SD 03/22/2019 42.5  37.0 - 54.0 fl Final   • MPV 03/22/2019 11.2  6.0 - 12.0 fL Final   • Platelets 03/22/2019 286  140 - 450 10*3/mm3 Final    SPECIMEN CHECKED FOR CLOT     • Neutrophil % 03/22/2019 53.0  42.7 - 76.0 % Final   • Lymphocyte % 03/22/2019 34.2  19.6 - 45.3 % Final   • Monocyte % 03/22/2019 5.2  5.0 - 12.0 % Final   • Eosinophil % 03/22/2019 6.6* 0.3 - 6.2 % Final   • Basophil % 03/22/2019 0.6  0.0 - 1.5 % Final   • Immature Grans % 03/22/2019 0.4  0.0 - 0.5 % Final   • Neutrophils, Absolute 03/22/2019 5.98  1.40 - 7.00 10*3/mm3 Final   • Lymphocytes, Absolute 03/22/2019 3.87* 0.70 - 3.10 10*3/mm3 Final   • Monocytes, Absolute 03/22/2019 0.59  0.10 - 0.90 10*3/mm3 Final   • Eosinophils, Absolute 03/22/2019 0.75* 0.00 - 0.40 10*3/mm3 Final   • Basophils, Absolute 03/22/2019 0.07  0.00 - 0.20 10*3/mm3 Final   • Immature Grans, Absolute 03/22/2019 0.04  0.00 - 0.05 10*3/mm3 Final   • nRBC 03/22/2019 0.0  0.0 - 0.0 /100 WBC Final   • Glucose 03/22/2019 119* 60 - 100 mg/dL Final   • BUN 03/22/2019 15  7 - 21 mg/dL Final   • Creatinine 03/22/2019 0.80  0.50 - 1.00 mg/dL Final   • Sodium 03/22/2019 134* 137 - 145 mmol/L Final   • Potassium 03/22/2019 4.4  3.5 - 5.1 mmol/L Final   • Chloride 03/22/2019 96  95 - 110 mmol/L Final   • CO2 03/22/2019 30.0  22.0 - 31.0 mmol/L Final   • Calcium 03/22/2019 10.0  8.4 - 10.2 mg/dL Final   • Total Protein 03/22/2019 7.3  6.3 - 8.6 g/dL Final   • Albumin 03/22/2019 4.00  3.40 - 4.80 g/dL Final   • ALT (SGPT) 03/22/2019 30  9 - 52 U/L Final   • AST (SGOT) 03/22/2019 18  14 - 36 U/L Final   • Alkaline Phosphatase 03/22/2019 60  38 - 126 U/L Final   • Total  Bilirubin 03/22/2019 0.3  0.2 - 1.3 mg/dL Final   • eGFR   Amer 03/22/2019 94  58 - 135 mL/min/1.73 Final   • Globulin 03/22/2019 3.3  2.3 - 3.5 gm/dL Final   • A/G Ratio 03/22/2019 1.2  1.1 - 1.8 g/dL Final   • BUN/Creatinine Ratio 03/22/2019 18.8  7.0 - 25.0 Final   • Anion Gap 03/22/2019 8.0  5.0 - 15.0 mmol/L Final   • Hemoglobin A1C 03/22/2019 7.9* 4 - 5.6 % Final   • Total Cholesterol 03/22/2019 165  0 - 199 mg/dL Final   • Triglycerides 03/22/2019 100  20 - 199 mg/dL Final   • HDL Cholesterol 03/22/2019 44* 60 - 200 mg/dL Final   • LDL Cholesterol  03/22/2019 98  1 - 129 mg/dL Final   • LDL/HDL Ratio 03/22/2019 2.30  0.00 - 3.22 Final   • 25 Hydroxy, Vitamin D 03/22/2019 57.3  30.0 - 100.0 ng/ml Final   • Vitamin B-12 03/22/2019 795  239 - 931 pg/mL Final   • Microalbumin/Creatinine Ratio 03/22/2019 10.6  0.0 - 30.0 mg/g Final    Unable to calculate   • Creatinine, Urine 03/22/2019 56.6  mg/dL Final   • Microalbumin, Urine 03/22/2019 0.6  mg/L Final   • Iron 03/22/2019 46  37 - 170 mcg/dL Final   • TIBC 03/22/2019 254* 265 - 497 mcg/dL Final   • Iron Saturation 03/22/2019 18  15 - 50 % Final   • Ferritin 03/22/2019 162.00* 6.20 - 137.00 ng/mL Final   • RBC Morphology 03/22/2019 Normal  Normal Final   • WBC Morphology 03/22/2019 Normal  Normal Final   • Platelet Estimate 03/22/2019 Adequate  Normal Final   • Large Platelets 03/22/2019 Slight/1+  None Seen Final    SOME LARGE PLATELETS SEEN        XR elbow 3+ vw right  Narrative: Patient Name:  CATHY MANUEL  Patient ID:  [id number]   Ordering:  DILCIA PICKERING  Attending:  DILCIA PICKERING  Referring:  DILCIA PICKERING  ------------------------------------------------    Three view right elbow    HISTORY: Right elbow pain    AP, lateral and oblique views obtained.    COMPARISON: None    No fracture or dislocation.  Very small olecranon spur.  No joint effusion.  No other osseous or articular abnormality.  Impression: CONCLUSION:  Very small olecranon  "spur.    [id number]    Electronically signed by:  Elvis Mancera MD  7/24/2017 8:19 PM CDT  Workstation: [id number]  XR Shoulder 2+ View Right  Narrative: PROCEDURE: Three views right shoulder    COMPARISON: No comparison.    HISTORY: Right shoulder pain    FINDINGS:   Three views of the right shoulder were obtained.  There is soft tissue calcification adjacent to the humeral head  seen on the scapular Y view only, of uncertain significance,  possibly due to calcific bursitis.  There is normal positioning of the humeral head within the  glenoid.   The acromioclavicular joint is grossly intact  There is no acute fracture or subluxation.   The visualized right lung is clear.  Impression: CONCLUSION:    There is soft tissue calcification adjacent to the humeral head  seen on the scapular Y view only, of uncertain significance,  possibly due to calcific bursitis.    Electronically signed by:  Navneet Ramirez MD  7/24/2017 7:03 PM CDT  Workstation: [id number]    [unfilled]  Immunization History   Administered Date(s) Administered   • Hepatitis B 07/19/2016, 08/23/2016, 02/17/2017   • Tdap 02/17/2017       The following portions of the patient's history were reviewed and updated as appropriate: allergies, current medications, past family history, past medical history, past social history, past surgical history and problem list.        Physical Exam  /80   Pulse 75   Temp 98.6 °F (37 °C)   Ht 160 cm (63\")   Wt 128 kg (281 lb 3.2 oz)   SpO2 93%   BMI 49.81 kg/m²     Physical Exam   Constitutional: She is oriented to person, place, and time. She appears well-developed and well-nourished.   HENT:   Head: Normocephalic and atraumatic.   Right Ear: External ear normal.   Eyes: Conjunctivae and EOM are normal. Pupils are equal, round, and reactive to light.   Neck: Normal range of motion. Neck supple.   Cardiovascular: Normal rate, regular rhythm and normal heart sounds.   No murmur heard.  Pulmonary/Chest: Effort " normal and breath sounds normal. No respiratory distress.   Abdominal: Soft. Bowel sounds are normal. She exhibits no distension. There is no tenderness.   Obese abdomen    Musculoskeletal: Normal range of motion. She exhibits no edema or deformity.    Lizzy had a diabetic foot exam performed today.   During the foot exam she had a monofilament test performed.    Neurological Sensory Findings - Unaltered hot/cold right ankle/foot discrimination and unaltered hot/cold left ankle/foot discrimination. Unaltered sharp/dull right ankle/foot discrimination and unaltered sharp/dull left ankle/foot discrimination. No left ankle/foot altered proprioception  Vascular Status -  Her right foot exhibits normal foot vasculature  and no edema. Her left foot exhibits normal foot vasculature  and no edema.  Skin Integrity  -  Her right foot skin is intact.Her left foot skin is intact..  Neurological: She is alert and oriented to person, place, and time. No cranial nerve deficit.   Skin: Skin is warm. No rash noted. She is not diaphoretic. No erythema. No pallor.   Psychiatric: She has a normal mood and affect. Her behavior is normal.   Nursing note and vitals reviewed.      Assessment/Plan   Problems Addressed this Visit        Cardiovascular and Mediastinum    Hyperlipidemia associated with type 2 diabetes mellitus (CMS/HCC)    Essential hypertension       Digestive    Morbid obesity (CMS/HCC)    Vitamin D deficiency    Gastroesophageal reflux disease       Endocrine    Controlled type 2 diabetes mellitus with complication, with long-term current use of insulin (CMS/HCC) - Primary       Hematopoietic and Hemostatic    Iron deficiency anemia       Other    Other fatigue          -for fatigue - suspect MIGEL. Recommend sleep study with Dr. Ramsey. Consultation appreciated   -recommend diabetic eye exam  -diabetic foot exam today. She has dry feet recommend lotion   - Essential Hypertension - BP at goal. Continue with current  medicaitons. Stop lisinopril-HCTZ. Start on lisinopril 20 mg daily.    - hyperlipidemia-  Recheck lipid panel The current medical regimen is effective;  continue present plan and medications.  - DM type 2-  Sugars improved with Trulicity. 0.75 mg weekly stopped lantus and insulin   stop januvia Hga1c improved. Performed diabetic foot exam today. Pt has dry feet on soles. Will give diabetic foot care informaiton. Recent hga1c>7.0. Recommend starting steglatro 15.  She could not afford jardiance. mg PO q daily. Samples and drug information provided today along with coupon cards  -recommend pap smear and mammogram screening  - vitamin D deficiency -continue vitamin D pill once a week.  - morbid obesity - BMI >40 - pt gained lbs since last visit. Continue with ketogenic diet.  Gave diet information to go. Gave bariatric weight loss surgery to go home with . Pt declines bariatric weight loss surgery. She will keep in contact if she would like the referral. Pt agrees if she cannot lose weight in 6 months consider Bariatric weight loss toney.  He weight today is 280 lbs.  Recheck in 1 month  -referral to [facility] for pap smear   -for back pain - referral to PT/OT. Services appreciated. Continue muscle relaxant. Also consider x-ray of lumbar spine. Continue mobic and flexeril   - iron deficiency anemia -continue on iron pill TID. Her last CBC showed stable hemoglobin  -lekuocytosis - slightly improved from last visit. Will continue to monitor. Consider Hematology refrral   - mild intermittent asthma - currently stable on albuterol   - GERD - continue on protonix  - recheck in 5  month or earlier if any problems  -I advised pt to be safe and call with any questions or concerns. All questions addressed today          This document has been electronically signed by Solitario Li MD on May 3, 2019 10:08 AM                      This document has been electronically signed by Solitario Li MD on May 2, 2019 9:53  AM

## 2019-05-03 ENCOUNTER — OFFICE VISIT (OUTPATIENT)
Dept: FAMILY MEDICINE CLINIC | Facility: CLINIC | Age: 44
End: 2019-05-03

## 2019-05-03 VITALS
SYSTOLIC BLOOD PRESSURE: 130 MMHG | OXYGEN SATURATION: 93 % | BODY MASS INDEX: 49.82 KG/M2 | HEIGHT: 63 IN | TEMPERATURE: 98.6 F | HEART RATE: 75 BPM | DIASTOLIC BLOOD PRESSURE: 80 MMHG | WEIGHT: 281.2 LBS

## 2019-05-03 DIAGNOSIS — E11.69 HYPERLIPIDEMIA ASSOCIATED WITH TYPE 2 DIABETES MELLITUS (HCC): ICD-10-CM

## 2019-05-03 DIAGNOSIS — E66.01 MORBID OBESITY (HCC): ICD-10-CM

## 2019-05-03 DIAGNOSIS — Z12.4 ENCOUNTER FOR PAPANICOLAOU SMEAR OF CERVIX: ICD-10-CM

## 2019-05-03 DIAGNOSIS — K21.9 GASTROESOPHAGEAL REFLUX DISEASE, ESOPHAGITIS PRESENCE NOT SPECIFIED: ICD-10-CM

## 2019-05-03 DIAGNOSIS — E11.8 CONTROLLED TYPE 2 DIABETES MELLITUS WITH COMPLICATION, WITH LONG-TERM CURRENT USE OF INSULIN (HCC): ICD-10-CM

## 2019-05-03 DIAGNOSIS — I10 ESSENTIAL HYPERTENSION: ICD-10-CM

## 2019-05-03 DIAGNOSIS — Z01.00 ENCOUNTER FOR EYE EXAM: Primary | ICD-10-CM

## 2019-05-03 DIAGNOSIS — R53.83 OTHER FATIGUE: ICD-10-CM

## 2019-05-03 DIAGNOSIS — E55.9 VITAMIN D DEFICIENCY: ICD-10-CM

## 2019-05-03 DIAGNOSIS — E78.5 HYPERLIPIDEMIA ASSOCIATED WITH TYPE 2 DIABETES MELLITUS (HCC): ICD-10-CM

## 2019-05-03 DIAGNOSIS — D50.9 IRON DEFICIENCY ANEMIA, UNSPECIFIED IRON DEFICIENCY ANEMIA TYPE: ICD-10-CM

## 2019-05-03 DIAGNOSIS — Z79.4 CONTROLLED TYPE 2 DIABETES MELLITUS WITH COMPLICATION, WITH LONG-TERM CURRENT USE OF INSULIN (HCC): ICD-10-CM

## 2019-05-03 PROCEDURE — 99214 OFFICE O/P EST MOD 30 MIN: CPT | Performed by: FAMILY MEDICINE

## 2019-05-03 RX ORDER — OMEPRAZOLE 40 MG/1
40 CAPSULE, DELAYED RELEASE ORAL DAILY
Refills: 5 | COMMUNITY
Start: 2019-04-29 | End: 2019-08-05 | Stop reason: SDUPTHER

## 2019-05-10 ENCOUNTER — PRIOR AUTHORIZATION (OUTPATIENT)
Dept: FAMILY MEDICINE CLINIC | Facility: CLINIC | Age: 44
End: 2019-05-10

## 2019-05-10 NOTE — TELEPHONE ENCOUNTER
Appeal/PA approved for jardiance.  Valid 5/7/19-5/10/20. (fax says 5/10/19-spoke with [employer] good for one year). Copy of authorization faxed to pharmacy.

## 2019-05-20 ENCOUNTER — TELEPHONE (OUTPATIENT)
Dept: FAMILY MEDICINE CLINIC | Facility: CLINIC | Age: 44
End: 2019-05-20

## 2019-05-20 DIAGNOSIS — Z12.31 ENCOUNTER FOR SCREENING MAMMOGRAM FOR MALIGNANT NEOPLASM OF BREAST: ICD-10-CM

## 2019-05-20 NOTE — TELEPHONE ENCOUNTER
----- Message from Solitario Li MD sent at 5/20/2019  9:21 AM CDT -----  Regarding: mammogram screening   Call pt. Mammogram screening normal.  Repeat in 1 year. Thanks   Called pt

## 2019-05-23 ENCOUNTER — OFFICE VISIT (OUTPATIENT)
Dept: SLEEP MEDICINE | Facility: HOSPITAL | Age: 44
End: 2019-05-23

## 2019-05-23 VITALS
DIASTOLIC BLOOD PRESSURE: 75 MMHG | OXYGEN SATURATION: 97 % | SYSTOLIC BLOOD PRESSURE: 115 MMHG | BODY MASS INDEX: 50.25 KG/M2 | HEIGHT: 63 IN | WEIGHT: 283.6 LBS | HEART RATE: 64 BPM

## 2019-05-23 DIAGNOSIS — G47.33 OBSTRUCTIVE SLEEP APNEA, ADULT: Primary | ICD-10-CM

## 2019-05-23 PROCEDURE — 99204 OFFICE O/P NEW MOD 45 MIN: CPT | Performed by: INTERNAL MEDICINE

## 2019-05-23 NOTE — PROGRESS NOTES
New Patient Sleep Medicine Consultation    Encounter Date: 5/23/2019         Patient's PCP: Solitario Li MD  Referring provider: No ref. provider found  Reason for consultation chief complaint: snoring, witnessed apneas, excessive daytime sleepiness and unrefreshing sleep    Lizzy Restrepo is a 43 y.o. female who admits to snoring, High blod pressure, excessive daytime sleepiness, morning headaches, Disturbed or restless sleep, Up to the bathroom at night, restless legs at night and sleepwalking or sleeptalking. She denies cataplexy, sleep paralysis, or hypnagogic hallucinations. Her bedtime is ~ 2300. She  falls asleep after 30-60 minutes, and is up 3-4 times per night. She wakes up ~ 0700. She endorses 7-8 hours of sleep. She drinks 0 cups of coffee, 0 teas, and 0 sodas per day. She drinks 0 alcoholic beverages per week. She is not a current smoker. She does not take sedatives or hypnotics. She has sleepiness with driving. She naps daily.    [place] - 11    Past Medical History:   Diagnosis Date   • Abdominal pain     unspecified   • Benign hypertension    • Essential hypertension    • Gastro-esophageal reflux disease with esophagitis    • GERD (gastroesophageal reflux disease)    • Headache     improving   • Iron deficiency anemia     unspecified   • Morbid obesity due to excess calories (CMS/[facility])     severe   • Type 2 diabetes mellitus (CMS/[facility])    • Urinary tract infectious disease    • Vitamin D deficiency    • Weight loss     advised     Social History     Socioeconomic History   • Marital status:      Spouse name: Not on file   • Number of children: Not on file   • Years of education: Not on file   • Highest education level: Not on file   Tobacco Use   • Smoking status: Never Smoker   • Smokeless tobacco: Never Used   Substance and Sexual Activity   • Alcohol use: No   • Drug use: No     Family History   Problem Relation Age of Onset   • Diabetes Mother    • Heart disease Mother    • Heart  "failure Mother         congestive   • Hypertension Mother    • Asthma Father    • Osteoarthritis Father    , 3 kids  Call center 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      Vitals:    05/23/19 0939   BP: 115/75   Pulse: 64   SpO2: 97%           05/23/19  0939   Weight: 129 kg (283 lb 9.6 oz)       Body mass index is 50.25 kg/m². Patient's Body mass index is 50.25 kg/m². BMI is above normal parameters. Recommendations include: referral to primary care.  Neck circumference: 15\"          General: Alert. Cooperative. Well developed. No acute distress.             Head:  Normocephalic. Symmetrical. Atraumatic.              Eyes: Sclera clear. No icterus. PERRLA. Normal EOM.             Ears: No deformities. Normal hearing.             Nose: No septal deviation. No drainage.          Throat: No oral lesions. No thrush. Moist mucous membranes. Trachea midline    Tongue is enlarged    Dentition is fair       Pharynx: Posterior pharyngeal pillars are narrow    Mallampati score of IV (only hard palate visible)    Pharynx is normal with unrermarkable tonsils   Chest Wall:  Normal shape. Symmetric expansion with respiration. No tenderness.          Lungs:  Clear to auscultation bilaterally. No wheezes. No rhonchi. No rales. Respirations regular, even and unlabored.            Heart:  Regular rhythm and normal rate. Normal S1 and S2. No murmur.     Abdomen: " " Soft, non-tender and non-distended. Normal bowel sounds. No masses. Obesity  Extremities:  Moves all extremities well. No edema.           Pulses: Pulses palpable and equal bilaterally.               Skin: Dry. Intact. No bleeding. No rash.           Neuro: Moves all 4 extremities and cranial nerves grossly intact.  Psychiatric: Normal mood and affect.      Current Outpatient Medications:   •  albuterol (ACCUNEB) 1.25 MG/3ML nebulizer solution, Take 3 mL by nebulization Every 4 (Four) Hours As Needed for Wheezing., Disp: 3 mL, Rfl: 6  •  albuterol (VENTOLIN HFA) 108 (90 Base) MCG/ACT inhaler, Inhale 2 puffs Every 4 (Four) Hours As Needed for Wheezing., Disp: 1 inhaler, Rfl: 6  •  aspirin 81 MG chewable tablet, Chew 1 tablet Daily., Disp: 30 tablet, Rfl: 6  •  atorvastatin (LIPITOR) 40 MG tablet, Take 1 tablet by mouth Daily., Disp: 90 tablet, Rfl: 3  •  cholecalciferol (OPTIMAL-D) 30441 units capsule, Take 1 capsule by mouth 1 (One) Time Per Week., Disp: 12 capsule, Rfl: 3  •  cyclobenzaprine (FLEXERIL) 10 MG tablet, Take 1 tablet by mouth At Night As Needed for Muscle Spasms., Disp: 30 tablet, Rfl: 3  •  Dulaglutide (TRULICITY) 0.75 MG/0.5ML solution pen-injector, Inject 0.75 mg under the skin into the appropriate area as directed 1 (One) Time Per Week., Disp: 12 pen, Rfl: 3  •  EQ LORATADINE 10 MG tablet, TAKE ONE TABLET BY MOUTH ONCE DAILY, Disp: 30 tablet, Rfl: 11  •  ferrous sulfate 325 (65 FE) MG EC tablet, Take 1 tablet by mouth 3 (Three) Times a Day With Meals., Disp: 90 tablet, Rfl: 6  •  fluticasone (FLONASE) 50 MCG/ACT nasal spray, 2 sprays into the nostril(s) as directed by provider Daily., Disp: 16 g, Rfl: 6  •  Insulin Pen Needle (PEN NEEDLES) 31G X 8 MM misc, , Disp: , Rfl:   •  Insulin Syringe-Needle U-100 30G X 5/16\" 1 ML misc, , Disp: , Rfl:   •  lisinopril (PRINIVIL,ZESTRIL) 20 MG tablet, Take 1 tablet by mouth Daily., Disp: 30 tablet, Rfl: 3  •  meloxicam (MOBIC) 15 MG tablet, Take 1 tablet by " mouth Daily., Disp: 30 tablet, Rfl: 6  •  montelukast (SINGULAIR) 10 MG tablet, Take 1 tablet by mouth Every Night., Disp: 30 tablet, Rfl: 6  •  omeprazole (priLOSEC) 40 MG capsule, Take 40 mg by mouth Daily., Disp: , Rfl: 5  •  ReliOn Ultra Thin Lancets misc, , Disp: , Rfl:   •  VENTOLIN  (90 Base) MCG/ACT inhaler, INHALE TWO PUFFS BY MOUTH EVERY 4 HOURS AS NEEDED, Disp: 1 inhaler, Rfl: 0    WBC   Date Value Ref Range Status   03/22/2019 11.30 (H) 3.40 - 10.80 10*3/mm3 Final     RBC   Date Value Ref Range Status   03/22/2019 5.40 (H) 3.77 - 5.28 10*6/mm3 Final     Hemoglobin   Date Value Ref Range Status   03/22/2019 11.2 (L) 12.0 - 15.9 g/dL Final     Hematocrit   Date Value Ref Range Status   03/22/2019 37.7 34.0 - 46.6 % Final     MCV   Date Value Ref Range Status   03/22/2019 69.8 (L) 79.0 - 97.0 fL Final     MCH   Date Value Ref Range Status   03/22/2019 20.7 (L) 26.6 - 33.0 pg Final     MCHC   Date Value Ref Range Status   03/22/2019 29.7 (L) 31.5 - 35.7 g/dL Final     RDW   Date Value Ref Range Status   03/22/2019 17.6 (H) 12.3 - 15.4 % Final     RDW-SD   Date Value Ref Range Status   03/22/2019 42.5 37.0 - 54.0 fl Final     MPV   Date Value Ref Range Status   03/22/2019 11.2 6.0 - 12.0 fL Final     Platelets   Date Value Ref Range Status   03/22/2019 286 140 - 450 10*3/mm3 Final     Comment:     SPECIMEN CHECKED FOR CLOT       Neutrophil %   Date Value Ref Range Status   03/22/2019 53.0 42.7 - 76.0 % Final     Lymphocyte %   Date Value Ref Range Status   03/22/2019 34.2 19.6 - 45.3 % Final     Monocyte %   Date Value Ref Range Status   03/22/2019 5.2 5.0 - 12.0 % Final     Eosinophil %   Date Value Ref Range Status   03/22/2019 6.6 (H) 0.3 - 6.2 % Final     Basophil %   Date Value Ref Range Status   03/22/2019 0.6 0.0 - 1.5 % Final     Immature Grans %   Date Value Ref Range Status   03/22/2019 0.4 0.0 - 0.5 % Final     Neutrophils, Absolute   Date Value Ref Range Status   03/22/2019 5.98 1.40 - 7.00  10*3/mm3 Final     Lymphocytes, Absolute   Date Value Ref Range Status   03/22/2019 3.87 (H) 0.70 - 3.10 10*3/mm3 Final     Monocytes, Absolute   Date Value Ref Range Status   03/22/2019 0.59 0.10 - 0.90 10*3/mm3 Final     Eosinophils, Absolute   Date Value Ref Range Status   03/22/2019 0.75 (H) 0.00 - 0.40 10*3/mm3 Final     Basophils, Absolute   Date Value Ref Range Status   03/22/2019 0.07 0.00 - 0.20 10*3/mm3 Final     Immature Grans, Absolute   Date Value Ref Range Status   03/22/2019 0.04 0.00 - 0.05 10*3/mm3 Final     nRBC   Date Value Ref Range Status   03/22/2019 0.0 0.0 - 0.0 /100 WBC Final     Contraindications to home sleep test: Morbid obesity with BMI greater than 50    ASSESSMENT:  1. Obstructive sleep apnea New, further workup planned (4)  1. Check split night polysomnography   2. Restless leg syndrome/ /Forman-Ekbom disease (RLS/WED) New, further workup planned (4)   1. Workup after PSG  4. Obesity-hypoventilation syndrome - baseline CO2 of 30 in 03/2019  5. DM    RTC 2 weeks after testing         This document has been electronically signed by Chalino Ramsey MD on May 23, 2019         CC: Solitario Li MD          No ref. provider found

## 2019-06-07 ENCOUNTER — OFFICE VISIT (OUTPATIENT)
Dept: FAMILY MEDICINE CLINIC | Facility: CLINIC | Age: 44
End: 2019-06-07

## 2019-06-07 VITALS
TEMPERATURE: 98.6 F | DIASTOLIC BLOOD PRESSURE: 78 MMHG | HEIGHT: 63 IN | HEART RATE: 74 BPM | WEIGHT: 281.4 LBS | SYSTOLIC BLOOD PRESSURE: 126 MMHG | OXYGEN SATURATION: 94 % | BODY MASS INDEX: 49.86 KG/M2

## 2019-06-07 DIAGNOSIS — E11.69 HYPERLIPIDEMIA ASSOCIATED WITH TYPE 2 DIABETES MELLITUS (HCC): ICD-10-CM

## 2019-06-07 DIAGNOSIS — E78.5 HYPERLIPIDEMIA ASSOCIATED WITH TYPE 2 DIABETES MELLITUS (HCC): ICD-10-CM

## 2019-06-07 DIAGNOSIS — I10 ESSENTIAL HYPERTENSION: ICD-10-CM

## 2019-06-07 DIAGNOSIS — E55.9 VITAMIN D DEFICIENCY: Primary | ICD-10-CM

## 2019-06-07 DIAGNOSIS — E11.8 CONTROLLED TYPE 2 DIABETES MELLITUS WITH COMPLICATION, WITH LONG-TERM CURRENT USE OF INSULIN (HCC): ICD-10-CM

## 2019-06-07 DIAGNOSIS — Z79.4 CONTROLLED TYPE 2 DIABETES MELLITUS WITH COMPLICATION, WITH LONG-TERM CURRENT USE OF INSULIN (HCC): ICD-10-CM

## 2019-06-07 DIAGNOSIS — K21.9 GASTROESOPHAGEAL REFLUX DISEASE, ESOPHAGITIS PRESENCE NOT SPECIFIED: ICD-10-CM

## 2019-06-07 DIAGNOSIS — E66.01 MORBID OBESITY (HCC): ICD-10-CM

## 2019-06-07 DIAGNOSIS — R53.83 OTHER FATIGUE: ICD-10-CM

## 2019-06-07 DIAGNOSIS — D50.9 IRON DEFICIENCY ANEMIA, UNSPECIFIED IRON DEFICIENCY ANEMIA TYPE: ICD-10-CM

## 2019-06-07 PROCEDURE — 99214 OFFICE O/P EST MOD 30 MIN: CPT | Performed by: FAMILY MEDICINE

## 2019-06-07 NOTE — PROGRESS NOTES
Subjective:  Lizzy Restrepo is a 43 y.o. female who presents for       Patient Active Problem List   Diagnosis   • Encounter for immunization   • Diabetes type 2, uncontrolled (CMS/HCC)   • Hyperlipidemia associated with type 2 diabetes mellitus (CMS/HCC)   • Morbid obesity (CMS/HCC)   • Vitamin D deficiency   • Essential hypertension   • Hyperlipidemia   • Urinary tract infection   • Acute pain of right shoulder   • Right arm pain   • Right elbow pain   • Lateral epicondylitis of right elbow   • Medial epicondylitis   • Encounter for screening mammogram for malignant neoplasm of breast   • Iron deficiency anemia   • Mild intermittent asthma   • Encounter for screening for endocrine disorder   • Gastroesophageal reflux disease   • Pain in both knees   • Controlled type 2 diabetes mellitus with complication, without long-term current use of insulin (CMS/HCC)   • Anemia   • Right knee pain   • Controlled type 2 diabetes mellitus with complication, with long-term current use of insulin (CMS/HCC)   • General medical examination   • Leukocytosis   • Chronic bilateral low back pain with sciatica   • Encounter for Papanicolaou smear of cervix   • Screening mammogram, encounter for   • Other fatigue           Current Outpatient Medications:   •  aspirin 81 MG chewable tablet, Chew 1 tablet Daily., Disp: 30 tablet, Rfl: 6  •  atorvastatin (LIPITOR) 40 MG tablet, Take 1 tablet by mouth Daily., Disp: 90 tablet, Rfl: 3  •  cholecalciferol (OPTIMAL-D) 19812 units capsule, Take 1 capsule by mouth 1 (One) Time Per Week., Disp: 12 capsule, Rfl: 3  •  cyclobenzaprine (FLEXERIL) 10 MG tablet, Take 1 tablet by mouth At Night As Needed for Muscle Spasms., Disp: 30 tablet, Rfl: 3  •  Dulaglutide (TRULICITY) 0.75 MG/0.5ML solution pen-injector, Inject 0.75 mg under the skin into the appropriate area as directed 1 (One) Time Per Week., Disp: 12 pen, Rfl: 3  •  EQ LORATADINE 10 MG tablet, TAKE ONE TABLET BY MOUTH ONCE DAILY, Disp: 30  "tablet, Rfl: 11  •  ferrous sulfate 325 (65 FE) MG EC tablet, Take 1 tablet by mouth 3 (Three) Times a Day With Meals., Disp: 90 tablet, Rfl: 6  •  fluticasone (FLONASE) 50 MCG/ACT nasal spray, 2 sprays into the nostril(s) as directed by provider Daily., Disp: 16 g, Rfl: 6  •  Insulin Pen Needle (PEN NEEDLES) 31G X 8 MM misc, , Disp: , Rfl:   •  Insulin Syringe-Needle U-100 30G X 5/16\" 1 ML misc, , Disp: , Rfl:   •  lisinopril (PRINIVIL,ZESTRIL) 20 MG tablet, Take 1 tablet by mouth Daily., Disp: 30 tablet, Rfl: 3  •  meloxicam (MOBIC) 15 MG tablet, Take 1 tablet by mouth Daily., Disp: 30 tablet, Rfl: 6  •  montelukast (SINGULAIR) 10 MG tablet, Take 1 tablet by mouth Every Night., Disp: 30 tablet, Rfl: 6  •  omeprazole (priLOSEC) 40 MG capsule, Take 40 mg by mouth Daily., Disp: , Rfl: 5  •  ReliOn Ultra Thin Lancets misc, , Disp: , Rfl:   •  VENTOLIN  (90 Base) MCG/ACT inhaler, INHALE TWO PUFFS BY MOUTH EVERY 4 HOURS AS NEEDED, Disp: 1 inhaler, Rfl: 0    HPI     3/29/19  Pt is 44 yo female with PMH of DM type 2, Asthma, Morbid obesity, HTN, HL:P, coming for recheck and followup. She just had  labwork and is  mammogram and pap smea. Labs shows hemoglobin at 11.2 with WBC at 12.0. CMP showed stable renal function and lvier function. Vitamin D and Vitamin B12 levels are normal. hga1c is at 7.9 from 6.8  About 9 months ago. LIpid panel shows LDL <100 and HDL low at 44.  She was working in [place] for 2 months. She would have to commuted between [place] and [place] 5 times a day.  This caused her to be stressed.  She does not work thereShe missed several injection. She is doing well now and continues to work at [employer].  No chest pain, no shortness of air, no dizziness.  She does not work in [place] anymore.       5/2/19 pt is here for recheck and followup.  She has yet to get mammogram and pap smear She is due for diabetic eye exam.  She is feeling a little down today.  She is going through a lot "     6/7/19 pt is here for recheck. She has upcoming sleep study this month.  She is doing well although she states she gets swollen. Was taking jardiance for diabetes type 2.  It did help with sugars. Last hga1c was 7.9         Diabetes   She presents for her follow-up diabetic visit. She has type 2 diabetes mellitus. Her disease course has been stable. Pertinent negatives for hypoglycemia include no confusion, dizziness, headaches, hunger, mood changes, nervousness/anxiousness, pallor, seizures, sleepiness, speech difficulty, sweats or tremors. Associated symptoms include fatigue. Pertinent negatives for diabetes include no blurred vision, no chest pain, no foot paresthesias, no foot ulcerations, no polydipsia, no polyphagia, no polyuria, no visual change, no weakness and no weight loss. Pertinent negatives for hypoglycemia complications include no blackouts, no hospitalization, no nocturnal hypoglycemia, no required assistance and no required glucagon injection. Symptoms are stable. Pertinent negatives for diabetic complications include no autonomic neuropathy, CVA, heart disease, nephropathy, peripheral neuropathy, PVD or retinopathy. Risk factors for coronary artery disease include diabetes mellitus, dyslipidemia and obesity. Current diabetic treatment includes insulin injections. She is following a diabetic diet. When asked about meal planning, she reported none. She has not had a previous visit with a dietitian. She participates in exercise intermittently. She does not see a podiatrist.Eye exam is not current.   Knee Pain    The incident occurred more than 1 week ago. The incident occurred at the gym. There was no injury mechanism. The pain is present in the right knee. The quality of the pain is described as aching. The pain is at a severity of 5/10. The pain is moderate. The pain has been constant since onset. Pertinent negatives include no inability to bear weight, loss of motion, loss of sensation, muscle  weakness, numbness or tingling. She reports no foreign bodies present. The symptoms are aggravated by movement and palpation. She has tried nothing for the symptoms. The treatment provided no relief.   Obesity   This is a chronic problem. The current episode started more than 1 year ago. The problem has been unchanged. Associated symptoms include arthralgias, fatigue and nausea. Pertinent negatives include no abdominal pain, anorexia, change in bowel habit, chest pain, chills, congestion, coughing, fever, headaches, joint swelling, myalgias, neck pain, numbness, rash, sore throat, swollen glands, urinary symptoms, vertigo, visual change, vomiting or weakness. Nothing aggravates the symptoms. She has tried nothing for the symptoms. The treatment provided no relief.     Review of Systems  Review of Systems   Constitutional: Positive for activity change. Negative for appetite change, chills, diaphoresis, fatigue and fever.   HENT: Negative for congestion, postnasal drip, rhinorrhea, sinus pressure, sinus pain, sneezing, sore throat, trouble swallowing and voice change.    Respiratory: Positive for shortness of breath. Negative for cough, choking, chest tightness, wheezing and stridor.    Cardiovascular: Positive for leg swelling. Negative for chest pain.   Gastrointestinal: Negative for diarrhea, nausea and vomiting.   Allergic/Immunologic: Positive for environmental allergies.   Neurological: Positive for weakness. Negative for headaches.       Patient Active Problem List   Diagnosis   • Encounter for immunization   • Diabetes type 2, uncontrolled (CMS/HCC)   • Hyperlipidemia associated with type 2 diabetes mellitus (CMS/HCC)   • Morbid obesity (CMS/HCC)   • Vitamin D deficiency   • Essential hypertension   • Hyperlipidemia   • Urinary tract infection   • Acute pain of right shoulder   • Right arm pain   • Right elbow pain   • Lateral epicondylitis of right elbow   • Medial epicondylitis   • Encounter for screening  mammogram for malignant neoplasm of breast   • Iron deficiency anemia   • Mild intermittent asthma   • Encounter for screening for endocrine disorder   • Gastroesophageal reflux disease   • Pain in both knees   • Controlled type 2 diabetes mellitus with complication, without long-term current use of insulin (CMS/HCC)   • Anemia   • Right knee pain   • Controlled type 2 diabetes mellitus with complication, with long-term current use of insulin (CMS/HCC)   • General medical examination   • Leukocytosis   • Chronic bilateral low back pain with sciatica   • Encounter for Papanicolaou smear of cervix   • Screening mammogram, encounter for   • Other fatigue     Past Surgical History:   Procedure Laterality Date   •  SECTION  10/30/2000   • CHOLECYSTECTOMY  1995   • COLONOSCOPY  2013    normal per pt   • CYSTOSCOPY  2016    And bilateral retrograde pyelograms. Performed at [facility].   • TONSILLECTOMY  1985   • UMBILICAL HERNIA REPAIR  2015   • UMBILICAL HERNIA REPAIR  2015     Social History     Socioeconomic History   • Marital status:      Spouse name: Not on file   • Number of children: Not on file   • Years of education: Not on file   • Highest education level: Not on file   Tobacco Use   • Smoking status: Never Smoker   • Smokeless tobacco: Never Used   Substance and Sexual Activity   • Alcohol use: No   • Drug use: No     Family History   Problem Relation Age of Onset   • Diabetes Mother    • Heart disease Mother    • Heart failure Mother         congestive   • Hypertension Mother    • Asthma Father    • Osteoarthritis Father      Lab on 2019   Component Date Value Ref Range Status   • WBC 2019 11.30* 3.40 - 10.80 10*3/mm3 Final   • RBC 2019 5.40* 3.77 - 5.28 10*6/mm3 Final   • Hemoglobin 2019 11.2* 12.0 - 15.9 g/dL Final   • Hematocrit 2019 37.7  34.0 - 46.6 % Final   • MCV 2019 69.8* 79.0 - 97.0 fL Final   • MCH  03/22/2019 20.7* 26.6 - 33.0 pg Final   • MCHC 03/22/2019 29.7* 31.5 - 35.7 g/dL Final   • RDW 03/22/2019 17.6* 12.3 - 15.4 % Final   • RDW-SD 03/22/2019 42.5  37.0 - 54.0 fl Final   • MPV 03/22/2019 11.2  6.0 - 12.0 fL Final   • Platelets 03/22/2019 286  140 - 450 10*3/mm3 Final    SPECIMEN CHECKED FOR CLOT     • Neutrophil % 03/22/2019 53.0  42.7 - 76.0 % Final   • Lymphocyte % 03/22/2019 34.2  19.6 - 45.3 % Final   • Monocyte % 03/22/2019 5.2  5.0 - 12.0 % Final   • Eosinophil % 03/22/2019 6.6* 0.3 - 6.2 % Final   • Basophil % 03/22/2019 0.6  0.0 - 1.5 % Final   • Immature Grans % 03/22/2019 0.4  0.0 - 0.5 % Final   • Neutrophils, Absolute 03/22/2019 5.98  1.40 - 7.00 10*3/mm3 Final   • Lymphocytes, Absolute 03/22/2019 3.87* 0.70 - 3.10 10*3/mm3 Final   • Monocytes, Absolute 03/22/2019 0.59  0.10 - 0.90 10*3/mm3 Final   • Eosinophils, Absolute 03/22/2019 0.75* 0.00 - 0.40 10*3/mm3 Final   • Basophils, Absolute 03/22/2019 0.07  0.00 - 0.20 10*3/mm3 Final   • Immature Grans, Absolute 03/22/2019 0.04  0.00 - 0.05 10*3/mm3 Final   • nRBC 03/22/2019 0.0  0.0 - 0.0 /100 WBC Final   • Glucose 03/22/2019 119* 60 - 100 mg/dL Final   • BUN 03/22/2019 15  7 - 21 mg/dL Final   • Creatinine 03/22/2019 0.80  0.50 - 1.00 mg/dL Final   • Sodium 03/22/2019 134* 137 - 145 mmol/L Final   • Potassium 03/22/2019 4.4  3.5 - 5.1 mmol/L Final   • Chloride 03/22/2019 96  95 - 110 mmol/L Final   • CO2 03/22/2019 30.0  22.0 - 31.0 mmol/L Final   • Calcium 03/22/2019 10.0  8.4 - 10.2 mg/dL Final   • Total Protein 03/22/2019 7.3  6.3 - 8.6 g/dL Final   • Albumin 03/22/2019 4.00  3.40 - 4.80 g/dL Final   • ALT (SGPT) 03/22/2019 30  9 - 52 U/L Final   • AST (SGOT) 03/22/2019 18  14 - 36 U/L Final   • Alkaline Phosphatase 03/22/2019 60  38 - 126 U/L Final   • Total Bilirubin 03/22/2019 0.3  0.2 - 1.3 mg/dL Final   • eGFR   Amer 03/22/2019 94  58 - 135 mL/min/1.73 Final   • Globulin 03/22/2019 3.3  2.3 - 3.5 gm/dL Final   • A/G Ratio  03/22/2019 1.2  1.1 - 1.8 g/dL Final   • BUN/Creatinine Ratio 03/22/2019 18.8  7.0 - 25.0 Final   • Anion Gap 03/22/2019 8.0  5.0 - 15.0 mmol/L Final   • Hemoglobin A1C 03/22/2019 7.9* 4 - 5.6 % Final   • Total Cholesterol 03/22/2019 165  0 - 199 mg/dL Final   • Triglycerides 03/22/2019 100  20 - 199 mg/dL Final   • HDL Cholesterol 03/22/2019 44* 60 - 200 mg/dL Final   • LDL Cholesterol  03/22/2019 98  1 - 129 mg/dL Final   • LDL/HDL Ratio 03/22/2019 2.30  0.00 - 3.22 Final   • 25 Hydroxy, Vitamin D 03/22/2019 57.3  30.0 - 100.0 ng/ml Final   • Vitamin B-12 03/22/2019 795  239 - 931 pg/mL Final   • Microalbumin/Creatinine Ratio 03/22/2019 10.6  0.0 - 30.0 mg/g Final    Unable to calculate   • Creatinine, Urine 03/22/2019 56.6  mg/dL Final   • Microalbumin, Urine 03/22/2019 0.6  mg/L Final   • Iron 03/22/2019 46  37 - 170 mcg/dL Final   • TIBC 03/22/2019 254* 265 - 497 mcg/dL Final   • Iron Saturation 03/22/2019 18  15 - 50 % Final   • Ferritin 03/22/2019 162.00* 6.20 - 137.00 ng/mL Final   • RBC Morphology 03/22/2019 Normal  Normal Final   • WBC Morphology 03/22/2019 Normal  Normal Final   • Platelet Estimate 03/22/2019 Adequate  Normal Final   • Large Platelets 03/22/2019 Slight/1+  None Seen Final    SOME LARGE PLATELETS SEEN        XR elbow 3+ vw right  Narrative: Patient Name:  CATHY MANUEL  Patient ID:  [id number]   Ordering:  DILCIA PICKERING  Attending:  DILCIA PICKERING  Referring:  DILCIA PICKERING  ------------------------------------------------    Three view right elbow    HISTORY: Right elbow pain    AP, lateral and oblique views obtained.    COMPARISON: None    No fracture or dislocation.  Very small olecranon spur.  No joint effusion.  No other osseous or articular abnormality.  Impression: CONCLUSION:  Very small olecranon spur.    [id number]    Electronically signed by:  Elvis Mancera MD  7/24/2017 8:19 PM CDT  Workstation: [email or web address] Shoulder 2+ View Right  Narrative: PROCEDURE: Three views right  "shoulder    COMPARISON: No comparison.    HISTORY: Right shoulder pain    FINDINGS:   Three views of the right shoulder were obtained.  There is soft tissue calcification adjacent to the humeral head  seen on the scapular Y view only, of uncertain significance,  possibly due to calcific bursitis.  There is normal positioning of the humeral head within the  glenoid.   The acromioclavicular joint is grossly intact  There is no acute fracture or subluxation.   The visualized right lung is clear.  Impression: CONCLUSION:    There is soft tissue calcification adjacent to the humeral head  seen on the scapular Y view only, of uncertain significance,  possibly due to calcific bursitis.    Electronically signed by:  Navneet Ramirez MD  7/24/2017 7:03 PM CDT  Workstation: [id number]    @[employer]@  Immunization History   Administered Date(s) Administered   • Hepatitis B 07/19/2016, 08/23/2016, 02/17/2017   • Tdap 02/17/2017       The following portions of the patient's history were reviewed and updated as appropriate: allergies, current medications, past family history, past medical history, past social history, past surgical history and problem list.        Physical Exam  /78   Pulse 74   Temp 98.6 °F (37 °C)   Ht 160 cm (63\")   Wt 128 kg (281 lb 6.4 oz)   SpO2 94%   BMI 49.85 kg/m²     Physical Exam   Constitutional: She is oriented to person, place, and time. She appears well-developed and well-nourished.   HENT:   Head: Normocephalic and atraumatic.   Right Ear: External ear normal.   Eyes: Conjunctivae and EOM are normal. Pupils are equal, round, and reactive to light.   Neck: Normal range of motion. Neck supple.   Cardiovascular: Normal rate, regular rhythm and normal heart sounds.   No murmur heard.  Pulmonary/Chest: Effort normal and breath sounds normal. No respiratory distress.   Obese abdomen    Abdominal: Soft. Bowel sounds are normal. She exhibits no distension. There is no tenderness.   Obese " abdomen    Musculoskeletal: Normal range of motion. She exhibits edema. She exhibits no deformity.   Nonpitting edema of both lower legs    Neurological: She is alert and oriented to person, place, and time. No cranial nerve deficit.   Skin: Skin is warm. No rash noted. She is not diaphoretic. No erythema. No pallor.   Psychiatric: She has a normal mood and affect. Her behavior is normal.   Nursing note and vitals reviewed.      Assessment/Plan    Diagnosis Plan   1. Vitamin D deficiency     2. Morbid obesity (CMS/[facility])     3. Other fatigue     4. Iron deficiency anemia, unspecified iron deficiency anemia type     5. Hyperlipidemia associated with type 2 diabetes mellitus (CMS/[facility])     6. Gastroesophageal reflux disease, esophagitis presence not specified     7. Essential hypertension     8. Controlled type 2 diabetes mellitus with complication, with long-term current use of insulin (CMS/[facility])            -for fatigue - suspect MIGEL. Recommend sleep study with Dr. Ramsey. Consultation appreciated   -recommend diabetic eye exam  -diabetic foot exam today. She has dry feet recommend lotion   - Essential Hypertension - BP at goal. Continue with current medicaitons. Stop lisinopril-HCTZ. Start on lisinopril 20 mg daily.    - hyperlipidemia-  Recheck lipid panel The current medical regimen is effective;  continue present plan and medications.  - DM type 2-  Sugars improved with Trulicity. 0.75 mg weekly stopped lantus and insulin   stop januvia Hga1c improved. Performed diabetic foot exam today. Pt has dry feet on soles. Will give diabetic foot care informaiton. Recent hga1c>7.0. Restart jardiance 25 mg daily.  Gave coupon cards and samples today.   -recommend pap smear and mammogram screening  - vitamin D deficiency -continue vitamin D pill once a week.  - morbid obesity - BMI >40 - pt gained lbs since last visit. Continue with ketogenic diet.  Gave diet information to go. Gave bariatric weight loss surgery to go home with .  Pt declines bariatric weight loss surgery. She will keep in contact if she would like the referral. Pt agrees if she cannot lose weight in 6 months consider Bariatric weight loss toney.  He weight today is 280 lbs.  Recheck in 1 month  -referral to [facility] for pap smear   -for back pain - referral to PT/OT. Services appreciated. Continue muscle relaxant. Also consider x-ray of lumbar spine. Continue mobic and flexeril   - iron deficiency anemia -continue on iron pill TID. Her last CBC showed stable hemoglobin  -lekuocytosis - slightly improved from last visit. Will continue to monitor. Consider Hematology refrral   - mild intermittent asthma - currently stable on albuterol   - GERD - continue on protonix  - recheck in 5  month or earlier if any problems  -I advised pt to be safe and call with any questions or concerns. All questions addressed today          This document has been electronically signed by Solitario Li MD on June 7, 2019 10:07 AM                  This document has been electronically signed by Solitario Li MD on June 7, 2019 10:07 AM

## 2019-06-28 ENCOUNTER — HOSPITAL ENCOUNTER (OUTPATIENT)
Dept: SLEEP MEDICINE | Facility: HOSPITAL | Age: 44
Discharge: HOME | End: 2019-06-28
Admitting: INTERNAL MEDICINE

## 2019-06-28 VITALS — WEIGHT: 283 LBS | HEIGHT: 63 IN | BODY MASS INDEX: 50.14 KG/M2

## 2019-06-28 DIAGNOSIS — G47.33 OBSTRUCTIVE SLEEP APNEA, ADULT: ICD-10-CM

## 2019-06-28 PROCEDURE — 95811 POLYSOM 6/>YRS CPAP 4/> PARM: CPT

## 2019-06-28 PROCEDURE — 95811 POLYSOM 6/>YRS CPAP 4/> PARM: CPT | Performed by: INTERNAL MEDICINE

## 2019-07-11 ENCOUNTER — LAB (OUTPATIENT)
Dept: LAB | Facility: HOSPITAL | Age: 44
End: 2019-07-11

## 2019-07-11 DIAGNOSIS — R53.83 OTHER FATIGUE: ICD-10-CM

## 2019-07-11 DIAGNOSIS — G47.33 OBSTRUCTIVE SLEEP APNEA, ADULT: Primary | ICD-10-CM

## 2019-07-11 PROCEDURE — 82607 VITAMIN B-12: CPT

## 2019-07-11 PROCEDURE — 80053 COMPREHEN METABOLIC PANEL: CPT

## 2019-07-11 PROCEDURE — 83036 HEMOGLOBIN GLYCOSYLATED A1C: CPT

## 2019-07-11 PROCEDURE — 82306 VITAMIN D 25 HYDROXY: CPT

## 2019-07-11 PROCEDURE — 80061 LIPID PANEL: CPT

## 2019-07-11 PROCEDURE — 85025 COMPLETE CBC W/AUTO DIFF WBC: CPT

## 2019-07-12 LAB
25(OH)D3 SERPL-MCNC: 65.4 NG/ML (ref 30–100)
ALBUMIN SERPL-MCNC: 4 G/DL (ref 3.5–5.2)
ALBUMIN/GLOB SERPL: 1.1 G/DL
ALP SERPL-CCNC: 55 U/L (ref 39–117)
ALT SERPL W P-5'-P-CCNC: 17 U/L (ref 1–33)
ANION GAP SERPL CALCULATED.3IONS-SCNC: 8.3 MMOL/L (ref 5–15)
AST SERPL-CCNC: 16 U/L (ref 1–32)
BASOPHILS # BLD AUTO: 0.07 10*3/MM3 (ref 0–0.2)
BASOPHILS NFR BLD AUTO: 0.8 % (ref 0–1.5)
BILIRUB SERPL-MCNC: 0.2 MG/DL (ref 0.2–1.2)
BUN BLD-MCNC: 11 MG/DL (ref 6–20)
BUN/CREAT SERPL: 12 (ref 7–25)
CALCIUM SPEC-SCNC: 9.7 MG/DL (ref 8.6–10.5)
CHLORIDE SERPL-SCNC: 102 MMOL/L (ref 98–107)
CHOLEST SERPL-MCNC: 136 MG/DL (ref 0–200)
CO2 SERPL-SCNC: 24.7 MMOL/L (ref 22–29)
CREAT BLD-MCNC: 0.92 MG/DL (ref 0.57–1)
DEPRECATED RDW RBC AUTO: 43 FL (ref 37–54)
EOSINOPHIL # BLD AUTO: 0.45 10*3/MM3 (ref 0–0.4)
EOSINOPHIL NFR BLD AUTO: 4.8 % (ref 0.3–6.2)
ERYTHROCYTE [DISTWIDTH] IN BLOOD BY AUTOMATED COUNT: 18.2 % (ref 12.3–15.4)
GFR SERPL CREATININE-BSD FRML MDRD: 80 ML/MIN/1.73
GLOBULIN UR ELPH-MCNC: 3.7 GM/DL
GLUCOSE BLD-MCNC: 101 MG/DL (ref 65–99)
HBA1C MFR BLD: 7.4 % (ref 4.8–5.6)
HCT VFR BLD AUTO: 40.8 % (ref 34–46.6)
HDLC SERPL-MCNC: 42 MG/DL (ref 40–60)
HGB BLD-MCNC: 11.7 G/DL (ref 12–15.9)
LDLC SERPL CALC-MCNC: 78 MG/DL (ref 0–100)
LDLC/HDLC SERPL: 1.85 {RATIO}
LYMPHOCYTES # BLD AUTO: 3.52 10*3/MM3 (ref 0.7–3.1)
LYMPHOCYTES NFR BLD AUTO: 37.9 % (ref 19.6–45.3)
MCH RBC QN AUTO: 20.4 PG (ref 26.6–33)
MCHC RBC AUTO-ENTMCNC: 28.7 G/DL (ref 31.5–35.7)
MCV RBC AUTO: 71.1 FL (ref 79–97)
MONOCYTES # BLD AUTO: 0.59 10*3/MM3 (ref 0.1–0.9)
MONOCYTES NFR BLD AUTO: 6.4 % (ref 5–12)
NEUTROPHILS # BLD AUTO: 4.61 10*3/MM3 (ref 1.7–7)
NEUTROPHILS NFR BLD AUTO: 49.7 % (ref 42.7–76)
PLATELET # BLD AUTO: 280 10*3/MM3 (ref 140–450)
PMV BLD AUTO: 11.5 FL (ref 6–12)
POTASSIUM BLD-SCNC: 4.5 MMOL/L (ref 3.5–5.2)
PROT SERPL-MCNC: 7.7 G/DL (ref 6–8.5)
RBC # BLD AUTO: 5.74 10*6/MM3 (ref 3.77–5.28)
SODIUM BLD-SCNC: 135 MMOL/L (ref 136–145)
TRIGL SERPL-MCNC: 82 MG/DL (ref 0–150)
VIT B12 BLD-MCNC: 916 PG/ML (ref 211–946)
VLDLC SERPL-MCNC: 16.4 MG/DL (ref 5–40)
WBC NRBC COR # BLD: 9.28 10*3/MM3 (ref 3.4–10.8)

## 2019-07-15 ENCOUNTER — TELEPHONE (OUTPATIENT)
Dept: FAMILY MEDICINE CLINIC | Facility: CLINIC | Age: 44
End: 2019-07-15

## 2019-07-15 NOTE — TELEPHONE ENCOUNTER
----- Message from Solitario Li MD sent at 7/13/2019 11:31 AM CDT -----  Call pt     Vitamin D is normal    On CBC WBC is normal but hemoglobin still slightly low at 11.7.  Pt has iron deficiency anemia and recommend Hematology referral with Dr. Philippe. Let me know if pt is okay with referral     hga1c stable at 7.4 from 7.9 but goal less than 7. Continue diabetic medications but watch sugar and carb intake     Kidney function and liver functin stable but pt has GFR in 80s . Likely had chronic kidney disease stage 2. Continue diabetic and blood pressure control    Lipid panel stable continue statin medication     Recheck on next visit. tHanks  Still unable to reach

## 2019-07-19 ENCOUNTER — OFFICE VISIT (OUTPATIENT)
Dept: SLEEP MEDICINE | Facility: HOSPITAL | Age: 44
End: 2019-07-19

## 2019-07-19 VITALS
DIASTOLIC BLOOD PRESSURE: 44 MMHG | BODY MASS INDEX: 49.88 KG/M2 | WEIGHT: 281.5 LBS | OXYGEN SATURATION: 98 % | HEIGHT: 63 IN | HEART RATE: 63 BPM | SYSTOLIC BLOOD PRESSURE: 122 MMHG

## 2019-07-19 DIAGNOSIS — G47.33 OBSTRUCTIVE SLEEP APNEA, ADULT: Primary | ICD-10-CM

## 2019-07-19 PROCEDURE — 99213 OFFICE O/P EST LOW 20 MIN: CPT | Performed by: INTERNAL MEDICINE

## 2019-07-19 NOTE — PROGRESS NOTES
"CHIEF COMPLAINT: follow up sleep testing    LAST OV: 05/23/2019    HPI:    The patient is a 43 y.o. female.  She returns to sleep clinic to discuss the results of the recent split night polysomnography performed on 06/28/2019.  The AHI/JANEE was 22.2, REM AHI 58.2. The patient had a periodic limb movement index of 0.  The patient did not have any significant cardiac arrhythmias. Well controlled on 10 cm H2O.    INTERVAL MEDICAL HISTORY: no change since last ov      MEDICATIONS:   Current Outpatient Medications:   •  aspirin 81 MG chewable tablet, Chew 1 tablet Daily., Disp: 30 tablet, Rfl: 6  •  atorvastatin (LIPITOR) 40 MG tablet, Take 1 tablet by mouth Daily., Disp: 90 tablet, Rfl: 3  •  cholecalciferol (OPTIMAL-D) 40526 units capsule, Take 1 capsule by mouth 1 (One) Time Per Week., Disp: 12 capsule, Rfl: 3  •  cyclobenzaprine (FLEXERIL) 10 MG tablet, Take 1 tablet by mouth At Night As Needed for Muscle Spasms., Disp: 30 tablet, Rfl: 3  •  Dulaglutide (TRULICITY) 0.75 MG/0.5ML solution pen-injector, Inject 0.75 mg under the skin into the appropriate area as directed 1 (One) Time Per Week., Disp: 12 pen, Rfl: 3  •  Empagliflozin (JARDIANCE) 25 MG tablet, Take 25 mg by mouth Daily., Disp: 30 tablet, Rfl: 3  •  Empagliflozin (JARDIANCE) 25 MG tablet, Take 25 mg by mouth Daily., Disp: 14 tablet, Rfl: 0  •  EQ LORATADINE 10 MG tablet, TAKE ONE TABLET BY MOUTH ONCE DAILY, Disp: 30 tablet, Rfl: 11  •  ferrous sulfate 325 (65 FE) MG EC tablet, Take 1 tablet by mouth 3 (Three) Times a Day With Meals., Disp: 90 tablet, Rfl: 6  •  fluticasone (FLONASE) 50 MCG/ACT nasal spray, 2 sprays into the nostril(s) as directed by provider Daily., Disp: 16 g, Rfl: 6  •  Insulin Pen Needle (PEN NEEDLES) 31G X 8 MM misc, , Disp: , Rfl:   •  Insulin Syringe-Needle U-100 30G X 5/16\" 1 ML misc, , Disp: , Rfl:   •  lisinopril (PRINIVIL,ZESTRIL) 20 MG tablet, Take 1 tablet by mouth Daily., Disp: 30 tablet, Rfl: 3  •  meloxicam (MOBIC) 15 MG " tablet, Take 1 tablet by mouth Daily., Disp: 30 tablet, Rfl: 6  •  montelukast (SINGULAIR) 10 MG tablet, Take 1 tablet by mouth Every Night., Disp: 30 tablet, Rfl: 6  •  omeprazole (priLOSEC) 40 MG capsule, Take 40 mg by mouth Daily., Disp: , Rfl: 5  •  ReliOn Ultra Thin Lancets misc, , Disp: , Rfl:   •  VENTOLIN  (90 Base) MCG/ACT inhaler, INHALE TWO PUFFS BY MOUTH EVERY 4 HOURS AS NEEDED, Disp: 1 inhaler, Rfl: 0    PHYSICAL EXAM  Vital Signs (last 24 hours)     None        There were no vitals filed for this visit.      Gen:  No acute distress, alert, oriented  Lungs:  CTA with normal effort   CV:  RRR, no M/R/G  GI:  soft, non-tender  Ext:  no c/c/e      ASSESSMENT / PLAN:     1. Obstructive sleep apnea Established, not controlled (2)  1. Script for autocpap  2. RTC in 31-90 days  2. Restless leg syndrome/ /Forman-Ekbom disease (RLS/WED) Established, not controlled (2)   1. Address after starting on cpap if sx do not resolve  3. DM  4. OHS      All of the patient's questions were answered. She states understanding and agreement with my assessment and plan as above.  Total time 15 min, more than half spent in face to face counseling and coordination of care.    Patient to follow up in 31-90 days with compliance report    She agrees to return sooner on a prn basis.             This document has been electronically signed by Chalino Ramsey MD on July 19, 2019           CC: Solitario Li MD          No ref. provider found

## 2019-07-29 PROBLEM — G47.33 OSA (OBSTRUCTIVE SLEEP APNEA): Status: ACTIVE | Noted: 2019-07-29

## 2019-08-05 RX ORDER — OMEPRAZOLE 40 MG/1
CAPSULE, DELAYED RELEASE ORAL
Qty: 30 CAPSULE | Refills: 5 | Status: SHIPPED | OUTPATIENT
Start: 2019-08-05 | End: 2019-10-31 | Stop reason: SDUPTHER

## 2019-08-05 RX ORDER — LISINOPRIL 20 MG/1
TABLET ORAL
Qty: 30 TABLET | Refills: 3 | Status: SHIPPED | OUTPATIENT
Start: 2019-08-05 | End: 2019-10-31 | Stop reason: SDUPTHER

## 2019-09-16 ENCOUNTER — PRIOR AUTHORIZATION (OUTPATIENT)
Dept: FAMILY MEDICINE CLINIC | Facility: CLINIC | Age: 44
End: 2019-09-16

## 2019-09-16 NOTE — TELEPHONE ENCOUNTER
Trulicity approved. Copy of auth faxed to pharmacy  Jardiance 25mg approved. Copy of auth faxed to pharmacy

## 2019-10-21 ENCOUNTER — TELEPHONE (OUTPATIENT)
Dept: FAMILY MEDICINE CLINIC | Facility: CLINIC | Age: 44
End: 2019-10-21

## 2019-10-21 NOTE — TELEPHONE ENCOUNTER
Made patient an appointment for next week. She wanted to know if you would go ahead and call in her trulicity.

## 2019-10-22 NOTE — PROGRESS NOTES
Subjective:  Lizzy Restrepo is a 44 y.o. female who presents for       Patient Active Problem List   Diagnosis   • Encounter for immunization   • Diabetes type 2, uncontrolled (CMS/HCC)   • Hyperlipidemia associated with type 2 diabetes mellitus (CMS/HCC)   • Morbid obesity (CMS/HCC)   • Vitamin D deficiency   • Essential hypertension   • Hyperlipidemia   • Urinary tract infection   • Acute pain of right shoulder   • Right arm pain   • Right elbow pain   • Lateral epicondylitis of right elbow   • Medial epicondylitis   • Encounter for screening mammogram for malignant neoplasm of breast   • Iron deficiency anemia   • Mild intermittent asthma   • Encounter for screening for endocrine disorder   • Gastroesophageal reflux disease   • Pain in both knees   • Controlled type 2 diabetes mellitus with complication, without long-term current use of insulin (CMS/HCC)   • Anemia   • Right knee pain   • Controlled type 2 diabetes mellitus with complication, with long-term current use of insulin (CMS/[facility])   • General medical examination   • Leukocytosis   • Chronic bilateral low back pain with sciatica   • Encounter for Papanicolaou smear of cervix   • Screening mammogram, encounter for   • Other fatigue   • MIGEL (obstructive sleep apnea)   • Moderate persistent asthma   • Acute bronchitis           Current Outpatient Medications:   •  albuterol sulfate HFA (VENTOLIN HFA) 108 (90 Base) MCG/ACT inhaler, Inhale 2 puffs Every 4 (Four) Hours As Needed for Shortness of Air., Disp: 1 inhaler, Rfl: 0  •  aspirin 81 MG chewable tablet, Chew 1 tablet Daily., Disp: 30 tablet, Rfl: 6  •  atorvastatin (LIPITOR) 40 MG tablet, Take 1 tablet by mouth Daily., Disp: 90 tablet, Rfl: 3  •  cholecalciferol (OPTIMAL-D) 44083 units capsule, Take 1 capsule by mouth 1 (One) Time Per Week., Disp: 12 capsule, Rfl: 3  •  cyclobenzaprine (FLEXERIL) 10 MG tablet, Take 1 tablet by mouth At Night As Needed for Muscle Spasms., Disp: 30 tablet, Rfl: 3  •   "Dulaglutide (TRULICITY) 0.75 MG/0.5ML solution pen-injector, Inject 0.75 mg under the skin into the appropriate area as directed 1 (One) Time Per Week., Disp: 12 pen, Rfl: 3  •  Empagliflozin (JARDIANCE) 25 MG tablet, Take 25 mg by mouth Daily., Disp: 30 tablet, Rfl: 3  •  ferrous sulfate 325 (65 FE) MG EC tablet, Take 1 tablet by mouth 3 (Three) Times a Day With Meals., Disp: 90 tablet, Rfl: 6  •  fluticasone (FLONASE) 50 MCG/ACT nasal spray, 2 sprays into the nostril(s) as directed by provider Daily., Disp: 16 g, Rfl: 6  •  Insulin Pen Needle (PEN NEEDLES) 31G X 8 MM misc, , Disp: , Rfl:   •  Insulin Syringe-Needle U-100 30G X 5/16\" 1 ML misc, , Disp: , Rfl:   •  lisinopril (PRINIVIL,ZESTRIL) 20 MG tablet, Take 1 tablet by mouth Daily., Disp: 90 tablet, Rfl: 3  •  loratadine (EQ LORATADINE) 10 MG tablet, Take 1 tablet by mouth Daily., Disp: 90 tablet, Rfl: 3  •  meloxicam (MOBIC) 15 MG tablet, Take 1 tablet by mouth Daily., Disp: 30 tablet, Rfl: 6  •  montelukast (SINGULAIR) 10 MG tablet, Take 1 tablet by mouth Every Night., Disp: 30 tablet, Rfl: 6  •  omeprazole (priLOSEC) 40 MG capsule, Take 1 capsule by mouth Daily., Disp: 90 capsule, Rfl: 3  •  ReliOn Ultra Thin Lancets misc, , Disp: , Rfl:   •  azithromycin (ZITHROMAX) 250 MG tablet, Take 2 tablets the first day, then 1 tablet daily for 4 days., Disp: 6 tablet, Rfl: 0  •  Beclomethasone Diprop HFA (QVAR REDIHALER) 80 MCG/ACT inhaler, Inhale 1 puff 2 (Two) Times a Day., Disp: 1 inhaler, Rfl: 3  •  fluconazole (DIFLUCAN) 150 MG tablet, Take 1 tablet by mouth 1 (One) Time for 1 dose. Take 1 tablet now and in 72 hours, Disp: 2 tablet, Rfl: 0  •  predniSONE (DELTASONE) 10 MG tablet, Take 4 pills for 4 days. 3 pills for 3 days 2 pills for 3 days and 2 pills for 2 days then stop, Disp: 30 tablet, Rfl: 0      Pt is 45 yo female with management of morbid obesity, moderate persistent asthma, allergic rhinitis, IDDM type 2,, HLP, HTN,  Vitamin D deficiency,GERD, chronic " fatigue, MIGEL, multiple joint pain, sp cholecystectomy sp tonsillectomy, sp umbilical hernia repair, sp , iron deficiency anemia , CKD stage 2       19 pt is here for recheck. She has upcoming sleep study this month.  She is doing well although she states she gets swollen. Was taking jardiance for diabetes type 2.  It did help with sugars. Last hga1c was 7.9     10/31/19 pt is here for recheck and followup.  Pt had labwork  On 19 that showed normal Vitamin B12 and vitamin D lipid panel was stable. hga1c imprvoed from 7.9 to 7.40.  CMP showed GFR in 80s.   CBC showed hemoglobin at 11.7. With MCV at 71. Pt does have iron deficiency anemia. She was referred to sleep medicine for her fatigue and she missed her appt on 10/25/19.   She has been ill lately for past 2 weeks. Symptoms include cough congestion, no fever. She did not get her flu vaccination yet. She recently lost her job at [employer] a few months ago and has a new interview  As a caregiver. She  Has yet to get her CPAP machine. In regards to her DM type 2 she is still taking jardiance but is not on trulicity due to insurance issues. She has been checking  Her sugar do not go over 200. She also has been using her albuterol inhaler more than 2 times a week for her asthma  .          Shortness of Breath   This is a chronic problem. The current episode started more than 1 year ago. The problem occurs constantly. The problem has been gradually worsening. Associated symptoms include headaches and wheezing. Pertinent negatives include no abdominal pain, chest pain, claudication, coryza, ear pain, fever, hemoptysis, leg pain, leg swelling, neck pain, orthopnea, PND, rash, rhinorrhea, sore throat, sputum production, swollen glands, syncope or vomiting. Nothing aggravates the symptoms. Risk factors include recent leg injury. She has tried beta agonist inhalers for the symptoms. The treatment provided no relief. Her past medical history is significant for  allergies and asthma. There is no history of aspirin allergies, bronchiolitis, CAD, chronic lung disease, COPD, DVT, a heart failure, PE, pneumonia or a recent surgery.   URI    This is a new problem. The current episode started 1 to 4 weeks ago. The problem has been unchanged. There has been no fever. Associated symptoms include congestion, coughing, headaches and wheezing. Pertinent negatives include no abdominal pain, chest pain, diarrhea, dysuria, ear pain, joint pain, nausea, neck pain, plugged ear sensation, rash, rhinorrhea, sinus pain, sneezing, sore throat, swollen glands or vomiting. She has tried nothing (albuterol inhaler singulair ) for the symptoms. The treatment provided no relief.   Diabetes   She presents for her follow-up diabetic visit. She has type 2 diabetes mellitus. Her disease course has been stable. Pertinent negatives for hypoglycemia include no confusion, dizziness, headaches, hunger, mood changes, nervousness/anxiousness, pallor, seizures, sleepiness, speech difficulty, sweats or tremors. Associated symptoms include fatigue. Pertinent negatives for diabetes include no blurred vision, no chest pain, no foot paresthesias, no foot ulcerations, no polydipsia, no polyphagia, no polyuria, no visual change, no weakness and no weight loss. Pertinent negatives for hypoglycemia complications include no blackouts, no hospitalization, no nocturnal hypoglycemia, no required assistance and no required glucagon injection. Symptoms are stable. Pertinent negatives for diabetic complications include no autonomic neuropathy, CVA, heart disease, nephropathy, peripheral neuropathy, PVD or retinopathy. Risk factors for coronary artery disease include diabetes mellitus, dyslipidemia and obesity. Current diabetic treatment includes insulin injections. She is following a diabetic diet. When asked about meal planning, she reported none. She has not had a previous visit with a dietitian. She participates in  exercise intermittently. She does not see a podiatrist.Eye exam is not current.   Knee Pain    The incident occurred more than 1 week ago. The incident occurred at the gym. There was no injury mechanism. The pain is present in the right knee. The quality of the pain is described as aching. The pain is at a severity of 5/10. The pain is moderate. The pain has been constant since onset. Pertinent negatives include no inability to bear weight, loss of motion, loss of sensation, muscle weakness, numbness or tingling. She reports no foreign bodies present. The symptoms are aggravated by movement and palpation. She has tried nothing for the symptoms. The treatment provided no relief.   Obesity   This is a chronic problem. The current episode started more than 1 year ago. The problem has been unchanged. Associated symptoms include arthralgias, fatigue and nausea. Pertinent negatives include no abdominal pain, anorexia, change in bowel habit, chest pain, chills, congestion, coughing, fever, headaches, joint swelling, myalgias, neck pain, numbness, rash, sore throat, swollen glands, urinary symptoms, vertigo, visual change, vomiting or weakness. Nothing aggravates the symptoms. She has tried nothing for the symptoms. The treatment provided no relief.     Review of Systems  Review of Systems   Constitutional: Positive for activity change and fatigue. Negative for appetite change, chills, diaphoresis and fever.   HENT: Positive for congestion. Negative for ear pain, postnasal drip, rhinorrhea, sinus pressure, sinus pain, sneezing, sore throat, trouble swallowing and voice change.    Respiratory: Positive for cough, shortness of breath and wheezing. Negative for hemoptysis, sputum production, choking, chest tightness and stridor.    Cardiovascular: Negative for chest pain, orthopnea, claudication, leg swelling, syncope and PND.   Gastrointestinal: Negative for abdominal pain, diarrhea, nausea and vomiting.   Genitourinary:  Negative for dysuria.   Musculoskeletal: Positive for arthralgias. Negative for joint pain and neck pain.   Skin: Negative for rash.   Allergic/Immunologic: Positive for environmental allergies.   Neurological: Positive for weakness, numbness and headaches.       Patient Active Problem List   Diagnosis   • Encounter for immunization   • Diabetes type 2, uncontrolled (CMS/HCC)   • Hyperlipidemia associated with type 2 diabetes mellitus (CMS/HCC)   • Morbid obesity (CMS/[facility])   • Vitamin D deficiency   • Essential hypertension   • Hyperlipidemia   • Urinary tract infection   • Acute pain of right shoulder   • Right arm pain   • Right elbow pain   • Lateral epicondylitis of right elbow   • Medial epicondylitis   • Encounter for screening mammogram for malignant neoplasm of breast   • Iron deficiency anemia   • Mild intermittent asthma   • Encounter for screening for endocrine disorder   • Gastroesophageal reflux disease   • Pain in both knees   • Controlled type 2 diabetes mellitus with complication, without long-term current use of insulin (CMS/[facility])   • Anemia   • Right knee pain   • Controlled type 2 diabetes mellitus with complication, with long-term current use of insulin (CMS/[facility])   • General medical examination   • Leukocytosis   • Chronic bilateral low back pain with sciatica   • Encounter for Papanicolaou smear of cervix   • Screening mammogram, encounter for   • Other fatigue   • MIGEL (obstructive sleep apnea)   • Moderate persistent asthma   • Acute bronchitis     Past Surgical History:   Procedure Laterality Date   •  SECTION  10/30/2000   • CHOLECYSTECTOMY  1995   • COLONOSCOPY  2013    normal per pt   • CYSTOSCOPY  2016    And bilateral retrograde pyelograms. Performed at [facility].   • TONSILLECTOMY  1985   • UMBILICAL HERNIA REPAIR  2015   • UMBILICAL HERNIA REPAIR  2015     Social History     Socioeconomic History   • Marital status:      Spouse  name: Not on file   • Number of children: Not on file   • Years of education: Not on file   • Highest education level: Not on file   Tobacco Use   • Smoking status: Never Smoker   • Smokeless tobacco: Never Used   Substance and Sexual Activity   • Alcohol use: No   • Drug use: No     Family History   Problem Relation Age of Onset   • Diabetes Mother    • Heart disease Mother    • Heart failure Mother         congestive   • Hypertension Mother    • Asthma Father    • Osteoarthritis Father      Lab on 07/11/2019   Component Date Value Ref Range Status   • WBC 07/11/2019 9.28  3.40 - 10.80 10*3/mm3 Final   • RBC 07/11/2019 5.74* 3.77 - 5.28 10*6/mm3 Final   • Hemoglobin 07/11/2019 11.7* 12.0 - 15.9 g/dL Final   • Hematocrit 07/11/2019 40.8  34.0 - 46.6 % Final   • MCV 07/11/2019 71.1* 79.0 - 97.0 fL Final   • MCH 07/11/2019 20.4* 26.6 - 33.0 pg Final   • MCHC 07/11/2019 28.7* 31.5 - 35.7 g/dL Final   • RDW 07/11/2019 18.2* 12.3 - 15.4 % Final   • RDW-SD 07/11/2019 43.0  37.0 - 54.0 fl Final   • MPV 07/11/2019 11.5  6.0 - 12.0 fL Final   • Platelets 07/11/2019 280  140 - 450 10*3/mm3 Final   • Neutrophil % 07/11/2019 49.7  42.7 - 76.0 % Final   • Lymphocyte % 07/11/2019 37.9  19.6 - 45.3 % Final   • Monocyte % 07/11/2019 6.4  5.0 - 12.0 % Final   • Eosinophil % 07/11/2019 4.8  0.3 - 6.2 % Final   • Basophil % 07/11/2019 0.8  0.0 - 1.5 % Final   • Neutrophils, Absolute 07/11/2019 4.61  1.70 - 7.00 10*3/mm3 Final   • Lymphocytes, Absolute 07/11/2019 3.52* 0.70 - 3.10 10*3/mm3 Final   • Monocytes, Absolute 07/11/2019 0.59  0.10 - 0.90 10*3/mm3 Final   • Eosinophils, Absolute 07/11/2019 0.45* 0.00 - 0.40 10*3/mm3 Final   • Basophils, Absolute 07/11/2019 0.07  0.00 - 0.20 10*3/mm3 Final   • Glucose 07/11/2019 101* 65 - 99 mg/dL Final   • BUN 07/11/2019 11  6 - 20 mg/dL Final   • Creatinine 07/11/2019 0.92  0.57 - 1.00 mg/dL Final   • Sodium 07/11/2019 135* 136 - 145 mmol/L Final   • Potassium 07/11/2019 4.5  3.5 - 5.2 mmol/L  Final   • Chloride 07/11/2019 102  98 - 107 mmol/L Final   • CO2 07/11/2019 24.7  22.0 - 29.0 mmol/L Final   • Calcium 07/11/2019 9.7  8.6 - 10.5 mg/dL Final   • Total Protein 07/11/2019 7.7  6.0 - 8.5 g/dL Final   • Albumin 07/11/2019 4.00  3.50 - 5.20 g/dL Final   • ALT (SGPT) 07/11/2019 17  1 - 33 U/L Final   • AST (SGOT) 07/11/2019 16  1 - 32 U/L Final   • Alkaline Phosphatase 07/11/2019 55  39 - 117 U/L Final   • Total Bilirubin 07/11/2019 0.2  0.2 - 1.2 mg/dL Final   • eGFR   Amer 07/11/2019 80  >60 mL/min/1.73 Final   • Globulin 07/11/2019 3.7  gm/dL Final   • A/G Ratio 07/11/2019 1.1  g/dL Final   • BUN/Creatinine Ratio 07/11/2019 12.0  7.0 - 25.0 Final   • Anion Gap 07/11/2019 8.3  5.0 - 15.0 mmol/L Final   • Hemoglobin A1C 07/11/2019 7.40* 4.80 - 5.60 % Final   • Total Cholesterol 07/11/2019 136  0 - 200 mg/dL Final   • Triglycerides 07/11/2019 82  0 - 150 mg/dL Final   • HDL Cholesterol 07/11/2019 42  40 - 60 mg/dL Final   • LDL Cholesterol  07/11/2019 78  0 - 100 mg/dL Final   • VLDL Cholesterol 07/11/2019 16.4  5 - 40 mg/dL Final   • LDL/HDL Ratio 07/11/2019 1.85   Final   • 25 Hydroxy, Vitamin D 07/11/2019 65.4  30.0 - 100.0 ng/ml Final   • Vitamin B-12 07/11/2019 916  211 - 946 pg/mL Final      Polysomnography 4 or More Parameters  [address] [address]  Phone: [phone]  Fax: [phone]    Split Night Polysomnography Interpretation  Patient's Name: Lizzy Restrepo  YOB: 1975  Date of Study: 06/28/2019  Referring provider: No info available  Primary Care Physician: Solitario Li MD  Pertinent History:  The patient is a 43 y.o. female who stands 63 inches tall and weighs 283   pounds. Her Body mass index is 50.13 kg/m². Her [place] Sleepiness Scale   Score is 12. Pertinent medical problems and pertinent medications include   are included in the sleep lab report and have been reviewed    BASELINE PORTION:  Sleep  Architecture:  Sleep onset occurred after 2.4 minutes, normal is < 20 minutes. Onset to   REM sleep was 81.4 minutes, normal is  minutes. Sleep efficiency was   91.6%, normal is >80% in age 65 and less. Total sleep time was 132.5   minutes. Wake after sleep onset was 9.7 minutes. The general pattern of   sleep state cycling, distribution, and maintenance was observed.     Sleep stages as a percent of total sleep time:    Stage N1 sleep - 1.9% , normal ~ 5%  Stage N2 sleep - 72.5% , normal ~ 50%  Stage N3 sleep - 0.0% , normal ~ 20%  Stage REM sleep - 25.7% , normal ~ 20% - 25%    There was no evidence of parasomnias (night terrors, sleepwalking, sleep   talking, etc.) on study night as noted in the record or by the technician.        Respiratory:  There were 0 central apneas, 0 obstructive apneas, 1 mixed apneas, and 48   hypopneas on study night. The apnea-hypopnea index (AHI) was 58.2 in REM   and 9.1 in NREM. The overall apnea-hypopnea index (AHI) was 22.2. Oxygen   saturations averaged 92.5% during total sleep time.  The oxygen dontae was   82.  The patient spent 4.5 minutes, or 3.4% of total sleep time with   saturations less than 89%.    Arousals:  The overall arousal index was 6.3.     Cardiac:  Nonspecific dysrhythmias were noted.     Periodic Limb Movements:  The periodic limb movement index was 0.     TREATMENT PORTION:    The patient qualifed for split night titration. She was titrated on CPAP   pressures of 5-10 cm H2O with a Full face mask. On the best setting, 10   cmH2O , the patient slept for 41 minutes, with 23 minutes of REM sleep.   The residual AHI was 1.5 and the oxygen saturation dontae was 94%.      Assessment:    1. Moderate  obstructive sleep apnea hypopnea syndrome with an AHI of   22.2.   2. REM dominant MIGEL.  3. Adequate control of sleep disordered breathing on 10 cm H2O    Recommendations:  1. I will start the patient on AutoCPAP from 10-20cm H2O with heated   humidification and a  "mask per patient’s choice.  2. Follow-up in the [facility] Disorders Clinic in 31-90 days after the   initiation of positive airway pressure therapy.  3. Close clinical follow up is recommended to ensure compliance and   resolution or reduction of symptoms.   4. Avoidance of sedatives, alcohol, and the supine position, all of which   can worsen apneic events.  5. Since obesity is known to exacerbate MIGEL and weight loss can improve   MIGEL symptoms, weight management should be a long-term goal.  6. Medically supervised weight reduction to achieve a BMI < 30 if   appropriate.7. Encourage good sleep hygiene practice and avoid sleep   deprivation    I have personally reviewed the raw data and summarized as above. Please   feel free to contact me if you have any questions. Thank you so much for   allowing me to participate in the care of this patient.    Sincerely,     This document has been electronically signed by   Chalino Ramsey MD     Medical Director  [facility]    CC: Solitario Li MD    [email or web address]  Immunization History   Administered Date(s) Administered   • Hepatitis B 07/19/2016, 08/23/2016, 02/17/2017   • Tdap 02/17/2017       The following portions of the patient's history were reviewed and updated as appropriate: allergies, current medications, past family history, past medical history, past social history, past surgical history and problem list.        Physical Exam  /80   Pulse 82   Temp 98.4 °F (36.9 °C)   Ht 160 cm (62.99\")   Wt 133 kg (292 lb 6.4 oz)   SpO2 98%   BMI 51.81 kg/m²       Physical Exam   Constitutional: She is oriented to person, place, and time. She appears well-developed and well-nourished.   HENT:   Head: Normocephalic and atraumatic.   Right Ear: External ear normal.   Eyes: Conjunctivae and EOM are normal. Pupils are equal, round, and reactive to light.   Neck: Normal range of motion. Neck supple.   Cardiovascular: Normal rate, regular rhythm " and normal heart sounds.   No murmur heard.  Pulmonary/Chest: Effort normal. No respiratory distress. She has wheezes.   Coarse breaths sounds in bronchial airways    Abdominal: Soft. Bowel sounds are normal. She exhibits no distension. There is no tenderness.   Obese abdomen    Musculoskeletal: Normal range of motion. She exhibits edema. She exhibits no deformity.   Neurological: She is alert and oriented to person, place, and time. No cranial nerve deficit.   Skin: Skin is warm. No rash noted. She is not diaphoretic. No erythema. No pallor.   Psychiatric: She has a normal mood and affect. Her behavior is normal.   Nursing note and vitals reviewed.      Assessment/Plan    Diagnosis Plan   1. Iron deficiency anemia, unspecified iron deficiency anemia type  Ambulatory Referral to Hematology   2. Vitamin D deficiency     3. Other fatigue  Ambulatory Referral to Hematology   4. MIGEL (obstructive sleep apnea)     5. Morbid obesity (CMS/[facility])     6. Gastroesophageal reflux disease, esophagitis presence not specified     7. Essential hypertension  CBC Auto Differential    Comprehensive Metabolic Panel    Hemoglobin A1c    Lipid Panel    Vitamin D 25 Hydroxy    Vitamin B12    Microalbumin / Creatinine Urine Ratio - Urine, Clean Catch   8. Controlled type 2 diabetes mellitus with complication, without long-term current use of insulin (CMS/[facility])     9. Hyperlipidemia, unspecified hyperlipidemia type     10. Moderate persistent asthma, unspecified whether complicated     11. Acute bronchitis, unspecified organism        -pt currently has Passport insurance but would like to change insurance to see Specialist. Gave number for medicaid services   -went over labwork in July 2019 recommend new labwork soon   -recommend influenza vaccination  She declined   -recommend pt get pap smear. She will call for an appt   -MIGEL- sleep medicine following. She will need to make a call for appt for CPAP  -recommend diabetic eye exam  -diabetic  foot exam today. She has dry feet recommend lotion   - Essential Hypertension - BP at goal. Continue with current medicaitons. Stop lisinopril-HCTZ. Start on lisinopril 20 mg daily.  -URI- pt has had symptoms for more than 2 weeks. Prednisone tapering dose. Azithromycin for 5 days. Diflucan if vaginitis occurs   - hyperlipidemia-  Recheck lipid panel The current medical regimen is effective;  continue present plan and medications.  - DM type 2-  Sugars improved with Trulicity. 0.75 mg weekly stopped lantus and insulin   stop januvia Hga1c improved. Performed diabetic foot exam today. Pt has dry feet on soles. Will give diabetic foot care informaiton. Recent hga1c>7.0. Restart jardiance 25 mg daily.  Gave coupon cards and samples today.   -recommend pap smear and mammogram screening  - vitamin D deficiency -continue vitamin D pill once a week.  - morbid obesity - BMI >40 - pt gained lbs since last visit. Continue with ketogenic diet.  Gave diet information to go. Gave bariatric weight loss surgery to go home with.  She has been reluctant for weight loss surgery.  BMI at 51.8   -referral to [facility] for pap smear   -for back pain - referral to PT/OT. Services appreciated. Continue muscle relaxant. Also consider x-ray of lumbar spine. Continue mobic and flexeril   - iron deficiency anemia -continue on iron pill TID. Her last CBC showed stable hemoglobin. Recommend Hematology referral and possible IV iron therapy. Consultation appreciated.   -lekuocytosis - slightly improved from last visit. Will continue to monitor. Consider Hematology refrral   - moderate persistent asthma - not controlled. Will go up on therapy and add qvar inhaler daily. Continue albuterol   - GERD - continue on protonix  -advised pt to be safe and call with questions and concerns  -advised to go to ER or call 911 if symptoms worrisome or severe  -advised to followup with Specialist and referrals  -recheck in 6 weeks  Or sooner if any  problems  arise         This document has been electronically signed by Solitario Li MD on October 31, 2019 10:26 AM

## 2019-10-31 ENCOUNTER — TELEPHONE (OUTPATIENT)
Dept: FAMILY MEDICINE CLINIC | Facility: CLINIC | Age: 44
End: 2019-10-31

## 2019-10-31 ENCOUNTER — OFFICE VISIT (OUTPATIENT)
Dept: FAMILY MEDICINE CLINIC | Facility: CLINIC | Age: 44
End: 2019-10-31

## 2019-10-31 VITALS
WEIGHT: 292.4 LBS | TEMPERATURE: 98.4 F | BODY MASS INDEX: 51.81 KG/M2 | DIASTOLIC BLOOD PRESSURE: 80 MMHG | HEART RATE: 82 BPM | HEIGHT: 63 IN | SYSTOLIC BLOOD PRESSURE: 130 MMHG | OXYGEN SATURATION: 98 %

## 2019-10-31 DIAGNOSIS — E11.8 CONTROLLED TYPE 2 DIABETES MELLITUS WITH COMPLICATION, WITHOUT LONG-TERM CURRENT USE OF INSULIN (HCC): ICD-10-CM

## 2019-10-31 DIAGNOSIS — R53.83 OTHER FATIGUE: ICD-10-CM

## 2019-10-31 DIAGNOSIS — E78.5 HYPERLIPIDEMIA, UNSPECIFIED HYPERLIPIDEMIA TYPE: ICD-10-CM

## 2019-10-31 DIAGNOSIS — D50.9 IRON DEFICIENCY ANEMIA, UNSPECIFIED IRON DEFICIENCY ANEMIA TYPE: Primary | ICD-10-CM

## 2019-10-31 DIAGNOSIS — J20.9 ACUTE BRONCHITIS, UNSPECIFIED ORGANISM: ICD-10-CM

## 2019-10-31 DIAGNOSIS — J45.40 MODERATE PERSISTENT ASTHMA, UNSPECIFIED WHETHER COMPLICATED: ICD-10-CM

## 2019-10-31 DIAGNOSIS — K21.9 GASTROESOPHAGEAL REFLUX DISEASE, ESOPHAGITIS PRESENCE NOT SPECIFIED: ICD-10-CM

## 2019-10-31 DIAGNOSIS — E55.9 VITAMIN D DEFICIENCY: ICD-10-CM

## 2019-10-31 DIAGNOSIS — G47.33 OSA (OBSTRUCTIVE SLEEP APNEA): ICD-10-CM

## 2019-10-31 DIAGNOSIS — E66.01 MORBID OBESITY (HCC): ICD-10-CM

## 2019-10-31 DIAGNOSIS — I10 ESSENTIAL HYPERTENSION: ICD-10-CM

## 2019-10-31 PROCEDURE — 99214 OFFICE O/P EST MOD 30 MIN: CPT | Performed by: FAMILY MEDICINE

## 2019-10-31 RX ORDER — OMEPRAZOLE 40 MG/1
40 CAPSULE, DELAYED RELEASE ORAL DAILY
Qty: 90 CAPSULE | Refills: 3 | Status: SHIPPED | OUTPATIENT
Start: 2019-10-31 | End: 2020-04-20 | Stop reason: SDUPTHER

## 2019-10-31 RX ORDER — LORATADINE 10 MG/1
10 TABLET ORAL DAILY
Qty: 90 TABLET | Refills: 3 | Status: SHIPPED | OUTPATIENT
Start: 2019-10-31 | End: 2019-12-12 | Stop reason: SDUPTHER

## 2019-10-31 RX ORDER — ALBUTEROL SULFATE 90 UG/1
2 AEROSOL, METERED RESPIRATORY (INHALATION) EVERY 4 HOURS PRN
Qty: 1 INHALER | Refills: 0 | Status: SHIPPED | OUTPATIENT
Start: 2019-10-31 | End: 2019-12-12 | Stop reason: SDUPTHER

## 2019-10-31 RX ORDER — AZITHROMYCIN 250 MG/1
TABLET, FILM COATED ORAL
Qty: 6 TABLET | Refills: 0 | Status: SHIPPED | OUTPATIENT
Start: 2019-10-31 | End: 2020-04-20

## 2019-10-31 RX ORDER — ASPIRIN 81 MG/1
81 TABLET, CHEWABLE ORAL DAILY
Qty: 30 TABLET | Refills: 6 | Status: SHIPPED | OUTPATIENT
Start: 2019-10-31 | End: 2019-12-12 | Stop reason: SDUPTHER

## 2019-10-31 RX ORDER — MONTELUKAST SODIUM 10 MG/1
10 TABLET ORAL NIGHTLY
Qty: 30 TABLET | Refills: 6 | Status: SHIPPED | OUTPATIENT
Start: 2019-10-31 | End: 2019-12-12 | Stop reason: SDUPTHER

## 2019-10-31 RX ORDER — LANOLIN ALCOHOL/MO/W.PET/CERES
325 CREAM (GRAM) TOPICAL
Qty: 90 TABLET | Refills: 6 | Status: SHIPPED | OUTPATIENT
Start: 2019-10-31 | End: 2020-04-20

## 2019-10-31 RX ORDER — FLUTICASONE PROPIONATE 50 MCG
2 SPRAY, SUSPENSION (ML) NASAL DAILY
Qty: 16 G | Refills: 6 | Status: SHIPPED | OUTPATIENT
Start: 2019-10-31 | End: 2019-12-12 | Stop reason: SDUPTHER

## 2019-10-31 RX ORDER — FLUCONAZOLE 150 MG/1
150 TABLET ORAL ONCE
Qty: 2 TABLET | Refills: 0 | Status: SHIPPED | OUTPATIENT
Start: 2019-10-31 | End: 2019-10-31

## 2019-10-31 RX ORDER — ATORVASTATIN CALCIUM 40 MG/1
40 TABLET, FILM COATED ORAL DAILY
Qty: 90 TABLET | Refills: 3 | Status: SHIPPED | OUTPATIENT
Start: 2019-10-31 | End: 2019-12-12 | Stop reason: SDUPTHER

## 2019-10-31 RX ORDER — LISINOPRIL 20 MG/1
20 TABLET ORAL DAILY
Qty: 90 TABLET | Refills: 3 | Status: SHIPPED | OUTPATIENT
Start: 2019-10-31 | End: 2019-12-12 | Stop reason: SDUPTHER

## 2019-10-31 RX ORDER — PREDNISONE 10 MG/1
TABLET ORAL
Qty: 30 TABLET | Refills: 0 | Status: SHIPPED | OUTPATIENT
Start: 2019-10-31 | End: 2020-04-20

## 2019-10-31 NOTE — PATIENT INSTRUCTIONS
[facility]  [phone] ()    Call dr. Ramsey for appt for cpap    Call OB/GYN for pap smear    New medications    qvar inhaler 1 puff twice a day  Use albuterol as needed     For bronchitis  Prednisone tapering dose  Azithromycin abx for 5 days    Will refer to Dr. Philippe for iron deficiency and possible iv iron therapy    Recheck in 6 weeks or sooner    Get labwork before next visit.     Call if having issues getting both jardiance and trulicity for sguars.         Beclomethasone respiratory inhalation aerosol  What is this medicine?  BECLOMETHASONE (be kloe METH a sone) is a corticosteroid. It helps decrease inflammation in your lungs. This medicine is used to treat the symptoms of asthma. Never use this medicine for an acute asthma attack.  This medicine may be used for other purposes; ask your health care provider or pharmacist if you have questions.  COMMON BRAND NAME(S): Beclovent, QVAR, Vancenase, Vancenase AQ, Vanceril  What should I tell my health care provider before I take this medicine?  They need to know if you have any of these conditions:  -bone problems  -eye disease, vision problems  -glaucoma  -immune system problems  -infection, like chickenpox, tuberculosis, herpes, or fungal infection  -recent surgery or injury of mouth or throat  -taking corticosteroids by mouth  -an unusual or allergic reaction to beclomethasone, other corticosteroids, other medicines, foods, dyes, or preservatives  -pregnant or trying to get pregnant  -breast-feeding  How should I use this medicine?  This medicine is for inhalation through the mouth. Rinse your mouth with water after use. Make sure not to swallow the water. Follow the directions on your prescription label. This medicine works best if used regularly. Do not use more than directed. Do not stop taking except on your doctor's advice. Make sure that you are using your inhaler correctly. Ask you doctor or health care provider if you have any  questions.  Talk to your pediatrician regarding the use of this medicine in children. While this drug may be prescribed for children as young as 5 years old for selected conditions, precautions do apply.  Overdosage: If you think you have taken too much of this medicine contact a poison control center or emergency room at once.  NOTE: This medicine is only for you. Do not share this medicine with others.  What if I miss a dose?  If you miss a dose, use it as soon as you remember. If it is almost time for your next dose, use only that dose and continue with your regular schedule, spacing doses evenly. Do not use double or extra doses.  What may interact with this medicine?  Interactions are not expected.  This list may not describe all possible interactions. Give your health care provider a list of all the medicines, herbs, non-prescription drugs, or dietary supplements you use. Also tell them if you smoke, drink alcohol, or use illegal drugs. Some items may interact with your medicine.  What should I watch for while using this medicine?  Visit your doctor or health care professional for regular check ups. Use this medicine regularly. Tell your doctor if your symptoms do not improve. Do not use extra medicine. If your asthma symptoms get worse while you are using this medicine, call your doctor right away.  Do not come in contact with people who have chickenpox or the measles while you are taking this medicine. If you do, call your doctor right away.  What side effects may I notice from receiving this medicine?  Side effects that you should report to your doctor or health care professional as soon as possible:  -allergic reactions like skin rash, itching or hives, swelling of the face, lips, or tongue  -changes in vision  -chest pain, tightness  -fever, infection  -trouble breathing, wheezing  -unusual swelling  -white patches or sores in the mouth or throat  Side effects that usually do not require medical attention  (report to your doctor or health care professional if they continue or are bothersome):  -burning, irritation in throat  -cough  -dry mouth  -headache  -unusual taste or smell  This list may not describe all possible side effects. Call your doctor for medical advice about side effects. You may report side effects to FDA at 1-066-FDA-7252.  Where should I keep my medicine?  Keep out of the reach of children.  Store at room temperature between 15 and 30 degrees C (59 and 86 degrees F). Do not puncture the canister or throw on a fire or incinerator. Throw away any unused medicine after the expiration date.  NOTE: This sheet is a summary. It may not cover all possible information. If you have questions about this medicine, talk to your doctor, pharmacist, or health care provider.  © 2019 [employer] (2019-05-08 14:44:44)  Prednisone tablets  What is this medicine?  PREDNISONE (PRED ni sone) is a corticosteroid. It is commonly used to treat inflammation of the skin, joints, lungs, and other organs. Common conditions treated include asthma, allergies, and arthritis. It is also used for other conditions, such as blood disorders and diseases of the adrenal glands.  This medicine may be used for other purposes; ask your health care provider or pharmacist if you have questions.  COMMON BRAND NAME(S): Deltasone, Predone, Sterapred, Sterapred DS  What should I tell my health care provider before I take this medicine?  They need to know if you have any of these conditions:  -Cushing's syndrome  -diabetes  -glaucoma  -heart disease  -high blood pressure  -infection (especially a virus infection such as chickenpox, cold sores, or herpes)  -kidney disease  -liver disease  -mental illness  -myasthenia gravis  -osteoporosis  -seizures  -stomach or intestine problems  -thyroid disease  -an unusual or allergic reaction to lactose, prednisone, other medicines, foods, dyes, or preservatives  -pregnant or trying to get  pregnant  -breast-feeding  How should I use this medicine?  Take this medicine by mouth with a glass of water. Follow the directions on the prescription label. Take this medicine with food. If you are taking this medicine once a day, take it in the morning. Do not take more medicine than you are told to take. Do not suddenly stop taking your medicine because you may develop a severe reaction. Your doctor will tell you how much medicine to take. If your doctor wants you to stop the medicine, the dose may be slowly lowered over time to avoid any side effects.  Talk to your pediatrician regarding the use of this medicine in children. Special care may be needed.  Overdosage: If you think you have taken too much of this medicine contact a poison control center or emergency room at once.  NOTE: This medicine is only for you. Do not share this medicine with others.  What if I miss a dose?  If you miss a dose, take it as soon as you can. If it is almost time for your next dose, talk to your doctor or health care professional. You may need to miss a dose or take an extra dose. Do not take double or extra doses without advice.  What may interact with this medicine?  Do not take this medicine with any of the following medications:  -metyrapone  -mifepristone  This medicine may also interact with the following medications:  -aminoglutethimide  -amphotericin B  -aspirin and aspirin-like medicines  -barbiturates  -certain medicines for diabetes, like glipizide or glyburide  -cholestyramine  -cholinesterase inhibitors  -cyclosporine  -digoxin  -diuretics  -ephedrine  -female hormones, like estrogens and birth control pills  -isoniazid  -ketoconazole  -NSAIDS, medicines for pain and inflammation, like ibuprofen or naproxen  -phenytoin  -rifampin  -toxoids  -vaccines  -warfarin  This list may not describe all possible interactions. Give your health care provider a list of all the medicines, herbs, non-prescription drugs, or dietary  supplements you use. Also tell them if you smoke, drink alcohol, or use illegal drugs. Some items may interact with your medicine.  What should I watch for while using this medicine?  Visit your doctor or health care professional for regular checks on your progress. If you are taking this medicine over a prolonged period, carry an identification card with your name and address, the type and dose of your medicine, and your doctor's name and address.  This medicine may increase your risk of getting an infection. Tell your doctor or health care professional if you are around anyone with measles or chickenpox, or if you develop sores or blisters that do not heal properly.  If you are going to have surgery, tell your doctor or health care professional that you have taken this medicine within the last twelve months.  Ask your doctor or health care professional about your diet. You may need to lower the amount of salt you eat.  This medicine may increase blood sugar. Ask your healthcare provider if changes in diet or medicines are needed if you have diabetes.  What side effects may I notice from receiving this medicine?  Side effects that you should report to your doctor or health care professional as soon as possible:  -allergic reactions like skin rash, itching or hives, swelling of the face, lips, or tongue  -changes in emotions or moods  -changes in vision  -depressed mood  -eye pain  -fever or chills, cough, sore throat, pain or difficulty passing urine  -signs and symptoms of high blood sugar such as being more thirsty or hungry or having to urinate more than normal. You may also feel very tired or have blurry vision.  -swelling of ankles, feet  Side effects that usually do not require medical attention (report to your doctor or health care professional if they continue or are bothersome):  -confusion, excitement, restlessness  -headache  -nausea, vomiting  -skin problems, acne, thin and shiny skin  -trouble  sleeping  -weight gain  This list may not describe all possible side effects. Call your doctor for medical advice about side effects. You may report side effects to FDA at 7-121-WUX-9477.  Where should I keep my medicine?  Keep out of the reach of children.  Store at room temperature between 15 and 30 degrees C (59 and 86 degrees F). Protect from light. Keep container tightly closed. Throw away any unused medicine after the expiration date.  NOTE: This sheet is a summary. It may not cover all possible information. If you have questions about this medicine, talk to your doctor, pharmacist, or health care provider.  © 2019 [employer] (2019-09-17 10:54:22)  Azithromycin tablets  What is this medicine?  AZITHROMYCIN (az ith shikha MYE sin) is a macrolide antibiotic. It is used to treat or prevent certain kinds of bacterial infections. It will not work for colds, flu, or other viral infections.  This medicine may be used for other purposes; ask your health care provider or pharmacist if you have questions.  COMMON BRAND NAME(S): Zithromax, Zithromax Tri-Yeison, Zithromax Z-Yeison  What should I tell my health care provider before I take this medicine?  They need to know if you have any of these conditions:  -history of blood diseases, like leukemia  -history of irregular heartbeat  -kidney disease  -liver disease  -myasthenia gravis  -an unusual or allergic reaction to azithromycin, erythromycin, other macrolide antibiotics, foods, dyes, or preservatives  -pregnant or trying to get pregnant  -breast-feeding  How should I use this medicine?  Take this medicine by mouth with a full glass of water. Follow the directions on the prescription label. The tablets can be taken with food or on an empty stomach. If the medicine upsets your stomach, take it with food. Take your medicine at regular intervals. Do not take your medicine more often than directed. Take all of your medicine as directed even if you think your are  better. Do not skip doses or stop your medicine early.  Talk to your pediatrician regarding the use of this medicine in children. While this drug may be prescribed for children as young as 6 months for selected conditions, precautions do apply.  Overdosage: If you think you have taken too much of this medicine contact a poison control center or emergency room at once.  NOTE: This medicine is only for you. Do not share this medicine with others.  What if I miss a dose?  If you miss a dose, take it as soon as you can. If it is almost time for your next dose, take only that dose. Do not take double or extra doses.  What may interact with this medicine?  Do not take this medicine with any of the following medications:  -cisapride  -dronedarone  -pimozide  -thioridazine  This medicine may also interact with the following medications:  -antacids that contain aluminum or magnesium  -birth control pills  -certain medicines for irregular heart beat like amiodarone, dofetilide, encainide, flecainide, propafenone, quinidine  -colchicine  -cyclosporine  -digoxin  -nelfinavir  -phenytoin  -warfarin  This list may not describe all possible interactions. Give your health care provider a list of all the medicines, herbs, non-prescription drugs, or dietary supplements you use. Also tell them if you smoke, drink alcohol, or use illegal drugs. Some items may interact with your medicine.  What should I watch for while using this medicine?  Tell your doctor or healthcare professional if your symptoms do not start to get better or if they get worse.  Do not treat diarrhea with over the counter products. Contact your doctor if you have diarrhea that lasts more than 2 days or if it is severe and watery.  This medicine can make you more sensitive to the sun. Keep out of the sun. If you cannot avoid being in the sun, wear protective clothing and use sunscreen. Do not use sun lamps or tanning beds/booths.  What side effects may I notice from  receiving this medicine?  Side effects that you should report to your doctor or health care professional as soon as possible:  -allergic reactions like skin rash, itching or hives, swelling of the face, lips, or tongue  -bloody or watery diarrhea  -breathing problems  -chest pain  -fast, irregular heartbeat  -muscle weakness  -redness, blistering, peeling or loosening of the skin, including inside the mouth  -signs and symptoms of liver injury like dark yellow or brown urine; general ill feeling or flu-like symptoms; light-colored stools; loss of appetite; nausea; right upper belly pain; unusually weak or tired; yellowing of the eyes or skin  -white patches or sores in the mouth  -unusually weak or tired  Side effects that usually do not require medical attention (report to your doctor or health care professional if they continue or are bothersome):  -diarrhea  -nausea  -stomach pain  -vomiting  This list may not describe all possible side effects. Call your doctor for medical advice about side effects. You may report side effects to FDA at 8-520-FDA-5423.  Where should I keep my medicine?  Keep out of the reach of children.  Store at room temperature between 15 and 30 degrees C (59 and 86 degrees F). Throw away any unused medicine after the expiration date.  NOTE: This sheet is a summary. It may not cover all possible information. If you have questions about this medicine, talk to your doctor, pharmacist, or health care provider.  © 2019 [employer] Standard (2019-04-26 15:21:46)    Upper Respiratory Infection, Adult  An upper respiratory infection (URI) is a common viral infection of the nose, throat, and upper air passages that lead to the lungs. The most common type of URI is the common cold. URIs usually get better on their own, without medical treatment.  What are the causes?  A URI is caused by a virus. You may catch a virus by:  · Breathing in droplets from an infected person's cough or sneeze.  · Touching  something that has been exposed to the virus (contaminated) and then touching your mouth, nose, or eyes.  What increases the risk?  You are more likely to get a URI if:  · You are very young or very old.  · It is chinedu or winter.  · You have close contact with others, such as at a , school, or health care facility.  · You smoke.  · You have long-term (chronic) heart or lung disease.  · You have a weakened disease-fighting (immune) system.  · You have nasal allergies or asthma.  · You are experiencing a lot of stress.  · You work in an area that has poor air circulation.  · You have poor nutrition.  What are the signs or symptoms?  A URI usually involves some of the following symptoms:  · Runny or stuffy (congested) nose.  · Sneezing.  · Cough.  · Sore throat.  · Headache.  · Fatigue.  · Fever.  · Loss of appetite.  · Pain in your forehead, behind your eyes, and over your cheekbones (sinus pain).  · Muscle aches.  · Redness or irritation of the eyes.  · Pressure in the ears or face.  How is this diagnosed?  This condition may be diagnosed based on your medical history and symptoms, and a physical exam. Your health care provider may use a cotton swab to take a mucus sample from your nose (nasal swab). This sample can be tested to determine what virus is causing the illness.  How is this treated?  URIs usually get better on their own within 7-10 days. You can take steps at home to relieve your symptoms. Medicines cannot cure URIs, but your health care provider may recommend certain medicines to help relieve symptoms, such as:  · Over-the-counter cold medicines.  · Cough suppressants. Coughing is a type of defense against infection that helps to clear the respiratory system, so take these medicines only as recommended by your health care provider.  · Fever-reducing medicines.  Follow these instructions at home:  Activity  · Rest as needed.  · If you have a fever, stay home from work or school until your fever is  gone or until your health care provider says you are no longer contagious. Your health care provider may have you wear a face mask to prevent your infection from spreading.  Relieving symptoms  · Gargle with a salt-water mixture 3-4 times a day or as needed. To make a salt-water mixture, completely dissolve ½-1 tsp of salt in 1 cup of warm water.  · Use a cool-mist humidifier to add moisture to the air. This can help you breathe more easily.  Eating and drinking    · Drink enough fluid to keep your urine pale yellow.  · Eat soups and other clear broths.  General instructions    · Take over-the-counter and prescription medicines only as told by your health care provider. These include cold medicines, fever reducers, and cough suppressants.  · Do not use any products that contain nicotine or tobacco, such as cigarettes and e-cigarettes. If you need help quitting, ask your health care provider.  · Stay away from secondhand smoke.  · Stay up to date on all immunizations, including the yearly (annual) flu vaccine.  · Keep all follow-up visits as told by your health care provider. This is important.  How to prevent the spread of infection to others    · URIs can be passed from person to person (are contagious). To prevent the infection from spreading:  ? Wash your hands often with soap and water. If soap and water are not available, use hand .  ? Avoid touching your mouth, face, eyes, or nose.  ? Cough or sneeze into a tissue or your sleeve or elbow instead of into your hand or into the air.  Contact a health care provider if:  · You are getting worse instead of better.  · You have a fever or chills.  · Your mucus is brown or red.  · You have yellow or brown discharge coming from your nose.  · You have pain in your face, especially when you bend forward.  · You have swollen neck glands.  · You have pain while swallowing.  · You have white areas in the back of your throat.  Get help right away if:  · You have  shortness of breath that gets worse.  · You have severe or persistent:  ? Headache.  ? Ear pain.  ? Sinus pain.  ? Chest pain.  · You have chronic lung disease along with any of the following:  ? Wheezing.  ? Prolonged cough.  ? Coughing up blood.  ? A change in your usual mucus.  · You have a stiff neck.  · You have changes in your:  ? Vision.  ? Hearing.  ? Thinking.  ? Mood.  Summary  · An upper respiratory infection (URI) is a common infection of the nose, throat, and upper air passages that lead to the lungs.  · A URI is caused by a virus.  · URIs usually get better on their own within 7-10 days.  · Medicines cannot cure URIs, but your health care provider may recommend certain medicines to help relieve symptoms.  This information is not intended to replace advice given to you by your health care provider. Make sure you discuss any questions you have with your health care provider.  Document Released: 06/13/2002 Document Revised: 08/03/2018 Document Reviewed: 08/03/2018  [employer] Interactive Patient Education © 2019 [employer]

## 2019-11-04 ENCOUNTER — PRIOR AUTHORIZATION (OUTPATIENT)
Dept: FAMILY MEDICINE CLINIC | Facility: CLINIC | Age: 44
End: 2019-11-04

## 2019-12-04 NOTE — PROGRESS NOTES
Subjective:  Lizzy Restrepo is a 44 y.o. female who presents for       Patient Active Problem List   Diagnosis   • Encounter for immunization   • Diabetes type 2, uncontrolled (CMS/HCC)   • Hyperlipidemia associated with type 2 diabetes mellitus (CMS/HCC)   • Morbid obesity (CMS/HCC)   • Vitamin D deficiency   • Essential hypertension   • Hyperlipidemia   • Urinary tract infection   • Acute pain of right shoulder   • Right arm pain   • Right elbow pain   • Lateral epicondylitis of right elbow   • Medial epicondylitis   • Encounter for screening mammogram for malignant neoplasm of breast   • Iron deficiency anemia   • Mild intermittent asthma   • Encounter for screening for endocrine disorder   • Gastroesophageal reflux disease   • Pain in both knees   • Controlled type 2 diabetes mellitus with complication, without long-term current use of insulin (CMS/HCC)   • Anemia   • Right knee pain   • Controlled type 2 diabetes mellitus with complication, with long-term current use of insulin (CMS/[facility])   • General medical examination   • Leukocytosis   • Chronic bilateral low back pain with sciatica   • Encounter for Papanicolaou smear of cervix   • Screening mammogram, encounter for   • Other fatigue   • MIGEL (obstructive sleep apnea)   • Moderate persistent asthma   • Acute bronchitis           Current Outpatient Medications:   •  albuterol sulfate HFA (VENTOLIN HFA) 108 (90 Base) MCG/ACT inhaler, Inhale 2 puffs Every 4 (Four) Hours As Needed for Shortness of Air., Disp: 1 inhaler, Rfl: 0  •  aspirin 81 MG chewable tablet, Chew 1 tablet Daily., Disp: 30 tablet, Rfl: 6  •  atorvastatin (LIPITOR) 40 MG tablet, Take 1 tablet by mouth Daily., Disp: 90 tablet, Rfl: 3  •  cholecalciferol (OPTIMAL-D) 1.25 MG (65481 UT) capsule, Take 1 capsule by mouth 1 (One) Time Per Week., Disp: 4 capsule, Rfl: 3  •  Dulaglutide (TRULICITY) 0.75 MG/0.5ML solution pen-injector, Inject 0.75 mg under the skin into the appropriate area as directed  "1 (One) Time Per Week., Disp: 12 pen, Rfl: 3  •  fluticasone (FLONASE) 50 MCG/ACT nasal spray, 2 sprays into the nostril(s) as directed by provider Daily., Disp: 16 g, Rfl: 6  •  lisinopril (PRINIVIL,ZESTRIL) 20 MG tablet, Take 1 tablet by mouth Daily., Disp: 90 tablet, Rfl: 3  •  loratadine (EQ LORATADINE) 10 MG tablet, Take 1 tablet by mouth Daily., Disp: 90 tablet, Rfl: 3  •  montelukast (SINGULAIR) 10 MG tablet, Take 1 tablet by mouth Every Night., Disp: 30 tablet, Rfl: 6  •  ReliOn Ultra Thin Lancets misc, , Disp: , Rfl:   •  azithromycin (ZITHROMAX) 250 MG tablet, Take 2 tablets the first day, then 1 tablet daily for 4 days., Disp: 6 tablet, Rfl: 0  •  Beclomethasone Diprop HFA (QVAR REDIHALER) 80 MCG/ACT inhaler, Inhale 1 puff 2 (Two) Times a Day., Disp: 1 inhaler, Rfl: 3  •  cyclobenzaprine (FLEXERIL) 10 MG tablet, Take 1 tablet by mouth At Night As Needed for Muscle Spasms., Disp: 30 tablet, Rfl: 3  •  Ertugliflozin L-PyroglutamicAc (STEGLATRO) 15 MG tablet, Take 1 tablet by mouth Every Morning., Disp: 30 tablet, Rfl: 3  •  ferrous sulfate 325 (65 FE) MG EC tablet, Take 1 tablet by mouth 3 (Three) Times a Day With Meals., Disp: 90 tablet, Rfl: 6  •  Insulin Pen Needle (PEN NEEDLES) 31G X 8 MM misc, , Disp: , Rfl:   •  Insulin Syringe-Needle U-100 30G X 5/16\" 1 ML misc, , Disp: , Rfl:   •  meloxicam (MOBIC) 15 MG tablet, Take 1 tablet by mouth Daily., Disp: 30 tablet, Rfl: 6  •  omeprazole (priLOSEC) 40 MG capsule, Take 1 capsule by mouth Daily., Disp: 90 capsule, Rfl: 3  •  predniSONE (DELTASONE) 10 MG tablet, Take 4 pills for 4 days. 3 pills for 3 days 2 pills for 3 days and 2 pills for 2 days then stop, Disp: 30 tablet, Rfl: 0    HPI        Pt is 45 yo female with management of morbid obesity, moderate persistent asthma, allergic rhinitis, IDDM type 2,, HLP, HTN,  Vitamin D deficiency,GERD, chronic fatigue, MIGEL, multiple joint pain, sp cholecystectomy sp tonsillectomy, sp umbilical hernia repair, sp " , iron deficiency anemia , CKD stage 2         19 pt is here for recheck. She has upcoming sleep study this month.  She is doing well although she states she gets swollen. Was taking jardiance for diabetes type 2.  It did help with sugars. Last hga1c was 7.9      10/31/19 pt is here for recheck and followup.  Pt had labwork  On 19 that showed normal Vitamin B12 and vitamin D lipid panel was stable. hga1c imprvoed from 7.9 to 7.40.  CMP showed GFR in 80s.   CBC showed hemoglobin at 11.7. With MCV at 71. Pt does have iron deficiency anemia. She was referred to sleep medicine for her fatigue and she missed her appt on 10/25/19.   She has been ill lately for past 2 weeks. Symptoms include cough congestion, no fever. She did not get her flu vaccination yet. She recently lost her job at [employer] a few months ago and has a new interview  As a caregiver. She  Has yet to get her CPAP machine. In regards to her DM type 2 she is still taking jardiance but is not on trulicity due to insurance issues. She has been checking  Her sugar do not go over 200. She also has been using her albuterol inhaler more than 2 times a week for her asthma      19 pt is here for recheck and followup. Pt is due for new labwork. She will get new insurance soon. She continues to take steglatro and trulicity. She also needs to followup with her Sleep Medicine for MIGEL. She has yet to get her CPAP. She is not working now. But has upcoming            Shortness of Breath   This is a chronic problem. The current episode started more than 1 year ago. The problem occurs constantly. The problem has been gradually worsening. Associated symptoms include headaches and wheezing. Pertinent negatives include no abdominal pain, chest pain, claudication, coryza, ear pain, fever, hemoptysis, leg pain, leg swelling, neck pain, orthopnea, PND, rash, rhinorrhea, sore throat, sputum production, swollen glands, syncope or vomiting. Nothing  aggravates the symptoms. Risk factors include recent leg injury. She has tried beta agonist inhalers for the symptoms. The treatment provided no relief. Her past medical history is significant for allergies and asthma. There is no history of aspirin allergies, bronchiolitis, CAD, chronic lung disease, COPD, DVT, a heart failure, PE, pneumonia or a recent surgery.   URI    This is a new problem. The current episode started 1 to 4 weeks ago. The problem has been unchanged. There has been no fever. Associated symptoms include congestion, coughing, headaches and wheezing. Pertinent negatives include no abdominal pain, chest pain, diarrhea, dysuria, ear pain, joint pain, nausea, neck pain, plugged ear sensation, rash, rhinorrhea, sinus pain, sneezing, sore throat, swollen glands or vomiting. She has tried nothing (albuterol inhaler singulair ) for the symptoms. The treatment provided no relief.   Diabetes   She presents for her follow-up diabetic visit. She has type 2 diabetes mellitus. Her disease course has been stable. Pertinent negatives for hypoglycemia include no confusion, dizziness, headaches, hunger, mood changes, nervousness/anxiousness, pallor, seizures, sleepiness, speech difficulty, sweats or tremors. Associated symptoms include fatigue. Pertinent negatives for diabetes include no blurred vision, no chest pain, no foot paresthesias, no foot ulcerations, no polydipsia, no polyphagia, no polyuria, no visual change, no weakness and no weight loss. Pertinent negatives for hypoglycemia complications include no blackouts, no hospitalization, no nocturnal hypoglycemia, no required assistance and no required glucagon injection. Symptoms are stable. Pertinent negatives for diabetic complications include no autonomic neuropathy, CVA, heart disease, nephropathy, peripheral neuropathy, PVD or retinopathy. Risk factors for coronary artery disease include diabetes mellitus, dyslipidemia and obesity. Current diabetic  treatment includes insulin injections. She is following a diabetic diet. When asked about meal planning, she reported none. She has not had a previous visit with a dietitian. She participates in exercise intermittently. She does not see a podiatrist.Eye exam is not current.   Knee Pain    The incident occurred more than 1 week ago. The incident occurred at the gym. There was no injury mechanism. The pain is present in the right knee. The quality of the pain is described as aching. The pain is at a severity of 5/10. The pain is moderate. The pain has been constant since onset. Pertinent negatives include no inability to bear weight, loss of motion, loss of sensation, muscle weakness, numbness or tingling. She reports no foreign bodies present. The symptoms are aggravated by movement and palpation. She has tried nothing for the symptoms. The treatment provided no relief.   Obesity   This is a chronic problem. The current episode started more than 1 year ago. The problem has been unchanged. Associated symptoms include arthralgias, fatigue and nausea. Pertinent negatives include no abdominal pain, anorexia, change in bowel habit, chest pain, chills, congestion, coughing, fever, headaches, joint swelling, myalgias, neck pain, numbness, rash, sore throat, swollen glands, urinary symptoms, vertigo, visual change, vomiting or weakness. Nothing aggravates the symptoms. She has tried nothing for the symptoms. The treatment provided no relief.        Review of Systems  Review of Systems   Constitutional: Positive for activity change and fatigue. Negative for appetite change, chills, diaphoresis and fever.   HENT: Negative for congestion, postnasal drip, rhinorrhea, sinus pressure, sinus pain, sneezing, sore throat, trouble swallowing and voice change.    Respiratory: Positive for cough and shortness of breath. Negative for choking, chest tightness, wheezing and stridor.    Cardiovascular: Negative for chest pain.    Gastrointestinal: Negative for diarrhea, nausea and vomiting.   Musculoskeletal: Positive for arthralgias and back pain.   Allergic/Immunologic: Positive for environmental allergies.   Neurological: Positive for weakness and numbness. Negative for headaches.       Patient Active Problem List   Diagnosis   • Encounter for immunization   • Diabetes type 2, uncontrolled (CMS/[facility])   • Hyperlipidemia associated with type 2 diabetes mellitus (CMS/[facility])   • Morbid obesity (CMS/[facility])   • Vitamin D deficiency   • Essential hypertension   • Hyperlipidemia   • Urinary tract infection   • Acute pain of right shoulder   • Right arm pain   • Right elbow pain   • Lateral epicondylitis of right elbow   • Medial epicondylitis   • Encounter for screening mammogram for malignant neoplasm of breast   • Iron deficiency anemia   • Mild intermittent asthma   • Encounter for screening for endocrine disorder   • Gastroesophageal reflux disease   • Pain in both knees   • Controlled type 2 diabetes mellitus with complication, without long-term current use of insulin (CMS/[facility])   • Anemia   • Right knee pain   • Controlled type 2 diabetes mellitus with complication, with long-term current use of insulin (CMS/[facility])   • General medical examination   • Leukocytosis   • Chronic bilateral low back pain with sciatica   • Encounter for Papanicolaou smear of cervix   • Screening mammogram, encounter for   • Other fatigue   • MIGEL (obstructive sleep apnea)   • Moderate persistent asthma   • Acute bronchitis     Past Surgical History:   Procedure Laterality Date   •  SECTION  10/30/2000   • CHOLECYSTECTOMY  1995   • COLONOSCOPY  2013    normal per pt   • CYSTOSCOPY  2016    And [facility]   • TONSILLECTOMY  1985   • UMBILICAL HERNIA REPAIR  2015   • UMBILICAL HERNIA REPAIR  2015     Social History     Socioeconomic History   • Marital status:       Spouse name: Not on file   • Number of children: Not on file   • Years of education: Not on file   • Highest education level: Not on file   Tobacco Use   • Smoking status: Never Smoker   • Smokeless tobacco: Never Used   Substance and Sexual Activity   • Alcohol use: No   • Drug use: No     Family History   Problem Relation Age of Onset   • Diabetes Mother    • Heart disease Mother    • Heart failure Mother         congestive   • Hypertension Mother    • Asthma Father    • Osteoarthritis Father      Lab on 07/11/2019   Component Date Value Ref Range Status   • WBC 07/11/2019 9.28  3.40 - 10.80 10*3/mm3 Final   • RBC 07/11/2019 5.74* 3.77 - 5.28 10*6/mm3 Final   • Hemoglobin 07/11/2019 11.7* 12.0 - 15.9 g/dL Final   • Hematocrit 07/11/2019 40.8  34.0 - 46.6 % Final   • MCV 07/11/2019 71.1* 79.0 - 97.0 fL Final   • MCH 07/11/2019 20.4* 26.6 - 33.0 pg Final   • MCHC 07/11/2019 28.7* 31.5 - 35.7 g/dL Final   • RDW 07/11/2019 18.2* 12.3 - 15.4 % Final   • RDW-SD 07/11/2019 43.0  37.0 - 54.0 fl Final   • MPV 07/11/2019 11.5  6.0 - 12.0 fL Final   • Platelets 07/11/2019 280  140 - 450 10*3/mm3 Final   • Neutrophil % 07/11/2019 49.7  42.7 - 76.0 % Final   • Lymphocyte % 07/11/2019 37.9  19.6 - 45.3 % Final   • Monocyte % 07/11/2019 6.4  5.0 - 12.0 % Final   • Eosinophil % 07/11/2019 4.8  0.3 - 6.2 % Final   • Basophil % 07/11/2019 0.8  0.0 - 1.5 % Final   • Neutrophils, Absolute 07/11/2019 4.61  1.70 - 7.00 10*3/mm3 Final   • Lymphocytes, Absolute 07/11/2019 3.52* 0.70 - 3.10 10*3/mm3 Final   • Monocytes, Absolute 07/11/2019 0.59  0.10 - 0.90 10*3/mm3 Final   • Eosinophils, Absolute 07/11/2019 0.45* 0.00 - 0.40 10*3/mm3 Final   • Basophils, Absolute 07/11/2019 0.07  0.00 - 0.20 10*3/mm3 Final   • Glucose 07/11/2019 101* 65 - 99 mg/dL Final   • BUN 07/11/2019 11  6 - 20 mg/dL Final   • Creatinine 07/11/2019 0.92  0.57 - 1.00 mg/dL Final   • Sodium 07/11/2019 135* 136 - 145 mmol/L Final   • Potassium 07/11/2019 4.5  3.5 - 5.2  mmol/L Final   • Chloride 07/11/2019 102  98 - 107 mmol/L Final   • CO2 07/11/2019 24.7  22.0 - 29.0 mmol/L Final   • Calcium 07/11/2019 9.7  8.6 - 10.5 mg/dL Final   • Total Protein 07/11/2019 7.7  6.0 - 8.5 g/dL Final   • Albumin 07/11/2019 4.00  3.50 - 5.20 g/dL Final   • ALT (SGPT) 07/11/2019 17  1 - 33 U/L Final   • AST (SGOT) 07/11/2019 16  1 - 32 U/L Final   • Alkaline Phosphatase 07/11/2019 55  39 - 117 U/L Final   • Total Bilirubin 07/11/2019 0.2  0.2 - 1.2 mg/dL Final   • eGFR   Amer 07/11/2019 80  >60 mL/min/1.73 Final   • Globulin 07/11/2019 3.7  gm/dL Final   • A/G Ratio 07/11/2019 1.1  g/dL Final   • BUN/Creatinine Ratio 07/11/2019 12.0  7.0 - 25.0 Final   • Anion Gap 07/11/2019 8.3  5.0 - 15.0 mmol/L Final   • Hemoglobin A1C 07/11/2019 7.40* 4.80 - 5.60 % Final   • Total Cholesterol 07/11/2019 136  0 - 200 mg/dL Final   • Triglycerides 07/11/2019 82  0 - 150 mg/dL Final   • HDL Cholesterol 07/11/2019 42  40 - 60 mg/dL Final   • LDL Cholesterol  07/11/2019 78  0 - 100 mg/dL Final   • VLDL Cholesterol 07/11/2019 16.4  5 - 40 mg/dL Final   • LDL/HDL Ratio 07/11/2019 1.85   Final   • 25 Hydroxy, Vitamin D 07/11/2019 65.4  30.0 - 100.0 ng/ml Final   • Vitamin B-12 07/11/2019 916  211 - 946 pg/mL Final      Polysomnography 4 or More Parameters  [address] [address]  Phone: [phone]  Fax: [phone]    Split Night Polysomnography Interpretation  Patient's Name: Lizzy Restrepo  YOB: 1975  Date of Study: 06/28/2019  Referring provider: No info available  Primary Care Physician: Solitario Li MD  Pertinent History:  The patient is a 43 y.o. female who stands 63 inches tall and weighs 283   pounds. Her Body mass index is 50.13 kg/m². Her Drewsey Sleepiness Scale   Score is 12. Pertinent medical problems and pertinent medications include   are included in the sleep lab report and have been reviewed    BASELINE  PORTION:  Sleep Architecture:  Sleep onset occurred after 2.4 minutes, normal is < 20 minutes. Onset to   REM sleep was 81.4 minutes, normal is  minutes. Sleep efficiency was   91.6%, normal is >80% in age 65 and less. Total sleep time was 132.5   minutes. Wake after sleep onset was 9.7 minutes. The general pattern of   sleep state cycling, distribution, and maintenance was observed.     Sleep stages as a percent of total sleep time:    Stage N1 sleep - 1.9% , normal ~ 5%  Stage N2 sleep - 72.5% , normal ~ 50%  Stage N3 sleep - 0.0% , normal ~ 20%  Stage REM sleep - 25.7% , normal ~ 20% - 25%    There was no evidence of parasomnias (night terrors, sleepwalking, sleep   talking, etc.) on study night as noted in the record or by the technician.        Respiratory:  There were 0 central apneas, 0 obstructive apneas, 1 mixed apneas, and 48   hypopneas on study night. The apnea-hypopnea index (AHI) was 58.2 in REM   and 9.1 in NREM. The overall apnea-hypopnea index (AHI) was 22.2. Oxygen   saturations averaged 92.5% during total sleep time.  The oxygen dontae was   82.  The patient spent 4.5 minutes, or 3.4% of total sleep time with   saturations less than 89%.    Arousals:  The overall arousal index was 6.3.     Cardiac:  Nonspecific dysrhythmias were noted.     Periodic Limb Movements:  The periodic limb movement index was 0.     TREATMENT PORTION:    The patient qualifed for split night titration. She was titrated on CPAP   pressures of 5-10 cm H2O with a Full face mask. On the best setting, 10   cmH2O , the patient slept for 41 minutes, with 23 minutes of REM sleep.   The residual AHI was 1.5 and the oxygen saturation dontae was 94%.      Assessment:    1. Moderate  obstructive sleep apnea hypopnea syndrome with an AHI of   22.2.   2. REM dominant MIGEL.  3. Adequate control of sleep disordered breathing on 10 cm H2O    Recommendations:  1. I will start the patient on AutoCPAP from 10-20cm H2O with heated  "  humidification and a mask per patient’s choice.  2. Follow-up in the [facility] in 31-90 days after the   initiation of positive airway pressure therapy.  3. Close clinical follow up is recommended to ensure compliance and   resolution or reduction of symptoms.   4. Avoidance of sedatives, alcohol, and the supine position, all of which   can worsen apneic events.  5. Since obesity is known to exacerbate MIGEL and weight loss can improve   MIGEL symptoms, weight management should be a long-term goal.  6. Medically supervised weight reduction to achieve a BMI < 30 if   appropriate.7. Encourage good sleep hygiene practice and avoid sleep   deprivation    I have personally reviewed the raw data and summarized as above. Please   feel free to contact me if you have any questions. Thank you so much for   allowing me to participate in the care of this patient.    Sincerely,     This document has been electronically signed by   Chalino Ramsey MD     Medical Director  [facility]    CC: Solitario Li MD    @[email or web address]  Immunization History   Administered Date(s) Administered   • Hepatitis B 07/19/2016, 08/23/2016, 02/17/2017   • Tdap 02/17/2017       The following portions of the patient's history were reviewed and updated as appropriate: allergies, current medications, past family history, past medical history, past social history, past surgical history and problem list.        Physical Exam  /80 (BP Location: Right arm, Patient Position: Sitting, Cuff Size: Large Adult)   Pulse 97   Temp 98.7 °F (37.1 °C) (Oral)   Ht 160 cm (63\")   Wt 131 kg (289 lb 12.8 oz)   SpO2 98%   BMI 51.34 kg/m²     Physical Exam   Constitutional: She is oriented to person, place, and time. She appears well-developed and well-nourished.   HENT:   Head: Normocephalic and atraumatic.   Right Ear: External ear normal.   Eyes: Pupils are equal, round, and reactive to light. Conjunctivae and EOM " are normal.   Neck: Normal range of motion. Neck supple.   Cardiovascular: Normal rate, regular rhythm and normal heart sounds.   No murmur heard.  Pulmonary/Chest: Effort normal and breath sounds normal. No respiratory distress.   Abdominal: Soft. Bowel sounds are normal. She exhibits no distension. There is no tenderness.   Obese abdomen    Musculoskeletal: Normal range of motion. She exhibits tenderness. She exhibits no edema or deformity.   Neurological: She is alert and oriented to person, place, and time. No cranial nerve deficit.   Skin: Skin is warm. No rash noted. She is not diaphoretic. No erythema. No pallor.   Psychiatric: She has a normal mood and affect. Her behavior is normal.   Nursing note and vitals reviewed.      Assessment/Plan    Diagnosis Plan   1. Encounter for Papanicolaou smear of cervix  Ambulatory Referral to Obstetrics / Gynecology   2. Vitamin D deficiency     3. Moderate persistent asthma, unspecified whether complicated     4. MIGEL (obstructive sleep apnea)     5. Morbid obesity (CMS/[facility])     6. Hyperlipidemia associated with type 2 diabetes mellitus (CMS/[facility])     7. Essential hypertension     8. Gastroesophageal reflux disease, esophagitis presence not specified     9. Controlled type 2 diabetes mellitus with complication, with long-term current use of insulin (CMS/[facility])        -recommend to get labwork soon   -pt currently has Passport insurance but would like to change insurance to see Specialist. Gave number for medicaid services   -went over labwork in July 2019 recommend new labwork soon   -recommend influenza vaccination  She declined   -recommend pt get pap smear. She will call for an appt   -MIGEL- sleep medicine following. She will need to make a call for appt for CPAP  -recommend diabetic eye exam  -diabetic foot exam today. She has dry feet recommend lotion   - Essential Hypertension - BP at goal. Continue with current medicaitons. Stop lisinopril-HCTZ. Start on lisinopril 20  mg daily.  - hyperlipidemia-  Recheck lipid panel The current medical regimen is effective;  continue present plan and medications.  - DM type 2-  Sugars improved with Trulicity. 0.75 mg weekly stopped lantus and insulin   stop januvia Hga1c improved. Performed diabetic foot exam today. Pt has dry feet on soles. Will give diabetic foot care informaiton. Recent hga1c>7.0. Restart jardiance 25 mg daily.  Gave coupon cards and samples today.   -recommend pap smear and mammogram screening  - vitamin D deficiency -continue vitamin D pill once a week.  - morbid obesity - BMI >40 - pt gained lbs since last visit. Continue with ketogenic diet.  Gave diet information to go. Gave bariatric weight loss surgery to go home with.  She has been reluctant for weight loss surgery.  BMI at 51.8   -referral to [facility] for pap smear   -for back pain - referral to PT/OT. Services appreciated. Continue muscle relaxant. Also consider x-ray of lumbar spine. Continue mobic and flexeril   - iron deficiency anemia -continue on iron pill TID. Her last CBC showed stable hemoglobin. Recommend Hematology referral and possible IV iron therapy. Consultation appreciated.   -lekuocytosis - slightly improved from last visit. Will continue to monitor. Consider Hematology refrral   - moderate persistent asthma - not controlled. Will go up on therapy and add qvar 80 mgBID  inhaler daily. Continue albuterol   - GERD - continue on protonix  -advised pt to be safe and call with questions and concerns  -advised to go to ER or call 911 if symptoms worrisome or severe  -advised to followup with Specialist and referrals  -recheck in 6 weeks  Or sooner if any problems  arise         This document has been electronically signed by Solitario Li MD on December 12, 2019 10:19 AM

## 2019-12-12 ENCOUNTER — OFFICE VISIT (OUTPATIENT)
Dept: FAMILY MEDICINE CLINIC | Facility: CLINIC | Age: 44
End: 2019-12-12

## 2019-12-12 ENCOUNTER — LAB (OUTPATIENT)
Dept: LAB | Facility: HOSPITAL | Age: 44
End: 2019-12-12

## 2019-12-12 VITALS
OXYGEN SATURATION: 98 % | WEIGHT: 289.8 LBS | DIASTOLIC BLOOD PRESSURE: 80 MMHG | HEIGHT: 63 IN | HEART RATE: 97 BPM | SYSTOLIC BLOOD PRESSURE: 132 MMHG | BODY MASS INDEX: 51.35 KG/M2 | TEMPERATURE: 98.7 F

## 2019-12-12 DIAGNOSIS — Z12.4 ENCOUNTER FOR PAPANICOLAOU SMEAR OF CERVIX: Primary | ICD-10-CM

## 2019-12-12 DIAGNOSIS — I10 ESSENTIAL HYPERTENSION: ICD-10-CM

## 2019-12-12 DIAGNOSIS — E11.69 HYPERLIPIDEMIA ASSOCIATED WITH TYPE 2 DIABETES MELLITUS (HCC): ICD-10-CM

## 2019-12-12 DIAGNOSIS — J45.40 MODERATE PERSISTENT ASTHMA, UNSPECIFIED WHETHER COMPLICATED: ICD-10-CM

## 2019-12-12 DIAGNOSIS — G47.33 OSA (OBSTRUCTIVE SLEEP APNEA): ICD-10-CM

## 2019-12-12 DIAGNOSIS — K21.9 GASTROESOPHAGEAL REFLUX DISEASE, ESOPHAGITIS PRESENCE NOT SPECIFIED: ICD-10-CM

## 2019-12-12 DIAGNOSIS — Z79.4 CONTROLLED TYPE 2 DIABETES MELLITUS WITH COMPLICATION, WITH LONG-TERM CURRENT USE OF INSULIN (HCC): ICD-10-CM

## 2019-12-12 DIAGNOSIS — E66.01 MORBID OBESITY (HCC): ICD-10-CM

## 2019-12-12 DIAGNOSIS — E78.5 HYPERLIPIDEMIA ASSOCIATED WITH TYPE 2 DIABETES MELLITUS (HCC): ICD-10-CM

## 2019-12-12 DIAGNOSIS — E11.8 CONTROLLED TYPE 2 DIABETES MELLITUS WITH COMPLICATION, WITH LONG-TERM CURRENT USE OF INSULIN (HCC): ICD-10-CM

## 2019-12-12 DIAGNOSIS — E55.9 VITAMIN D DEFICIENCY: ICD-10-CM

## 2019-12-12 LAB
ANISOCYTOSIS BLD QL: ABNORMAL
BASOPHILS # BLD MANUAL: 0.09 10*3/MM3 (ref 0–0.2)
BASOPHILS NFR BLD AUTO: 1 % (ref 0–1.5)
DEPRECATED RDW RBC AUTO: 36.6 FL (ref 37–54)
EOSINOPHIL # BLD MANUAL: 0.35 10*3/MM3 (ref 0–0.4)
EOSINOPHIL NFR BLD MANUAL: 3.8 % (ref 0.3–6.2)
ERYTHROCYTE [DISTWIDTH] IN BLOOD BY AUTOMATED COUNT: 17.2 % (ref 12.3–15.4)
HBA1C MFR BLD: 10.23 % (ref 4.8–5.6)
HCT VFR BLD AUTO: 42.7 % (ref 34–46.6)
HGB BLD-MCNC: 12.5 G/DL (ref 12–15.9)
LYMPHOCYTES # BLD MANUAL: 4.22 10*3/MM3 (ref 0.7–3.1)
LYMPHOCYTES NFR BLD MANUAL: 2.9 % (ref 5–12)
LYMPHOCYTES NFR BLD MANUAL: 45.7 % (ref 19.6–45.3)
MCH RBC QN AUTO: 19.6 PG (ref 26.6–33)
MCHC RBC AUTO-ENTMCNC: 29.3 G/DL (ref 31.5–35.7)
MCV RBC AUTO: 66.9 FL (ref 79–97)
MICROCYTES BLD QL: ABNORMAL
MONOCYTES # BLD AUTO: 0.27 10*3/MM3 (ref 0.1–0.9)
NEUTROPHILS # BLD AUTO: 4.14 10*3/MM3 (ref 1.7–7)
NEUTROPHILS NFR BLD MANUAL: 44.8 % (ref 42.7–76)
PLAT MORPH BLD: NORMAL
PLATELET # BLD AUTO: 290 10*3/MM3 (ref 140–450)
PMV BLD AUTO: 10.9 FL (ref 6–12)
RBC # BLD AUTO: 6.38 10*6/MM3 (ref 3.77–5.28)
VARIANT LYMPHS NFR BLD MANUAL: 1.9 % (ref 0–5)
WBC MORPH BLD: NORMAL
WBC NRBC COR # BLD: 9.23 10*3/MM3 (ref 3.4–10.8)

## 2019-12-12 PROCEDURE — 80053 COMPREHEN METABOLIC PANEL: CPT

## 2019-12-12 PROCEDURE — 82043 UR ALBUMIN QUANTITATIVE: CPT

## 2019-12-12 PROCEDURE — 82570 ASSAY OF URINE CREATININE: CPT

## 2019-12-12 PROCEDURE — 80061 LIPID PANEL: CPT

## 2019-12-12 PROCEDURE — 85007 BL SMEAR W/DIFF WBC COUNT: CPT

## 2019-12-12 PROCEDURE — 85025 COMPLETE CBC W/AUTO DIFF WBC: CPT

## 2019-12-12 PROCEDURE — 99214 OFFICE O/P EST MOD 30 MIN: CPT | Performed by: FAMILY MEDICINE

## 2019-12-12 PROCEDURE — 82306 VITAMIN D 25 HYDROXY: CPT

## 2019-12-12 PROCEDURE — 82607 VITAMIN B-12: CPT

## 2019-12-12 PROCEDURE — 83036 HEMOGLOBIN GLYCOSYLATED A1C: CPT

## 2019-12-12 RX ORDER — ASPIRIN 81 MG/1
81 TABLET, CHEWABLE ORAL DAILY
Qty: 30 TABLET | Refills: 6 | Status: SHIPPED | OUTPATIENT
Start: 2019-12-12 | End: 2020-04-20 | Stop reason: SDUPTHER

## 2019-12-12 RX ORDER — ATORVASTATIN CALCIUM 40 MG/1
40 TABLET, FILM COATED ORAL DAILY
Qty: 90 TABLET | Refills: 3 | Status: SHIPPED | OUTPATIENT
Start: 2019-12-12 | End: 2020-04-20 | Stop reason: SDUPTHER

## 2019-12-12 RX ORDER — LORATADINE 10 MG/1
10 TABLET ORAL DAILY
Qty: 90 TABLET | Refills: 3 | Status: SHIPPED | OUTPATIENT
Start: 2019-12-12 | End: 2020-04-20 | Stop reason: SDUPTHER

## 2019-12-12 RX ORDER — MONTELUKAST SODIUM 10 MG/1
10 TABLET ORAL NIGHTLY
Qty: 30 TABLET | Refills: 6 | Status: SHIPPED | OUTPATIENT
Start: 2019-12-12 | End: 2020-04-20 | Stop reason: SDUPTHER

## 2019-12-12 RX ORDER — LISINOPRIL 20 MG/1
20 TABLET ORAL DAILY
Qty: 90 TABLET | Refills: 3 | Status: SHIPPED | OUTPATIENT
Start: 2019-12-12 | End: 2020-04-20 | Stop reason: SDUPTHER

## 2019-12-12 RX ORDER — FLUTICASONE PROPIONATE 50 MCG
2 SPRAY, SUSPENSION (ML) NASAL DAILY
Qty: 16 G | Refills: 6 | Status: SHIPPED | OUTPATIENT
Start: 2019-12-12 | End: 2020-04-20 | Stop reason: SDUPTHER

## 2019-12-12 RX ORDER — ALBUTEROL SULFATE 90 UG/1
2 AEROSOL, METERED RESPIRATORY (INHALATION) EVERY 4 HOURS PRN
Qty: 1 INHALER | Refills: 0 | Status: SHIPPED | OUTPATIENT
Start: 2019-12-12 | End: 2022-08-04 | Stop reason: SDUPTHER

## 2019-12-13 LAB
25(OH)D3 SERPL-MCNC: 59.5 NG/ML (ref 30–100)
ALBUMIN SERPL-MCNC: 4.1 G/DL (ref 3.5–5.2)
ALBUMIN UR-MCNC: <1.2 MG/DL
ALBUMIN/GLOB SERPL: 1.1 G/DL
ALP SERPL-CCNC: 63 U/L (ref 39–117)
ALT SERPL W P-5'-P-CCNC: 23 U/L (ref 1–33)
ANION GAP SERPL CALCULATED.3IONS-SCNC: 12 MMOL/L (ref 5–15)
AST SERPL-CCNC: 18 U/L (ref 1–32)
BILIRUB SERPL-MCNC: 0.2 MG/DL (ref 0.2–1.2)
BUN BLD-MCNC: 10 MG/DL (ref 6–20)
BUN/CREAT SERPL: 13.5 (ref 7–25)
CALCIUM SPEC-SCNC: 9.8 MG/DL (ref 8.6–10.5)
CHLORIDE SERPL-SCNC: 102 MMOL/L (ref 98–107)
CHOLEST SERPL-MCNC: 148 MG/DL (ref 0–200)
CO2 SERPL-SCNC: 29 MMOL/L (ref 22–29)
CREAT BLD-MCNC: 0.74 MG/DL (ref 0.57–1)
CREAT UR-MCNC: 123.5 MG/DL
GFR SERPL CREATININE-BSD FRML MDRD: 103 ML/MIN/1.73
GLOBULIN UR ELPH-MCNC: 3.8 GM/DL
GLUCOSE BLD-MCNC: 149 MG/DL (ref 65–99)
HDLC SERPL-MCNC: 47 MG/DL (ref 40–60)
LDLC SERPL CALC-MCNC: 83 MG/DL (ref 0–100)
LDLC/HDLC SERPL: 1.77 {RATIO}
MICROALBUMIN/CREAT UR: NORMAL MG/G{CREAT}
POTASSIUM BLD-SCNC: 4.4 MMOL/L (ref 3.5–5.2)
PROT SERPL-MCNC: 7.9 G/DL (ref 6–8.5)
SODIUM BLD-SCNC: 143 MMOL/L (ref 136–145)
TRIGL SERPL-MCNC: 90 MG/DL (ref 0–150)
VIT B12 BLD-MCNC: 854 PG/ML (ref 211–946)
VLDLC SERPL-MCNC: 18 MG/DL (ref 5–40)

## 2019-12-16 ENCOUNTER — TELEPHONE (OUTPATIENT)
Dept: FAMILY MEDICINE CLINIC | Facility: CLINIC | Age: 44
End: 2019-12-16

## 2019-12-16 RX ORDER — INSULIN GLARGINE 100 [IU]/ML
INJECTION, SOLUTION SUBCUTANEOUS
Qty: 3 PEN | Refills: 3 | Status: SHIPPED | OUTPATIENT
Start: 2019-12-16 | End: 2020-04-20 | Stop reason: SDUPTHER

## 2019-12-23 ENCOUNTER — TELEPHONE (OUTPATIENT)
Dept: FAMILY MEDICINE CLINIC | Facility: CLINIC | Age: 44
End: 2019-12-23

## 2020-01-02 ENCOUNTER — TELEPHONE (OUTPATIENT)
Dept: FAMILY MEDICINE CLINIC | Facility: CLINIC | Age: 45
End: 2020-01-02

## 2020-01-02 NOTE — TELEPHONE ENCOUNTER
Spoke with pt to make sure she had started new medication. She stated she had not that the pharmacy didn't have it ready. I called and verified it was ready at pharmacy. I also told pt to stop old medication and she stated she didn't have that one.

## 2020-01-02 NOTE — TELEPHONE ENCOUNTER
----- Message from Solitario Li MD sent at 12/22/2019  1:57 PM CST -----  Regarding: medication   Please call pt    steglatro 15 mg daily was sent to [facility] in replacement of jardiance 25 mg daily. Make sure pt gets this and starts taking for her diabetes. Needs to drink a lot of water while on this medication    Recheck on next visit . Thanks

## 2020-04-20 ENCOUNTER — TELEPHONE (OUTPATIENT)
Dept: FAMILY MEDICINE CLINIC | Facility: CLINIC | Age: 45
End: 2020-04-20

## 2020-04-20 ENCOUNTER — OFFICE VISIT (OUTPATIENT)
Dept: FAMILY MEDICINE CLINIC | Facility: CLINIC | Age: 45
End: 2020-04-20

## 2020-04-20 VITALS — HEIGHT: 63 IN | BODY MASS INDEX: 51.34 KG/M2

## 2020-04-20 DIAGNOSIS — K21.9 GASTROESOPHAGEAL REFLUX DISEASE, ESOPHAGITIS PRESENCE NOT SPECIFIED: ICD-10-CM

## 2020-04-20 DIAGNOSIS — I10 ESSENTIAL HYPERTENSION: ICD-10-CM

## 2020-04-20 DIAGNOSIS — E66.01 MORBID OBESITY (HCC): Primary | ICD-10-CM

## 2020-04-20 DIAGNOSIS — D50.9 IRON DEFICIENCY ANEMIA, UNSPECIFIED IRON DEFICIENCY ANEMIA TYPE: ICD-10-CM

## 2020-04-20 DIAGNOSIS — E78.5 HYPERLIPIDEMIA, UNSPECIFIED HYPERLIPIDEMIA TYPE: ICD-10-CM

## 2020-04-20 DIAGNOSIS — J45.40 MODERATE PERSISTENT ASTHMA, UNSPECIFIED WHETHER COMPLICATED: ICD-10-CM

## 2020-04-20 DIAGNOSIS — G47.33 OSA (OBSTRUCTIVE SLEEP APNEA): ICD-10-CM

## 2020-04-20 DIAGNOSIS — E55.9 VITAMIN D DEFICIENCY: ICD-10-CM

## 2020-04-20 PROCEDURE — 99213 OFFICE O/P EST LOW 20 MIN: CPT | Performed by: FAMILY MEDICINE

## 2020-04-20 RX ORDER — MONTELUKAST SODIUM 10 MG/1
10 TABLET ORAL NIGHTLY
Qty: 30 TABLET | Refills: 6 | Status: SHIPPED | OUTPATIENT
Start: 2020-04-20 | End: 2022-03-18 | Stop reason: SDUPTHER

## 2020-04-20 RX ORDER — LORATADINE 10 MG/1
10 TABLET ORAL DAILY
Qty: 90 TABLET | Refills: 3 | Status: SHIPPED | OUTPATIENT
Start: 2020-04-20 | End: 2021-07-06

## 2020-04-20 RX ORDER — FLUTICASONE PROPIONATE 50 MCG
2 SPRAY, SUSPENSION (ML) NASAL DAILY
Qty: 16 G | Refills: 6 | Status: SHIPPED | OUTPATIENT
Start: 2020-04-20 | End: 2022-03-18 | Stop reason: SDUPTHER

## 2020-04-20 RX ORDER — LISINOPRIL 20 MG/1
20 TABLET ORAL DAILY
Qty: 90 TABLET | Refills: 3 | Status: SHIPPED | OUTPATIENT
Start: 2020-04-20 | End: 2021-07-06

## 2020-04-20 RX ORDER — CYCLOBENZAPRINE HCL 10 MG
10 TABLET ORAL NIGHTLY PRN
Qty: 30 TABLET | Refills: 3 | Status: SHIPPED | OUTPATIENT
Start: 2020-04-20 | End: 2022-03-18 | Stop reason: SDUPTHER

## 2020-04-20 RX ORDER — OMEPRAZOLE 40 MG/1
40 CAPSULE, DELAYED RELEASE ORAL DAILY
Qty: 90 CAPSULE | Refills: 3 | Status: SHIPPED | OUTPATIENT
Start: 2020-04-20 | End: 2021-07-06

## 2020-04-20 RX ORDER — ATORVASTATIN CALCIUM 40 MG/1
40 TABLET, FILM COATED ORAL DAILY
Qty: 90 TABLET | Refills: 3 | Status: SHIPPED | OUTPATIENT
Start: 2020-04-20 | End: 2022-03-18 | Stop reason: SDUPTHER

## 2020-04-20 RX ORDER — KETOROLAC TROMETHAMINE 10 MG/1
10 TABLET, FILM COATED ORAL EVERY 6 HOURS PRN
COMMUNITY
Start: 2020-01-26 | End: 2020-04-20

## 2020-04-20 RX ORDER — INSULIN GLARGINE 100 [IU]/ML
INJECTION, SOLUTION SUBCUTANEOUS
Qty: 3 PEN | Refills: 3 | Status: SHIPPED | OUTPATIENT
Start: 2020-04-20 | End: 2020-05-04

## 2020-04-20 RX ORDER — ASPIRIN 81 MG/1
81 TABLET, CHEWABLE ORAL DAILY
Qty: 30 TABLET | Refills: 6 | Status: SHIPPED | OUTPATIENT
Start: 2020-04-20 | End: 2021-07-06

## 2020-04-20 NOTE — TELEPHONE ENCOUNTER
Pharmacy called and stated that they can no longer break boxes of insulin and wanted to know if pt could be given 5 Glargine insulin needles instead of 3. Gave verbal for pt to receive 5.

## 2020-04-20 NOTE — PROGRESS NOTES
Subjective:  Lizzy Restrepo is a 44 y.o. female who presents for       Patient Active Problem List   Diagnosis   • Encounter for immunization   • Diabetes type 2, uncontrolled (CMS/HCC)   • Hyperlipidemia associated with type 2 diabetes mellitus (CMS/HCC)   • Morbid obesity (CMS/HCC)   • Vitamin D deficiency   • Essential hypertension   • Hyperlipidemia   • Urinary tract infection   • Acute pain of right shoulder   • Right arm pain   • Right elbow pain   • Lateral epicondylitis of right elbow   • Medial epicondylitis   • Encounter for screening mammogram for malignant neoplasm of breast   • Iron deficiency anemia   • Mild intermittent asthma   • Encounter for screening for endocrine disorder   • Gastroesophageal reflux disease   • Pain in both knees   • Controlled type 2 diabetes mellitus with complication, without long-term current use of insulin (CMS/HCC)   • Anemia   • Right knee pain   • Controlled type 2 diabetes mellitus with complication, with long-term current use of insulin (CMS/[facility])   • General medical examination   • Leukocytosis   • Chronic bilateral low back pain with sciatica   • Encounter for Papanicolaou smear of cervix   • Screening mammogram, encounter for   • Other fatigue   • MIGEL (obstructive sleep apnea)   • Moderate persistent asthma   • Acute bronchitis           Current Outpatient Medications:   •  albuterol sulfate HFA (VENTOLIN HFA) 108 (90 Base) MCG/ACT inhaler, Inhale 2 puffs Every 4 (Four) Hours As Needed for Shortness of Air., Disp: 1 inhaler, Rfl: 0  •  aspirin 81 MG chewable tablet, Chew 1 tablet Daily., Disp: 30 tablet, Rfl: 6  •  atorvastatin (LIPITOR) 40 MG tablet, Take 1 tablet by mouth Daily., Disp: 90 tablet, Rfl: 3  •  azithromycin (ZITHROMAX) 250 MG tablet, Take 2 tablets the first day, then 1 tablet daily for 4 days., Disp: 6 tablet, Rfl: 0  •  Beclomethasone Diprop HFA (QVAR REDIHALER) 80 MCG/ACT inhaler, Inhale 1 puff 2 (Two) Times a Day., Disp: 1 inhaler, Rfl: 3  •   "cholecalciferol (OPTIMAL-D) 1.25 MG (75803 UT) capsule, Take 1 capsule by mouth 1 (One) Time Per Week., Disp: 4 capsule, Rfl: 3  •  cyclobenzaprine (FLEXERIL) 10 MG tablet, Take 1 tablet by mouth At Night As Needed for Muscle Spasms., Disp: 30 tablet, Rfl: 3  •  Dulaglutide (TRULICITY) 0.75 MG/0.5ML solution pen-injector, Inject 0.75 mg under the skin into the appropriate area as directed 1 (One) Time Per Week., Disp: 12 pen, Rfl: 3  •  Ertugliflozin L-PyroglutamicAc (STEGLATRO) 15 MG tablet, Take 1 tablet by mouth Every Morning., Disp: 30 tablet, Rfl: 3  •  exenatide er (BYDUREON) 2 MG/0.85ML auto-injector injection, Inject 0.85 mL under the skin into the appropriate area as directed 1 (One) Time Per Week., Disp: 4 pen, Rfl: 3  •  ferrous sulfate 325 (65 FE) MG EC tablet, Take 1 tablet by mouth 3 (Three) Times a Day With Meals., Disp: 90 tablet, Rfl: 6  •  fluticasone (FLONASE) 50 MCG/ACT nasal spray, 2 sprays into the nostril(s) as directed by provider Daily., Disp: 16 g, Rfl: 6  •  Insulin Glargine (BASAGLAR KWIKPEN) 100 UNIT/ML injection pen, Start 10 units at bedtime subq can go up by 3 units every 3 days until am sugars running , Disp: 3 pen, Rfl: 3  •  Insulin Pen Needle (PEN NEEDLES) 31G X 8 MM misc, , Disp: , Rfl:   •  Insulin Pen Needle 32G X 4 MM misc, 1 application Every Night., Disp: 100 each, Rfl: 3  •  Insulin Syringe-Needle U-100 30G X 5/16\" 1 ML misc, , Disp: , Rfl:   •  lisinopril (PRINIVIL,ZESTRIL) 20 MG tablet, Take 1 tablet by mouth Daily., Disp: 90 tablet, Rfl: 3  •  loratadine (EQ LORATADINE) 10 MG tablet, Take 1 tablet by mouth Daily., Disp: 90 tablet, Rfl: 3  •  meloxicam (MOBIC) 15 MG tablet, Take 1 tablet by mouth Daily., Disp: 30 tablet, Rfl: 6  •  montelukast (SINGULAIR) 10 MG tablet, Take 1 tablet by mouth Every Night., Disp: 30 tablet, Rfl: 6  •  omeprazole (priLOSEC) 40 MG capsule, Take 1 capsule by mouth Daily., Disp: 90 capsule, Rfl: 3  •  predniSONE (DELTASONE) 10 MG tablet, " Take 4 pills for 4 days. 3 pills for 3 days 2 pills for 3 days and 2 pills for 2 days then stop, Disp: 30 tablet, Rfl: 0  •  ReliOn Ultra Thin Lancets misc, , Disp: , Rfl:     HPI        Pt is 43 yo female with management of morbid obesity, moderate persistent asthma, allergic rhinitis, IDDM type 2,, HLP, HTN,  Vitamin D deficiency,GERD, chronic fatigue, MIGEL, multiple joint pain, sp cholecystectomy sp tonsillectomy, sp umbilical hernia repair, sp , iron deficiency anemia , CKD stage 2      19 pt is here for recheck and followup. Pt is due for new labwork. She will get new insurance soon. She continues to take steglatro and trulicity. She also needs to followup with her Sleep Medicine for MIGEL. She has yet to get her CPAP. She is not working now. But has upcoming     20 Telemedicine Visit today. Had labwork done on  that showed normal Vitamin D and vitamin B12. Lipid panel was stable hga1c was at 10.23. CMP showed GFR at 103 with  Normal liver enzymes. CBC showed hemoglobin at 12.5  With MCV At 66.9.  She is currently working as a caregiver. She continues to take insulin basaglar and bydurone 2 mg subq weekly. Morning sugars on average run 200           Shortness of Breath   This is a chronic problem. The current episode started more than 1 year ago. The problem occurs constantly. The problem has been gradually worsening. Associated symptoms include headaches and wheezing. Pertinent negatives include no abdominal pain, chest pain, claudication, coryza, ear pain, fever, hemoptysis, leg pain, leg swelling, neck pain, orthopnea, PND, rash, rhinorrhea, sore throat, sputum production, swollen glands, syncope or vomiting. Nothing aggravates the symptoms. Risk factors include recent leg injury. She has tried beta agonist inhalers for the symptoms. The treatment provided no relief. Her past medical history is significant for allergies and asthma. There is no history of aspirin  allergies, bronchiolitis, CAD, chronic lung disease, COPD, DVT, a heart failure, PE, pneumonia or a recent surgery.      Diabetes   She presents for her follow-up diabetic visit. She has type 2 diabetes mellitus. Her disease course has been stable. Pertinent negatives for hypoglycemia include no confusion, dizziness, headaches, hunger, mood changes, nervousness/anxiousness, pallor, seizures, sleepiness, speech difficulty, sweats or tremors. Associated symptoms include fatigue. Pertinent negatives for diabetes include no blurred vision, no chest pain, no foot paresthesias, no foot ulcerations, no polydipsia, no polyphagia, no polyuria, no visual change, no weakness and no weight loss. Pertinent negatives for hypoglycemia complications include no blackouts, no hospitalization, no nocturnal hypoglycemia, no required assistance and no required glucagon injection. Symptoms are stable. Pertinent negatives for diabetic complications include no autonomic neuropathy, CVA, heart disease, nephropathy, peripheral neuropathy, PVD or retinopathy. Risk factors for coronary artery disease include diabetes mellitus, dyslipidemia and obesity. Current diabetic treatment includes insulin injections. She is following a diabetic diet. When asked about meal planning, she reported none. She has not had a previous visit with a dietitian. She participates in exercise intermittently. She does not see a podiatrist.Eye exam is not current.   Knee Pain    The incident occurred more than 1 week ago. The incident occurred at the gym. There was no injury mechanism. The pain is present in the right knee. The quality of the pain is described as aching. The pain is at a severity of 5/10. The pain is moderate. The pain has been constant since onset. Pertinent negatives include no inability to bear weight, loss of motion, loss of sensation, muscle weakness, numbness or tingling. She reports no foreign bodies present. The symptoms are aggravated  by movement and palpation. She has tried nothing for the symptoms. The treatment provided no relief.   Obesity   This is a chronic problem. The current episode started more than 1 year ago. The problem has been unchanged. Associated symptoms include arthralgias, fatigue and nausea. Pertinent negatives include no abdominal pain, anorexia, change in bowel habit, chest pain, chills, congestion, coughing, fever, headaches, joint swelling, myalgias, neck pain, numbness, rash, sore throat, swollen glands, urinary symptoms, vertigo, visual change, vomiting or weakness. Nothing aggravates the symptoms. She has tried nothing for the symptoms. The treatment provided no relief.     Review of Systems  Review of Systems   Constitutional: Positive for activity change and fatigue. Negative for appetite change, chills, diaphoresis and fever.   HENT: Negative for congestion, postnasal drip, rhinorrhea, sinus pressure, sinus pain, sneezing, sore throat, trouble swallowing and voice change.    Respiratory: Positive for cough and shortness of breath. Negative for choking, chest tightness, wheezing and stridor.    Cardiovascular: Negative for chest pain.   Gastrointestinal: Negative for diarrhea, nausea and vomiting.   Neurological: Positive for weakness and numbness. Negative for headaches.   All other systems reviewed and are negative.      Patient Active Problem List   Diagnosis   • Encounter for immunization   • Diabetes type 2, uncontrolled (CMS/HCC)   • Hyperlipidemia associated with type 2 diabetes mellitus (CMS/HCC)   • Morbid obesity (CMS/HCC)   • Vitamin D deficiency   • Essential hypertension   • Hyperlipidemia   • Urinary tract infection   • Acute pain of right shoulder   • Right arm pain   • Right elbow pain   • Lateral epicondylitis of right elbow   • Medial epicondylitis   • Encounter for screening mammogram for malignant neoplasm of breast   • Iron deficiency anemia   • Mild intermittent asthma   • Encounter for  screening for endocrine disorder   • Gastroesophageal reflux disease   • Pain in both knees   • Controlled type 2 diabetes mellitus with complication, without long-term current use of insulin (CMS/HCC)   • Anemia   • Right knee pain   • Controlled type 2 diabetes mellitus with complication, with long-term current use of insulin (CMS/HCC)   • General medical examination   • Leukocytosis   • Chronic bilateral low back pain with sciatica   • Encounter for Papanicolaou smear of cervix   • Screening mammogram, encounter for   • Other fatigue   • MIGEL (obstructive sleep apnea)   • Moderate persistent asthma   • Acute bronchitis     Past Surgical History:   Procedure Laterality Date   •  SECTION  10/30/2000   • CHOLECYSTECTOMY  1995   • COLONOSCOPY      normal per pt   • CYSTOSCOPY  2016    And bilateral retrograde pyelograms. Performed at [facility].   • TONSILLECTOMY  1985   • UMBILICAL HERNIA REPAIR  2015   • UMBILICAL HERNIA REPAIR  2015     Social History     Socioeconomic History   • Marital status:      Spouse name: Not on file   • Number of children: Not on file   • Years of education: Not on file   • Highest education level: Not on file   Tobacco Use   • Smoking status: Never Smoker   • Smokeless tobacco: Never Used   Substance and Sexual Activity   • Alcohol use: No   • Drug use: No     Family History   Problem Relation Age of Onset   • Diabetes Mother    • Heart disease Mother    • Heart failure Mother         congestive   • Hypertension Mother    • Asthma Father    • Osteoarthritis Father      Lab on 2019   Component Date Value Ref Range Status   • WBC 2019 9.23  3.40 - 10.80 10*3/mm3 Final   • RBC 2019 6.38* 3.77 - 5.28 10*6/mm3 Final   • Hemoglobin 2019 12.5  12.0 - 15.9 g/dL Final   • Hematocrit 2019 42.7  34.0 - 46.6 % Final   • MCV 2019 66.9* 79.0 - 97.0 fL Final   • MCH 2019 19.6* 26.6 - 33.0 pg Final    • MCHC 12/12/2019 29.3* 31.5 - 35.7 g/dL Final   • RDW 12/12/2019 17.2* 12.3 - 15.4 % Final   • RDW-SD 12/12/2019 36.6* 37.0 - 54.0 fl Final   • MPV 12/12/2019 10.9  6.0 - 12.0 fL Final   • Platelets 12/12/2019 290  140 - 450 10*3/mm3 Final   • Glucose 12/12/2019 149* 65 - 99 mg/dL Final   • BUN 12/12/2019 10  6 - 20 mg/dL Final   • Creatinine 12/12/2019 0.74  0.57 - 1.00 mg/dL Final   • Sodium 12/12/2019 143  136 - 145 mmol/L Final   • Potassium 12/12/2019 4.4  3.5 - 5.2 mmol/L Final   • Chloride 12/12/2019 102  98 - 107 mmol/L Final   • CO2 12/12/2019 29.0  22.0 - 29.0 mmol/L Final   • Calcium 12/12/2019 9.8  8.6 - 10.5 mg/dL Final   • Total Protein 12/12/2019 7.9  6.0 - 8.5 g/dL Final   • Albumin 12/12/2019 4.10  3.50 - 5.20 g/dL Final   • ALT (SGPT) 12/12/2019 23  1 - 33 U/L Final   • AST (SGOT) 12/12/2019 18  1 - 32 U/L Final   • Alkaline Phosphatase 12/12/2019 63  39 - 117 U/L Final   • Total Bilirubin 12/12/2019 0.2  0.2 - 1.2 mg/dL Final   • eGFR  African Amer 12/12/2019 103  >60 mL/min/1.73 Final   • Globulin 12/12/2019 3.8  gm/dL Final   • A/G Ratio 12/12/2019 1.1  g/dL Final   • BUN/Creatinine Ratio 12/12/2019 13.5  7.0 - 25.0 Final   • Anion Gap 12/12/2019 12.0  5.0 - 15.0 mmol/L Final   • Hemoglobin A1C 12/12/2019 10.23* 4.80 - 5.60 % Final   • Total Cholesterol 12/12/2019 148  0 - 200 mg/dL Final   • Triglycerides 12/12/2019 90  0 - 150 mg/dL Final   • HDL Cholesterol 12/12/2019 47  40 - 60 mg/dL Final   • LDL Cholesterol  12/12/2019 83  0 - 100 mg/dL Final   • VLDL Cholesterol 12/12/2019 18  5 - 40 mg/dL Final   • LDL/HDL Ratio 12/12/2019 1.77   Final   • 25 Hydroxy, Vitamin D 12/12/2019 59.5  30.0 - 100.0 ng/ml Final   • Vitamin B-12 12/12/2019 854  211 - 946 pg/mL Final   • Microalbumin/Creatinine Ratio 12/12/2019    Final    Unable to calculate   • Creatinine, Urine 12/12/2019 123.5  mg/dL Final   • Microalbumin, Urine 12/12/2019 <1.2  mg/dL Final   • Neutrophil % 12/12/2019 44.8  42.7 - 76.0 %  Final   • Lymphocyte % 12/12/2019 45.7* 19.6 - 45.3 % Final   • Monocyte % 12/12/2019 2.9* 5.0 - 12.0 % Final   • Eosinophil % 12/12/2019 3.8  0.3 - 6.2 % Final   • Basophil % 12/12/2019 1.0  0.0 - 1.5 % Final   • Atypical Lymphocyte % 12/12/2019 1.9  0.0 - 5.0 % Final   • Neutrophils Absolute 12/12/2019 4.14  1.70 - 7.00 10*3/mm3 Final   • Lymphocytes Absolute 12/12/2019 4.22* 0.70 - 3.10 10*3/mm3 Final   • Monocytes Absolute 12/12/2019 0.27  0.10 - 0.90 10*3/mm3 Final   • Eosinophils Absolute 12/12/2019 0.35  0.00 - 0.40 10*3/mm3 Final   • Basophils Absolute 12/12/2019 0.09  0.00 - 0.20 10*3/mm3 Final   • Anisocytosis 12/12/2019 Slight/1+  None Seen Final   • Microcytes 12/12/2019 Slight/1+  None Seen Final   • WBC Morphology 12/12/2019 Normal  Normal Final   • Platelet Morphology 12/12/2019 Normal  Normal Final      Polysomnography 4 or More Parameters  [place] [address]  Phone: [phone]  Fax: [phone]    Split Night Polysomnography Interpretation  Patient's Name: Lizzy Restrepo  YOB: 1975  Date of Study: 06/28/2019  Referring provider: No info available  Primary Care Physician: Solitario Li MD  Pertinent History:  The patient is a 43 y.o. female who stands 63 inches tall and weighs 283   pounds. Her Body mass index is 50.13 kg/m². Her Hardin Sleepiness Scale   Score is 12. Pertinent medical problems and pertinent medications include   are included in the sleep lab report and have been reviewed    BASELINE PORTION:  Sleep Architecture:  Sleep onset occurred after 2.4 minutes, normal is < 20 minutes. Onset to   REM sleep was 81.4 minutes, normal is  minutes. Sleep efficiency was   91.6%, normal is >80% in age 65 and less. Total sleep time was 132.5   minutes. Wake after sleep onset was 9.7 minutes. The general pattern of   sleep state cycling, distribution, and maintenance was observed.     Sleep stages as a percent  of total sleep time:    Stage N1 sleep - 1.9% , normal ~ 5%  Stage N2 sleep - 72.5% , normal ~ 50%  Stage N3 sleep - 0.0% , normal ~ 20%  Stage REM sleep - 25.7% , normal ~ 20% - 25%    There was no evidence of parasomnias (night terrors, sleepwalking, sleep   talking, etc.) on study night as noted in the record or by the technician.        Respiratory:  There were 0 central apneas, 0 obstructive apneas, 1 mixed apneas, and 48   hypopneas on study night. The apnea-hypopnea index (AHI) was 58.2 in REM   and 9.1 in NREM. The overall apnea-hypopnea index (AHI) was 22.2. Oxygen   saturations averaged 92.5% during total sleep time.  The oxygen dontae was   82.  The patient spent 4.5 minutes, or 3.4% of total sleep time with   saturations less than 89%.    Arousals:  The overall arousal index was 6.3.     Cardiac:  Nonspecific dysrhythmias were noted.     Periodic Limb Movements:  The periodic limb movement index was 0.     TREATMENT PORTION:    The patient qualifed for split night titration. She was titrated on CPAP   pressures of 5-10 cm H2O with a Full face mask. On the best setting, 10   cmH2O , the patient slept for 41 minutes, with 23 minutes of REM sleep.   The residual AHI was 1.5 and the oxygen saturation dontae was 94%.      Assessment:    1. Moderate  obstructive sleep apnea hypopnea syndrome with an AHI of   22.2.   2. REM dominant MIGEL.  3. Adequate control of sleep disordered breathing on 10 cm H2O    Recommendations:  1. I will start the patient on AutoCPAP from 10-20cm H2O with heated   humidification and a mask per patient’s choice.  2. Follow-up in the [facility] Disorders Clinic in 31-90 days after the   initiation of positive airway pressure therapy.  3. Close clinical follow up is recommended to ensure compliance and   resolution or reduction of symptoms.   4. Avoidance of sedatives, alcohol, and the supine position, all of which   can worsen apneic events.  5. Since obesity is known to exacerbate MIGEL and  "weight loss can improve   MIGEL symptoms, weight management should be a long-term goal.  6. Medically supervised weight reduction to achieve a BMI < 30 if   appropriate.7. Encourage good sleep hygiene practice and avoid sleep   deprivation    I have personally reviewed the raw data and summarized as above. Please   feel free to contact me if you have any questions. Thank you so much for   allowing me to participate in the care of this patient.    Sincerely,     This document has been electronically signed by   Chalino Ramsey MD     Medical Director  [facility]    CC: Solitario Li MD    [unfilled]  Immunization History   Administered Date(s) Administered   • Hepatitis B 07/19/2016, 08/23/2016, 02/17/2017   • Tdap 02/17/2017       The following portions of the patient's history were reviewed and updated as appropriate: allergies, current medications, past family history, past medical history, past social history, past surgical history and problem list.        Physical Exam   Ht 160 cm (63\")   BMI 51.34 kg/m²     Physical Exam   Constitutional: She is oriented to person, place, and time. She appears well-developed and well-nourished.   HENT:   Head: Normocephalic and atraumatic.   Right Ear: External ear normal.   Eyes: Pupils are equal, round, and reactive to light. Conjunctivae and EOM are normal.   Neck: Normal range of motion. Neck supple.   Cardiovascular: Normal rate, regular rhythm and normal heart sounds.   No murmur heard.  Pulmonary/Chest: Effort normal and breath sounds normal. No respiratory distress.   Abdominal: Soft. Bowel sounds are normal. She exhibits no distension. There is no tenderness.   Musculoskeletal: Normal range of motion. She exhibits no edema or deformity.   Neurological: She is alert and oriented to person, place, and time. No cranial nerve deficit.   Skin: Skin is warm. No rash noted. She is not diaphoretic. No erythema. No pallor.   Psychiatric: She " has a normal mood and affect. Her behavior is normal.   Nursing note and vitals reviewed.      Assessment/Plan    Diagnosis Plan   1. Vitamin D deficiency     2. MIGEL (obstructive sleep apnea)     3. Morbid obesity (CMS/HCC)     4. Moderate persistent asthma, unspecified whether complicated     5. Hyperlipidemia, unspecified hyperlipidemia type     6. Iron deficiency anemia, unspecified iron deficiency anemia type     7. Gastroesophageal reflux disease, esophagitis presence not specified     8. Essential hypertension             -recommend pt get pap smear. She will call for an appt   -MIGEL- sleep medicine following. She will need to make a call for appt for CPAP  -recommend diabetic eye exam  - Essential Hypertension - BP at goal. Continue with current medicaitons. Stop lisinopril-HCTZ. Start on lisinopril 20 mg daily.  - hyperlipidemia-  Recheck lipid panel The current medical regimen is effective;  continue present plan and medications.  - DM type 2-  bydureon . 2 mg sub q weekly. basaglar 10 units at bedtime. Advised pt can go by 3 unites every 3 days until morning sugars at goal   - vitamin D deficiency -continue vitamin D pill once a week.  - morbid obesity - BMI >40 - pt gained lbs since last visit. Continue with ketogenic diet.  Gave diet information to go. Gave bariatric weight loss surgery to go home with.  She has been reluctant for weight loss surgery.  BMI at 51.8   -for back pain - referral to PT/OT. Services appreciated. Continue muscle relaxant. Also consider x-ray of lumbar spine. Continue mobic and flexeril   - iron deficiency anemia -continue on iron pill TID. Her last CBC showed stable hemoglobin. Recommend Hematology referral and possible IV iron therapy. Consultation appreciated.   -lekuocytosis - slightly improved from last visit. Will continue to monitor. Consider Hematology refrral   - moderate persistent asthma - not controlled. Will go up on therapy and add qvar 80 mgBID  inhaler daily.  Continue albuterol   - GERD - continue on protonix  -advised pt to be safe and call with questions and concerns  -advised to go to ER or call 911 if symptoms worrisome or severe  -advised to followup with Specialist and referrals  -adivsed pt to be safe during COVID-19 pandemit  This visit has been rescheduled as a phone visit to comply with patient safety concerns in accordance with CDC recommendations. Total time of discussion was 25  Minutes.  -recheck in 2 weeks         This document has been electronically signed by Solitario Li MD on April 20, 2020 13:47

## 2020-04-23 ENCOUNTER — TELEPHONE (OUTPATIENT)
Dept: FAMILY MEDICINE CLINIC | Facility: CLINIC | Age: 45
End: 2020-04-23

## 2020-04-24 ENCOUNTER — TELEPHONE (OUTPATIENT)
Dept: FAMILY MEDICINE CLINIC | Facility: CLINIC | Age: 45
End: 2020-04-24

## 2020-04-27 ENCOUNTER — TELEPHONE (OUTPATIENT)
Dept: FAMILY MEDICINE CLINIC | Facility: CLINIC | Age: 45
End: 2020-04-27

## 2020-04-27 RX ORDER — FLUTICASONE PROPIONATE 220 UG/1
1 AEROSOL, METERED RESPIRATORY (INHALATION)
Qty: 1 INHALER | Refills: 3 | Status: SHIPPED | OUTPATIENT
Start: 2020-04-27 | End: 2022-03-18 | Stop reason: SDUPTHER

## 2020-05-01 ENCOUNTER — TELEPHONE (OUTPATIENT)
Dept: FAMILY MEDICINE CLINIC | Facility: CLINIC | Age: 45
End: 2020-05-01

## 2020-05-04 ENCOUNTER — OFFICE VISIT (OUTPATIENT)
Dept: FAMILY MEDICINE CLINIC | Facility: CLINIC | Age: 45
End: 2020-05-04

## 2020-05-04 VITALS — BODY MASS INDEX: 51.34 KG/M2 | HEIGHT: 63 IN

## 2020-05-04 DIAGNOSIS — E66.01 MORBID OBESITY (HCC): ICD-10-CM

## 2020-05-04 DIAGNOSIS — M54.40 CHRONIC BILATERAL LOW BACK PAIN WITH SCIATICA, SCIATICA LATERALITY UNSPECIFIED: ICD-10-CM

## 2020-05-04 DIAGNOSIS — K21.9 GASTROESOPHAGEAL REFLUX DISEASE, ESOPHAGITIS PRESENCE NOT SPECIFIED: ICD-10-CM

## 2020-05-04 DIAGNOSIS — I10 ESSENTIAL HYPERTENSION: ICD-10-CM

## 2020-05-04 DIAGNOSIS — E55.9 VITAMIN D DEFICIENCY: Primary | ICD-10-CM

## 2020-05-04 DIAGNOSIS — D50.9 IRON DEFICIENCY ANEMIA, UNSPECIFIED IRON DEFICIENCY ANEMIA TYPE: ICD-10-CM

## 2020-05-04 DIAGNOSIS — G47.33 OSA (OBSTRUCTIVE SLEEP APNEA): ICD-10-CM

## 2020-05-04 DIAGNOSIS — J45.40 MODERATE PERSISTENT ASTHMA, UNSPECIFIED WHETHER COMPLICATED: ICD-10-CM

## 2020-05-04 DIAGNOSIS — G89.29 CHRONIC BILATERAL LOW BACK PAIN WITH SCIATICA, SCIATICA LATERALITY UNSPECIFIED: ICD-10-CM

## 2020-05-04 DIAGNOSIS — E78.5 HYPERLIPIDEMIA, UNSPECIFIED HYPERLIPIDEMIA TYPE: ICD-10-CM

## 2020-05-04 DIAGNOSIS — E11.65 UNCONTROLLED TYPE 2 DIABETES MELLITUS WITH HYPERGLYCEMIA (HCC): ICD-10-CM

## 2020-05-04 PROCEDURE — 99213 OFFICE O/P EST LOW 20 MIN: CPT | Performed by: FAMILY MEDICINE

## 2020-05-04 RX ORDER — PEN NEEDLE, DIABETIC 31 G X1/4"
NEEDLE, DISPOSABLE MISCELLANEOUS
COMMUNITY
Start: 2020-04-27 | End: 2021-02-12

## 2020-05-04 RX ORDER — INSULIN GLARGINE 100 [IU]/ML
INJECTION, SOLUTION SUBCUTANEOUS
Qty: 3 PEN | Refills: 3 | Status: SHIPPED | OUTPATIENT
Start: 2020-05-04 | End: 2021-07-06

## 2020-05-04 RX ORDER — PEN NEEDLE, DIABETIC 29 G X1/2"
NEEDLE, DISPOSABLE MISCELLANEOUS
COMMUNITY
Start: 2020-04-22

## 2020-05-05 ENCOUNTER — TELEPHONE (OUTPATIENT)
Dept: FAMILY MEDICINE CLINIC | Facility: CLINIC | Age: 45
End: 2020-05-05

## 2020-05-05 NOTE — TELEPHONE ENCOUNTER
[facility] called on a recorded line to confirm the EGFR for the patient's prescription.    They can be contacted at [phone]..     Ask for Bonita SANDERS

## 2020-06-04 NOTE — PROGRESS NOTES
"   Subjective:  Lizzy Restrepo is a 44 y.o. female who presents for       Patient Active Problem List   Diagnosis   • Encounter for immunization   • Diabetes type 2, uncontrolled (CMS/HCC)   • Hyperlipidemia associated with type 2 diabetes mellitus (CMS/HCC)   • Morbid obesity (CMS/HCC)   • Vitamin D deficiency   • Essential hypertension   • Hyperlipidemia   • Urinary tract infection   • Acute pain of right shoulder   • Right arm pain   • Right elbow pain   • Lateral epicondylitis of right elbow   • Medial epicondylitis   • Encounter for screening mammogram for malignant neoplasm of breast   • Iron deficiency anemia   • Mild intermittent asthma   • Encounter for screening for endocrine disorder   • Gastroesophageal reflux disease   • Pain in both knees   • Controlled type 2 diabetes mellitus with complication, without long-term current use of insulin (CMS/[facility])   • Anemia   • Right knee pain   • Controlled type 2 diabetes mellitus with complication, with long-term current use of insulin (CMS/[facility])   • General medical examination   • Leukocytosis   • Chronic bilateral low back pain with sciatica   • Encounter for Papanicolaou smear of cervix   • Screening mammogram, encounter for   • Other fatigue   • MIGEL (obstructive sleep apnea)   • Moderate persistent asthma   • Acute bronchitis           Current Outpatient Medications:   •  albuterol sulfate HFA (VENTOLIN HFA) 108 (90 Base) MCG/ACT inhaler, Inhale 2 puffs Every 4 (Four) Hours As Needed for Shortness of Air., Disp: 1 inhaler, Rfl: 0  •  aspirin 81 MG chewable tablet, Chew 1 tablet Daily., Disp: 30 tablet, Rfl: 6  •  atorvastatin (Lipitor) 40 MG tablet, Take 1 tablet by mouth Daily., Disp: 90 tablet, Rfl: 3  •  BD INSULIN SYRINGE U/F 31G X 5/16\" 1 ML misc, , Disp: , Rfl:   •  cholecalciferol (Optimal-D) 1.25 MG (55647 UT) capsule, Take 1 capsule by mouth 1 (One) Time Per Week., Disp: 4 capsule, Rfl: 3  •  cyclobenzaprine (FLEXERIL) 10 MG tablet, Take 1 tablet by " mouth At Night As Needed for Muscle Spasms., Disp: 30 tablet, Rfl: 3  •  Ertugliflozin L-PyroglutamicAc (Steglatro) 15 MG tablet, Take 1 tablet by mouth Every Morning., Disp: 30 tablet, Rfl: 3  •  exenatide er (BYDUREON) 2 MG/0.85ML auto-injector injection, Inject 0.85 mL under the skin into the appropriate area as directed 1 (One) Time Per Week., Disp: 4 pen, Rfl: 3  •  fluticasone (Flonase) 50 MCG/ACT nasal spray, 2 sprays into the nostril(s) as directed by provider Daily., Disp: 16 g, Rfl: 6  •  fluticasone (FLOVENT HFA) 220 MCG/ACT inhaler, Inhale 1 puff 2 (Two) Times a Day., Disp: 1 inhaler, Rfl: 3  •  glucose blood test strip, Give testing strips and lancets to used 2-3 times daily., Disp: 300 each, Rfl: 3  •  Insulin Glargine (BASAGLAR KWIKPEN) 100 UNIT/ML injection pen, Start 16 units at bedtime subq can go up by 3 units every 3 days until am sugars running , Disp: 3 pen, Rfl: 3  •  [employer] PEN NEEDLES 31G X 6 MM misc, , Disp: , Rfl:   •  lisinopril (PRINIVIL,ZESTRIL) 20 MG tablet, Take 1 tablet by mouth Daily., Disp: 90 tablet, Rfl: 3  •  loratadine (EQ Loratadine) 10 MG tablet, Take 1 tablet by mouth Daily., Disp: 90 tablet, Rfl: 3  •  meloxicam (MOBIC) 15 MG tablet, Take 1 tablet by mouth Daily., Disp: 30 tablet, Rfl: 6  •  montelukast (Singulair) 10 MG tablet, Take 1 tablet by mouth Every Night., Disp: 30 tablet, Rfl: 6  •  omeprazole (priLOSEC) 40 MG capsule, Take 1 capsule by mouth Daily., Disp: 90 capsule, Rfl: 3  •  ReliOn Ultra Thin Lancets misc, , Disp: , Rfl:     Pt is 43 yo female with management of morbid obesity, moderate persistent asthma, allergic rhinitis, IDDM type 2,, HLP, HTN,  Vitamin D deficiency,GERD, chronic fatigue, MIGEL, multiple joint pain, sp cholecystectomy sp tonsillectomy, sp umbilical hernia repair, sp , iron deficiency anemia , CKD stage 2        20 Telemedicine Visit. Pt has yet to get labwork. Pt had issues with medications being covered by insurance. She  is now taking her Basaglar insulin 16 units at bedtime along with bydureon and  stegalatro but she has been taking jardiance instead until finished. Per patient blood sugars have improved since taking basagla insulin. Currently pt is taking 16 units at bedtime.    Breathing  Stable on flovent 220 1 puff BID.   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      6/12/20 in office visit for recheck on pt's morbid obesity, asthma, IDDM type 2, HLP, HTN, pt recently had labowrk done on 6/10/20 that showed normal vitamin B12, Vitamin D was low. Ferritin showed low iron saturation and TIBC transferrin was low at 190. On thyroid studies TSH was normal.  T4 was low at 0.83 T3 was normal lipid panel  Showed HDL at 34. hga1c was at 11.40 up from 10.23. CMP showed GFR at 94. With normal liver enzymes. CBC showed hemoglobin at 12.4 with MCV at 68.5. She has not been taking bydrueon weekly.   She continues to take steglatro 15 mg daily.  She also takes basaglar insulin 16 units at bedtime. She has not been  Tapering up sugars.   Her morning sugars run  170 in morning. She also has right wrist pain that has been present for 6 months. Hurts to move hand and fingers. It hurts to .  She has been wearing a wrist brace for past few months. She has trouble gripping things and holding her grandbaby             Wrist Pain    The pain is present in the right hand. This is a recurrent problem. The current episode started more than 1 month ago. The problem occurs constantly. The problem has been gradually worsening. The quality of the pain is described as aching. The pain is at a severity of 5/10. The pain is moderate. Associated symptoms include joint locking, joint swelling, a limited range of motion, numbness, stiffness and tingling. Pertinent negatives include no fever, inability to bear weight or itching. The symptoms are aggravated by activity and lying down. Treatments tried: wrist brace  The  treatment provided no relief.   Diabetes   She presents for her follow-up diabetic visit. She has type 2 diabetes mellitus. Her disease course has been uncontrolled . Pertinent negatives for hypoglycemia include no confusion, dizziness, headaches, hunger, mood changes, nervousness/anxiousness, pallor, seizures, sleepiness, speech difficulty, sweats or tremors. Associated symptoms include fatigue. Pertinent negatives for diabetes include no blurred vision, no chest pain, no foot paresthesias, no foot ulcerations, no polydipsia, no polyphagia, no polyuria, no visual change, no weakness and no weight loss. Pertinent negatives for hypoglycemia complications include no blackouts, no hospitalization, no nocturnal hypoglycemia, no required assistance and no required glucagon injection. Symptoms are stable. Pertinent negatives for diabetic complications include no autonomic neuropathy, CVA, heart disease, nephropathy, peripheral neuropathy, PVD or retinopathy. Risk factors for coronary artery disease include diabetes mellitus, dyslipidemia and obesity. Current diabetic treatment includes insulin injections. She is following a diabetic diet. When asked about meal planning, she reported none. She has not had a previous visit with a dietitian. She participates in exercise intermittently. She does not see a podiatrist.Eye exam is not current.   Obesity   This is a chronic problem. The current episode started more than 1 year ago. The problem has been unchanged. Associated symptoms include arthralgias, fatigue and nausea. Pertinent negatives include no abdominal pain, anorexia, change in bowel habit, chest pain, chills, congestion, coughing, fever, headaches, joint swelling, myalgias, neck pain, numbness, rash, sore throat, swollen glands, urinary symptoms, vertigo, visual change, vomiting or weakness. Nothing aggravates the symptoms. She has tried nothing for the symptoms. The treatment provided no relief.        Review of  Systems  Review of Systems   Constitutional: Positive for activity change and fatigue. Negative for appetite change, chills, diaphoresis and fever.   HENT: Negative for congestion, postnasal drip, rhinorrhea, sinus pressure, sinus pain, sneezing, sore throat, trouble swallowing and voice change.    Respiratory: Positive for cough and shortness of breath. Negative for choking, chest tightness, wheezing and stridor.    Cardiovascular: Negative for chest pain.   Gastrointestinal: Negative for diarrhea, nausea and vomiting.   Musculoskeletal: Positive for arthralgias, back pain and stiffness.        Right wrist pain    Skin: Negative for itching.   Allergic/Immunologic: Positive for environmental allergies.   Neurological: Positive for tingling, weakness and numbness. Negative for headaches.       Patient Active Problem List   Diagnosis   • Encounter for immunization   • Diabetes type 2, uncontrolled (CMS/HCC)   • Hyperlipidemia associated with type 2 diabetes mellitus (CMS/HCC)   • Morbid obesity (CMS/HCC)   • Vitamin D deficiency   • Essential hypertension   • Hyperlipidemia   • Urinary tract infection   • Acute pain of right shoulder   • Right arm pain   • Right elbow pain   • Lateral epicondylitis of right elbow   • Medial epicondylitis   • Encounter for screening mammogram for malignant neoplasm of breast   • Iron deficiency anemia   • Mild intermittent asthma   • Encounter for screening for endocrine disorder   • Gastroesophageal reflux disease   • Pain in both knees   • Controlled type 2 diabetes mellitus with complication, without long-term current use of insulin (CMS/HCC)   • Anemia   • Right knee pain   • Controlled type 2 diabetes mellitus with complication, with long-term current use of insulin (CMS/HCC)   • General medical examination   • Leukocytosis   • Chronic bilateral low back pain with sciatica   • Encounter for Papanicolaou smear of cervix   • Screening mammogram, encounter for   • Other fatigue   •  MIGEL (obstructive sleep apnea)   • Moderate persistent asthma   • Acute bronchitis     Past Surgical History:   Procedure Laterality Date   •  SECTION  10/30/2000   • CHOLECYSTECTOMY  1995   • COLONOSCOPY  2013    normal per pt   • CYSTOSCOPY  2016    And bilateral retrograde pyelograms. Performed at [facility].   • TONSILLECTOMY  1985   • UMBILICAL HERNIA REPAIR  2015   • UMBILICAL HERNIA REPAIR  2015     Social History     Socioeconomic History   • Marital status:      Spouse name: Not on file   • Number of children: Not on file   • Years of education: Not on file   • Highest education level: Not on file   Tobacco Use   • Smoking status: Never Smoker   • Smokeless tobacco: Never Used   Substance and Sexual Activity   • Alcohol use: No   • Drug use: No     Family History   Problem Relation Age of Onset   • Diabetes Mother    • Heart disease Mother    • Heart failure Mother         congestive   • Hypertension Mother    • Asthma Father    • Osteoarthritis Father      No visits with results within 6 Month(s) from this visit.   Latest known visit with results is:   Lab on 2019   Component Date Value Ref Range Status   • WBC 2019 9.23  3.40 - 10.80 10*3/mm3 Final   • RBC 2019 6.38* 3.77 - 5.28 10*6/mm3 Final   • Hemoglobin 2019 12.5  12.0 - 15.9 g/dL Final   • Hematocrit 2019 42.7  34.0 - 46.6 % Final   • MCV 2019 66.9* 79.0 - 97.0 fL Final   • MCH 2019 19.6* 26.6 - 33.0 pg Final   • MCHC 2019 29.3* 31.5 - 35.7 g/dL Final   • RDW 2019 17.2* 12.3 - 15.4 % Final   • RDW-SD 2019 36.6* 37.0 - 54.0 fl Final   • MPV 2019 10.9  6.0 - 12.0 fL Final   • Platelets 2019 290  140 - 450 10*3/mm3 Final   • Glucose 2019 149* 65 - 99 mg/dL Final   • BUN 2019 10  6 - 20 mg/dL Final   • Creatinine 2019 0.74  0.57 - 1.00 mg/dL Final   • Sodium 2019 143  136 - 145 mmol/L Final   •  Potassium 12/12/2019 4.4  3.5 - 5.2 mmol/L Final   • Chloride 12/12/2019 102  98 - 107 mmol/L Final   • CO2 12/12/2019 29.0  22.0 - 29.0 mmol/L Final   • Calcium 12/12/2019 9.8  8.6 - 10.5 mg/dL Final   • Total Protein 12/12/2019 7.9  6.0 - 8.5 g/dL Final   • Albumin 12/12/2019 4.10  3.50 - 5.20 g/dL Final   • ALT (SGPT) 12/12/2019 23  1 - 33 U/L Final   • AST (SGOT) 12/12/2019 18  1 - 32 U/L Final   • Alkaline Phosphatase 12/12/2019 63  39 - 117 U/L Final   • Total Bilirubin 12/12/2019 0.2  0.2 - 1.2 mg/dL Final   • eGFR  African Amer 12/12/2019 103  >60 mL/min/1.73 Final   • Globulin 12/12/2019 3.8  gm/dL Final   • A/G Ratio 12/12/2019 1.1  g/dL Final   • BUN/Creatinine Ratio 12/12/2019 13.5  7.0 - 25.0 Final   • Anion Gap 12/12/2019 12.0  5.0 - 15.0 mmol/L Final   • Hemoglobin A1C 12/12/2019 10.23* 4.80 - 5.60 % Final   • Total Cholesterol 12/12/2019 148  0 - 200 mg/dL Final   • Triglycerides 12/12/2019 90  0 - 150 mg/dL Final   • HDL Cholesterol 12/12/2019 47  40 - 60 mg/dL Final   • LDL Cholesterol  12/12/2019 83  0 - 100 mg/dL Final   • VLDL Cholesterol 12/12/2019 18  5 - 40 mg/dL Final   • LDL/HDL Ratio 12/12/2019 1.77   Final   • 25 Hydroxy, Vitamin D 12/12/2019 59.5  30.0 - 100.0 ng/ml Final   • Vitamin B-12 12/12/2019 854  211 - 946 pg/mL Final   • Microalbumin/Creatinine Ratio 12/12/2019    Final    Unable to calculate   • Creatinine, Urine 12/12/2019 123.5  mg/dL Final   • Microalbumin, Urine 12/12/2019 <1.2  mg/dL Final   • Neutrophil % 12/12/2019 44.8  42.7 - 76.0 % Final   • Lymphocyte % 12/12/2019 45.7* 19.6 - 45.3 % Final   • Monocyte % 12/12/2019 2.9* 5.0 - 12.0 % Final   • Eosinophil % 12/12/2019 3.8  0.3 - 6.2 % Final   • Basophil % 12/12/2019 1.0  0.0 - 1.5 % Final   • Atypical Lymphocyte % 12/12/2019 1.9  0.0 - 5.0 % Final   • Neutrophils Absolute 12/12/2019 4.14  1.70 - 7.00 10*3/mm3 Final   • Lymphocytes Absolute 12/12/2019 4.22* 0.70 - 3.10 10*3/mm3 Final   • Monocytes Absolute 12/12/2019  0.27  0.10 - 0.90 10*3/mm3 Final   • Eosinophils Absolute 12/12/2019 0.35  0.00 - 0.40 10*3/mm3 Final   • Basophils Absolute 12/12/2019 0.09  0.00 - 0.20 10*3/mm3 Final   • Anisocytosis 12/12/2019 Slight/1+  None Seen Final   • Microcytes 12/12/2019 Slight/1+  None Seen Final   • WBC Morphology 12/12/2019 Normal  Normal Final   • Platelet Morphology 12/12/2019 Normal  Normal Final      Polysomnography 4 or More Parameters  [place] [address]  Phone: [phone]  Fax: [phone]    Split Night Polysomnography Interpretation  Patient's Name: Lizzy Restrepo  YOB: 1975  Date of Study: 06/28/2019  Referring provider: No info available  Primary Care Physician: Solitario Li MD  Pertinent History:  The patient is a 43 y.o. female who stands 63 inches tall and weighs 283   pounds. Her Body mass index is 50.13 kg/m². Her [place] Sleepiness Scale   Score is 12. Pertinent medical problems and pertinent medications include   are included in the sleep lab report and have been reviewed    BASELINE PORTION:  Sleep Architecture:  Sleep onset occurred after 2.4 minutes, normal is < 20 minutes. Onset to   REM sleep was 81.4 minutes, normal is  minutes. Sleep efficiency was   91.6%, normal is >80% in age 65 and less. Total sleep time was 132.5   minutes. Wake after sleep onset was 9.7 minutes. The general pattern of   sleep state cycling, distribution, and maintenance was observed.     Sleep stages as a percent of total sleep time:    Stage N1 sleep - 1.9% , normal ~ 5%  Stage N2 sleep - 72.5% , normal ~ 50%  Stage N3 sleep - 0.0% , normal ~ 20%  Stage REM sleep - 25.7% , normal ~ 20% - 25%    There was no evidence of parasomnias (night terrors, sleepwalking, sleep   talking, etc.) on study night as noted in the record or by the technician.        Respiratory:  There were 0 central apneas, 0 obstructive apneas, 1 mixed apneas, and 48   hypopneas  on study night. The apnea-hypopnea index (AHI) was 58.2 in REM   and 9.1 in NREM. The overall apnea-hypopnea index (AHI) was 22.2. Oxygen   saturations averaged 92.5% during total sleep time.  The oxygen dontae was   82.  The patient spent 4.5 minutes, or 3.4% of total sleep time with   saturations less than 89%.    Arousals:  The overall arousal index was 6.3.     Cardiac:  Nonspecific dysrhythmias were noted.     Periodic Limb Movements:  The periodic limb movement index was 0.     TREATMENT PORTION:    The patient qualifed for split night titration. She was titrated on CPAP   pressures of 5-10 cm H2O with a Full face mask. On the best setting, 10   cmH2O , the patient slept for 41 minutes, with 23 minutes of REM sleep.   The residual AHI was 1.5 and the oxygen saturation dontae was 94%.      Assessment:    1. Moderate  obstructive sleep apnea hypopnea syndrome with an AHI of   22.2.   2. REM dominant MIGEL.  3. Adequate control of sleep disordered breathing on 10 cm H2O    Recommendations:  1. I will start the patient on AutoCPAP from 10-20cm H2O with heated   humidification and a mask per patient’s choice.  2. Follow-up in the [facility] in 31-90 days after the   initiation of positive airway pressure therapy.  3. Close clinical follow up is recommended to ensure compliance and   resolution or reduction of symptoms.   4. Avoidance of sedatives, alcohol, and the supine position, all of which   can worsen apneic events.  5. Since obesity is known to exacerbate MIGEL and weight loss can improve   MIGEL symptoms, weight management should be a long-term goal.  6. Medically supervised weight reduction to achieve a BMI < 30 if   appropriate.7. Encourage good sleep hygiene practice and avoid sleep   deprivation    I have personally reviewed the raw data and summarized as above. Please   feel free to contact me if you have any questions. Thank you so much for   allowing me to participate in the care of this  "patient.    Sincerely,     This document has been electronically signed by   Chalino Ramsey MD     Medical Director  [facility]    CC: Solitario Li MD    [unfilled]  Immunization History   Administered Date(s) Administered   • Hepatitis B 07/19/2016, 08/23/2016, 02/17/2017   • Tdap 02/17/2017       The following portions of the patient's history were reviewed and updated as appropriate: allergies, current medications, past family history, past medical history, past social history, past surgical history and problem list.        Physical Exam  /82 (BP Location: Right arm, Patient Position: Sitting, Cuff Size: Large Adult)   Pulse 84   Temp 98.4 °F (36.9 °C) (Temporal)   Ht 160 cm (63\")   Wt 132 kg (290 lb)   LMP 03/08/2020   SpO2 98%   BMI 51.37 kg/m²     Physical Exam   Constitutional: She is oriented to person, place, and time. She appears well-developed and well-nourished.   HENT:   Head: Normocephalic and atraumatic.   Right Ear: External ear normal.   Eyes: Pupils are equal, round, and reactive to light. Conjunctivae and EOM are normal.   Neck: Normal range of motion. Neck supple.   Cardiovascular: Normal rate, regular rhythm and normal heart sounds.   No murmur heard.  Pulmonary/Chest: Effort normal and breath sounds normal. No respiratory distress.   Abdominal: Soft. Bowel sounds are normal. She exhibits no distension. There is no tenderness.   Obese abdomen    Musculoskeletal: She exhibits tenderness. She exhibits no edema or deformity.        Right wrist: She exhibits decreased range of motion, tenderness, bony tenderness and swelling.        Arms:  Neurological: She is alert and oriented to person, place, and time. No cranial nerve deficit.   Skin: Skin is warm. No rash noted. She is not diaphoretic. No erythema. No pallor.   Psychiatric: She has a normal mood and affect. Her behavior is normal.   Nursing note and vitals reviewed.      Assessment/Plan    " Diagnosis Plan   1. Vitamin D deficiency     2. MIGEL (obstructive sleep apnea)     3. Morbid obesity (CMS/[facility])     4. Hyperlipidemia, unspecified hyperlipidemia type     5. Gastroesophageal reflux disease, esophagitis presence not specified     6. Essential hypertension     7. Controlled type 2 diabetes mellitus with complication, with long-term current use of insulin (CMS/[facility])            -went over labwork   -recommend pt get pap smear. She will call for an appt   -will get mammogram screening   -MIGEL- sleep medicine following. She will need to make a call for appt for CPAP  -recommend diabetic eye exam  -right wrist pain/hand pain - possible carpal tunnel syndrome. Refer to Neurologyst. Will get x-ray of right wrist. Stop mobic and start ibuprofen 800 mg PO TID   - Essential Hypertension - on lisinopril 20 mg PO q daily.    - hyperlipidemia-  Recheck lipid panel The current medical regimen is effective;  continue present plan and medications.  - DM type 2-  uncontrolled   go back on  bydureon  2 mg sub q weekly. basaglar 16 units at bedtime. Advised pt can go by 3 unites every 3 days until morning sugars at goal  on steglastro 15 mg daily.  Advised to bring sugars on next visit   - vitamin D deficiency -continue vitamin D pill once a week.  - morbid obesity - BMI >40 - pt gained lbs since last visit. Continue with ketogenic diet.  Gave diet information to go. Gave bariatric weight loss surgery to go home with.  She has been reluctant for weight loss surgery.  BMI at 51.37    -for back pain - referredl to PT/OT. Services appreciated.on flexeril 10 mg PO qhs. And mobic 15 mg daily.    - iron deficiency anemia -continue on iron pill TID. Her last CBC showed stable hemoglobin. Recommend Hematology referral and possible IV iron therapy. Consultation appreciated.   -lekuocytosis - slightly improved from last visit. Will continue to monitor. Consider Hematology refrral   - moderate persistent asthma - on flovent 220 mcg 1  puff BId.   - GERD - continue on protonix  -advised pt to be safe and call with questions and concerns  -advised to go to ER or call 911 if symptoms worrisome or severe  -advised to followup with Specialist and referrals  -adivsed pt to be safe during COVID-19 pandemic  -recheck in 1 month         This document has been electronically signed by Solitario Li MD on June 12, 2020 07:24

## 2020-06-10 ENCOUNTER — LAB (OUTPATIENT)
Dept: LAB | Facility: HOSPITAL | Age: 45
End: 2020-06-10

## 2020-06-10 DIAGNOSIS — E66.01 MORBID OBESITY (HCC): ICD-10-CM

## 2020-06-10 LAB
25(OH)D3 SERPL-MCNC: 62.1 NG/ML (ref 30–100)
ALBUMIN SERPL-MCNC: 4 G/DL (ref 3.5–5.2)
ALBUMIN/GLOB SERPL: 1.2 G/DL
ALP SERPL-CCNC: 61 U/L (ref 39–117)
ALT SERPL W P-5'-P-CCNC: 16 U/L (ref 1–33)
ANION GAP SERPL CALCULATED.3IONS-SCNC: 9.4 MMOL/L (ref 5–15)
AST SERPL-CCNC: 21 U/L (ref 1–32)
BILIRUB SERPL-MCNC: 0.2 MG/DL (ref 0.2–1.2)
BUN BLD-MCNC: 12 MG/DL (ref 6–20)
BUN/CREAT SERPL: 15 (ref 7–25)
CALCIUM SPEC-SCNC: 9.9 MG/DL (ref 8.6–10.5)
CHLORIDE SERPL-SCNC: 99 MMOL/L (ref 98–107)
CHOLEST SERPL-MCNC: 107 MG/DL (ref 0–200)
CO2 SERPL-SCNC: 27.6 MMOL/L (ref 22–29)
CREAT BLD-MCNC: 0.8 MG/DL (ref 0.57–1)
DEPRECATED RDW RBC AUTO: 35.6 FL (ref 37–54)
EOSINOPHIL # BLD MANUAL: 0.11 10*3/MM3 (ref 0–0.4)
EOSINOPHIL NFR BLD MANUAL: 1 % (ref 0.3–6.2)
ERYTHROCYTE [DISTWIDTH] IN BLOOD BY AUTOMATED COUNT: 14.8 % (ref 12.3–15.4)
FERRITIN SERPL-MCNC: 226 NG/ML (ref 13–150)
GFR SERPL CREATININE-BSD FRML MDRD: 94 ML/MIN/1.73
GLOBULIN UR ELPH-MCNC: 3.4 GM/DL
GLUCOSE BLD-MCNC: 201 MG/DL (ref 65–99)
HBA1C MFR BLD: 11.4 % (ref 4.8–5.6)
HCT VFR BLD AUTO: 40.2 % (ref 34–46.6)
HDLC SERPL-MCNC: 34 MG/DL (ref 40–60)
HGB BLD-MCNC: 12.4 G/DL (ref 12–15.9)
IRON 24H UR-MRATE: 51 MCG/DL (ref 37–145)
IRON SATN MFR SERPL: 18 % (ref 20–50)
LDLC SERPL CALC-MCNC: 54 MG/DL (ref 0–100)
LDLC/HDLC SERPL: 1.6 {RATIO}
LYMPHOCYTES # BLD MANUAL: 4.75 10*3/MM3 (ref 0.7–3.1)
LYMPHOCYTES NFR BLD MANUAL: 45 % (ref 19.6–45.3)
LYMPHOCYTES NFR BLD MANUAL: 6 % (ref 5–12)
MCH RBC QN AUTO: 21.1 PG (ref 26.6–33)
MCHC RBC AUTO-ENTMCNC: 30.8 G/DL (ref 31.5–35.7)
MCV RBC AUTO: 68.5 FL (ref 79–97)
MONOCYTES # BLD AUTO: 0.63 10*3/MM3 (ref 0.1–0.9)
NEUTROPHILS # BLD AUTO: 4.96 10*3/MM3 (ref 1.7–7)
NEUTROPHILS NFR BLD MANUAL: 47 % (ref 42.7–76)
PLAT MORPH BLD: NORMAL
PLATELET # BLD AUTO: 323 10*3/MM3 (ref 140–450)
PMV BLD AUTO: 11.6 FL (ref 6–12)
POTASSIUM BLD-SCNC: 4.2 MMOL/L (ref 3.5–5.2)
PROT SERPL-MCNC: 7.4 G/DL (ref 6–8.5)
RBC # BLD AUTO: 5.87 10*6/MM3 (ref 3.77–5.28)
RBC MORPH BLD: NORMAL
SODIUM BLD-SCNC: 136 MMOL/L (ref 136–145)
T3FREE SERPL-MCNC: 2.68 PG/ML (ref 2–4.4)
T4 FREE SERPL-MCNC: 0.83 NG/DL (ref 0.93–1.7)
TIBC SERPL-MCNC: 283 MCG/DL (ref 298–536)
TRANSFERRIN SERPL-MCNC: 190 MG/DL (ref 200–360)
TRIGL SERPL-MCNC: 93 MG/DL (ref 0–150)
TSH SERPL DL<=0.05 MIU/L-ACNC: 2.6 UIU/ML (ref 0.27–4.2)
VARIANT LYMPHS NFR BLD MANUAL: 1 % (ref 0–5)
VIT B12 BLD-MCNC: 797 PG/ML (ref 211–946)
VLDLC SERPL-MCNC: 18.6 MG/DL (ref 5–40)
WBC MORPH BLD: NORMAL
WBC NRBC COR # BLD: 10.56 10*3/MM3 (ref 3.4–10.8)

## 2020-06-10 PROCEDURE — 84443 ASSAY THYROID STIM HORMONE: CPT

## 2020-06-10 PROCEDURE — 80061 LIPID PANEL: CPT

## 2020-06-10 PROCEDURE — 84466 ASSAY OF TRANSFERRIN: CPT

## 2020-06-10 PROCEDURE — 85025 COMPLETE CBC W/AUTO DIFF WBC: CPT

## 2020-06-10 PROCEDURE — 85007 BL SMEAR W/DIFF WBC COUNT: CPT

## 2020-06-10 PROCEDURE — 82306 VITAMIN D 25 HYDROXY: CPT

## 2020-06-10 PROCEDURE — 83540 ASSAY OF IRON: CPT

## 2020-06-10 PROCEDURE — 84439 ASSAY OF FREE THYROXINE: CPT

## 2020-06-10 PROCEDURE — 83036 HEMOGLOBIN GLYCOSYLATED A1C: CPT

## 2020-06-10 PROCEDURE — 82607 VITAMIN B-12: CPT

## 2020-06-10 PROCEDURE — 82728 ASSAY OF FERRITIN: CPT

## 2020-06-10 PROCEDURE — 80053 COMPREHEN METABOLIC PANEL: CPT

## 2020-06-10 PROCEDURE — 84481 FREE ASSAY (FT-3): CPT

## 2020-06-12 ENCOUNTER — OFFICE VISIT (OUTPATIENT)
Dept: FAMILY MEDICINE CLINIC | Facility: CLINIC | Age: 45
End: 2020-06-12

## 2020-06-12 VITALS
HEIGHT: 63 IN | BODY MASS INDEX: 51.38 KG/M2 | SYSTOLIC BLOOD PRESSURE: 130 MMHG | OXYGEN SATURATION: 98 % | DIASTOLIC BLOOD PRESSURE: 82 MMHG | WEIGHT: 290 LBS | HEART RATE: 84 BPM | TEMPERATURE: 98.4 F

## 2020-06-12 DIAGNOSIS — K21.9 GASTROESOPHAGEAL REFLUX DISEASE, ESOPHAGITIS PRESENCE NOT SPECIFIED: ICD-10-CM

## 2020-06-12 DIAGNOSIS — G47.33 OSA (OBSTRUCTIVE SLEEP APNEA): ICD-10-CM

## 2020-06-12 DIAGNOSIS — E11.8 CONTROLLED TYPE 2 DIABETES MELLITUS WITH COMPLICATION, WITH LONG-TERM CURRENT USE OF INSULIN (HCC): ICD-10-CM

## 2020-06-12 DIAGNOSIS — M25.531 RIGHT WRIST PAIN: ICD-10-CM

## 2020-06-12 DIAGNOSIS — Z12.31 SCREENING MAMMOGRAM, ENCOUNTER FOR: Primary | ICD-10-CM

## 2020-06-12 DIAGNOSIS — E78.5 HYPERLIPIDEMIA, UNSPECIFIED HYPERLIPIDEMIA TYPE: ICD-10-CM

## 2020-06-12 DIAGNOSIS — E55.9 VITAMIN D DEFICIENCY: ICD-10-CM

## 2020-06-12 DIAGNOSIS — I10 ESSENTIAL HYPERTENSION: ICD-10-CM

## 2020-06-12 DIAGNOSIS — Z79.4 CONTROLLED TYPE 2 DIABETES MELLITUS WITH COMPLICATION, WITH LONG-TERM CURRENT USE OF INSULIN (HCC): ICD-10-CM

## 2020-06-12 DIAGNOSIS — E66.01 MORBID OBESITY (HCC): ICD-10-CM

## 2020-06-12 PROCEDURE — 99214 OFFICE O/P EST MOD 30 MIN: CPT | Performed by: FAMILY MEDICINE

## 2020-06-12 RX ORDER — FERROUS SULFATE 325(65) MG
325 TABLET ORAL
Qty: 30 TABLET | Refills: 3 | Status: SHIPPED | OUTPATIENT
Start: 2020-06-12 | End: 2022-03-18 | Stop reason: SDUPTHER

## 2020-06-12 RX ORDER — IBUPROFEN 800 MG/1
800 TABLET ORAL EVERY 8 HOURS PRN
Qty: 90 TABLET | Refills: 3 | Status: SHIPPED | OUTPATIENT
Start: 2020-06-12 | End: 2020-07-17

## 2020-06-12 NOTE — PATIENT INSTRUCTIONS
Need to bring sugars in morning before breakfast and 2 hours after meal    Continue stegalstro 15 mg daily     Restart on bydureon 2 mg sub q weekly    Continue on basaglar 16 units at bedtime. Go up by 3 unites every 3 days until morning sugars running . Also check sugars 2 hours after meal    Recheck in  1 month    Will refer to Dr. Nguyen Neurology for EMG study for right wrist and hand    Stop mobic/meloxciam. Start back on ibuprofen 800 mg every 8 hours with meals.

## 2020-07-10 NOTE — PROGRESS NOTES
"   Subjective:  Lizzy Restrepo is a 44 y.o. female who presents for       Patient Active Problem List   Diagnosis   • Encounter for immunization   • Diabetes type 2, uncontrolled (CMS/HCC)   • Hyperlipidemia associated with type 2 diabetes mellitus (CMS/HCC)   • Morbid obesity (CMS/HCC)   • Vitamin D deficiency   • Essential hypertension   • Hyperlipidemia   • Urinary tract infection   • Acute pain of right shoulder   • Right arm pain   • Right elbow pain   • Lateral epicondylitis of right elbow   • Medial epicondylitis   • Encounter for screening mammogram for malignant neoplasm of breast   • Iron deficiency anemia   • Mild intermittent asthma   • Encounter for screening for endocrine disorder   • Gastroesophageal reflux disease   • Pain in both knees   • Controlled type 2 diabetes mellitus with complication, without long-term current use of insulin (CMS/[facility])   • Anemia   • Right knee pain   • Controlled type 2 diabetes mellitus with complication, with long-term current use of insulin (CMS/[facility])   • General medical examination   • Leukocytosis   • Chronic bilateral low back pain with sciatica   • Encounter for Papanicolaou smear of cervix   • Screening mammogram, encounter for   • Other fatigue   • MIGEL (obstructive sleep apnea)   • Moderate persistent asthma   • Acute bronchitis   • Right wrist pain   • Nausea and vomiting   • Grief           Current Outpatient Medications:   •  albuterol sulfate HFA (VENTOLIN HFA) 108 (90 Base) MCG/ACT inhaler, Inhale 2 puffs Every 4 (Four) Hours As Needed for Shortness of Air., Disp: 1 inhaler, Rfl: 0  •  aspirin 81 MG chewable tablet, Chew 1 tablet Daily., Disp: 30 tablet, Rfl: 6  •  atorvastatin (Lipitor) 40 MG tablet, Take 1 tablet by mouth Daily., Disp: 90 tablet, Rfl: 3  •  BD INSULIN SYRINGE U/F 31G X 5/16\" 1 ML misc, , Disp: , Rfl:   •  cholecalciferol (Optimal-D) 1.25 MG (91993 UT) capsule, Take 1 capsule by mouth 1 (One) Time Per Week., Disp: 4 capsule, Rfl: 3  •  " cyclobenzaprine (FLEXERIL) 10 MG tablet, Take 1 tablet by mouth At Night As Needed for Muscle Spasms., Disp: 30 tablet, Rfl: 3  •  Ertugliflozin L-PyroglutamicAc (Steglatro) 15 MG tablet, Take 1 tablet by mouth Every Morning., Disp: 30 tablet, Rfl: 3  •  exenatide er (BYDUREON) 2 MG/0.85ML auto-injector injection, Inject 0.85 mL under the skin into the appropriate area as directed 1 (One) Time Per Week., Disp: 4 pen, Rfl: 3  •  ferrous sulfate 325 (65 FE) MG tablet, Take 1 tablet by mouth Daily With Breakfast., Disp: 30 tablet, Rfl: 3  •  fluticasone (Flonase) 50 MCG/ACT nasal spray, 2 sprays into the nostril(s) as directed by provider Daily., Disp: 16 g, Rfl: 6  •  fluticasone (FLOVENT HFA) 220 MCG/ACT inhaler, Inhale 1 puff 2 (Two) Times a Day., Disp: 1 inhaler, Rfl: 3  •  glucose blood test strip, Give testing strips and lancets to used 2-3 times daily., Disp: 300 each, Rfl: 3  •  Insulin Glargine (BASAGLAR KWIKPEN) 100 UNIT/ML injection pen, Start 16 units at bedtime subq can go up by 3 units every 3 days until am sugars running , Disp: 3 pen, Rfl: 3  •  [employer] PEN NEEDLES 31G X 6 MM misc, , Disp: , Rfl:   •  lisinopril (PRINIVIL,ZESTRIL) 20 MG tablet, Take 1 tablet by mouth Daily., Disp: 90 tablet, Rfl: 3  •  loratadine (EQ Loratadine) 10 MG tablet, Take 1 tablet by mouth Daily., Disp: 90 tablet, Rfl: 3  •  montelukast (Singulair) 10 MG tablet, Take 1 tablet by mouth Every Night., Disp: 30 tablet, Rfl: 6  •  omeprazole (priLOSEC) 40 MG capsule, Take 1 capsule by mouth Daily., Disp: 90 capsule, Rfl: 3  •  ReliOn Ultra Thin Lancets misc, , Disp: , Rfl:   •  nabumetone (RELAFEN) 750 MG tablet, Take 1 tablet by mouth 2 (Two) Times a Day., Disp: 60 tablet, Rfl: 3  •  ondansetron ODT (Zofran ODT) 8 MG disintegrating tablet, Place 1 tablet on the tongue Every 8 (Eight) Hours As Needed for Nausea or Vomiting., Disp: 30 tablet, Rfl: 3    Pt is 45 yo female with management of morbid obesity, moderate persistent  asthma, allergic rhinitis, IDDM type 2,, HLP, HTN,  Vitamin D deficiency,GERD, chronic fatigue, MIGEL, multiple joint pain, sp cholecystectomy sp tonsillectomy, sp umbilical hernia repair, sp , iron deficiency anemia , CKD stage 2        20 in office visit for recheck on pt's morbid obesity, asthma, IDDM type 2, HLP, HTN, pt recently had labowrk done on 6/10/20 that showed normal vitamin B12, Vitamin D was low. Ferritin showed low iron saturation and TIBC transferrin was low at 190. On thyroid studies TSH was normal.  T4 was low at 0.83 T3 was normal lipid panel  Showed HDL at 34. hga1c was at 11.40 up from 10.23. CMP showed GFR at 94. With normal liver enzymes. CBC showed hemoglobin at 12.4 with MCV at 68.5. She has not been taking bydrueon weekly.   She continues to take steglatro 15 mg daily.  She also takes basaglar insulin 16 units at bedtime. She has not been  Tapering up sugars.   Her morning sugars run  170 in morning. She also has right wrist pain that has been present for 6 months. Hurts to move hand and fingers. It hurts to .  She has been wearing a wrist brace for past few months. She has trouble gripping things and holding her grandbaby     20 in office visit for recheck  On pt's IDDM type 2, HLP, HTN, morbid obesity, pt was referred to Neurology for her wrist pain  And for possible carapl tunnel. 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  Her sugars have been more stable.  She has upcoming appt with Neurology for right wrist next week.  She continues to use wrist brace and ibuprofen. Also has nausea/vomiting lately. No fever        Nausea   This is a new problem. The current episode started more than 1 month ago. The problem occurs constantly. Associated symptoms include arthralgias, fatigue, joint swelling, numbness and weakness. Pertinent negatives include no abdominal pain, anorexia, change in bowel habit, chest pain, chills, congestion,  coughing, diaphoresis, fever, headaches, myalgias, nausea, neck pain, rash, sore throat, swollen glands, urinary symptoms, vertigo, visual change or vomiting. Nothing aggravates the symptoms. She has tried nothing for the symptoms. The treatment provided no relief.   Wrist Pain    The pain is present in the right hand. This is a recurrent problem. The current episode started more than 1 month ago. The problem occurs constantly. The problem has been gradually worsening. The quality of the pain is described as aching. The pain is at a severity of 5/10. The pain is moderate. Associated symptoms include joint locking, joint swelling, a limited range of motion, numbness, stiffness and tingling. Pertinent negatives include no fever, inability to bear weight or itching. The symptoms are aggravated by activity and lying down. Treatments tried: wrist brace  The treatment provided no relief.   Diabetes   She presents for her follow-up diabetic visit. She has type 2 diabetes mellitus. Her disease course has been uncontrolled . Pertinent negatives for hypoglycemia include no confusion, dizziness, headaches, hunger, mood changes, nervousness/anxiousness, pallor, seizures, sleepiness, speech difficulty, sweats or tremors. Associated symptoms include fatigue. Pertinent negatives for diabetes include no blurred vision, no chest pain, no foot paresthesias, no foot ulcerations, no polydipsia, no polyphagia, no polyuria, no visual change, no weakness and no weight loss. Pertinent negatives for hypoglycemia complications include no blackouts, no hospitalization, no nocturnal hypoglycemia, no required assistance and no required glucagon injection. Symptoms are stable. Pertinent negatives for diabetic complications include no autonomic neuropathy, CVA, heart disease, nephropathy, peripheral neuropathy, PVD or retinopathy. Risk factors for coronary artery disease include diabetes mellitus, dyslipidemia and obesity. Current diabetic  treatment includes insulin injections. She is following a diabetic diet. When asked about meal planning, she reported none. She has not had a previous visit with a dietitian. She participates in exercise intermittently. She does not see a podiatrist.Eye exam is not current.   Obesity   This is a chronic problem. The current episode started more than 1 year ago. The problem has been unchanged. Associated symptoms include arthralgias, fatigue and nausea. Pertinent negatives include no abdominal pain, anorexia, change in bowel habit, chest pain, chills, congestion, coughing, fever, headaches, joint swelling, myalgias, neck pain, numbness, rash, sore throat, swollen glands, urinary symptoms, vertigo, visual change, vomiting or weakness. Nothing aggravates the symptoms. She has tried nothing for the symptoms. The treatment provided no relief.     Review of Systems  Review of Systems   Constitutional: Positive for activity change and fatigue. Negative for appetite change, chills, diaphoresis and fever.   HENT: Negative for congestion, postnasal drip, rhinorrhea, sinus pressure, sinus pain, sneezing, sore throat, trouble swallowing and voice change.    Respiratory: Positive for shortness of breath. Negative for cough, choking, chest tightness, wheezing and stridor.    Cardiovascular: Negative for chest pain.   Gastrointestinal: Negative for abdominal pain, anorexia, change in bowel habit, diarrhea, nausea and vomiting.   Musculoskeletal: Positive for arthralgias and joint swelling. Negative for myalgias and neck pain.   Skin: Negative for rash.   Allergic/Immunologic: Positive for environmental allergies.   Neurological: Positive for weakness and numbness. Negative for vertigo and headaches.       Patient Active Problem List   Diagnosis   • Encounter for immunization   • Diabetes type 2, uncontrolled (CMS/HCC)   • Hyperlipidemia associated with type 2 diabetes mellitus (CMS/HCC)   • Morbid obesity (CMS/HCC)   • Vitamin D  deficiency   • Essential hypertension   • Hyperlipidemia   • Urinary tract infection   • Acute pain of right shoulder   • Right arm pain   • Right elbow pain   • Lateral epicondylitis of right elbow   • Medial epicondylitis   • Encounter for screening mammogram for malignant neoplasm of breast   • Iron deficiency anemia   • Mild intermittent asthma   • Encounter for screening for endocrine disorder   • Gastroesophageal reflux disease   • Pain in both knees   • Controlled type 2 diabetes mellitus with complication, without long-term current use of insulin (CMS/HCC)   • Anemia   • Right knee pain   • Controlled type 2 diabetes mellitus with complication, with long-term current use of insulin (CMS/[facility])   • General medical examination   • Leukocytosis   • Chronic bilateral low back pain with sciatica   • Encounter for Papanicolaou smear of cervix   • Screening mammogram, encounter for   • Other fatigue   • MIGEL (obstructive sleep apnea)   • Moderate persistent asthma   • Acute bronchitis   • Right wrist pain   • Nausea and vomiting   • Grief     Past Surgical History:   Procedure Laterality Date   •  SECTION  10/30/2000   • CHOLECYSTECTOMY  1995   • COLONOSCOPY  2013    normal per pt   • CYSTOSCOPY  2016    And bilateral retrograde pyelograms. Performed at [facility].   • TONSILLECTOMY  1985   • UMBILICAL HERNIA REPAIR  2015   • UMBILICAL HERNIA REPAIR  2015     Social History     Socioeconomic History   • Marital status:      Spouse name: Not on file   • Number of children: Not on file   • Years of education: Not on file   • Highest education level: Not on file   Tobacco Use   • Smoking status: Never Smoker   • Smokeless tobacco: Never Used   Substance and Sexual Activity   • Alcohol use: No   • Drug use: No     Family History   Problem Relation Age of Onset   • Diabetes Mother    • Heart disease Mother    • Heart failure Mother         congestive   •  Hypertension Mother    • Asthma Father    • Osteoarthritis Father      Lab on 06/10/2020   Component Date Value Ref Range Status   • WBC 06/10/2020 10.56  3.40 - 10.80 10*3/mm3 Final   • RBC 06/10/2020 5.87* 3.77 - 5.28 10*6/mm3 Final   • Hemoglobin 06/10/2020 12.4  12.0 - 15.9 g/dL Final   • Hematocrit 06/10/2020 40.2  34.0 - 46.6 % Final   • MCV 06/10/2020 68.5* 79.0 - 97.0 fL Final   • MCH 06/10/2020 21.1* 26.6 - 33.0 pg Final   • MCHC 06/10/2020 30.8* 31.5 - 35.7 g/dL Final   • RDW 06/10/2020 14.8  12.3 - 15.4 % Final   • RDW-SD 06/10/2020 35.6* 37.0 - 54.0 fl Final   • MPV 06/10/2020 11.6  6.0 - 12.0 fL Final   • Platelets 06/10/2020 323  140 - 450 10*3/mm3 Final   • Glucose 06/10/2020 201* 65 - 99 mg/dL Final   • BUN 06/10/2020 12  6 - 20 mg/dL Final   • Creatinine 06/10/2020 0.80  0.57 - 1.00 mg/dL Final   • Sodium 06/10/2020 136  136 - 145 mmol/L Final   • Potassium 06/10/2020 4.2  3.5 - 5.2 mmol/L Final   • Chloride 06/10/2020 99  98 - 107 mmol/L Final   • CO2 06/10/2020 27.6  22.0 - 29.0 mmol/L Final   • Calcium 06/10/2020 9.9  8.6 - 10.5 mg/dL Final   • Total Protein 06/10/2020 7.4  6.0 - 8.5 g/dL Final   • Albumin 06/10/2020 4.00  3.50 - 5.20 g/dL Final   • ALT (SGPT) 06/10/2020 16  1 - 33 U/L Final   • AST (SGOT) 06/10/2020 21  1 - 32 U/L Final   • Alkaline Phosphatase 06/10/2020 61  39 - 117 U/L Final   • Total Bilirubin 06/10/2020 0.2  0.2 - 1.2 mg/dL Final   • eGFR  African Amer 06/10/2020 94  >60 mL/min/1.73 Final   • Globulin 06/10/2020 3.4  gm/dL Final   • A/G Ratio 06/10/2020 1.2  g/dL Final   • BUN/Creatinine Ratio 06/10/2020 15.0  7.0 - 25.0 Final   • Anion Gap 06/10/2020 9.4  5.0 - 15.0 mmol/L Final   • Hemoglobin A1C 06/10/2020 11.40* 4.80 - 5.60 % Final   • Total Cholesterol 06/10/2020 107  0 - 200 mg/dL Final   • Triglycerides 06/10/2020 93  0 - 150 mg/dL Final   • HDL Cholesterol 06/10/2020 34* 40 - 60 mg/dL Final   • LDL Cholesterol  06/10/2020 54  0 - 100 mg/dL Final   • VLDL Cholesterol  06/10/2020 18.6  5 - 40 mg/dL Final   • LDL/HDL Ratio 06/10/2020 1.60   Final   • TSH 06/10/2020 2.600  0.270 - 4.200 uIU/mL Final   • Free T4 06/10/2020 0.83* 0.93 - 1.70 ng/dL Final   • T3, Free 06/10/2020 2.68  2.00 - 4.40 pg/mL Final   • 25 Hydroxy, Vitamin D 06/10/2020 62.1  30.0 - 100.0 ng/ml Final   • Vitamin B-12 06/10/2020 797  211 - 946 pg/mL Final   • Iron 06/10/2020 51  37 - 145 mcg/dL Final   • Iron Saturation 06/10/2020 18* 20 - 50 % Final   • Transferrin 06/10/2020 190* 200 - 360 mg/dL Final   • TIBC 06/10/2020 283* 298 - 536 mcg/dL Final   • Ferritin 06/10/2020 226.00* 13.00 - 150.00 ng/mL Final   • Neutrophil % 06/10/2020 47.0  42.7 - 76.0 % Final   • Lymphocyte % 06/10/2020 45.0  19.6 - 45.3 % Final   • Monocyte % 06/10/2020 6.0  5.0 - 12.0 % Final   • Eosinophil % 06/10/2020 1.0  0.3 - 6.2 % Final   • Atypical Lymphocyte % 06/10/2020 1.0  0.0 - 5.0 % Final   • Neutrophils Absolute 06/10/2020 4.96  1.70 - 7.00 10*3/mm3 Final   • Lymphocytes Absolute 06/10/2020 4.75* 0.70 - 3.10 10*3/mm3 Final   • Monocytes Absolute 06/10/2020 0.63  0.10 - 0.90 10*3/mm3 Final   • Eosinophils Absolute 06/10/2020 0.11  0.00 - 0.40 10*3/mm3 Final   • RBC Morphology 06/10/2020 Normal  Normal Final   • WBC Morphology 06/10/2020 Normal  Normal Final   • Platelet Morphology 06/10/2020 Normal  Normal Final      XR Wrist 3+ View Right  Narrative: Procedure:  Right wrist        Indication:  Right wrist pain.   .    Technique:  Two views   .    Prior relevant exam:  None.    There is negative ulnar variance i.e. the ulna is shorter than  the radius by approximately 0.5 cm.    There is increased ulnar sloping to the distal radius.    This can be a source of chronic pain.    The right wrist is otherwise unremarkable.  Impression: As above.    Electronically signed by:  Julio Becker MD  6/12/2020 3:43 PM CDT  Workstation: [email or web address]    [email or web address]  Immunization History   Administered Date(s) Administered   • Hepatitis B 07/19/2016,  "08/23/2016, 02/17/2017   • Tdap 02/17/2017       The following portions of the patient's history were reviewed and updated as appropriate: allergies, current medications, past family history, past medical history, past social history, past surgical history and problem list.        Physical Exam  /74 (BP Location: Right arm, Patient Position: Sitting, Cuff Size: Large Adult)   Pulse 84   Temp 97.5 °F (36.4 °C) (Infrared) Comment (Src): from screener  Ht 160 cm (63\")   Wt 133 kg (294 lb)   LMP  (LMP Unknown)   SpO2 98%   BMI 52.08 kg/m²     Physical Exam   Constitutional: She is oriented to person, place, and time. She appears well-developed and well-nourished.   HENT:   Head: Normocephalic and atraumatic.   Right Ear: External ear normal.   Eyes: Pupils are equal, round, and reactive to light. Conjunctivae and EOM are normal.   Neck: Normal range of motion. Neck supple.   Cardiovascular: Normal rate, regular rhythm and normal heart sounds.   No murmur heard.  Pulmonary/Chest: Effort normal and breath sounds normal. No respiratory distress.   Abdominal: Soft. Bowel sounds are normal. She exhibits no distension. There is no tenderness.   Obese abdomen    Musculoskeletal: She exhibits tenderness. She exhibits no edema or deformity.        Right wrist: She exhibits decreased range of motion, tenderness and bony tenderness.   Neurological: She is alert and oriented to person, place, and time. No cranial nerve deficit.   Skin: Skin is warm. No rash noted. She is not diaphoretic. No erythema. No pallor.   Psychiatric: She has a normal mood and affect. Her behavior is normal.   Appear sad    Nursing note and vitals reviewed.      Assessment/Plan    Diagnosis Plan   1. Right wrist pain     2. Uncontrolled type 2 diabetes mellitus with hyperglycemia (CMS/HCC)     3. Essential hypertension     4. Hyperlipidemia, unspecified hyperlipidemia type     5. Morbid obesity (CMS/HCC)     6. Nausea and vomiting, " intractability of vomiting not specified, unspecified vomiting type     7. Grief          -went over labwork   -recommend pt get pap smear. She will call for an appt   -will get mammogram screening   -nausea/vomiting - zofran 8 mg ODT every 8 hours PRn   -MIGEL- sleep medicine following. She will need to make a call for appt for CPAP  -recommend diabetic eye exam  -right wrist pain/hand pain - neurology following stop ibuprofen start on relafen 750 mg PO BId. Drug information provided.    - Essential Hypertension - on lisinopril 20 mg PO q daily.    - hyperlipidemia-  Recheck lipid panel The current medical regimen is effective;  continue present plan and medications.  - DM type 2-  uncontrolled   go back on  bydureon  2 mg sub q weekly. basaglar 16 units at bedtime. Advised pt can go by 3 unites every 3 days until morning sugars at goal  on steglastro 15 mg daily.  Advised to bring sugars on next visit   - vitamin D deficiency -continue vitamin D pill once a week.  - morbid obesity - BMI >40 - pt gained lbs since last visit. Continue with ketogenic diet.  Gave diet information to go. Gave bariatric weight loss surgery to go home with.  She has been reluctant for weight loss surgery.  BMI at 52.08   -for back pain - referredl to PT/OT. Services appreciated.on flexeril 10 mg PO qhs. And mobic 15 mg daily.    - iron deficiency anemia -continue on iron pill TID. Her last CBC showed stable hemoglobin. Recommend Hematology referral and possible IV iron therapy. Consultation appreciated.   -lekuocytosis - slightly improved from last visit. Will continue to monitor. Consider Hematology refrral   - moderate persistent asthma - on flovent 220 mcg 1 puff BId.   - GERD - continue on protonix  -advised pt to be safe and call with questions and concerns  -advised to go to ER or call 911 if symptoms worrisome or severe  -advised to followup with Specialist and referrals  -adivsed pt to be safe during COVID-19 pandemic  -total time  with pt >25 minutes         This document has been electronically signed by Solitario Li MD on July 17, 2020 08:38

## 2020-07-15 ENCOUNTER — TELEPHONE (OUTPATIENT)
Dept: FAMILY MEDICINE CLINIC | Facility: CLINIC | Age: 45
End: 2020-07-15

## 2020-07-15 NOTE — TELEPHONE ENCOUNTER
----- Message from Solitario Li MD sent at 7/15/2020  8:43 AM CDT -----  Regarding: mammogram   Please call pt    Mammogram screening normal    Repeat in 1 year.     Thanks

## 2020-07-17 ENCOUNTER — OFFICE VISIT (OUTPATIENT)
Dept: FAMILY MEDICINE CLINIC | Facility: CLINIC | Age: 45
End: 2020-07-17

## 2020-07-17 VITALS
OXYGEN SATURATION: 98 % | DIASTOLIC BLOOD PRESSURE: 74 MMHG | BODY MASS INDEX: 51.91 KG/M2 | WEIGHT: 293 LBS | HEART RATE: 84 BPM | TEMPERATURE: 97.5 F | SYSTOLIC BLOOD PRESSURE: 124 MMHG | HEIGHT: 63 IN

## 2020-07-17 DIAGNOSIS — M25.531 RIGHT WRIST PAIN: Primary | ICD-10-CM

## 2020-07-17 DIAGNOSIS — F43.21 GRIEF: ICD-10-CM

## 2020-07-17 DIAGNOSIS — E66.01 MORBID OBESITY (HCC): ICD-10-CM

## 2020-07-17 DIAGNOSIS — E11.65 UNCONTROLLED TYPE 2 DIABETES MELLITUS WITH HYPERGLYCEMIA (HCC): ICD-10-CM

## 2020-07-17 DIAGNOSIS — R11.2 NAUSEA AND VOMITING, INTRACTABILITY OF VOMITING NOT SPECIFIED, UNSPECIFIED VOMITING TYPE: ICD-10-CM

## 2020-07-17 DIAGNOSIS — I10 ESSENTIAL HYPERTENSION: ICD-10-CM

## 2020-07-17 DIAGNOSIS — E78.5 HYPERLIPIDEMIA, UNSPECIFIED HYPERLIPIDEMIA TYPE: ICD-10-CM

## 2020-07-17 PROCEDURE — 99214 OFFICE O/P EST MOD 30 MIN: CPT | Performed by: FAMILY MEDICINE

## 2020-07-17 RX ORDER — ONDANSETRON 8 MG/1
8 TABLET, ORALLY DISINTEGRATING ORAL EVERY 8 HOURS PRN
Qty: 30 TABLET | Refills: 3 | Status: SHIPPED | OUTPATIENT
Start: 2020-07-17 | End: 2021-09-15 | Stop reason: SDUPTHER

## 2020-07-17 RX ORDER — NABUMETONE 750 MG/1
750 TABLET, FILM COATED ORAL 2 TIMES DAILY
Qty: 60 TABLET | Refills: 3 | Status: SHIPPED | OUTPATIENT
Start: 2020-07-17 | End: 2020-08-20

## 2020-07-17 NOTE — PATIENT INSTRUCTIONS
Nabumetone tablets  What is this medicine?  NABUMETONE (na BYOO me tone) is a non-steroidal anti-inflammatory drug (NSAID). It is used to reduce swelling and to treat pain. It is used for osteoarthritis or rheumatoid arthritis.  This medicine may be used for other purposes; ask your health care provider or pharmacist if you have questions.  COMMON BRAND NAME(S): Relafen, Relafen DS  What should I tell my health care provider before I take this medicine?  They need to know if you have any of these conditions:  · cigarette smoker  · coronary artery bypass graft (CABG) surgery within the past 2 weeks  · drink more than 3 alcohol-containing drinks a day  · heart disease  · high blood pressure  · history of stomach bleeding  · kidney disease  · liver disease  · lung or breathing disease, like asthma  · an unusual or allergic reaction to nabumetone, aspirin, other NSAIDs, other medicines, foods, dyes, or preservatives  · pregnant or trying to get pregnant  · breast-feeding  How should I use this medicine?  Take this medicine by mouth with a full glass of water. Follow the directions on the prescription label. You can take it with or without food. If it upsets your stomach, take it with food. Try to not lie down for at least 10 minutes after you take this medicine. Take your medicine at regular intervals. Do not take your medicine more often than directed. Long term, continuous use may increase the risk of heart attack or stroke.  A special MedGuide will be given to you by the pharmacist with each prescription and refill. Be sure to read this information carefully each time.  Talk to your pediatrician regarding the use of this medicine in children. Special care may be needed.  Overdosage: If you think you have taken too much of this medicine contact a poison control center or emergency room at once.  NOTE: This medicine is only for you. Do not share this medicine with others.  What if I miss a dose?  If you miss a dose,  take it as soon as you can. If it is almost time for your next dose, take only that dose. Do not take double or extra doses.  What may interact with this medicine?  · alcohol  · aspirin  · cidofovir  · diuretics  · lithium  · medicines for high blood pressure  · methotrexate  · other drugs for inflammation like ketorolac, ibuprofen, and prednisone  · pemetrexed  · warfarin  This list may not describe all possible interactions. Give your health care provider a list of all the medicines, herbs, non-prescription drugs, or dietary supplements you use. Also tell them if you smoke, drink alcohol, or use illegal drugs. Some items may interact with your medicine.  What should I watch for while using this medicine?  Tell your doctor or healthcare provider if your pain does not get better. Talk to your doctor before taking another medicine for pain. Do not treat yourself.  This medicine does not prevent heart attack or stroke. In fact, this medicine may increase the chance of a heart attack or stroke. The chance may increase with longer use of this medicine and in people who have heart disease. If you take aspirin to prevent heart attack or stroke, talk with your doctor or healthcare provider.  This medicine may cause serious skin reactions. They can happen weeks to months after starting the medicine. Contact your healthcare provider right away if you notice fevers or flu-like symptoms with a rash. The rash may be red or purple and then turn into blisters or peeling of the skin. Or, you might notice a red rash with swelling of the face, lips or lymph nodes in your neck or under your arms.  Do not take medicines such as ibuprofen and naproxen with this medicine. Side effects such as stomach upset, nausea, or ulcers may be more likely to occur. Many medicines available without a prescription should not be taken with this medicine.  This medicine can cause ulcers and bleeding in the stomach and intestines at any time during  treatment. Do not smoke cigarettes or drink alcohol. These increase irritation to your stomach and can make it more susceptible to damage from this medicine. Ulcers and bleeding can happen without warning symptoms and can cause death.  You may get drowsy or dizzy. Do not drive, use machinery, or do anything that needs mental alertness until you know how this medicine affects you. Do not stand or sit up quickly, especially if you are an older patient. This reduces the risk of dizzy or fainting spells.  This medicine can cause you to bleed more easily. Try to avoid damage to your teeth and gums when you brush or floss your teeth.  What side effects may I notice from receiving this medicine?  Side effects that you should report to your doctor or health care professional as soon as possible:  · black or bloody stools, blood in the urine or vomit  · blurred vision  · chest pain  · difficulty breathing or wheezing  · nausea or vomiting  · redness, blistering, peeling, or loosening of the skin, including inside the mouth  · skin rash, hives, or itching  · slurred speech or weakness on one side of the body  · severe stomach pain  · swelling of eyelids, throat, lips  · unexplained weight gain or swelling  · unusually weak or tired  · yellowing of eyes or skin  Side effects that usually do not require medical attention (report to your doctor or health care professional if they continue or are bothersome):  · constipation or diarrhea  · gas or heartburn  This list may not describe all possible side effects. Call your doctor for medical advice about side effects. You may report side effects to FDA at 3-983-FDA-9936.  Where should I keep my medicine?  Keep out of the reach of children.  Store at room temperature between 15 and 30 degrees C (59 and 86 degrees F). Keep container tightly closed. Throw away any unused medicine after the expiration date.  NOTE: This sheet is a summary. It may not cover all possible information. If  you have questions about this medicine, talk to your doctor, pharmacist, or health care provider.  ©  [employer] (2020-03-10 08:15:26)    Managing Loss, Adult  People experience loss in many different ways throughout their lives. Events such as moving, changing jobs, and losing friends can create a sense of loss. The loss may be as serious as a major health change, divorce, death of a pet, or death of a loved one. All of these types of loss are likely to create a physical and emotional reaction known as grief. Grief is the result of a major change or an absence of something or someone that you count on. Grief is a normal reaction to loss.  A variety of factors can affect your grieving experience, including:  · The nature of your loss.  · Your relationship to what or whom you lost.  · Your understanding of grief and how to manage it.  · Your support system.  How to manage lifestyle changes  Keep to your normal routine as much as possible.  · If you have trouble focusing or doing normal activities, it is acceptable to take some time away from your normal routine.  · Spend time with friends and loved ones.  · Eat a healthy diet, get plenty of sleep, and rest when you feel tired.  How to recognize changes   The way that you deal with your grief will affect your ability to function as you normally do. When grieving, you may experience these changes:  · Numbness, shock, sadness, anxiety, anger, denial, and guilt.  · Thoughts about death.  · Unexpected crying.  · A physical sensation of emptiness in your stomach.  · Problems sleeping and eating.  · Tiredness (fatigue).  · Loss of interest in normal activities.  · Dreaming about or imagining seeing the person who .  · A need to remember what or whom you lost.  · Difficulty thinking about anything other than your loss for a period of time.  · Relief. If you have been expecting the loss for a while, you may feel a sense of relief when it happens.  Follow  these instructions at home:    Activity  Express your feelings in healthy ways, such as:  · Talking with others about your loss. It may be helpful to find others who have had a similar loss, such as a support group.  · Writing down your feelings in a journal.  · Doing physical activities to release stress and emotional energy.  · Doing creative activities like painting, sculpting, or playing or listening to music.  · Practicing resilience. This is the ability to recover and adjust after facing challenges. Reading some resources that encourage resilience may help you to learn ways to practice those behaviors.  General instructions  · Be patient with yourself and others. Allow the grieving process to happen, and remember that grieving takes time.  ? It is likely that you may never feel completely done with some grief. You may find a way to move on while still cherishing memories and feelings about your loss.  ? Accepting your loss is a process. It can take months or longer to adjust.  · Keep all follow-up visits as told by your health care provider. This is important.  Where to find support  To get support for managing loss:  · Ask your health care provider for help and recommendations, such as grief counseling or therapy.  · Think about joining a support group for people who are managing a loss.  Where to find more information  You can find more information about managing loss from:  · American Society of Clinical Oncology: www.cancer.net  · American Psychological Association: www.apa.org  Contact a health care provider if:  · Your grief is extreme and keeps getting worse.  · You have ongoing grief that does not improve.  · Your body shows symptoms of grief, such as illness.  · You feel depressed, anxious, or lonely.  Get help right away if:  · You have thoughts about hurting yourself or others.  If you ever feel like you may hurt yourself or others, or have thoughts about taking your own life, get help right away.  You can go to your nearest emergency department or call:  · Your local emergency services (911 in the [place]  · A suicide crisis helpline, such as the National Suicide Prevention Lifeline at 1-629.462.8719. This is open 24 hours a day.  Summary  · Grief is the result of a major change or an absence of someone or something that you count on. Grief is a normal reaction to loss.  · The depth of grief and the period of recovery depend on the type of loss and your ability to adjust to the change and process your feelings.  · Processing grief requires patience and a willingness to accept your feelings and talk about your loss with people who are supportive.  · It is important to find resources that work for you and to realize that people experience grief differently. There is not one grieving process that works for everyone in the same way.  · Be aware that when grief becomes extreme, it can lead to more severe issues like isolation, depression, anxiety, or suicidal thoughts. Talk with your health care provider if you have any of these issues.  This information is not intended to replace advice given to you by your health care provider. Make sure you discuss any questions you have with your health care provider.  Document Released: 05/03/2018 Document Revised: 02/21/2020 Document Reviewed: 05/03/2018  [employer] Patient Education © 2020 [employer]    Complicated Grief  Grief is a normal response to the death of someone close to you. Feelings of fear, anger, and guilt can affect almost everyone who loses a loved one. It is also common to have symptoms of depression while you are grieving. These include problems with sleep, loss of appetite, and lack of energy. They may last for weeks or months after a loss.  Complicated grief is different from normal grief or depression. Normal grieving involves sadness and feelings of loss, but those feelings get better and heal over time. Complicated grief is a severe type of grief that  lasts for a long time, usually for several months to a year or longer. It interferes with your ability to function normally. Complicated grief may require treatment from a mental health care provider.  What are the causes?  The cause of this condition is not known. It is not clear why some people continue to struggle with grief and others do not.  What increases the risk?  You are more likely to develop this condition if:  · The death of your loved one was sudden or unexpected.  · The death of your loved one was due to a violent event.  · Your loved one  from suicide.  · Your loved one was a child or a young person.  · You were very close to your loved one, or you were dependent on him or her.  · You have a history of depression or anxiety.  What are the signs or symptoms?  Symptoms of this condition include:  · Feeling disbelief or having a lack of emotion (numbness).  · Being unable to enjoy good memories of your loved one.  · Needing to avoid anything or anyone that reminds you of your loved one.  · Being unable to stop thinking about the death.  · Feeling intense anger or guilt.  · Feeling alone and hopeless.  · Feeling that your life is meaningless and empty.  · Losing the desire to move on with your life.  How is this diagnosed?  This condition may be diagnosed based on:  · Your symptoms. Complicated grief will be diagnosed if you have ongoing symptoms of grief for 6-12 months or longer.  · The effect of symptoms on your life. You may be diagnosed with this condition if your symptoms are interfering with your ability to live your life.  Your health care provider may recommend that you see a mental health care provider. Many symptoms of depression are similar to the symptoms of complicated grief. It is important to be evaluated for complicated grief along with other mental health conditions.  How is this treated?  This condition is most commonly treated with talk therapy. This therapy is offered by a mental  health specialist (psychiatrist). During therapy:  · You will learn healthy ways to cope with the loss of your loved one.  · Your mental health care provider may recommend antidepressant medicines.  Follow these instructions at home:  Lifestyle    · Take care of yourself.  ? Eat on a regular basis, and maintain a healthy diet. Eat plenty of fruits, vegetables, lean protein, and whole grains.  ? Try to get some exercise each day. Aim for 30 minutes of exercise on most days of the week.  ? Keep a consistent sleep schedule. Try to get 8 or more hours of sleep each night.  ? Start doing the things that you used to enjoy.  · Do not use drugs or alcohol to ease your symptoms.  · Spend time with friends and loved ones.  General instructions  · Take over-the-counter and prescription medicines only as told by your health care provider.  · Consider joining a grief (bereavement) support group to help you deal with your loss.  · Keep all follow-up visits as told by your health care provider. This is important.  Contact a health care provider if:  · Your symptoms prevent you from functioning normally.  · Your symptoms do not get better with treatment.  Get help right away if:  · You have serious thoughts about hurting yourself or someone else.  · You have suicidal feelings.  If you ever feel like you may hurt yourself or others, or have thoughts about taking your own life, get help right away. You can go to your nearest emergency department or call:  · Your local emergency services (911 in the [place]  · A suicide crisis helpline, such as the National Suicide Prevention Lifeline at 1-500.378.5793. This is open 24 hours a day.  Summary  · Complicated grief is a severe type of grief that lasts for a long time. This grief is not likely to go away on its own. Get the help you need.  · Some griefs are more difficult than others and can cause this condition. You may need a certain type of treatment to help you recover if the loss of  your loved one was sudden, violent, or due to suicide.  · You may feel guilty about moving on with your life. Getting help does not mean that you are forgetting your loved one. It means that you are taking care of yourself.  · Complicated grief is best treated with talk therapy. Medicines may also be prescribed.  · Seek the help you need, and find support that will help you recover.  This information is not intended to replace advice given to you by your health care provider. Make sure you discuss any questions you have with your health care provider.  Document Released: 12/18/2006 Document Revised: 11/30/2018 Document Reviewed: 10/03/2018  [employer] Patient Education © 2020 [employer]  Nabumetone tablets  What is this medicine?  NABUMETONE (na BYOO me tone) is a non-steroidal anti-inflammatory drug (NSAID). It is used to reduce swelling and to treat pain. It is used for osteoarthritis or rheumatoid arthritis.  This medicine may be used for other purposes; ask your health care provider or pharmacist if you have questions.  COMMON BRAND NAME(S): Relafen, Relafen DS  What should I tell my health care provider before I take this medicine?  They need to know if you have any of these conditions:  · cigarette smoker  · coronary artery bypass graft (CABG) surgery within the past 2 weeks  · drink more than 3 alcohol-containing drinks a day  · heart disease  · high blood pressure  · history of stomach bleeding  · kidney disease  · liver disease  · lung or breathing disease, like asthma  · an unusual or allergic reaction to nabumetone, aspirin, other NSAIDs, other medicines, foods, dyes, or preservatives  · pregnant or trying to get pregnant  · breast-feeding  How should I use this medicine?  Take this medicine by mouth with a full glass of water. Follow the directions on the prescription label. You can take it with or without food. If it upsets your stomach, take it with food. Try to not lie down for at least 10 minutes  after you take this medicine. Take your medicine at regular intervals. Do not take your medicine more often than directed. Long term, continuous use may increase the risk of heart attack or stroke.  A special MedGuide will be given to you by the pharmacist with each prescription and refill. Be sure to read this information carefully each time.  Talk to your pediatrician regarding the use of this medicine in children. Special care may be needed.  Overdosage: If you think you have taken too much of this medicine contact a poison control center or emergency room at once.  NOTE: This medicine is only for you. Do not share this medicine with others.  What if I miss a dose?  If you miss a dose, take it as soon as you can. If it is almost time for your next dose, take only that dose. Do not take double or extra doses.  What may interact with this medicine?  · alcohol  · aspirin  · cidofovir  · diuretics  · lithium  · medicines for high blood pressure  · methotrexate  · other drugs for inflammation like ketorolac, ibuprofen, and prednisone  · pemetrexed  · warfarin  This list may not describe all possible interactions. Give your health care provider a list of all the medicines, herbs, non-prescription drugs, or dietary supplements you use. Also tell them if you smoke, drink alcohol, or use illegal drugs. Some items may interact with your medicine.  What should I watch for while using this medicine?  Tell your doctor or healthcare provider if your pain does not get better. Talk to your doctor before taking another medicine for pain. Do not treat yourself.  This medicine does not prevent heart attack or stroke. In fact, this medicine may increase the chance of a heart attack or stroke. The chance may increase with longer use of this medicine and in people who have heart disease. If you take aspirin to prevent heart attack or stroke, talk with your doctor or healthcare provider.  This medicine may cause serious skin reactions.  They can happen weeks to months after starting the medicine. Contact your healthcare provider right away if you notice fevers or flu-like symptoms with a rash. The rash may be red or purple and then turn into blisters or peeling of the skin. Or, you might notice a red rash with swelling of the face, lips or lymph nodes in your neck or under your arms.  Do not take medicines such as ibuprofen and naproxen with this medicine. Side effects such as stomach upset, nausea, or ulcers may be more likely to occur. Many medicines available without a prescription should not be taken with this medicine.  This medicine can cause ulcers and bleeding in the stomach and intestines at any time during treatment. Do not smoke cigarettes or drink alcohol. These increase irritation to your stomach and can make it more susceptible to damage from this medicine. Ulcers and bleeding can happen without warning symptoms and can cause death.  You may get drowsy or dizzy. Do not drive, use machinery, or do anything that needs mental alertness until you know how this medicine affects you. Do not stand or sit up quickly, especially if you are an older patient. This reduces the risk of dizzy or fainting spells.  This medicine can cause you to bleed more easily. Try to avoid damage to your teeth and gums when you brush or floss your teeth.  What side effects may I notice from receiving this medicine?  Side effects that you should report to your doctor or health care professional as soon as possible:  · black or bloody stools, blood in the urine or vomit  · blurred vision  · chest pain  · difficulty breathing or wheezing  · nausea or vomiting  · redness, blistering, peeling, or loosening of the skin, including inside the mouth  · skin rash, hives, or itching  · slurred speech or weakness on one side of the body  · severe stomach pain  · swelling of eyelids, throat, lips  · unexplained weight gain or swelling  · unusually weak or tired  · yellowing  of eyes or skin  Side effects that usually do not require medical attention (report to your doctor or health care professional if they continue or are bothersome):  · constipation or diarrhea  · gas or heartburn  This list may not describe all possible side effects. Call your doctor for medical advice about side effects. You may report side effects to FDA at 0-549-HCQ-9926.  Where should I keep my medicine?  Keep out of the reach of children.  Store at room temperature between 15 and 30 degrees C (59 and 86 degrees F). Keep container tightly closed. Throw away any unused medicine after the expiration date.  NOTE: This sheet is a summary. It may not cover all possible information. If you have questions about this medicine, talk to your doctor, pharmacist, or health care provider.  © 2020 [employer] (2020-03-10 08:15:26)

## 2020-07-20 DIAGNOSIS — Z12.31 SCREENING MAMMOGRAM, ENCOUNTER FOR: ICD-10-CM

## 2020-08-08 NOTE — PROGRESS NOTES
"   Subjective:  Lizzy Restrepo is a 45 y.o. female who presents for       Patient Active Problem List   Diagnosis   • Encounter for immunization   • Diabetes type 2, uncontrolled (CMS/HCC)   • Hyperlipidemia associated with type 2 diabetes mellitus (CMS/HCC)   • Morbid obesity (CMS/HCC)   • Vitamin D deficiency   • Essential hypertension   • Hyperlipidemia   • Urinary tract infection   • Acute pain of right shoulder   • Right arm pain   • Right elbow pain   • Lateral epicondylitis of right elbow   • Medial epicondylitis   • Encounter for screening mammogram for malignant neoplasm of breast   • Iron deficiency anemia   • Mild intermittent asthma   • Encounter for screening for endocrine disorder   • Gastroesophageal reflux disease   • Pain in both knees   • Controlled type 2 diabetes mellitus with complication, without long-term current use of insulin (CMS/[facility])   • Anemia   • Right knee pain   • Controlled type 2 diabetes mellitus with complication, with long-term current use of insulin (CMS/[facility])   • General medical examination   • Leukocytosis   • Chronic bilateral low back pain with sciatica   • Encounter for Papanicolaou smear of cervix   • Screening mammogram, encounter for   • Other fatigue   • MIGEL (obstructive sleep apnea)   • Moderate persistent asthma   • Acute bronchitis   • Right wrist pain   • Nausea and vomiting   • Grief           Current Outpatient Medications:   •  albuterol sulfate HFA (VENTOLIN HFA) 108 (90 Base) MCG/ACT inhaler, Inhale 2 puffs Every 4 (Four) Hours As Needed for Shortness of Air., Disp: 1 inhaler, Rfl: 0  •  aspirin 81 MG chewable tablet, Chew 1 tablet Daily., Disp: 30 tablet, Rfl: 6  •  atorvastatin (Lipitor) 40 MG tablet, Take 1 tablet by mouth Daily., Disp: 90 tablet, Rfl: 3  •  BD INSULIN SYRINGE U/F 31G X 5/16\" 1 ML misc, , Disp: , Rfl:   •  cholecalciferol (Optimal-D) 1.25 MG (39628 UT) capsule, Take 1 capsule by mouth 1 (One) Time Per Week., Disp: 4 capsule, Rfl: 3  •  " cyclobenzaprine (FLEXERIL) 10 MG tablet, Take 1 tablet by mouth At Night As Needed for Muscle Spasms., Disp: 30 tablet, Rfl: 3  •  Ertugliflozin L-PyroglutamicAc (Steglatro) 15 MG tablet, Take 1 tablet by mouth Every Morning., Disp: 30 tablet, Rfl: 3  •  exenatide er (BYDUREON) 2 MG/0.85ML auto-injector injection, Inject 0.85 mL under the skin into the appropriate area as directed 1 (One) Time Per Week., Disp: 4 pen, Rfl: 3  •  ferrous sulfate 325 (65 FE) MG tablet, Take 1 tablet by mouth Daily With Breakfast., Disp: 30 tablet, Rfl: 3  •  fluticasone (Flonase) 50 MCG/ACT nasal spray, 2 sprays into the nostril(s) as directed by provider Daily., Disp: 16 g, Rfl: 6  •  fluticasone (FLOVENT HFA) 220 MCG/ACT inhaler, Inhale 1 puff 2 (Two) Times a Day., Disp: 1 inhaler, Rfl: 3  •  glucose blood test strip, Give testing strips and lancets to used 2-3 times daily., Disp: 300 each, Rfl: 3  •  Insulin Glargine (BASAGLAR KWIKPEN) 100 UNIT/ML injection pen, Start 16 units at bedtime subq can go up by 3 units every 3 days until am sugars running , Disp: 3 pen, Rfl: 3  •  [employer] PEN NEEDLES 31G X 6 MM misc, , Disp: , Rfl:   •  lisinopril (PRINIVIL,ZESTRIL) 20 MG tablet, Take 1 tablet by mouth Daily., Disp: 90 tablet, Rfl: 3  •  loratadine (EQ Loratadine) 10 MG tablet, Take 1 tablet by mouth Daily., Disp: 90 tablet, Rfl: 3  •  montelukast (Singulair) 10 MG tablet, Take 1 tablet by mouth Every Night., Disp: 30 tablet, Rfl: 6  •  omeprazole (priLOSEC) 40 MG capsule, Take 1 capsule by mouth Daily., Disp: 90 capsule, Rfl: 3  •  ondansetron ODT (Zofran ODT) 8 MG disintegrating tablet, Place 1 tablet on the tongue Every 8 (Eight) Hours As Needed for Nausea or Vomiting., Disp: 30 tablet, Rfl: 3  •  ReliOn Ultra Thin Lancets misc, , Disp: , Rfl:     HPI        Pt is 45 yo female with management of morbid obesity, moderate persistent asthma, allergic rhinitis, IDDM type 2,, HLP, HTN,  Vitamin D deficiency,GERD, chronic fatigue, MIGEL,  multiple joint pain, sp cholecystectomy sp tonsillectomy, sp umbilical hernia repair, sp , iron deficiency anemia , CKD stage 2, carpal tunnel on right hand/wrist      20 in office visit for recheck  On pt's IDDM type 2, HLP, HTN, morbid obesity, pt was referred to Neurology for her wrist pain  And for possible carapl tunnel. 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  Her sugars have been more stable.  She has upcoming appt with Neurology for right wrist next week.  She continues to use wrist brace and ibuprofen. Also has nausea/vomiting lately. No fever     20 in office visit for recheck on pts above medical issues. On last visit pt was having issues with right wrist. Her last mammogram on 20 was normal  She continues to have pain on right wrist. Pt had EMG study done earlier this year and was positive for right carpal tunnel. She still uses her brace. She is reluctant to get surgery.  She is due for colonoscopy screening and pap smear. She has yet to call for OB/GYN for pap smear appt.        Wrist Pain    The pain is present in the right hand. This is a recurrent problem. The current episode started more than 1 month ago. The problem occurs constantly. The problem has been gradually worsening. The quality of the pain is described as aching. The pain is at a severity of 5/10. The pain is moderate. Associated symptoms include joint locking, joint swelling, a limited range of motion, numbness, stiffness and tingling. Pertinent negatives include no fever, inability to bear weight or itching. The symptoms are aggravated by activity and lying down. Treatments tried: wrist brace  The treatment provided no relief.   Diabetes   She presents for her follow-up diabetic visit. She has type 2 diabetes mellitus. Her disease course has been uncontrolled . Pertinent negatives for hypoglycemia include no confusion, dizziness, headaches, hunger, mood  changes, nervousness/anxiousness, pallor, seizures, sleepiness, speech difficulty, sweats or tremors. Associated symptoms include fatigue. Pertinent negatives for diabetes include no blurred vision, no chest pain, no foot paresthesias, no foot ulcerations, no polydipsia, no polyphagia, no polyuria, no visual change, no weakness and no weight loss. Pertinent negatives for hypoglycemia complications include no blackouts, no hospitalization, no nocturnal hypoglycemia, no required assistance and no required glucagon injection. Symptoms are stable. Pertinent negatives for diabetic complications include no autonomic neuropathy, CVA, heart disease, nephropathy, peripheral neuropathy, PVD or retinopathy. Risk factors for coronary artery disease include diabetes mellitus, dyslipidemia and obesity. Current diabetic treatment includes insulin injections. She is following a diabetic diet. When asked about meal planning, she reported none. She has not had a previous visit with a dietitian. She participates in exercise intermittently. She does not see a podiatrist.Eye exam is not current.   Obesity   This is a chronic problem. The current episode started more than 1 year ago. The problem has been unchanged. Associated symptoms include arthralgias, fatigue and nausea. Pertinent negatives include no abdominal pain, anorexia, change in bowel habit, chest pain, chills, congestion, coughing, fever, headaches, joint swelling, myalgias, neck pain, numbness, rash, sore throat, swollen glands, urinary symptoms, vertigo, visual change, vomiting or weakness. Nothing aggravates the symptoms. She has tried nothing for the symptoms. The treatment provided no relief.        Review of Systems  Review of Systems   Constitutional: Positive for fatigue. Negative for activity change, appetite change, chills, diaphoresis and fever.   HENT: Negative for congestion, postnasal drip, rhinorrhea, sinus pressure, sinus pain, sneezing, sore throat, trouble  swallowing and voice change.    Respiratory: Negative for cough, choking, chest tightness, shortness of breath, wheezing and stridor.    Cardiovascular: Negative for chest pain.   Gastrointestinal: Negative for diarrhea, nausea and vomiting.   Musculoskeletal: Positive for arthralgias.        Right hand pain/wrist pain    Allergic/Immunologic: Positive for environmental allergies.   Neurological: Positive for weakness and numbness. Negative for headaches.   Psychiatric/Behavioral: The patient is nervous/anxious.         Depressed mood        Patient Active Problem List   Diagnosis   • Encounter for immunization   • Diabetes type 2, uncontrolled (CMS/[facility])   • Hyperlipidemia associated with type 2 diabetes mellitus (CMS/[facility])   • Morbid obesity (CMS/[facility])   • Vitamin D deficiency   • Essential hypertension   • Hyperlipidemia   • Urinary tract infection   • Acute pain of right shoulder   • Right arm pain   • Right elbow pain   • Lateral epicondylitis of right elbow   • Medial epicondylitis   • Encounter for screening mammogram for malignant neoplasm of breast   • Iron deficiency anemia   • Mild intermittent asthma   • Encounter for screening for endocrine disorder   • Gastroesophageal reflux disease   • Pain in both knees   • Controlled type 2 diabetes mellitus with complication, without long-term current use of insulin (CMS/[facility])   • Anemia   • Right knee pain   • Controlled type 2 diabetes mellitus with complication, with long-term current use of insulin (CMS/[facility])   • General medical examination   • Leukocytosis   • Chronic bilateral low back pain with sciatica   • Encounter for Papanicolaou smear of cervix   • Screening mammogram, encounter for   • Other fatigue   • MIGEL (obstructive sleep apnea)   • Moderate persistent asthma   • Acute bronchitis   • Right wrist pain   • Nausea and vomiting   • Grief     Past Surgical History:   Procedure Laterality Date   •  SECTION  10/30/2000   • CHOLECYSTECTOMY  1995   •  COLONOSCOPY  2013    normal per pt   • CYSTOSCOPY  04/03/2016    And bilateral retrograde pyelograms. Performed at [facility].   • TONSILLECTOMY  09/1985   • UMBILICAL HERNIA REPAIR  11/06/2015   • UMBILICAL HERNIA REPAIR  08/12/2015     Social History     Socioeconomic History   • Marital status:      Spouse name: Not on file   • Number of children: Not on file   • Years of education: Not on file   • Highest education level: Not on file   Tobacco Use   • Smoking status: Never Smoker   • Smokeless tobacco: Never Used   Substance and Sexual Activity   • Alcohol use: No   • Drug use: No     Family History   Problem Relation Age of Onset   • Diabetes Mother    • Heart disease Mother    • Heart failure Mother         congestive   • Hypertension Mother    • Asthma Father    • Osteoarthritis Father      Lab on 06/10/2020   Component Date Value Ref Range Status   • WBC 06/10/2020 10.56  3.40 - 10.80 10*3/mm3 Final   • RBC 06/10/2020 5.87* 3.77 - 5.28 10*6/mm3 Final   • Hemoglobin 06/10/2020 12.4  12.0 - 15.9 g/dL Final   • Hematocrit 06/10/2020 40.2  34.0 - 46.6 % Final   • MCV 06/10/2020 68.5* 79.0 - 97.0 fL Final   • MCH 06/10/2020 21.1* 26.6 - 33.0 pg Final   • MCHC 06/10/2020 30.8* 31.5 - 35.7 g/dL Final   • RDW 06/10/2020 14.8  12.3 - 15.4 % Final   • RDW-SD 06/10/2020 35.6* 37.0 - 54.0 fl Final   • MPV 06/10/2020 11.6  6.0 - 12.0 fL Final   • Platelets 06/10/2020 323  140 - 450 10*3/mm3 Final   • Glucose 06/10/2020 201* 65 - 99 mg/dL Final   • BUN 06/10/2020 12  6 - 20 mg/dL Final   • Creatinine 06/10/2020 0.80  0.57 - 1.00 mg/dL Final   • Sodium 06/10/2020 136  136 - 145 mmol/L Final   • Potassium 06/10/2020 4.2  3.5 - 5.2 mmol/L Final   • Chloride 06/10/2020 99  98 - 107 mmol/L Final   • CO2 06/10/2020 27.6  22.0 - 29.0 mmol/L Final   • Calcium 06/10/2020 9.9  8.6 - 10.5 mg/dL Final   • Total Protein 06/10/2020 7.4  6.0 - 8.5 g/dL Final   • Albumin 06/10/2020 4.00  3.50 - 5.20 g/dL Final    • ALT (SGPT) 06/10/2020 16  1 - 33 U/L Final   • AST (SGOT) 06/10/2020 21  1 - 32 U/L Final   • Alkaline Phosphatase 06/10/2020 61  39 - 117 U/L Final   • Total Bilirubin 06/10/2020 0.2  0.2 - 1.2 mg/dL Final   • eGFR  African Amer 06/10/2020 94  >60 mL/min/1.73 Final   • Globulin 06/10/2020 3.4  gm/dL Final   • A/G Ratio 06/10/2020 1.2  g/dL Final   • BUN/Creatinine Ratio 06/10/2020 15.0  7.0 - 25.0 Final   • Anion Gap 06/10/2020 9.4  5.0 - 15.0 mmol/L Final   • Hemoglobin A1C 06/10/2020 11.40* 4.80 - 5.60 % Final   • Total Cholesterol 06/10/2020 107  0 - 200 mg/dL Final   • Triglycerides 06/10/2020 93  0 - 150 mg/dL Final   • HDL Cholesterol 06/10/2020 34* 40 - 60 mg/dL Final   • LDL Cholesterol  06/10/2020 54  0 - 100 mg/dL Final   • VLDL Cholesterol 06/10/2020 18.6  5 - 40 mg/dL Final   • LDL/HDL Ratio 06/10/2020 1.60   Final   • TSH 06/10/2020 2.600  0.270 - 4.200 uIU/mL Final   • Free T4 06/10/2020 0.83* 0.93 - 1.70 ng/dL Final   • T3, Free 06/10/2020 2.68  2.00 - 4.40 pg/mL Final   • 25 Hydroxy, Vitamin D 06/10/2020 62.1  30.0 - 100.0 ng/ml Final   • Vitamin B-12 06/10/2020 797  211 - 946 pg/mL Final   • Iron 06/10/2020 51  37 - 145 mcg/dL Final   • Iron Saturation 06/10/2020 18* 20 - 50 % Final   • Transferrin 06/10/2020 190* 200 - 360 mg/dL Final   • TIBC 06/10/2020 283* 298 - 536 mcg/dL Final   • Ferritin 06/10/2020 226.00* 13.00 - 150.00 ng/mL Final   • Neutrophil % 06/10/2020 47.0  42.7 - 76.0 % Final   • Lymphocyte % 06/10/2020 45.0  19.6 - 45.3 % Final   • Monocyte % 06/10/2020 6.0  5.0 - 12.0 % Final   • Eosinophil % 06/10/2020 1.0  0.3 - 6.2 % Final   • Atypical Lymphocyte % 06/10/2020 1.0  0.0 - 5.0 % Final   • Neutrophils Absolute 06/10/2020 4.96  1.70 - 7.00 10*3/mm3 Final   • Lymphocytes Absolute 06/10/2020 4.75* 0.70 - 3.10 10*3/mm3 Final   • Monocytes Absolute 06/10/2020 0.63  0.10 - 0.90 10*3/mm3 Final   • Eosinophils Absolute 06/10/2020 0.11  0.00 - 0.40 10*3/mm3 Final   • RBC Morphology  "06/10/2020 Normal  Normal Final   • WBC Morphology 06/10/2020 Normal  Normal Final   • Platelet Morphology 06/10/2020 Normal  Normal Final      XR Wrist 3+ View Right  Narrative: Procedure:  Right wrist        Indication:  Right wrist pain.   .    Technique:  Two views   .    Prior relevant exam:  None.    There is negative ulnar variance i.e. the ulna is shorter than  the radius by approximately 0.5 cm.    There is increased ulnar sloping to the distal radius.    This can be a source of chronic pain.    The right wrist is otherwise unremarkable.  Impression: As above.    Electronically signed by:  Julio Becker MD  6/12/2020 3:43 PM CDT  Workstation: [email or web address]    @[email or web address]@  Immunization History   Administered Date(s) Administered   • FLUARIX/FLUZONE/AFLURIA/FLULAVAL QUAD 10/14/2016   • Hepatitis B 07/19/2016, 08/23/2016, 02/17/2017   • Hepatitis B Vaccine Adult IM 07/19/2016, 08/23/2016, 02/17/2017   • Tdap 02/17/2017       The following portions of the patient's history were reviewed and updated as appropriate: allergies, current medications, past family history, past medical history, past social history, past surgical history and problem list.        Physical Exam  /72 (BP Location: Right arm, Patient Position: Sitting, Cuff Size: Large Adult)   Pulse 74   Temp 96.7 °F (35.9 °C) Comment: per screener  Ht 160 cm (63\")   Wt 135 kg (298 lb)   SpO2 98%   BMI 52.79 kg/m²     Physical Exam   Constitutional: She is oriented to person, place, and time. She appears well-developed and well-nourished.   HENT:   Head: Normocephalic and atraumatic.   Right Ear: External ear normal.   Eyes: Pupils are equal, round, and reactive to light. Conjunctivae and EOM are normal.   Neck: Normal range of motion. Neck supple.   Cardiovascular: Normal rate, regular rhythm and normal heart sounds.   No murmur heard.  Pulmonary/Chest: Effort normal and breath sounds normal. No respiratory distress.   Abdominal: Soft. Bowel sounds " are normal. She exhibits no distension. There is no tenderness.   Obese abdomen    Musculoskeletal: She exhibits tenderness. She exhibits no edema or deformity.        Right wrist: She exhibits decreased range of motion, tenderness and bony tenderness.   Neurological: She is alert and oriented to person, place, and time. No cranial nerve deficit.   Skin: Skin is warm. No rash noted. She is not diaphoretic. No erythema. No pallor.   Psychiatric: She has a normal mood and affect. Her behavior is normal.   Nursing note and vitals reviewed.      Assessment/Plan    Diagnosis Plan   1. Right carpal tunnel syndrome  Ambulatory Referral to Orthopedic Surgery   2. Morbid obesity (CMS/HCC)  CBC Auto Differential    Comprehensive Metabolic Panel    Hemoglobin A1c    Lipid Panel    Vitamin D 25 Hydroxy    Hepatitis C antibody   3. Vitamin D deficiency  CBC Auto Differential    Comprehensive Metabolic Panel    Hemoglobin A1c    Lipid Panel    Vitamin D 25 Hydroxy    Hepatitis C antibody   4. Essential hypertension  CBC Auto Differential    Comprehensive Metabolic Panel    Hemoglobin A1c    Lipid Panel    Vitamin D 25 Hydroxy    Hepatitis C antibody   5. Hyperlipidemia, unspecified hyperlipidemia type  CBC Auto Differential    Comprehensive Metabolic Panel    Hemoglobin A1c    Lipid Panel    Vitamin D 25 Hydroxy    Hepatitis C antibody   6. Uncontrolled type 2 diabetes mellitus with hyperglycemia (CMS/HCC)  CBC Auto Differential    Comprehensive Metabolic Panel    Hemoglobin A1c    Lipid Panel    Vitamin D 25 Hydroxy    Hepatitis C antibody   7. Need for hepatitis C screening test  CBC Auto Differential    Comprehensive Metabolic Panel    Hemoglobin A1c    Lipid Panel    Vitamin D 25 Hydroxy    Hepatitis C antibody   8. Right wrist pain  Ambulatory Referral to Orthopedic Surgery             -recommend labwork  -recommend diabetic eye exam  -recommend colonoscopy screening - pt undecided gave information. Also recommended  cologuard test   -recommend pt get pap smear. She will call for an appt   -nausea/vomiting - zofran 8 mg ODT every 8 hours PRn   -MIGEL- sleep medicine following. She will need to make a call for appt for CPAP  -right wrist pain/hand pain/carpal tunnel surgery -  Had EMG study.  Recommend Orthopedic referral. Continue using brace for right wrist   - Essential Hypertension - on lisinopril 20 mg PO q daily.    - hyperlipidemia-  Recheck lipid panel The current medical regimen is effective;  continue present plan and medications.  - DM type 2-  uncontrolled   g on  bydureon  2 mg sub q weekly. basaglar 16 units at bedtime. Advised pt can go by 3 unites every 3 days until morning sugars at goal  on steglastro 15 mg daily.  Advised to bring sugars on next visit   -vitamin D deficiency -continue vitamin D pill once a week.  -morbid obesity - BMI >40 - pt gained lbs since last visit. Continue with ketogenic diet.  Gave diet information to go. Gave bariatric weight loss surgery to go home with.  She has been reluctant for weight loss surgery.  BMI at 52.79  -for back pain - referredl to PT/OT. Services appreciated.on flexeril 10 mg PO qhs. And mobic 15 mg daily.    -iron deficiency anemia -continue on iron pill TID. Her last CBC showed stable hemoglobin. Recommend Hematology referral and possible IV iron therapy. Consultation appreciated.    -moderate persistent asthma - on flovent 220 mcg 1 puff BID:. Albuterol PRN   - GERD - continue on protonix  -advised pt to be safe and call with questions and concerns  -advised to go to ER or call 911 if symptoms worrisome or severe  -advised to followup with Specialist and referrals  -adivsed pt to be safe during COVID-19 pandemic  -total time with pt >25 minutes  -recheck in 6 weeks         This document has been electronically signed by Solitario Li MD on August 20, 2020 08:50

## 2020-08-20 ENCOUNTER — OFFICE VISIT (OUTPATIENT)
Dept: FAMILY MEDICINE CLINIC | Facility: CLINIC | Age: 45
End: 2020-08-20

## 2020-08-20 VITALS
SYSTOLIC BLOOD PRESSURE: 120 MMHG | OXYGEN SATURATION: 98 % | TEMPERATURE: 96.7 F | WEIGHT: 293 LBS | HEIGHT: 63 IN | HEART RATE: 74 BPM | BODY MASS INDEX: 51.91 KG/M2 | DIASTOLIC BLOOD PRESSURE: 72 MMHG

## 2020-08-20 DIAGNOSIS — I10 ESSENTIAL HYPERTENSION: ICD-10-CM

## 2020-08-20 DIAGNOSIS — M25.531 RIGHT WRIST PAIN: ICD-10-CM

## 2020-08-20 DIAGNOSIS — E55.9 VITAMIN D DEFICIENCY: ICD-10-CM

## 2020-08-20 DIAGNOSIS — E66.01 MORBID OBESITY (HCC): ICD-10-CM

## 2020-08-20 DIAGNOSIS — E78.5 HYPERLIPIDEMIA, UNSPECIFIED HYPERLIPIDEMIA TYPE: ICD-10-CM

## 2020-08-20 DIAGNOSIS — Z11.59 NEED FOR HEPATITIS C SCREENING TEST: ICD-10-CM

## 2020-08-20 DIAGNOSIS — E11.65 UNCONTROLLED TYPE 2 DIABETES MELLITUS WITH HYPERGLYCEMIA (HCC): ICD-10-CM

## 2020-08-20 DIAGNOSIS — G56.01 RIGHT CARPAL TUNNEL SYNDROME: Primary | ICD-10-CM

## 2020-08-20 PROCEDURE — 99214 OFFICE O/P EST MOD 30 MIN: CPT | Performed by: FAMILY MEDICINE

## 2020-08-20 RX ORDER — CHOLECALCIFEROL (VITAMIN D3) 1250 MCG
CAPSULE ORAL
Qty: 4 CAPSULE | Refills: 3 | Status: SHIPPED | OUTPATIENT
Start: 2020-08-20 | End: 2021-07-06

## 2020-08-20 RX ORDER — ERTUGLIFLOZIN 15 MG/1
TABLET, FILM COATED ORAL
Qty: 30 TABLET | Refills: 3 | Status: SHIPPED | OUTPATIENT
Start: 2020-08-20 | End: 2021-07-06

## 2020-08-20 NOTE — PATIENT INSTRUCTIONS
Please call OB/GYN to schedule pap smear   Open Carpal Tunnel Release    Open carpal tunnel release is a surgery to relieve symptoms caused by carpal tunnel syndrome. The carpal tunnel is a narrow, hollow space in the wrist. It is located between the wrist bones and a band of connective tissue (transverse carpal ligament, also known as the flexor retinaculum). The nerve that supplies most of the hand (median nerve) passes through the carpal tunnel, and so do tissues that connect bones to muscles (tendons) in the hand and arm. Carpal tunnel syndrome makes this space swell and become narrow. The swelling pinches the median nerve and causes pain and numbness.  During carpal tunnel release surgery, the transverse carpal ligament is cut to make more room in the carpal tunnel space. This also lessens the pressure on the median nerve. You may have this surgery if other types of treatment have not relieved your carpal tunnel symptoms. This surgery is usually done only for the hand that you use more often (dominant hand), but it may be done for both hands depending on your symptoms.  Tell a health care provider about:  · Any allergies you have.  · All medicines you are taking, including vitamins, herbs, eye drops, creams, and over-the-counter medicines.  · Any problems you or family members have had with anesthetic medicines.  · Any blood disorders you have.  · Any surgeries you have had.  · Any medical conditions you have.  · Whether you are pregnant or may be pregnant.  What are the risks?  Generally, this is a safe procedure. However, problems may occur, including:  · Infection.  · Bleeding.  · Injury to the median nerve.  · Allergic reactions to medicines.  · The surgery failing to relieve your symptoms, or making your symptoms worse.  What happens before the procedure?  Medicines  · Ask your health care provider about:  ? Changing or stopping your regular medicines. This is especially important if you are taking  diabetes medicines or blood thinners.  ? Taking medicines such as aspirin and ibuprofen. These medicines can thin your blood. Do not take these medicines unless your health care provider tells you to take them.  ? Taking over-the-counter medicines, vitamins, herbs, and supplements.  · You may be given antibiotic medicine to help prevent infection.  Staying hydrated  Follow instructions from your health care provider about hydration, which may include:  · Up to 2 hours before the procedure - you may continue to drink clear liquids, such as water, clear fruit juice, black coffee, and plain tea.  Eating and drinking restrictions  Follow instructions from your health care provider about eating and drinking, which may include:  · 8 hours before the procedure - stop eating heavy meals or foods such as meat, fried foods, or fatty foods.  · 6 hours before the procedure - stop eating light meals or foods, such as toast or cereal.  · 6 hours before the procedure - stop drinking milk or drinks that contain milk.  · 2 hours before the procedure - stop drinking clear liquids.  General instructions  · Ask your health care provider how your surgical site will be marked or identified.  · You may be asked to shower with a germ-killing soap.  · Plan to have someone take you home from the hospital or clinic.  · Plan to have a responsible adult care for you for at least 24 hours after you leave the hospital or clinic. This is important.  What happens during the procedure?  · To lower your risk of infection:  ? Your health care team will wash or sanitize their hands.  ? Hair may be removed from the surgical area.  ? Your arm, hand, and wrist will be cleaned with a germ-killing (antiseptic) solution.  · An IV will be inserted into one of your veins.  · You will be given one of the following:  ? A medicine to numb the wrist area (local anesthetic). You may also be given a medicine to help you relax (sedative).  ? A medicine to make you  fall asleep (general anesthetic).  · An incision will be made in your wrist, on the same side as your palm.  · The skin of your wrist will be spread to expose the transverse carpal ligament.  · The transverse carpal ligament will be cut to make more room in the carpal tunnel space.  · Your incision will be closed with stitches (sutures) or staples.  · A bandage (dressing) will be placed over your wrist and wrapped around your hand and wrist.  The procedure may vary among health care providers and hospitals.  What happens after the procedure?  · Your blood pressure, heart rate, breathing rate, and blood oxygen level will be monitored until the medicines you were given have worn off.  · You will be given pain medicine as needed.  · A splint or brace may be placed over your dressing, to hold your hand and wrist in place while you heal.  · Do not drive until your health care provider approves.  Summary  · Carpal tunnel release is a surgery to relieve pain and numbness in the hand caused by swelling around a nerve (carpal tunnel syndrome).  · You may have this surgery if other types of treatment have not relieved your carpal tunnel symptoms.  · During carpal tunnel release surgery, a band of connective tissue (transverse carpal ligament) is cut to make more room in the carpal tunnel space.  This information is not intended to replace advice given to you by your health care provider. Make sure you discuss any questions you have with your health care provider.  Document Released: 03/09/2005 Document Revised: 11/30/2018 Document Reviewed: 08/27/2018  [employer] Patient Education © 2020 [employer]    Carpal Tunnel Syndrome    Carpal tunnel syndrome is a condition that causes pain in your hand and arm. The carpal tunnel is a narrow area located on the palm side of your wrist. Repeated wrist motion or certain diseases may cause swelling within the tunnel. This swelling pinches the main nerve in the wrist (median nerve).  What  are the causes?  This condition may be caused by:  · Repeated wrist motions.  · Wrist injuries.  · Arthritis.  · A cyst or tumor in the carpal tunnel.  · Fluid buildup during pregnancy.  Sometimes the cause of this condition is not known.  What increases the risk?  The following factors may make you more likely to develop this condition:  · Having a job, such as being a  or a , that requires you to repeatedly move your wrist in the same motion.  · Being a woman.  · Having certain conditions, such as:  ? Diabetes.  ? Obesity.  ? An underactive thyroid (hypothyroidism).  ? Kidney failure.  What are the signs or symptoms?  Symptoms of this condition include:  · A tingling feeling in your fingers, especially in your thumb, index, and middle fingers.  · Tingling or numbness in your hand.  · An aching feeling in your entire arm, especially when your wrist and elbow are bent for a long time.  · Wrist pain that goes up your arm to your shoulder.  · Pain that goes down into your palm or fingers.  · A weak feeling in your hands. You may have trouble grabbing and holding items.  Your symptoms may feel worse during the night.  How is this diagnosed?  This condition is diagnosed with a medical history and physical exam. You may also have tests, including:  · Electromyogram (EMG). This test measures electrical signals sent by your nerves into the muscles.  · Nerve conduction study. This test measures how well electrical signals pass through your nerves.  · Imaging tests, such as X-rays, ultrasound, and MRI. These tests check for possible causes of your condition.  How is this treated?  This condition may be treated with:  · Lifestyle changes. It is important to stop or change the activity that caused your condition.  · Doing exercise and activities to strengthen your muscles and bones (physical therapy).  · Learning how to use your hand again after diagnosis (occupational therapy).  · Medicines for pain and  inflammation. This may include medicine that is injected into your wrist.  · A wrist splint.  · Surgery.  Follow these instructions at home:  If you have a splint:  · Wear the splint as told by your health care provider. Remove it only as told by your health care provider.  · Loosen the splint if your fingers tingle, become numb, or turn cold and blue.  · Keep the splint clean.  · If the splint is not waterproof:  ? Do not let it get wet.  ? Cover it with a watertight covering when you take a bath or shower.  Managing pain, stiffness, and swelling    · If directed, put ice on the painful area:  ? If you have a removable splint, remove it as told by your health care provider.  ? Put ice in a plastic bag.  ? Place a towel between your skin and the bag.  ? Leave the ice on for 20 minutes, 2-3 times per day.  General instructions  · Take over-the-counter and prescription medicines only as told by your health care provider.  · Rest your wrist from any activity that may be causing your pain. If your condition is work related, talk with your employer about changes that can be made, such as getting a wrist pad to use while typing.  · Do any exercises as told by your health care provider, physical therapist, or occupational therapist.  · Keep all follow-up visits as told by your health care provider. This is important.  Contact a health care provider if:  · You have new symptoms.  · Your pain is not controlled with medicines.  · Your symptoms get worse.  Get help right away if:  · You have severe numbness or tingling in your wrist or hand.  Summary  · Carpal tunnel syndrome is a condition that causes pain in your hand and arm.  · It is usually caused by repeated wrist motions.  · Lifestyle changes and medicines are used to treat carpal tunnel syndrome. Surgery may be recommended.  · Follow your health care provider's instructions about wearing a splint, resting from activity, keeping follow-up visits, and calling for  help.  This information is not intended to replace advice given to you by your health care provider. Make sure you discuss any questions you have with your health care provider.  Document Released: 12/15/2001 Document Revised: 04/26/2019 Document Reviewed: 04/26/2019  [employer] Patient Education © 2020 [employer]    Colonoscopy, Adult  A colonoscopy is an exam to look at the entire large intestine. During the exam, a lubricated, flexible tube that has a camera on the end of it is inserted into the anus and then passed into the rectum, colon, and other parts of the large intestine.  You may have a colonoscopy as a part of normal colorectal screening or if you have certain symptoms, such as:  · Lack of red blood cells (anemia).  · Diarrhea that does not go away.  · Abdominal pain.  · Blood in your stool (feces).  A colonoscopy can help screen for and diagnose medical problems, including:  · Tumors.  · Polyps.  · Inflammation.  · Areas of bleeding.  Tell a health care provider about:  · Any allergies you have.  · All medicines you are taking, including vitamins, herbs, eye drops, creams, and over-the-counter medicines.  · Any problems you or family members have had with anesthetic medicines.  · Any blood disorders you have.  · Any surgeries you have had.  · Any medical conditions you have.  · Any problems you have had passing stool.  What are the risks?  Generally, this is a safe procedure. However, problems may occur, including:  · Bleeding.  · A tear in the intestine.  · A reaction to medicines given during the exam.  · Infection (rare).  What happens before the procedure?  Eating and drinking restrictions  Follow instructions from your health care provider about eating and drinking, which may include:  · A few days before the procedure - follow a low-fiber diet. Avoid nuts, seeds, dried fruit, raw fruits, and vegetables.  · 1-3 days before the procedure - follow a clear liquid diet. Drink only clear liquids, such  as clear broth or bouillon, black coffee or tea, clear juice, clear soft drinks or sports drinks, gelatin dessert, and popsicles. Avoid any liquids that contain red or purple dye.  · On the day of the procedure - do not eat or drink anything starting 2 hours before the procedure, or within the time period that your health care provider recommends. Up to 2 hours before the procedure, you may continue to drink clear liquids, such as water or clear fruit juice.  Bowel prep  If you were prescribed an oral bowel prep to clean out your colon:  · Take it as told by your health care provider. Starting the day before your procedure, you will need to drink a large amount of medicated liquid. The liquid will cause you to have multiple loose stools until your stool is almost clear or light green.  · If your skin or anus gets irritated from diarrhea, you may use these to relieve the irritation:  ? Medicated wipes, such as adult wet wipes with aloe and vitamin E.  ? A skin-soothing product like petroleum jelly.  · If you vomit while drinking the bowel prep, take a break for up to 60 minutes and then begin the bowel prep again. If vomiting continues and you cannot take the bowel prep without vomiting, call your health care provider.  · To clean out your colon, you may also be given:  ? Laxative medicines.  ? Instructions about how to use an enema.  General instructions  · Ask your health care provider about:  ? Changing or stopping your regular medicines or supplements. This is especially important if you are taking iron supplements, diabetes medicines, or blood thinners.  ? Taking medicines such as aspirin and ibuprofen. These medicines can thin your blood. Do not take these medicines before the procedure if your health care provider tells you not to.  · Plan to have someone take you home from the hospital or clinic.  What happens during the procedure?    · An IV may be inserted into one of your veins.  · You will be given  medicine to help you relax (sedative).  · To reduce your risk of infection:  ? Your health care team will wash or sanitize their hands.  ? Your anal area will be washed with soap.  · You will be asked to lie on your side with your knees bent.  · Your health care provider will lubricate a long, thin, flexible tube. The tube will have a camera and a light on the end.  · The tube will be inserted into your anus.  · The tube will be gently eased through your rectum and colon.  · Air will be delivered into your colon to keep it open. You may feel some pressure or cramping.  · The camera will be used to take images during the procedure.  · A small tissue sample may be removed to be examined under a microscope (biopsy).  · If small polyps are found, your health care provider may remove them and have them checked for cancer cells.  · When the exam is done, the tube will be removed.  The procedure may vary among health care providers and hospitals.  What happens after the procedure?  · Your blood pressure, heart rate, breathing rate, and blood oxygen level will be monitored until the medicines you were given have worn off.  · Do not drive for 24 hours after the exam.  · You may have a small amount of blood in your stool.  · You may pass gas and have mild abdominal cramping or bloating due to the air that was used to inflate your colon during the exam.  · It is up to you to get the results of your procedure. Ask your health care provider, or the department performing the procedure, when your results will be ready.  Summary  · A colonoscopy is an exam to look at the entire large intestine.  · During a colonoscopy, a lubricated, flexible tube with a camera on the end of it is inserted into the anus and then passed into the colon and other parts of the large intestine.  · Follow instructions from your health care provider about eating and drinking before the procedure.  · If you were prescribed an oral bowel prep to clean out  your colon, take it as told by your health care provider.  · After your procedure, your blood pressure, heart rate, breathing rate, and blood oxygen level will be monitored until the medicines you were given have worn off.  This information is not intended to replace advice given to you by your health care provider. Make sure you discuss any questions you have with your health care provider.  Document Released: 12/15/2001 Document Revised: 10/10/2018 Document Reviewed: 02/28/2017  [employer] Patient Education © 2020 [employer]

## 2020-08-31 ENCOUNTER — TELEPHONE (OUTPATIENT)
Dept: FAMILY MEDICINE CLINIC | Facility: CLINIC | Age: 45
End: 2020-08-31

## 2020-08-31 NOTE — TELEPHONE ENCOUNTER
PATIENT CALLING IN STATING SHE NEEDS ANOTHER REFERRAL FOR A PAPSHMIR.     CALL BACK NUMBER: [phone]

## 2020-08-31 NOTE — TELEPHONE ENCOUNTER
Made pt aware that referral was placed in December and she could just call and schedule. Pt voiced understanding.

## 2020-09-12 PROCEDURE — U0002 COVID-19 LAB TEST NON-CDC: HCPCS | Performed by: NURSE PRACTITIONER

## 2020-09-17 ENCOUNTER — LAB (OUTPATIENT)
Dept: LAB | Facility: HOSPITAL | Age: 45
End: 2020-09-17

## 2020-09-17 ENCOUNTER — TELEPHONE (OUTPATIENT)
Dept: OBSTETRICS AND GYNECOLOGY | Facility: CLINIC | Age: 45
End: 2020-09-17

## 2020-09-17 ENCOUNTER — PROCEDURE VISIT (OUTPATIENT)
Dept: OBSTETRICS AND GYNECOLOGY | Facility: CLINIC | Age: 45
End: 2020-09-17

## 2020-09-17 VITALS
HEIGHT: 63 IN | WEIGHT: 287 LBS | SYSTOLIC BLOOD PRESSURE: 126 MMHG | DIASTOLIC BLOOD PRESSURE: 78 MMHG | BODY MASS INDEX: 50.85 KG/M2

## 2020-09-17 DIAGNOSIS — E78.5 HYPERLIPIDEMIA, UNSPECIFIED HYPERLIPIDEMIA TYPE: ICD-10-CM

## 2020-09-17 DIAGNOSIS — E55.9 VITAMIN D DEFICIENCY: ICD-10-CM

## 2020-09-17 DIAGNOSIS — Z11.59 NEED FOR HEPATITIS C SCREENING TEST: ICD-10-CM

## 2020-09-17 DIAGNOSIS — Z11.3 SCREENING FOR STD (SEXUALLY TRANSMITTED DISEASE): ICD-10-CM

## 2020-09-17 DIAGNOSIS — Z01.419 ENCOUNTER FOR WELL WOMAN EXAM WITH ROUTINE GYNECOLOGICAL EXAM: Primary | ICD-10-CM

## 2020-09-17 DIAGNOSIS — Z12.4 ENCOUNTER FOR PAPANICOLAOU SMEAR FOR CERVICAL CANCER SCREENING: ICD-10-CM

## 2020-09-17 DIAGNOSIS — E11.65 UNCONTROLLED TYPE 2 DIABETES MELLITUS WITH HYPERGLYCEMIA (HCC): ICD-10-CM

## 2020-09-17 DIAGNOSIS — E66.01 MORBID OBESITY (HCC): ICD-10-CM

## 2020-09-17 DIAGNOSIS — I10 ESSENTIAL HYPERTENSION: ICD-10-CM

## 2020-09-17 PROCEDURE — 99396 PREV VISIT EST AGE 40-64: CPT | Performed by: NURSE PRACTITIONER

## 2020-09-17 PROCEDURE — 80053 COMPREHEN METABOLIC PANEL: CPT

## 2020-09-17 PROCEDURE — 80061 LIPID PANEL: CPT

## 2020-09-17 PROCEDURE — 82306 VITAMIN D 25 HYDROXY: CPT

## 2020-09-17 PROCEDURE — 85007 BL SMEAR W/DIFF WBC COUNT: CPT

## 2020-09-17 PROCEDURE — 86803 HEPATITIS C AB TEST: CPT

## 2020-09-17 PROCEDURE — 86592 SYPHILIS TEST NON-TREP QUAL: CPT | Performed by: NURSE PRACTITIONER

## 2020-09-17 PROCEDURE — G0432 EIA HIV-1/HIV-2 SCREEN: HCPCS | Performed by: NURSE PRACTITIONER

## 2020-09-17 PROCEDURE — 87624 HPV HI-RISK TYP POOLED RSLT: CPT | Performed by: NURSE PRACTITIONER

## 2020-09-17 PROCEDURE — 83036 HEMOGLOBIN GLYCOSYLATED A1C: CPT

## 2020-09-17 PROCEDURE — 87340 HEPATITIS B SURFACE AG IA: CPT | Performed by: NURSE PRACTITIONER

## 2020-09-17 PROCEDURE — 85025 COMPLETE CBC W/AUTO DIFF WBC: CPT

## 2020-09-17 NOTE — PROGRESS NOTES
Subjective   Lizzy Restrepo is a 45 y.o. here for gyn annual    LMP: 2020; periods occur every 2 months  BC: tubal ligation  Mammo: 2020 kris Ball is a  who presents for gyn annual. Desires STI screening as she is in a new relationship. Does not use condoms.     Gynecologic Exam  The patient's pertinent negatives include no genital itching, genital lesions, genital odor, genital rash, missed menses, pelvic pain, vaginal bleeding or vaginal discharge. Pertinent negatives include no abdominal pain, chills, constipation, diarrhea, dysuria, fever, frequency, nausea, rash or sore throat. Nothing aggravates the symptoms. She has tried nothing for the symptoms. She is sexually active. It is unknown whether or not her partner has an STD. She uses tubal ligation for contraception. Her menstrual history has been irregular. Her past medical history is significant for a  section and a gynecological surgery.       The following portions of the patient's history were reviewed and updated as appropriate: allergies, current medications, past family history, past medical history, past social history, past surgical history and problem list.    Review of Systems   Constitutional: Negative for chills, fatigue, fever, unexpected weight gain and unexpected weight loss.   HENT: Negative for sneezing and sore throat.    Respiratory: Negative for shortness of breath.    Cardiovascular: Negative for chest pain and palpitations.   Gastrointestinal: Negative for abdominal pain, constipation, diarrhea and nausea.   Endocrine: Negative for cold intolerance and heat intolerance.   Genitourinary: Negative for breast discharge, breast lump, breast pain, difficulty urinating, dysuria, frequency, menstrual problem, missed menses, pelvic pain, pelvic pressure, urinary incontinence, vaginal bleeding, vaginal discharge and vaginal pain.   Skin: Negative for rash.   Neurological: Negative for weakness and headache.    Psychiatric/Behavioral: Negative for sleep disturbance, depressed mood and stress.       Objective   Physical Exam  Vitals signs and nursing note reviewed. Exam conducted with a chaperone present.   Constitutional:       Appearance: She is well-developed. She is obese.   HENT:      Head: Normocephalic and atraumatic.   Eyes:      General:         Right eye: No discharge.         Left eye: No discharge.      Conjunctiva/sclera: Conjunctivae normal.      Pupils: Pupils are equal, round, and reactive to light.   Neck:      Musculoskeletal: Normal range of motion and neck supple.      Thyroid: No thyromegaly.   Cardiovascular:      Rate and Rhythm: Normal rate and regular rhythm.      Heart sounds: Normal heart sounds.   Pulmonary:      Effort: Pulmonary effort is normal. No respiratory distress.      Breath sounds: Normal breath sounds.   Abdominal:      General: Bowel sounds are normal. There is no distension.      Palpations: Abdomen is soft.      Tenderness: There is no abdominal tenderness.   Genitourinary:     General: Normal vulva.      Labia:         Right: No rash, tenderness, lesion or injury.         Left: No rash, tenderness, lesion or injury.       Vagina: Normal.      Cervix: Normal.      Uterus: Normal.       Adnexa: Right adnexa normal and left adnexa normal.      Comments: Pap test obtained; GC ordered with with Pap. Bimanual exam limited d/t body habitus.   Musculoskeletal: Normal range of motion.   Skin:     General: Skin is warm and dry.   Neurological:      Mental Status: She is alert and oriented to person, place, and time.   Psychiatric:         Mood and Affect: Mood normal.         Behavior: Behavior normal.           Assessment/Plan   Lizzy was seen today for gynecologic exam.    Diagnoses and all orders for this visit:    Encounter for well woman exam with routine gynecological exam    Screening for STD (sexually transmitted disease)  -     Hepatitis B Surface Antigen  -     Cancel:  Hepatitis C Antibody  -     HIV-1 & HIV-2 Antibodies  -     RPR  -     [place] Gonorrhea and Chlamydia by ThinPrep - ThinPrep Vial, Cervix  -     Trichomonas by ThinPrep - ThinPrep Vial, Cervix  -     Hepatitis B Surface Antigen  -     Hep B Confirmation Tube  -     HIV-1 / O / 2 Ag / Antibody 4th Generation    Encounter for Papanicolaou smear for cervical cancer screening  -     Liquid-based Pap Smear, Screening      Reviewed cytology and mammogram guidelines. Discussed the definition of menopause and to return for any PMB. Encourage condom use. Will call patient for any abnormal results of screening. Return in one year.

## 2020-09-18 LAB
25(OH)D3 SERPL-MCNC: 60.7 NG/ML (ref 30–100)
ALBUMIN SERPL-MCNC: 4.1 G/DL (ref 3.5–5.2)
ALBUMIN/GLOB SERPL: 1.1 G/DL
ALP SERPL-CCNC: 67 U/L (ref 39–117)
ALT SERPL W P-5'-P-CCNC: 24 U/L (ref 1–33)
ANION GAP SERPL CALCULATED.3IONS-SCNC: 9.3 MMOL/L (ref 5–15)
ANISOCYTOSIS BLD QL: ABNORMAL
AST SERPL-CCNC: 17 U/L (ref 1–32)
BILIRUB SERPL-MCNC: 0.2 MG/DL (ref 0–1.2)
BUN SERPL-MCNC: 10 MG/DL (ref 6–20)
BUN/CREAT SERPL: 12.3 (ref 7–25)
CALCIUM SPEC-SCNC: 9.9 MG/DL (ref 8.6–10.5)
CHLORIDE SERPL-SCNC: 103 MMOL/L (ref 98–107)
CHOLEST SERPL-MCNC: 144 MG/DL (ref 0–200)
CO2 SERPL-SCNC: 26.7 MMOL/L (ref 22–29)
CREAT SERPL-MCNC: 0.81 MG/DL (ref 0.57–1)
DEPRECATED RDW RBC AUTO: 38.8 FL (ref 37–54)
ERYTHROCYTE [DISTWIDTH] IN BLOOD BY AUTOMATED COUNT: 17 % (ref 12.3–15.4)
GFR SERPL CREATININE-BSD FRML MDRD: 92 ML/MIN/1.73
GLOBULIN UR ELPH-MCNC: 3.6 GM/DL
GLUCOSE SERPL-MCNC: 93 MG/DL (ref 65–99)
HBA1C MFR BLD: 7.6 % (ref 4.8–5.6)
HBV SURFACE AG SERPL QL IA: NORMAL
HCT VFR BLD AUTO: 39.9 % (ref 34–46.6)
HCV AB SER DONR QL: NORMAL
HDLC SERPL-MCNC: 46 MG/DL (ref 40–60)
HGB BLD-MCNC: 11.9 G/DL (ref 12–15.9)
HIV1+2 AB SER QL: NORMAL
HOLD SPECIMEN: NORMAL
HYPOCHROMIA BLD QL: ABNORMAL
LDLC SERPL CALC-MCNC: 77 MG/DL (ref 0–100)
LDLC/HDLC SERPL: 1.67 {RATIO}
LYMPHOCYTES # BLD MANUAL: 5 10*3/MM3 (ref 0.7–3.1)
LYMPHOCYTES NFR BLD MANUAL: 3.1 % (ref 5–12)
LYMPHOCYTES NFR BLD MANUAL: 54.1 % (ref 19.6–45.3)
MCH RBC QN AUTO: 20.4 PG (ref 26.6–33)
MCHC RBC AUTO-ENTMCNC: 29.8 G/DL (ref 31.5–35.7)
MCV RBC AUTO: 68.6 FL (ref 79–97)
MICROCYTES BLD QL: ABNORMAL
MONOCYTES # BLD AUTO: 0.29 10*3/MM3 (ref 0.1–0.9)
NEUTROPHILS # BLD AUTO: 3.11 10*3/MM3 (ref 1.7–7)
NEUTROPHILS NFR BLD MANUAL: 33.7 % (ref 42.7–76)
PLAT MORPH BLD: NORMAL
PLATELET # BLD AUTO: 334 10*3/MM3 (ref 140–450)
PMV BLD AUTO: 11.1 FL (ref 6–12)
POIKILOCYTOSIS BLD QL SMEAR: ABNORMAL
POTASSIUM SERPL-SCNC: 4.3 MMOL/L (ref 3.5–5.2)
PROT SERPL-MCNC: 7.7 G/DL (ref 6–8.5)
RBC # BLD AUTO: 5.82 10*6/MM3 (ref 3.77–5.28)
RPR SER QL: NORMAL
SODIUM SERPL-SCNC: 139 MMOL/L (ref 136–145)
TARGETS BLD QL SMEAR: ABNORMAL
TRIGL SERPL-MCNC: 105 MG/DL (ref 0–150)
VARIANT LYMPHS NFR BLD MANUAL: 9.2 % (ref 0–5)
VLDLC SERPL-MCNC: 21 MG/DL (ref 5–40)
WBC # BLD AUTO: 9.24 10*3/MM3 (ref 3.4–10.8)
WBC MORPH BLD: NORMAL

## 2020-09-21 ENCOUNTER — TELEPHONE (OUTPATIENT)
Dept: FAMILY MEDICINE CLINIC | Facility: CLINIC | Age: 45
End: 2020-09-21

## 2020-09-21 NOTE — TELEPHONE ENCOUNTER
----- Message from Solitario Li MD sent at 9/19/2020  6:29 PM CDT -----  Please call pt     All labwork stable and hga1c improved from 11.40 to 7.60     Hemoglobin at 11.9     Kidney function stable    Recheck on next visit. Thanks

## 2020-09-22 ENCOUNTER — TELEPHONE (OUTPATIENT)
Dept: FAMILY MEDICINE CLINIC | Facility: CLINIC | Age: 45
End: 2020-09-22

## 2020-09-25 LAB
LAB AP CASE REPORT: NORMAL
PATH INTERP SPEC-IMP: NORMAL

## 2020-10-23 ENCOUNTER — OFFICE VISIT (OUTPATIENT)
Dept: ORTHOPEDIC SURGERY | Facility: CLINIC | Age: 45
End: 2020-10-23

## 2020-10-23 VITALS — WEIGHT: 287 LBS | BODY MASS INDEX: 50.85 KG/M2 | HEIGHT: 63 IN

## 2020-10-23 DIAGNOSIS — M25.531 RIGHT WRIST PAIN: Primary | ICD-10-CM

## 2020-10-23 DIAGNOSIS — G56.01 CARPAL TUNNEL SYNDROME ON RIGHT: ICD-10-CM

## 2020-10-23 PROCEDURE — 99203 OFFICE O/P NEW LOW 30 MIN: CPT | Performed by: ORTHOPAEDIC SURGERY

## 2020-10-23 NOTE — PROGRESS NOTES
Lizzy Restrepo is a 45 y.o. female   Primary provider:  Solitario Li MD       Chief Complaint   Patient presents with   • Right Hand - Numbness, Pain       HISTORY OF PRESENT ILLNESS: Patient is here today for right hand pain. She has had xray and EMG studies prior to visit today. She states that her pain is 5/10.  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  She had her studies in July which showed carpal tunnel on the right side.  Since then she is gotten better has less pain discomfort that she attributes to the splint the anti-inflammatory at this point, as well as changing jobs.  She has a history of diabetes as well    Pain  This is a chronic problem. The current episode started more than 1 month ago. The problem occurs intermittently. The problem has been waxing and waning. Associated symptoms include arthralgias, joint swelling, numbness and weakness. Pertinent negatives include no abdominal pain, chest pain, chills, fever, nausea or vomiting. She has tried NSAIDs, ice and heat for the symptoms. The treatment provided mild relief.        CONCURRENT MEDICAL HISTORY:    Past Medical History:   Diagnosis Date   • Abdominal pain     unspecified   • Benign hypertension    • Carpal tunnel syndrome    • Essential hypertension    • Gastro-esophageal reflux disease with esophagitis    • GERD (gastroesophageal reflux disease)    • Headache     improving   • Iron deficiency anemia     unspecified   • Morbid obesity due to excess calories (CMS/[facility])     severe   • Type 2 diabetes mellitus (CMS/[facility])    • Urinary tract infectious disease    • Vitamin D deficiency    • Weight loss     advised       No Known Allergies      Current Outpatient Medications:   •  albuterol sulfate HFA (VENTOLIN HFA) 108 (90 Base) MCG/ACT inhaler, Inhale 2 puffs Every 4 (Four) Hours As Needed for Shortness of Air., Disp:  "1 inhaler, Rfl: 0  •  aspirin 81 MG chewable tablet, Chew 1 tablet Daily., Disp: 30 tablet, Rfl: 6  •  atorvastatin (Lipitor) 40 MG tablet, Take 1 tablet by mouth Daily., Disp: 90 tablet, Rfl: 3  •  BD INSULIN SYRINGE U/F 31G X 5/16\" 1 ML misc, , Disp: , Rfl:   •  Cholecalciferol (VITAMIN D3) 1.25 MG (94325 UT) capsule, TAKE ONE CAPSULE BY MOUTH ONCE WEEKLY, Disp: 4 capsule, Rfl: 3  •  cyclobenzaprine (FLEXERIL) 10 MG tablet, Take 1 tablet by mouth At Night As Needed for Muscle Spasms., Disp: 30 tablet, Rfl: 3  •  exenatide er (BYDUREON) 2 MG/0.85ML auto-injector injection, Inject 0.85 mL under the skin into the appropriate area as directed 1 (One) Time Per Week., Disp: 4 pen, Rfl: 3  •  ferrous sulfate 325 (65 FE) MG tablet, Take 1 tablet by mouth Daily With Breakfast., Disp: 30 tablet, Rfl: 3  •  fluticasone (Flonase) 50 MCG/ACT nasal spray, 2 sprays into the nostril(s) as directed by provider Daily., Disp: 16 g, Rfl: 6  •  fluticasone (FLOVENT HFA) 220 MCG/ACT inhaler, Inhale 1 puff 2 (Two) Times a Day., Disp: 1 inhaler, Rfl: 3  •  glucose blood test strip, Give testing strips and lancets to used 2-3 times daily., Disp: 300 each, Rfl: 3  •  Insulin Glargine (BASAGLAR KWIKPEN) 100 UNIT/ML injection pen, Start 16 units at bedtime subq can go up by 3 units every 3 days until am sugars running , Disp: 3 pen, Rfl: 3  •  [employer] PEN NEEDLES 31G X 6 MM misc, , Disp: , Rfl:   •  lisinopril (PRINIVIL,ZESTRIL) 20 MG tablet, Take 1 tablet by mouth Daily., Disp: 90 tablet, Rfl: 3  •  loratadine (EQ Loratadine) 10 MG tablet, Take 1 tablet by mouth Daily., Disp: 90 tablet, Rfl: 3  •  montelukast (Singulair) 10 MG tablet, Take 1 tablet by mouth Every Night., Disp: 30 tablet, Rfl: 6  •  omeprazole (priLOSEC) 40 MG capsule, Take 1 capsule by mouth Daily., Disp: 90 capsule, Rfl: 3  •  ondansetron ODT (Zofran ODT) 8 MG disintegrating tablet, Place 1 tablet on the tongue Every 8 (Eight) Hours As Needed for Nausea or Vomiting., " Disp: 30 tablet, Rfl: 3  •  ReliOn Ultra Thin Lancets misc, , Disp: , Rfl:   •  STEGLATRO 15 MG tablet, TAKE ONE TABLET BY MOUTH EVERY MORNING, Disp: 30 tablet, Rfl: 3    Past Surgical History:   Procedure Laterality Date   •  SECTION  10/30/2000   • CHOLECYSTECTOMY  1995   • COLONOSCOPY      normal per pt   • CYSTOSCOPY  2016    And bilateral retrograde pyelograms. Performed at [facility].   • TONSILLECTOMY  1985   • UMBILICAL HERNIA REPAIR  2015   • UMBILICAL HERNIA REPAIR  2015       Family History   Problem Relation Age of Onset   • Diabetes Mother    • Heart disease Mother    • Heart failure Mother         congestive   • Hypertension Mother    • Asthma Father    • Osteoarthritis Father         Social History     Socioeconomic History   • Marital status:      Spouse name: Not on file   • Number of children: Not on file   • Years of education: Not on file   • Highest education level: Not on file   Tobacco Use   • Smoking status: Never Smoker   • Smokeless tobacco: Never Used   Substance and Sexual Activity   • Alcohol use: No   • Drug use: No        Review of Systems   Constitutional: Negative for chills and fever.   HENT: Negative.  Negative for facial swelling.    Eyes: Negative.    Respiratory: Negative.  Negative for apnea and shortness of breath.    Cardiovascular: Negative.  Negative for chest pain and leg swelling.   Gastrointestinal: Negative.  Negative for abdominal pain, nausea and vomiting.   Endocrine: Negative.    Genitourinary: Negative.  Negative for dysuria.   Musculoskeletal: Positive for arthralgias and joint swelling.   Skin: Negative.  Negative for color change.   Allergic/Immunologic: Negative.    Neurological: Positive for weakness and numbness. Negative for seizures and syncope.   Hematological: Negative.  Negative for adenopathy.   Psychiatric/Behavioral: Negative.  Negative for dysphoric mood.       PHYSICAL EXAMINATION:    "    Ht 160 cm (63\")   Wt 130 kg (287 lb)   BMI 50.84 kg/m²     Physical Exam  Constitutional:       Appearance: She is well-developed.   HENT:      Head: Normocephalic and atraumatic.   Eyes:      Pupils: Pupils are equal, round, and reactive to light.   Neck:      Musculoskeletal: Neck supple.   Pulmonary:      Effort: Pulmonary effort is normal.   Musculoskeletal: Normal range of motion.         General: Tenderness present.   Skin:     General: Skin is warm and dry.   Neurological:      Mental Status: She is alert and oriented to person, place, and time.      Sensory: No sensory deficit.   Psychiatric:         Mood and Affect: Mood normal.         GAIT:     []  Normal  []  Antalgic    Assistive device: [x]  None  []  Walker     []  Crutches  []  Cane     []  Wheelchair  []  Stretcher    Ortho Exam  Tinel's is negative.  Phalen's negative.  No numbness today.  Good strength.  Good motion the wrist.  EMG-NCS  ASSESSMENT: Abnormal Test. EMG-NCS test is most consistent with right upper extremity moderate stage carpal tunnel syndrome with invovlement of right median motor and sensory fibers at the level of the wrist. No obvious neurophysiologic evidence of right upper extremity ulnar neuropathy or peripheral sensory motor neuropathy.   Romeo Nguyen M.D.       ASSESSMENT:    Diagnoses and all orders for this visit:    Right wrist pain    Carpal tunnel syndrome on right          PLAN at this point her symptoms are dissipating.  I think she is slowly improving.  Probably is a combination of the things that she has had and I have encouraged at this point to continue her splint at night only take the ibuprofen as needed.  She will call with any problems.  I have told her palpitations are 2 to 3 months to continue to improve but she will let me know with any issues.  Patient's Body mass index is 50.84 kg/m². BMI is above normal parameters. Recommendations include: exercise counseling and nutrition counseling.  No " follow-ups on file.        This document has been electronically signed by Lalito Day MD on October 23, 2020 14:10 CDT

## 2020-11-12 ENCOUNTER — OFFICE VISIT (OUTPATIENT)
Dept: FAMILY MEDICINE CLINIC | Facility: CLINIC | Age: 45
End: 2020-11-12

## 2020-11-12 VITALS
DIASTOLIC BLOOD PRESSURE: 68 MMHG | TEMPERATURE: 96.9 F | BODY MASS INDEX: 51.56 KG/M2 | SYSTOLIC BLOOD PRESSURE: 118 MMHG | WEIGHT: 291 LBS | HEIGHT: 63 IN | OXYGEN SATURATION: 99 % | HEART RATE: 70 BPM

## 2020-11-12 DIAGNOSIS — D50.9 IRON DEFICIENCY ANEMIA, UNSPECIFIED IRON DEFICIENCY ANEMIA TYPE: ICD-10-CM

## 2020-11-12 DIAGNOSIS — I10 ESSENTIAL HYPERTENSION: ICD-10-CM

## 2020-11-12 DIAGNOSIS — Z79.4 CONTROLLED TYPE 2 DIABETES MELLITUS WITH COMPLICATION, WITH LONG-TERM CURRENT USE OF INSULIN (HCC): ICD-10-CM

## 2020-11-12 DIAGNOSIS — E55.9 VITAMIN D DEFICIENCY: ICD-10-CM

## 2020-11-12 DIAGNOSIS — E78.5 HYPERLIPIDEMIA ASSOCIATED WITH TYPE 2 DIABETES MELLITUS (HCC): ICD-10-CM

## 2020-11-12 DIAGNOSIS — E66.01 MORBID OBESITY (HCC): ICD-10-CM

## 2020-11-12 DIAGNOSIS — E11.8 CONTROLLED TYPE 2 DIABETES MELLITUS WITH COMPLICATION, WITH LONG-TERM CURRENT USE OF INSULIN (HCC): ICD-10-CM

## 2020-11-12 DIAGNOSIS — J45.40 MODERATE PERSISTENT ASTHMA, UNSPECIFIED WHETHER COMPLICATED: ICD-10-CM

## 2020-11-12 DIAGNOSIS — Z12.11 ENCOUNTER FOR SCREENING COLONOSCOPY: Primary | ICD-10-CM

## 2020-11-12 DIAGNOSIS — E11.69 HYPERLIPIDEMIA ASSOCIATED WITH TYPE 2 DIABETES MELLITUS (HCC): ICD-10-CM

## 2020-11-12 PROCEDURE — 99214 OFFICE O/P EST MOD 30 MIN: CPT | Performed by: FAMILY MEDICINE

## 2020-11-16 ENCOUNTER — TELEPHONE (OUTPATIENT)
Dept: FAMILY MEDICINE CLINIC | Facility: CLINIC | Age: 45
End: 2020-11-16

## 2020-11-16 NOTE — TELEPHONE ENCOUNTER
PATIENTS PHARMACY CALLED IN AND STATED THAT THEY HAD RECEIVED A PRE AUTHORIZATION FOR REGULAR BYDUREON AND NOT BYDUREON B SIZE. THE PRE AUTH NEEDS TO BE WRITTEN FOR B SIZE MEDICATION.    [facility]    [phone]   [facility]

## 2020-11-16 NOTE — TELEPHONE ENCOUNTER
SREEKANTH CALLING FROM [facility]  IS CALLING ABOUT A  PA SCRIPT FOR MS. JACKSON CALL COVER MY MEDS  OR FAX [phone]    Olanzapine 5mg po/IM q6hrs prn agitation

## 2020-12-31 PROCEDURE — U0003 INFECTIOUS AGENT DETECTION BY NUCLEIC ACID (DNA OR RNA); SEVERE ACUTE RESPIRATORY SYNDROME CORONAVIRUS 2 (SARS-COV-2) (CORONAVIRUS DISEASE [COVID-19]), AMPLIFIED PROBE TECHNIQUE, MAKING USE OF HIGH THROUGHPUT TECHNOLOGIES AS DESCRIBED BY CMS-2020-01-R: HCPCS | Performed by: NURSE PRACTITIONER

## 2021-02-04 ENCOUNTER — OFFICE VISIT (OUTPATIENT)
Dept: GASTROENTEROLOGY | Facility: CLINIC | Age: 46
End: 2021-02-04

## 2021-02-04 VITALS
WEIGHT: 287 LBS | HEART RATE: 77 BPM | DIASTOLIC BLOOD PRESSURE: 85 MMHG | SYSTOLIC BLOOD PRESSURE: 141 MMHG | HEIGHT: 63 IN | BODY MASS INDEX: 50.85 KG/M2

## 2021-02-04 DIAGNOSIS — Z12.11 ENCOUNTER FOR SCREENING FOR MALIGNANT NEOPLASM OF COLON: Primary | ICD-10-CM

## 2021-02-04 PROCEDURE — 99203 OFFICE O/P NEW LOW 30 MIN: CPT | Performed by: INTERNAL MEDICINE

## 2021-02-04 RX ORDER — DEXTROSE AND SODIUM CHLORIDE 5; .45 G/100ML; G/100ML
30 INJECTION, SOLUTION INTRAVENOUS CONTINUOUS PRN
Status: CANCELLED | OUTPATIENT
Start: 2021-02-04

## 2021-02-04 NOTE — PATIENT INSTRUCTIONS
MyPlate from USDA    MyPlate is an outline of a general healthy diet based on the 2010 Dietary Guidelines for Americans, from the U.S. Department of Agriculture (USDA). It sets guidelines for how much food you should eat from each food group based on your age, sex, and level of physical activity.  What are tips for following MyPlate?  To follow MyPlate recommendations:  · Eat a wide variety of fruits and vegetables, grains, and protein foods.  · Serve smaller portions and eat less food throughout the day.  · Limit portion sizes to avoid overeating.  · Enjoy your food.  · Get at least 150 minutes of exercise every week. This is about 30 minutes each day, 5 or more days per week.  It can be difficult to have every meal look like MyPlate. Think about MyPlate as eating guidelines for an entire day, rather than each individual meal.  Fruits and vegetables  · Make half of your plate fruits and vegetables.  · Eat many different colors of fruits and vegetables each day.  · For a 2,000 calorie daily food plan, eat:  ? 2½ cups of vegetables every day.  ? 2 cups of fruit every day.  · 1 cup is equal to:  ? 1 cup raw or cooked vegetables.  ? 1 cup raw fruit.  ? 1 medium-sized orange, apple, or banana.  ? 1 cup 100% fruit or vegetable juice.  ? 2 cups raw leafy greens, such as lettuce, spinach, or kale.  ? ½ cup dried fruit.  Grains  · One fourth of your plate should be grains.  · Make at least half of the grains you eat each day whole grains.  · For a 2,000 calorie daily food plan, eat 6 oz of grains every day.  · 1 oz is equal to:  ? 1 slice bread.  ? 1 cup cereal.  ? ½ cup cooked rice, cereal, or pasta.  Protein  · One fourth of your plate should be protein.  · Eat a wide variety of protein foods, including meat, poultry, fish, eggs, beans, nuts, and tofu.  · For a 2,000 calorie daily food plan, eat 5½ oz of protein every day.  · 1 oz is equal to:  ? 1 oz meat, poultry, or fish.  ? ¼ cup cooked beans.  ? 1 egg.  ? ½ oz nuts  or seeds.  ? 1 Tbsp peanut butter.  Dairy  · Drink fat-free or low-fat (1%) milk.  · Eat or drink dairy as a side to meals.  · For a 2,000 calorie daily food plan, eat or drink 3 cups of dairy every day.  · 1 cup is equal to:  ? 1 cup milk, yogurt, cottage cheese, or soy milk (soy beverage).  ? 2 oz processed cheese.  ? 1½ oz natural cheese.  Fats, oils, salt, and sugars  · Only small amounts of oils are recommended.  · Avoid foods that are high in calories and low in nutritional value (empty calories), like foods high in fat or added sugars.  · Choose foods that are low in salt (sodium). Choose foods that have less than 140 milligrams (mg) of sodium per serving.  · Drink water instead of sugary drinks. Drink enough water each day to keep your urine pale yellow.  Where to find support  · Work with your health care provider or a nutrition specialist (dietitian) to develop a customized eating plan that is right for you.  · Download an nette (mobile application) to help you track your daily food intake.  Where to find more information  · Go to ChooseMyPlate.gov for more information.  Summary  · MyPlate is a general guideline for healthy eating from the USDA. It is based on the 2010 Dietary Guidelines for Americans.  · In general, fruits and vegetables should take up ½ of your plate, grains should take up ¼ of your plate, and protein should take up ¼ of your plate.  This information is not intended to replace advice given to you by your health care provider. Make sure you discuss any questions you have with your health care provider.  Document Revised: 05/21/2020 Document Reviewed: 03/19/2018  Elsevier Patient Education © 2020 Elsevier Inc.

## 2021-02-05 NOTE — PROGRESS NOTES
[facility]      Chief Complaint:   Chief Complaint   Patient presents with   • Colonoscopy Consultation       Subjective     HPI:   Ms. Restrepo is a 45-year-old -American female with past medical history of hypertension, carpal tunnel syndrome, iron deficiency anemia, morbid obesity, diabetes mellitus, urinary tract infection, vitamin D deficiency presenting for evaluation for colorectal cancer screening.  She has heartburn for past several years with symptoms well controlled on Prilosec.  Denies dysphagia.  She does not have any GI complaints at this time otherwise.  Family history is unremarkable for colon cancer or polyps.    Past Medical History:   Past Medical History:   Diagnosis Date   • Abdominal pain     unspecified   • Benign hypertension    • Carpal tunnel syndrome    • Essential hypertension    • Gastro-esophageal reflux disease with esophagitis    • GERD (gastroesophageal reflux disease)    • Headache     improving   • Iron deficiency anemia     unspecified   • Morbid obesity due to excess calories (CMS/[facility])     severe   • Type 2 diabetes mellitus (CMS/HCC)    • Urinary tract infectious disease    • Vitamin D deficiency    • Weight loss     advised       Past Surgical History:  Past Surgical History:   Procedure Laterality Date   •  SECTION  10/30/2000   • CHOLECYSTECTOMY  1995   • COLONOSCOPY  2013    normal per pt   • CYSTOSCOPY  2016    And bilateral retrograde pyelograms. Performed at [facility].   • TONSILLECTOMY  1985   • UMBILICAL HERNIA REPAIR  2015   • UMBILICAL HERNIA REPAIR  2015       Family History:  Family History   Problem Relation Age of Onset   • Diabetes Mother    • Heart disease Mother    • Heart failure Mother         congestive   • Hypertension Mother    • Asthma Father    • Osteoarthritis Father        Social History:   reports that she has never smoked. She has never used smokeless tobacco. She  "reports that she does not drink alcohol or use drugs.    Medications:   Prior to Admission medications    Medication Sig Start Date End Date Taking? Authorizing Provider   albuterol sulfate HFA (VENTOLIN HFA) 108 (90 Base) MCG/ACT inhaler Inhale 2 puffs Every 4 (Four) Hours As Needed for Shortness of Air. 12/12/19  Yes Solitario Li MD   aspirin 81 MG chewable tablet Chew 1 tablet Daily. 4/20/20  Yes Solitario Li MD   atorvastatin (Lipitor) 40 MG tablet Take 1 tablet by mouth Daily. 4/20/20  Yes Solitario Li MD   BD INSULIN SYRINGE U/F 31G X 5/16\" 1 ML misc  4/22/20  Yes ProviderElijah MD   Cholecalciferol (VITAMIN D3) 1.25 MG (02678 UT) capsule TAKE ONE CAPSULE BY MOUTH ONCE WEEKLY 8/20/20  Yes Solitario Li MD   cyclobenzaprine (FLEXERIL) 10 MG tablet Take 1 tablet by mouth At Night As Needed for Muscle Spasms. 4/20/20  Yes Solitario Li MD   exenatide er (BYDUREON) 2 MG pen-injector injection Inject 1 pen under the skin into the appropriate area as directed 1 (One) Time Per Week. 11/16/20  Yes Solitario Li MD   ferrous sulfate 325 (65 FE) MG tablet Take 1 tablet by mouth Daily With Breakfast. 6/12/20  Yes Solitario Li MD   fluticasone (Flonase) 50 MCG/ACT nasal spray 2 sprays into the nostril(s) as directed by provider Daily. 4/20/20  Yes Solitario Li MD   fluticasone (FLOVENT HFA) 220 MCG/ACT inhaler Inhale 1 puff 2 (Two) Times a Day. 4/27/20  Yes Solitario Li MD   glucose blood test strip Give testing strips and lancets to used 2-3 times daily. 5/4/20  Yes Solitario Li MD   glucose blood test strip Use as instructed 11/12/20  Yes Solitario Li MD   Insulin Glargine (BASAGLAR KWIKPEN) 100 UNIT/ML injection pen Start 16 units at bedtime subq can go up by 3 units every 3 days until am sugars running  5/4/20  Yes Solitario Li MD   [employer] PEN NEEDLES 31G X 6 MM misc  4/27/20  Yes Provider, MD Elijah   lisinopril (PRINIVIL,ZESTRIL) 20 MG tablet Take 1 " tablet by mouth Daily. 4/20/20  Yes Solitario Li MD   loratadine (EQ Loratadine) 10 MG tablet Take 1 tablet by mouth Daily. 4/20/20  Yes Solitario Li MD   montelukast (Singulair) 10 MG tablet Take 1 tablet by mouth Every Night. 4/20/20  Yes Solitario Li MD   omeprazole (priLOSEC) 40 MG capsule Take 1 capsule by mouth Daily. 4/20/20  Yes Solitario Li MD   ondansetron ODT (Zofran ODT) 8 MG disintegrating tablet Place 1 tablet on the tongue Every 8 (Eight) Hours As Needed for Nausea or Vomiting. 7/17/20  Yes Solitario Li MD   ReliOn Ultra Thin Lancets misc  12/2/17  Yes Elijah Lee MD   STEGLATRO 15 MG tablet TAKE ONE TABLET BY MOUTH EVERY MORNING 8/20/20  Yes Solitario Li MD       Allergies:  Patient has no known allergies.    ROS:    Review of Systems   Constitutional: Negative for chills, fatigue, fever and unexpected weight change.   HENT: Negative for congestion, ear discharge, hearing loss, nosebleeds and sore throat.    Eyes: Negative for pain, discharge and redness.   Respiratory: Negative for cough, chest tightness, shortness of breath and wheezing.    Cardiovascular: Negative for chest pain and palpitations.   Gastrointestinal: Negative for abdominal distention, abdominal pain, blood in stool, constipation, diarrhea, nausea and vomiting.   Endocrine: Negative for cold intolerance, polydipsia, polyphagia and polyuria.   Genitourinary: Negative for dysuria, flank pain, frequency, hematuria and urgency.   Musculoskeletal: Negative for arthralgias, back pain, joint swelling and myalgias.   Skin: Negative for color change, pallor and rash.   Neurological: Negative for tremors, seizures, syncope, weakness and headaches.   Hematological: Negative for adenopathy. Does not bruise/bleed easily.   Psychiatric/Behavioral: Negative for behavioral problems, confusion, dysphoric mood, hallucinations and suicidal ideas. The patient is not nervous/anxious.    Has GERD.  Objective     Blood  "pressure 141/85, pulse 77, height 160 cm (63\"), weight 130 kg (287 lb).    Physical Exam  Constitutional:       Appearance: She is well-developed.   HENT:      Head: Normocephalic and atraumatic.   Eyes:      Conjunctiva/sclera: Conjunctivae normal.      Pupils: Pupils are equal, round, and reactive to light.   Neck:      Musculoskeletal: Normal range of motion and neck supple.      Thyroid: No thyromegaly.   Cardiovascular:      Rate and Rhythm: Normal rate and regular rhythm.      Heart sounds: Normal heart sounds. No murmur.   Pulmonary:      Effort: Pulmonary effort is normal.      Breath sounds: Normal breath sounds. No wheezing.   Abdominal:      General: Bowel sounds are normal. There is no distension.      Palpations: Abdomen is soft. There is no mass.      Tenderness: There is no abdominal tenderness.      Hernia: No hernia is present.   Genitourinary:     Comments: No lesions noted  Musculoskeletal: Normal range of motion.         General: No tenderness.   Lymphadenopathy:      Cervical: No cervical adenopathy.   Skin:     General: Skin is warm and dry.      Findings: No rash.   Neurological:      Mental Status: She is alert and oriented to person, place, and time.      Cranial Nerves: No cranial nerve deficit.   Psychiatric:         Thought Content: Thought content normal.        Extremities: No edema, cyanosis or clubbing.    Assessment/Plan    1.  Colorectal cancer screening, average risk.  Schedule colonoscopy.  2.  GERD, symptoms well controlled with Prilosec.  Continue Prilosec and antireflux lifestyle.  3.  Morbid obesity, recommend exercise and diet control.  Diagnoses and all orders for this visit:    1. Encounter for screening for malignant neoplasm of colon (Primary)  -     Case Request; Standing  -     dextrose 5 % and sodium chloride 0.45 % infusion  -     Case Request    Other orders  -     Follow Anesthesia Guidelines / Standing Orders; Future  -     Obtain Informed Consent; Future  -     " Implement Anesthesia Orders Day of Procedure; Standing  -     Obtain Informed Consent; Standing  -     POC Glucose Once; Standing  -     Insert Peripheral IV; Standing        COLONOSCOPY (N/A)     Diagnosis Plan   1. Encounter for screening for malignant neoplasm of colon  Case Request    dextrose 5 % and sodium chloride 0.45 % infusion    Case Request       Anticipated Surgical Procedure:  Orders Placed This Encounter   Procedures   • Follow Anesthesia Guidelines / Standing Orders     Standing Status:   Future   • Obtain Informed Consent     Standing Status:   Future     Order Specific Question:   Informed Consent Given For     Answer:   colonoscopy       The risks, benefits, and alternatives of this procedure have been discussed with the patient or the responsible party- the patient understands and agrees to proceed.            This document has been electronically signed by Caitlin Hermosillo MD on February 4, 2021 19:48 CST

## 2021-02-10 ENCOUNTER — LAB (OUTPATIENT)
Dept: LAB | Facility: HOSPITAL | Age: 46
End: 2021-02-10

## 2021-02-10 DIAGNOSIS — Z00.00 GENERAL MEDICAL EXAMINATION: Primary | ICD-10-CM

## 2021-02-10 DIAGNOSIS — Z00.00 GENERAL MEDICAL EXAMINATION: ICD-10-CM

## 2021-02-10 LAB
25(OH)D3 SERPL-MCNC: 66.9 NG/ML (ref 30–100)
ALBUMIN SERPL-MCNC: 3.9 G/DL (ref 3.5–5.2)
ALBUMIN UR-MCNC: <1.2 MG/DL
ALBUMIN/GLOB SERPL: 1.1 G/DL
ALP SERPL-CCNC: 68 U/L (ref 39–117)
ALT SERPL W P-5'-P-CCNC: 20 U/L (ref 1–33)
ANION GAP SERPL CALCULATED.3IONS-SCNC: 4.9 MMOL/L (ref 5–15)
AST SERPL-CCNC: 13 U/L (ref 1–32)
BILIRUB SERPL-MCNC: 0.2 MG/DL (ref 0–1.2)
BUN SERPL-MCNC: 10 MG/DL (ref 6–20)
BUN/CREAT SERPL: 12.8 (ref 7–25)
CALCIUM SPEC-SCNC: 9.9 MG/DL (ref 8.6–10.5)
CHLORIDE SERPL-SCNC: 100 MMOL/L (ref 98–107)
CHOLEST SERPL-MCNC: 117 MG/DL (ref 0–200)
CO2 SERPL-SCNC: 32.1 MMOL/L (ref 22–29)
CREAT SERPL-MCNC: 0.78 MG/DL (ref 0.57–1)
CREAT UR-MCNC: 71.3 MG/DL
DEPRECATED RDW RBC AUTO: 36.4 FL (ref 37–54)
EOSINOPHIL # BLD MANUAL: 0.69 10*3/MM3 (ref 0–0.4)
EOSINOPHIL NFR BLD MANUAL: 6 % (ref 0.3–6.2)
ERYTHROCYTE [DISTWIDTH] IN BLOOD BY AUTOMATED COUNT: 16.5 % (ref 12.3–15.4)
GFR SERPL CREATININE-BSD FRML MDRD: 96 ML/MIN/1.73
GLOBULIN UR ELPH-MCNC: 3.6 GM/DL
GLUCOSE SERPL-MCNC: 121 MG/DL (ref 65–99)
HBA1C MFR BLD: 8 % (ref 4.8–5.6)
HCT VFR BLD AUTO: 40.9 % (ref 34–46.6)
HDLC SERPL-MCNC: 41 MG/DL (ref 40–60)
HGB BLD-MCNC: 12.6 G/DL (ref 12–15.9)
HYPOCHROMIA BLD QL: ABNORMAL
LDLC SERPL CALC-MCNC: 59 MG/DL (ref 0–100)
LDLC/HDLC SERPL: 1.43 {RATIO}
LYMPHOCYTES # BLD MANUAL: 3.81 10*3/MM3 (ref 0.7–3.1)
LYMPHOCYTES NFR BLD MANUAL: 33 % (ref 19.6–45.3)
LYMPHOCYTES NFR BLD MANUAL: 9 % (ref 5–12)
MCH RBC QN AUTO: 20.9 PG (ref 26.6–33)
MCHC RBC AUTO-ENTMCNC: 30.8 G/DL (ref 31.5–35.7)
MCV RBC AUTO: 67.7 FL (ref 79–97)
MICROALBUMIN/CREAT UR: NORMAL MG/G{CREAT}
MICROCYTES BLD QL: ABNORMAL
MONOCYTES # BLD AUTO: 1.04 10*3/MM3 (ref 0.1–0.9)
NEUTROPHILS # BLD AUTO: 6.01 10*3/MM3 (ref 1.7–7)
NEUTROPHILS NFR BLD MANUAL: 52 % (ref 42.7–76)
PLAT MORPH BLD: NORMAL
PLATELET # BLD AUTO: 305 10*3/MM3 (ref 140–450)
PMV BLD AUTO: 10.8 FL (ref 6–12)
POTASSIUM SERPL-SCNC: 4.1 MMOL/L (ref 3.5–5.2)
PROT SERPL-MCNC: 7.5 G/DL (ref 6–8.5)
RBC # BLD AUTO: 6.04 10*6/MM3 (ref 3.77–5.28)
SODIUM SERPL-SCNC: 137 MMOL/L (ref 136–145)
TRIGL SERPL-MCNC: 86 MG/DL (ref 0–150)
VIT B12 BLD-MCNC: 1148 PG/ML (ref 211–946)
VLDLC SERPL-MCNC: 17 MG/DL (ref 5–40)
WBC # BLD AUTO: 11.56 10*3/MM3 (ref 3.4–10.8)
WBC MORPH BLD: NORMAL

## 2021-02-10 PROCEDURE — 82043 UR ALBUMIN QUANTITATIVE: CPT

## 2021-02-10 PROCEDURE — 82306 VITAMIN D 25 HYDROXY: CPT

## 2021-02-10 PROCEDURE — 80061 LIPID PANEL: CPT

## 2021-02-10 PROCEDURE — 83036 HEMOGLOBIN GLYCOSYLATED A1C: CPT

## 2021-02-10 PROCEDURE — 82570 ASSAY OF URINE CREATININE: CPT

## 2021-02-10 PROCEDURE — 85025 COMPLETE CBC W/AUTO DIFF WBC: CPT

## 2021-02-10 PROCEDURE — 80053 COMPREHEN METABOLIC PANEL: CPT

## 2021-02-10 PROCEDURE — 82607 VITAMIN B-12: CPT

## 2021-02-12 ENCOUNTER — OFFICE VISIT (OUTPATIENT)
Dept: FAMILY MEDICINE CLINIC | Facility: CLINIC | Age: 46
End: 2021-02-12

## 2021-02-12 VITALS
HEIGHT: 63 IN | BODY MASS INDEX: 51.28 KG/M2 | WEIGHT: 289.4 LBS | TEMPERATURE: 93.9 F | DIASTOLIC BLOOD PRESSURE: 76 MMHG | HEART RATE: 86 BPM | OXYGEN SATURATION: 95 % | SYSTOLIC BLOOD PRESSURE: 108 MMHG

## 2021-02-12 DIAGNOSIS — E66.01 MORBID OBESITY (HCC): ICD-10-CM

## 2021-02-12 DIAGNOSIS — J45.40 MODERATE PERSISTENT ASTHMA, UNSPECIFIED WHETHER COMPLICATED: ICD-10-CM

## 2021-02-12 DIAGNOSIS — I10 ESSENTIAL HYPERTENSION: ICD-10-CM

## 2021-02-12 DIAGNOSIS — E11.65 UNCONTROLLED TYPE 2 DIABETES MELLITUS WITH HYPERGLYCEMIA (HCC): ICD-10-CM

## 2021-02-12 DIAGNOSIS — E55.9 VITAMIN D DEFICIENCY: Primary | ICD-10-CM

## 2021-02-12 DIAGNOSIS — D50.9 IRON DEFICIENCY ANEMIA, UNSPECIFIED IRON DEFICIENCY ANEMIA TYPE: ICD-10-CM

## 2021-02-12 PROCEDURE — 99214 OFFICE O/P EST MOD 30 MIN: CPT | Performed by: FAMILY MEDICINE

## 2021-02-12 RX ORDER — PEN NEEDLE, DIABETIC 31 G X1/4"
NEEDLE, DISPOSABLE MISCELLANEOUS
Qty: 100 EACH | Refills: 2 | Status: SHIPPED | OUTPATIENT
Start: 2021-02-12 | End: 2022-03-18 | Stop reason: SDUPTHER

## 2021-02-12 NOTE — PATIENT INSTRUCTIONS
jose m bring sugar readings next visit before breakfast and 2 hours after meal    On paper    Restart your byudreon    Recheck in 6 weeks

## 2021-06-29 ENCOUNTER — TELEPHONE (OUTPATIENT)
Dept: FAMILY MEDICINE CLINIC | Facility: CLINIC | Age: 46
End: 2021-06-29

## 2021-07-06 RX ORDER — ERTUGLIFLOZIN 15 MG/1
TABLET, FILM COATED ORAL
Qty: 30 TABLET | Refills: 0 | Status: SHIPPED | OUTPATIENT
Start: 2021-07-06 | End: 2021-09-15 | Stop reason: SDUPTHER

## 2021-07-06 RX ORDER — INSULIN GLARGINE 100 [IU]/ML
INJECTION, SOLUTION SUBCUTANEOUS
Qty: 9 PEN | Refills: 0 | Status: SHIPPED | OUTPATIENT
Start: 2021-07-06 | End: 2021-07-09 | Stop reason: CLARIF

## 2021-07-06 RX ORDER — CHOLECALCIFEROL (VITAMIN D3) 1250 MCG
CAPSULE ORAL
Qty: 4 CAPSULE | Refills: 0 | Status: SHIPPED | OUTPATIENT
Start: 2021-07-06 | End: 2021-09-15 | Stop reason: SDUPTHER

## 2021-07-06 RX ORDER — ASPIRIN 81 MG/1
TABLET, CHEWABLE ORAL
Qty: 30 TABLET | Refills: 0 | Status: SHIPPED | OUTPATIENT
Start: 2021-07-06 | End: 2021-09-15 | Stop reason: SDUPTHER

## 2021-07-06 RX ORDER — LORATADINE 10 MG/1
TABLET ORAL
Qty: 30 TABLET | Refills: 0 | Status: SHIPPED | OUTPATIENT
Start: 2021-07-06 | End: 2021-09-15

## 2021-07-06 RX ORDER — LISINOPRIL 20 MG/1
TABLET ORAL
Qty: 30 TABLET | Refills: 0 | Status: SHIPPED | OUTPATIENT
Start: 2021-07-06 | End: 2021-09-15

## 2021-07-06 RX ORDER — OMEPRAZOLE 40 MG/1
CAPSULE, DELAYED RELEASE ORAL
Qty: 30 CAPSULE | Refills: 0 | Status: SHIPPED | OUTPATIENT
Start: 2021-07-06 | End: 2021-09-15

## 2021-07-08 RX ORDER — EXENATIDE 2 MG/.85ML
2 INJECTION, SUSPENSION, EXTENDED RELEASE SUBCUTANEOUS WEEKLY
Qty: 4 PEN | Refills: 3 | Status: SHIPPED | OUTPATIENT
Start: 2021-07-08 | End: 2021-11-16

## 2021-07-08 NOTE — TELEPHONE ENCOUNTER
Pharmacy sent a fax stating only Bydureon BCISE is available not bydureon. Would you like to switch?

## 2021-07-09 RX ORDER — INSULIN GLARGINE 100 [IU]/ML
INJECTION, SOLUTION SUBCUTANEOUS
Qty: 9 ML | Refills: 12 | Status: SHIPPED | OUTPATIENT
Start: 2021-07-09 | End: 2022-06-21 | Stop reason: SDUPTHER

## 2021-07-29 ENCOUNTER — TELEPHONE (OUTPATIENT)
Dept: FAMILY MEDICINE CLINIC | Facility: CLINIC | Age: 46
End: 2021-07-29

## 2021-09-10 NOTE — PROGRESS NOTES
Chief Complaint  Follow-up    Subjective          Lizzy Restrepo presents to [facility] [place]           Pt is 45 yo female with management of morbid obesity, moderate persistent asthma, allergic rhinitis, IDDM type 2,, HLP, HTN,  Vitamin D deficiency,GERD, chronic fatigue, MIGEL, multiple joint pain, sp cholecystectomy sp tonsillectomy, sp umbilical hernia repair, sp , iron deficiency anemia , CKD stage 2, carpal tunnel on right hand/wrist      21 in office visit for recheck on pt's above medical issues. Pt saw GI on 21 and has upcoming appt for endocscopy on 21.  Also saw UC on 3120 for COVID-19 expsosure. Pt had labwork doneo  2/10/21 that showed  Vitamin B12 elevated at 1148. Vitamin D levels normal lipid panel stable CBC showed hemoglobin at 12.6 from 11.9.  CMP shows glucose at 121 with GFR at 96 and elevated liver enzymes. hga1c up at 8.00 from 7.60. she has not been taking  Her bydureon. 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   BP is stable. Continues to take her medications for HLP asthma. Breathing stable. She is due for colonoscopy. Her last colonoscopy was in [place] about 20 years.     9/15/21 in office visit for recheck on pt's above medical issues. Pt continues to take her medications for IDDM type 2, GERD, HTN, HLP, asthma, iron deficiency anemia, allergic rhinitis. She is due for new labwork.she is due for mammogram and colonoscopy screening.   She stopped taking lipitor 20 mg PO qhs due to leg cramping. She has yet to get her COVID-19 vaccination.           Leg Swelling  This is a chronic problem. The current episode started more than 1 month ago. The problem occurs constantly. Pertinent negatives include no abdominal pain, anorexia, arthralgias, change in bowel habit, chest pain, chills, congestion, coughing, diaphoresis, fatigue, fever, headaches, joint swelling, myalgias,  nausea, neck pain, numbness, rash, sore throat, swollen glands, urinary symptoms, vertigo, visual change, vomiting or weakness. Nothing aggravates the symptoms. She has tried nothing for the symptoms. The treatment provided no relief.   Hypertension  This is a chronic problem. The current episode started more than 1 year ago. The problem has been resolved since onset. The problem is controlled. Pertinent negatives include no anxiety, blurred vision, chest pain, headaches, malaise/fatigue, neck pain, orthopnea, palpitations, peripheral edema, PND, shortness of breath or sweats. Risk factors for coronary artery disease include diabetes mellitus, sedentary lifestyle and obesity. Past treatments include ACE inhibitors. Current antihypertension treatment includes ACE inhibitors. The current treatment provides significant improvement. There is no history of angina, kidney disease, CAD/MI, CVA, heart failure, left ventricular hypertrophy, PVD or retinopathy. There is no history of chronic renal disease, coarctation of the aorta, hyperaldosteronism, hypercortisolism, hyperparathyroidism, a hypertension causing med, pheochromocytoma, renovascular disease, sleep apnea or a thyroid problem.   Diabetes   She presents for her follow-up diabetic visit. She has type 2 diabetes mellitus. Her disease course has been controlled . Pertinent negatives for hypoglycemia include no confusion, dizziness, headaches, hunger, mood changes, nervousness/anxiousness, pallor, seizures, sleepiness, speech difficulty, sweats or tremors. Associated symptoms include fatigue. Pertinent negatives for diabetes include no blurred vision, no chest pain, no foot paresthesias, no foot ulcerations, no polydipsia, no polyphagia, no polyuria, no visual change, no weakness and no weight loss. Pertinent negatives for hypoglycemia complications include no blackouts, no hospitalization, no nocturnal hypoglycemia, no required assistance and no required glucagon  "injection. Symptoms are stable. Pertinent negatives for diabetic complications include no autonomic neuropathy, CVA, heart disease, nephropathy, peripheral neuropathy, PVD or retinopathy. Risk factors for coronary artery disease include diabetes mellitus, dyslipidemia and obesity. Current diabetic treatment includes insulin injections. She is following a diabetic diet. When asked about meal planning, she reported none. She has not had a previous visit with a dietitian. She participates in exercise intermittently. She does not see a podiatrist.Eye exam is not current.   Obesity   This is a chronic problem. The current episode started more than 1 year ago. The problem has been unchanged. Associated symptoms include arthralgias, fatigue and nausea. Pertinent negatives include no abdominal pain, anorexia, change in bowel habit, chest pain, chills, congestion, coughing, fever, headaches, joint swelling, myalgias, neck pain, numbness, rash, sore throat, swollen glands, urinary symptoms, vertigo, visual change, vomiting or weakness. Nothing aggravates the symptoms. She has tried nothing for the symptoms. The treatment provided no relief      Objective   Vital Signs:   /82 (BP Location: Right arm, Patient Position: Sitting, Cuff Size: Large Adult)   Pulse 94   Temp 97.5 °F (36.4 °C)   Ht 160 cm (63\")   Wt 132 kg (291 lb 6.4 oz)   LMP 09/15/2021   SpO2 95%   BMI 51.62 kg/m²       Physical Exam  Vitals and nursing note reviewed.   Constitutional:       Appearance: She is well-developed. She is not diaphoretic.   HENT:      Head: Normocephalic and atraumatic.      Right Ear: External ear normal.   Eyes:      Conjunctiva/sclera: Conjunctivae normal.      Pupils: Pupils are equal, round, and reactive to light.   Cardiovascular:      Rate and Rhythm: Normal rate and regular rhythm.      Heart sounds: Normal heart sounds. No murmur heard.     Pulmonary:      Effort: Pulmonary effort is normal. No respiratory distress. "      Breath sounds: Normal breath sounds.   Abdominal:      General: Bowel sounds are normal. There is no distension.      Palpations: Abdomen is soft.      Tenderness: There is no abdominal tenderness.      Comments: Obese abdomen    Musculoskeletal:         General: Swelling and tenderness present. No deformity. Normal range of motion.      Cervical back: Normal range of motion and neck supple.   Skin:     General: Skin is warm.      Coloration: Skin is not pale.      Findings: No erythema or rash.   Neurological:      Mental Status: She is alert and oriented to person, place, and time.      Cranial Nerves: No cranial nerve deficit.   Psychiatric:         Behavior: Behavior normal.        Result Review :                 Assessment and Plan    Diagnoses and all orders for this visit:    1. Iron deficiency (Primary)  -     Iron Profile; Future  -     Ferritin; Future    2. Vitamin D deficiency  -     CBC Auto Differential; Future  -     Comprehensive Metabolic Panel; Future  -     Hemoglobin A1c; Future  -     Lipid Panel; Future  -     TSH; Future  -     T4, Free; Future  -     Vitamin D 25 Hydroxy; Future  -     Vitamin B12; Future  -     Microalbumin / Creatinine Urine Ratio - Urine, Clean Catch; Future    3. Morbid obesity (CMS/HCC)    4. Moderate persistent asthma, unspecified whether complicated  -     CBC Auto Differential; Future  -     Comprehensive Metabolic Panel; Future  -     Hemoglobin A1c; Future  -     Lipid Panel; Future  -     TSH; Future  -     T4, Free; Future  -     Vitamin D 25 Hydroxy; Future  -     Vitamin B12; Future  -     Microalbumin / Creatinine Urine Ratio - Urine, Clean Catch; Future    5. Mixed hyperlipidemia  -     CBC Auto Differential; Future  -     Comprehensive Metabolic Panel; Future  -     Hemoglobin A1c; Future  -     Lipid Panel; Future  -     TSH; Future  -     T4, Free; Future  -     Vitamin D 25 Hydroxy; Future  -     Vitamin B12; Future  -     Microalbumin / Creatinine  Urine Ratio - Urine, Clean Catch; Future    6. Essential hypertension  -     CBC Auto Differential; Future  -     Comprehensive Metabolic Panel; Future  -     Hemoglobin A1c; Future  -     Lipid Panel; Future  -     TSH; Future  -     T4, Free; Future  -     Vitamin D 25 Hydroxy; Future  -     Vitamin B12; Future  -     Microalbumin / Creatinine Urine Ratio - Urine, Clean Catch; Future    7. Controlled type 2 diabetes mellitus with complication, without long-term current use of insulin (CMS/[facility])  -     CBC Auto Differential; Future  -     Comprehensive Metabolic Panel; Future  -     Hemoglobin A1c; Future  -     Lipid Panel; Future  -     TSH; Future  -     T4, Free; Future  -     Vitamin D 25 Hydroxy; Future  -     Vitamin B12; Future  -     Microalbumin / Creatinine Urine Ratio - Urine, Clean Catch; Future    8. Gastroesophageal reflux disease, unspecified whether esophagitis present    9. Screening mammogram, encounter for  -     Mammo Screening Bilateral With CAD; Future    Other orders  -     aspirin 81 MG chewable tablet; Chew 1 tablet Daily.  Dispense: 30 tablet; Refill: 3  -     Cholecalciferol (Vitamin D3) 1.25 MG (67387 UT) capsule; Take 1 capsule by mouth 1 (One) Time Per Week.  Dispense: 4 capsule; Refill: 3  -     Ertugliflozin L-PyroglutamicAc (Steglatro) 15 MG tablet; Take 1 tablet by mouth Every Morning.  Dispense: 30 tablet; Refill: 3  -     ondansetron ODT (Zofran ODT) 8 MG disintegrating tablet; Place 1 tablet on the tongue Every 8 (Eight) Hours As Needed for Nausea or Vomiting.  Dispense: 30 tablet; Refill: 3       -recommend labwork    -recommend diabetic eye exam  -annual physical exam today  -recommend mammogram screening   -recommend COVID-19 vaccination   -recommend pneumonia vaccination   -recommend colonoscopy screening - will hold due to BHDM   -recommend pt get pap smear. She will call for an appt   -nausea/vomiting - on zofran 8 mg ODT every 8 hours PRN  -MIGEL- sleep medicine following.  She will need to make a call for appt for CPAP  - Essential Hypertension - stable on lisinopril 20 mg PO q daily.    - hyperlipidemia-  Recheck lipid panel The current medical regimen is effective;  continue present plan and medications.  - DM type 2-  on Lantus 15 units at bedtime on steglatro 15 mg daily. On bydureon 2 mg sub q weekly. Recheck hga1c  -allergic rhinitis - on claritin 10 mg daily.    -vitamin D deficiency -on Vitamin D3 50,000 units once a week   -morbid obesity - BMI >40 - pt gained lbs since last visit. Continue with ketogenic diet.  Gave diet information to go. Gave bariatric weight loss surgery to go home with.  She has been reluctant for weight loss surgery.  BMI at 51.62    -iron deficiency anemia -continue on iron pill TID. Her last CBC showed stable hemoglobin. Recommend Hematology referral and possible IV iron therapy. Consultation appreciated.    -moderate persistent asthma - on flovent 220 mcg 1 puff BID:. Albuterol PRN  on singulair 10 mg at bedtime   - GERD - on prilosec 40 mg daily.   -advised pt to be safe and call with questions and concerns  -advised to go to ER or call 911 if symptoms worrisome or severe  -advised to followup with Specialist and referrals  -adivsed pt to be safe during COVID-19 pandemic  I spent 30  minutes caring for Lizyz on this date of service. This time includes time spent by me in the following activities: preparing for the visit, reviewing tests, obtaining and/or reviewing a separately obtained history, performing a medically appropriate examination and/or evaluation, counseling and educating the patient/family/caregiver, ordering medications, tests, or procedures, referring and communicating with other health care professionals, documenting information in the medical record, independently interpreting results and communicating that information with the patient/family/caregiver and care coordination.   -recheck in 2  months         This document has been  electronically signed by Solitario Li MD on September 15, 2021 11:59 CDT            Follow Up   Return in about 2 months (around 11/15/2021).  Patient was given instructions and counseling regarding her condition or for health maintenance advice. Please see specific information pulled into the AVS if appropriate.

## 2021-09-15 ENCOUNTER — OFFICE VISIT (OUTPATIENT)
Dept: FAMILY MEDICINE CLINIC | Facility: CLINIC | Age: 46
End: 2021-09-15

## 2021-09-15 VITALS
SYSTOLIC BLOOD PRESSURE: 108 MMHG | DIASTOLIC BLOOD PRESSURE: 82 MMHG | TEMPERATURE: 97.5 F | BODY MASS INDEX: 51.63 KG/M2 | WEIGHT: 291.4 LBS | HEIGHT: 63 IN | OXYGEN SATURATION: 95 % | HEART RATE: 94 BPM

## 2021-09-15 DIAGNOSIS — I10 ESSENTIAL HYPERTENSION: ICD-10-CM

## 2021-09-15 DIAGNOSIS — E11.8 CONTROLLED TYPE 2 DIABETES MELLITUS WITH COMPLICATION, WITHOUT LONG-TERM CURRENT USE OF INSULIN (HCC): ICD-10-CM

## 2021-09-15 DIAGNOSIS — Z12.31 SCREENING MAMMOGRAM, ENCOUNTER FOR: ICD-10-CM

## 2021-09-15 DIAGNOSIS — E78.2 MIXED HYPERLIPIDEMIA: ICD-10-CM

## 2021-09-15 DIAGNOSIS — E55.9 VITAMIN D DEFICIENCY: ICD-10-CM

## 2021-09-15 DIAGNOSIS — E66.01 MORBID OBESITY (HCC): ICD-10-CM

## 2021-09-15 DIAGNOSIS — K21.9 GASTROESOPHAGEAL REFLUX DISEASE, UNSPECIFIED WHETHER ESOPHAGITIS PRESENT: ICD-10-CM

## 2021-09-15 DIAGNOSIS — E61.1 IRON DEFICIENCY: Primary | ICD-10-CM

## 2021-09-15 DIAGNOSIS — J45.40 MODERATE PERSISTENT ASTHMA, UNSPECIFIED WHETHER COMPLICATED: ICD-10-CM

## 2021-09-15 PROCEDURE — 99214 OFFICE O/P EST MOD 30 MIN: CPT | Performed by: FAMILY MEDICINE

## 2021-09-15 RX ORDER — ASPIRIN 81 MG/1
81 TABLET, CHEWABLE ORAL DAILY
Qty: 30 TABLET | Refills: 3 | Status: SHIPPED | OUTPATIENT
Start: 2021-09-15 | End: 2022-03-01

## 2021-09-15 RX ORDER — OMEPRAZOLE 40 MG/1
CAPSULE, DELAYED RELEASE ORAL
Qty: 30 CAPSULE | Refills: 0 | Status: SHIPPED | OUTPATIENT
Start: 2021-09-15 | End: 2021-09-29

## 2021-09-15 RX ORDER — LISINOPRIL 20 MG/1
TABLET ORAL
Qty: 30 TABLET | Refills: 0 | Status: SHIPPED | OUTPATIENT
Start: 2021-09-15 | End: 2021-09-29

## 2021-09-15 RX ORDER — CHOLECALCIFEROL (VITAMIN D3) 1250 MCG
CAPSULE ORAL
Qty: 4 CAPSULE | Refills: 3 | Status: SHIPPED | OUTPATIENT
Start: 2021-09-15 | End: 2022-03-18 | Stop reason: SDUPTHER

## 2021-09-15 RX ORDER — ERTUGLIFLOZIN 15 MG/1
TABLET, FILM COATED ORAL
Qty: 30 TABLET | Refills: 3 | Status: SHIPPED | OUTPATIENT
Start: 2021-09-15 | End: 2022-03-18 | Stop reason: SDUPTHER

## 2021-09-15 RX ORDER — LORATADINE 10 MG/1
TABLET ORAL
Qty: 30 TABLET | Refills: 0 | Status: SHIPPED | OUTPATIENT
Start: 2021-09-15 | End: 2021-09-29

## 2021-09-15 RX ORDER — CHOLECALCIFEROL (VITAMIN D3) 1250 MCG
50000 CAPSULE ORAL WEEKLY
Qty: 4 CAPSULE | Refills: 3 | Status: SHIPPED | OUTPATIENT
Start: 2021-09-15 | End: 2021-09-15

## 2021-09-15 RX ORDER — ONDANSETRON 8 MG/1
8 TABLET, ORALLY DISINTEGRATING ORAL EVERY 8 HOURS PRN
Qty: 30 TABLET | Refills: 3 | Status: SHIPPED | OUTPATIENT
Start: 2021-09-15 | End: 2021-12-13

## 2021-09-15 NOTE — TELEPHONE ENCOUNTER
Rx Refill Note  Requested Prescriptions     Pending Prescriptions Disp Refills   • Cholecalciferol (Vitamin D3) 1.25 MG (22489 UT) capsule [Pharmacy Med Name: VITAMIN D3 (CHOLECAL) 50,000 IU CAP] 4 capsule 3     Sig: TAKE ONE CAPSULE BY MOUTH ONCE WEEKLY   • loratadine (CLARITIN) 10 MG tablet [Pharmacy Med Name: LORATADINE 10 MG TABLET] 30 tablet 0     Sig: TAKE ONE TABLET BY MOUTH DAILY   • lisinopril (PRINIVIL,ZESTRIL) 20 MG tablet [Pharmacy Med Name: LISINOPRIL 20 MG TABLET] 30 tablet 0     Sig: TAKE ONE TABLET BY MOUTH DAILY   • omeprazole (priLOSEC) 40 MG capsule [Pharmacy Med Name: OMEPRAZOLE DR 40 MG CAPSULE] 30 capsule 0     Sig: TAKE ONE CAPSULE BY MOUTH DAILY   • Steglatro 15 MG tablet [Pharmacy Med Name: STEGLATRO 15 MG TABLET] 30 tablet 3     Sig: TAKE ONE TABLET BY MOUTH EVERY MORNING      Last office visit with prescribing clinician: 9/15/2021      Next office visit with prescribing clinician: 11/16/2021   {TIP  Encounters:23}         Grover Peerz Rep  09/15/21, 13:10 CDT

## 2021-09-24 ENCOUNTER — PRIOR AUTHORIZATION (OUTPATIENT)
Dept: FAMILY MEDICINE CLINIC | Facility: CLINIC | Age: 46
End: 2021-09-24

## 2021-09-29 RX ORDER — LISINOPRIL 20 MG/1
TABLET ORAL
Qty: 30 TABLET | Refills: 0 | Status: SHIPPED | OUTPATIENT
Start: 2021-09-29 | End: 2021-11-16

## 2021-09-29 RX ORDER — OMEPRAZOLE 40 MG/1
CAPSULE, DELAYED RELEASE ORAL
Qty: 30 CAPSULE | Refills: 0 | Status: SHIPPED | OUTPATIENT
Start: 2021-09-29 | End: 2021-11-16

## 2021-09-29 RX ORDER — LORATADINE 10 MG/1
TABLET ORAL
Qty: 30 TABLET | Refills: 0 | Status: SHIPPED | OUTPATIENT
Start: 2021-09-29 | End: 2021-11-16

## 2021-09-29 NOTE — TELEPHONE ENCOUNTER
Rx Refill Note  Requested Prescriptions     Pending Prescriptions Disp Refills   • loratadine (CLARITIN) 10 MG tablet [Pharmacy Med Name: LORATADINE 10 MG TABLET] 30 tablet 0     Sig: TAKE ONE TABLET BY MOUTH DAILY   • omeprazole (priLOSEC) 40 MG capsule [Pharmacy Med Name: OMEPRAZOLE DR 40 MG CAPSULE] 30 capsule 0     Sig: TAKE ONE CAPSULE BY MOUTH DAILY   • lisinopril (PRINIVIL,ZESTRIL) 20 MG tablet [Pharmacy Med Name: LISINOPRIL 20 MG TABLET] 30 tablet 0     Sig: TAKE ONE TABLET BY MOUTH DAILY      Last office visit with prescribing clinician: 9/15/2021      Next office visit with prescribing clinician: 11/16/2021            Grover Perez Rep  09/29/21, 15:43 CDT

## 2021-10-11 ENCOUNTER — TELEPHONE (OUTPATIENT)
Dept: FAMILY MEDICINE CLINIC | Facility: CLINIC | Age: 46
End: 2021-10-11

## 2021-10-11 DIAGNOSIS — Z12.31 SCREENING MAMMOGRAM, ENCOUNTER FOR: ICD-10-CM

## 2021-10-11 NOTE — TELEPHONE ENCOUNTER
----- Message from Solitario Li MD sent at 10/11/2021  3:16 PM CDT -----  Please let pt know mammogram screening normal    Repeat in 1 year. Thanks

## 2021-11-08 ENCOUNTER — LAB (OUTPATIENT)
Dept: LAB | Facility: HOSPITAL | Age: 46
End: 2021-11-08

## 2021-11-08 DIAGNOSIS — E11.8 CONTROLLED TYPE 2 DIABETES MELLITUS WITH COMPLICATION, WITHOUT LONG-TERM CURRENT USE OF INSULIN (HCC): ICD-10-CM

## 2021-11-08 DIAGNOSIS — E61.1 IRON DEFICIENCY: ICD-10-CM

## 2021-11-08 DIAGNOSIS — I10 ESSENTIAL HYPERTENSION: ICD-10-CM

## 2021-11-08 DIAGNOSIS — E55.9 VITAMIN D DEFICIENCY: ICD-10-CM

## 2021-11-08 DIAGNOSIS — J45.40 MODERATE PERSISTENT ASTHMA, UNSPECIFIED WHETHER COMPLICATED: ICD-10-CM

## 2021-11-08 DIAGNOSIS — E78.2 MIXED HYPERLIPIDEMIA: ICD-10-CM

## 2021-11-08 LAB
25(OH)D3 SERPL-MCNC: 60.8 NG/ML (ref 30–100)
ALBUMIN SERPL-MCNC: 4.2 G/DL (ref 3.5–5.2)
ALBUMIN UR-MCNC: <1.2 MG/DL
ALBUMIN/GLOB SERPL: 1.3 G/DL
ALP SERPL-CCNC: 65 U/L (ref 39–117)
ALT SERPL W P-5'-P-CCNC: 29 U/L (ref 1–33)
ANION GAP SERPL CALCULATED.3IONS-SCNC: 6.4 MMOL/L (ref 5–15)
ANISOCYTOSIS BLD QL: ABNORMAL
AST SERPL-CCNC: 22 U/L (ref 1–32)
BILIRUB SERPL-MCNC: 0.2 MG/DL (ref 0–1.2)
BUN SERPL-MCNC: 12 MG/DL (ref 6–20)
BUN/CREAT SERPL: 16.9 (ref 7–25)
CALCIUM SPEC-SCNC: 9.6 MG/DL (ref 8.6–10.5)
CHLORIDE SERPL-SCNC: 102 MMOL/L (ref 98–107)
CHOLEST SERPL-MCNC: 140 MG/DL (ref 0–200)
CO2 SERPL-SCNC: 32.6 MMOL/L (ref 22–29)
CREAT SERPL-MCNC: 0.71 MG/DL (ref 0.57–1)
CREAT UR-MCNC: 103.1 MG/DL
DEPRECATED RDW RBC AUTO: 40.9 FL (ref 37–54)
EOSINOPHIL # BLD MANUAL: 0.31 10*3/MM3 (ref 0–0.4)
EOSINOPHIL NFR BLD MANUAL: 3 % (ref 0.3–6.2)
ERYTHROCYTE [DISTWIDTH] IN BLOOD BY AUTOMATED COUNT: 17.3 % (ref 12.3–15.4)
FERRITIN SERPL-MCNC: 221 NG/ML (ref 13–150)
GFR SERPL CREATININE-BSD FRML MDRD: 107 ML/MIN/1.73
GLOBULIN UR ELPH-MCNC: 3.2 GM/DL
GLUCOSE SERPL-MCNC: 74 MG/DL (ref 65–99)
HBA1C MFR BLD: 8.23 % (ref 4.8–5.6)
HCT VFR BLD AUTO: 44.5 % (ref 34–46.6)
HDLC SERPL-MCNC: 51 MG/DL (ref 40–60)
HGB BLD-MCNC: 12.2 G/DL (ref 12–15.9)
IRON 24H UR-MRATE: 45 MCG/DL (ref 37–145)
IRON SATN MFR SERPL: 15 % (ref 20–50)
LDLC SERPL CALC-MCNC: 76 MG/DL (ref 0–100)
LDLC/HDLC SERPL: 1.49 {RATIO}
LYMPHOCYTES # BLD MANUAL: 3.46 10*3/MM3 (ref 0.7–3.1)
LYMPHOCYTES NFR BLD MANUAL: 33.3 % (ref 19.6–45.3)
LYMPHOCYTES NFR BLD MANUAL: 8.1 % (ref 5–12)
MCH RBC QN AUTO: 19.6 PG (ref 26.6–33)
MCHC RBC AUTO-ENTMCNC: 27.4 G/DL (ref 31.5–35.7)
MCV RBC AUTO: 71.5 FL (ref 79–97)
MICROALBUMIN/CREAT UR: NORMAL MG/G{CREAT}
MICROCYTES BLD QL: ABNORMAL
MONOCYTES # BLD AUTO: 0.84 10*3/MM3 (ref 0.1–0.9)
NEUTROPHILS # BLD AUTO: 5.78 10*3/MM3 (ref 1.7–7)
NEUTROPHILS NFR BLD MANUAL: 55.6 % (ref 42.7–76)
PLAT MORPH BLD: NORMAL
PLATELET # BLD AUTO: 282 10*3/MM3 (ref 140–450)
PMV BLD AUTO: 10.8 FL (ref 6–12)
POLYCHROMASIA BLD QL SMEAR: ABNORMAL
POTASSIUM SERPL-SCNC: 4.4 MMOL/L (ref 3.5–5.2)
PROT SERPL-MCNC: 7.4 G/DL (ref 6–8.5)
RBC # BLD AUTO: 6.22 10*6/MM3 (ref 3.77–5.28)
SODIUM SERPL-SCNC: 141 MMOL/L (ref 136–145)
T4 FREE SERPL-MCNC: 0.81 NG/DL (ref 0.93–1.7)
TIBC SERPL-MCNC: 301 MCG/DL (ref 298–536)
TRANSFERRIN SERPL-MCNC: 202 MG/DL (ref 200–360)
TRIGL SERPL-MCNC: 65 MG/DL (ref 0–150)
TSH SERPL DL<=0.05 MIU/L-ACNC: 1.75 UIU/ML (ref 0.27–4.2)
VIT B12 BLD-MCNC: 1131 PG/ML (ref 211–946)
VLDLC SERPL-MCNC: 13 MG/DL (ref 5–40)
WBC # BLD AUTO: 10.4 10*3/MM3 (ref 3.4–10.8)
WBC MORPH BLD: NORMAL

## 2021-11-08 PROCEDURE — 83036 HEMOGLOBIN GLYCOSYLATED A1C: CPT

## 2021-11-08 PROCEDURE — 84439 ASSAY OF FREE THYROXINE: CPT

## 2021-11-08 PROCEDURE — 84466 ASSAY OF TRANSFERRIN: CPT

## 2021-11-08 PROCEDURE — 80061 LIPID PANEL: CPT

## 2021-11-08 PROCEDURE — 82607 VITAMIN B-12: CPT

## 2021-11-08 PROCEDURE — 82043 UR ALBUMIN QUANTITATIVE: CPT

## 2021-11-08 PROCEDURE — 82570 ASSAY OF URINE CREATININE: CPT

## 2021-11-08 PROCEDURE — 82728 ASSAY OF FERRITIN: CPT

## 2021-11-08 PROCEDURE — 80050 GENERAL HEALTH PANEL: CPT

## 2021-11-08 PROCEDURE — 83540 ASSAY OF IRON: CPT

## 2021-11-08 PROCEDURE — 85007 BL SMEAR W/DIFF WBC COUNT: CPT

## 2021-11-08 PROCEDURE — 82306 VITAMIN D 25 HYDROXY: CPT

## 2021-11-09 ENCOUNTER — TELEPHONE (OUTPATIENT)
Dept: FAMILY MEDICINE CLINIC | Facility: CLINIC | Age: 46
End: 2021-11-09

## 2021-11-09 RX ORDER — LEVOTHYROXINE SODIUM 0.03 MG/1
25 TABLET ORAL DAILY
Qty: 30 TABLET | Refills: 3 | Status: SHIPPED | OUTPATIENT
Start: 2021-11-09 | End: 2022-03-18 | Stop reason: SDUPTHER

## 2021-11-09 NOTE — TELEPHONE ENCOUNTER
----- Message from Solitario Li MD sent at 11/9/2021  8:31 AM CST -----  Please let pt know that labwork stable     Except on thyroid studies T4 low at recommend pt starting on synthroid 25 mcg PO q daily to help with fatigue     Hga1c up to 8.23 and will need to discuss about possibly switching bydureon to trulicity on next visit    Vitamin B12 high and if on supplement have her take every other day instead of daily    Recheck on next visit thanks

## 2021-11-16 ENCOUNTER — OFFICE VISIT (OUTPATIENT)
Dept: FAMILY MEDICINE CLINIC | Facility: CLINIC | Age: 46
End: 2021-11-16

## 2021-11-16 VITALS
SYSTOLIC BLOOD PRESSURE: 190 MMHG | HEIGHT: 63 IN | DIASTOLIC BLOOD PRESSURE: 81 MMHG | HEART RATE: 92 BPM | BODY MASS INDEX: 51.38 KG/M2 | WEIGHT: 290 LBS

## 2021-11-16 DIAGNOSIS — K21.9 GASTROESOPHAGEAL REFLUX DISEASE, UNSPECIFIED WHETHER ESOPHAGITIS PRESENT: ICD-10-CM

## 2021-11-16 DIAGNOSIS — D50.9 IRON DEFICIENCY ANEMIA, UNSPECIFIED IRON DEFICIENCY ANEMIA TYPE: ICD-10-CM

## 2021-11-16 DIAGNOSIS — E78.2 MIXED HYPERLIPIDEMIA: ICD-10-CM

## 2021-11-16 DIAGNOSIS — R03.0 ELEVATED BLOOD PRESSURE READING: ICD-10-CM

## 2021-11-16 DIAGNOSIS — E55.9 VITAMIN D DEFICIENCY: ICD-10-CM

## 2021-11-16 DIAGNOSIS — E66.01 MORBID OBESITY (HCC): Primary | ICD-10-CM

## 2021-11-16 DIAGNOSIS — I10 ESSENTIAL HYPERTENSION: ICD-10-CM

## 2021-11-16 DIAGNOSIS — E03.9 HYPOTHYROIDISM, UNSPECIFIED TYPE: ICD-10-CM

## 2021-11-16 PROCEDURE — 99443 PR PHYS/QHP TELEPHONE EVALUATION 21-30 MIN: CPT | Performed by: FAMILY MEDICINE

## 2021-11-16 RX ORDER — LORATADINE 10 MG/1
TABLET ORAL
Qty: 30 TABLET | Refills: 3 | Status: SHIPPED | OUTPATIENT
Start: 2021-11-16 | End: 2022-06-21 | Stop reason: SDUPTHER

## 2021-11-16 RX ORDER — LISINOPRIL 20 MG/1
TABLET ORAL
Qty: 30 TABLET | OUTPATIENT
Start: 2021-11-16

## 2021-11-16 RX ORDER — DULAGLUTIDE 0.75 MG/.5ML
0.75 INJECTION, SOLUTION SUBCUTANEOUS WEEKLY
Qty: 4 PEN | Refills: 3 | Status: SHIPPED | OUTPATIENT
Start: 2021-11-16 | End: 2022-03-09 | Stop reason: SDUPTHER

## 2021-11-16 RX ORDER — OMEPRAZOLE 40 MG/1
CAPSULE, DELAYED RELEASE ORAL
Qty: 30 CAPSULE | Refills: 3 | Status: SHIPPED | OUTPATIENT
Start: 2021-11-16 | End: 2022-03-18 | Stop reason: SDUPTHER

## 2021-11-26 NOTE — TELEPHONE ENCOUNTER
----- Message from Solitario Li MD sent at 3/23/2019  1:07 PM CDT -----  Call pt    Her hga1c is worse from last visit from 6.8 to 7.9. May need to adjust diabetic medicaiotn and diet    Her anemia is stable but her WBC continues to be high. May need to get US of uterus unless she had hyterectomy or stool studies. May need to refer to Hematologist    Kidney function, Vitamin D and Vitamin b12 levels normal     Recheck on next visit. Thanks  Unable to reach pt,left message to call me back   Yes

## 2021-12-13 RX ORDER — ONDANSETRON 8 MG/1
TABLET, ORALLY DISINTEGRATING ORAL
Qty: 30 TABLET | Refills: 3 | Status: SHIPPED | OUTPATIENT
Start: 2021-12-13 | End: 2022-08-04 | Stop reason: SDUPTHER

## 2022-01-12 ENCOUNTER — TELEPHONE (OUTPATIENT)
Dept: FAMILY MEDICINE CLINIC | Facility: CLINIC | Age: 47
End: 2022-01-12

## 2022-03-01 RX ORDER — ASPIRIN 81 MG/1
TABLET, CHEWABLE ORAL
Qty: 30 TABLET | Refills: 3 | Status: SHIPPED | OUTPATIENT
Start: 2022-03-01 | End: 2022-08-04 | Stop reason: SDUPTHER

## 2022-03-09 RX ORDER — DULAGLUTIDE 0.75 MG/.5ML
0.75 INJECTION, SOLUTION SUBCUTANEOUS WEEKLY
Qty: 4 PEN | Refills: 0 | Status: SHIPPED | OUTPATIENT
Start: 2022-03-09 | End: 2022-03-18 | Stop reason: SDUPTHER

## 2022-03-10 ENCOUNTER — TELEPHONE (OUTPATIENT)
Dept: FAMILY MEDICINE CLINIC | Facility: CLINIC | Age: 47
End: 2022-03-10

## 2022-03-18 ENCOUNTER — LAB (OUTPATIENT)
Dept: LAB | Facility: HOSPITAL | Age: 47
End: 2022-03-18

## 2022-03-18 ENCOUNTER — OFFICE VISIT (OUTPATIENT)
Dept: FAMILY MEDICINE CLINIC | Facility: CLINIC | Age: 47
End: 2022-03-18

## 2022-03-18 VITALS
TEMPERATURE: 97.3 F | OXYGEN SATURATION: 96 % | SYSTOLIC BLOOD PRESSURE: 120 MMHG | BODY MASS INDEX: 47.79 KG/M2 | DIASTOLIC BLOOD PRESSURE: 86 MMHG | HEART RATE: 96 BPM | HEIGHT: 63 IN | WEIGHT: 269.7 LBS

## 2022-03-18 DIAGNOSIS — J45.40 MODERATE PERSISTENT ASTHMA, UNSPECIFIED WHETHER COMPLICATED: ICD-10-CM

## 2022-03-18 DIAGNOSIS — E55.9 VITAMIN D DEFICIENCY: ICD-10-CM

## 2022-03-18 DIAGNOSIS — D50.9 IRON DEFICIENCY ANEMIA, UNSPECIFIED IRON DEFICIENCY ANEMIA TYPE: ICD-10-CM

## 2022-03-18 DIAGNOSIS — R19.7 DIARRHEA, UNSPECIFIED TYPE: Primary | ICD-10-CM

## 2022-03-18 DIAGNOSIS — E11.69 HYPERLIPIDEMIA ASSOCIATED WITH TYPE 2 DIABETES MELLITUS: ICD-10-CM

## 2022-03-18 DIAGNOSIS — K21.9 GASTROESOPHAGEAL REFLUX DISEASE, UNSPECIFIED WHETHER ESOPHAGITIS PRESENT: ICD-10-CM

## 2022-03-18 DIAGNOSIS — R11.2 NAUSEA AND VOMITING, INTRACTABILITY OF VOMITING NOT SPECIFIED, UNSPECIFIED VOMITING TYPE: ICD-10-CM

## 2022-03-18 DIAGNOSIS — E11.65 UNCONTROLLED TYPE 2 DIABETES MELLITUS WITH HYPERGLYCEMIA: ICD-10-CM

## 2022-03-18 DIAGNOSIS — R03.0 ELEVATED BLOOD PRESSURE READING: ICD-10-CM

## 2022-03-18 DIAGNOSIS — G89.29 CHRONIC BILATERAL LOW BACK PAIN WITH SCIATICA, SCIATICA LATERALITY UNSPECIFIED: ICD-10-CM

## 2022-03-18 DIAGNOSIS — M54.40 CHRONIC BILATERAL LOW BACK PAIN WITH SCIATICA, SCIATICA LATERALITY UNSPECIFIED: ICD-10-CM

## 2022-03-18 DIAGNOSIS — E03.9 ACQUIRED HYPOTHYROIDISM: ICD-10-CM

## 2022-03-18 DIAGNOSIS — I10 ESSENTIAL HYPERTENSION: ICD-10-CM

## 2022-03-18 DIAGNOSIS — E78.2 MIXED HYPERLIPIDEMIA: ICD-10-CM

## 2022-03-18 DIAGNOSIS — E66.01 MORBID OBESITY: ICD-10-CM

## 2022-03-18 DIAGNOSIS — E78.5 HYPERLIPIDEMIA ASSOCIATED WITH TYPE 2 DIABETES MELLITUS: ICD-10-CM

## 2022-03-18 LAB
EXPIRATION DATE: ABNORMAL
HBA1C MFR BLD: 6.6 %
Lab: ABNORMAL

## 2022-03-18 PROCEDURE — 84439 ASSAY OF FREE THYROXINE: CPT

## 2022-03-18 PROCEDURE — 83036 HEMOGLOBIN GLYCOSYLATED A1C: CPT | Performed by: FAMILY MEDICINE

## 2022-03-18 PROCEDURE — 83036 HEMOGLOBIN GLYCOSYLATED A1C: CPT

## 2022-03-18 PROCEDURE — 82306 VITAMIN D 25 HYDROXY: CPT

## 2022-03-18 PROCEDURE — 84481 FREE ASSAY (FT-3): CPT

## 2022-03-18 PROCEDURE — 84466 ASSAY OF TRANSFERRIN: CPT

## 2022-03-18 PROCEDURE — 82607 VITAMIN B-12: CPT

## 2022-03-18 PROCEDURE — 80050 GENERAL HEALTH PANEL: CPT

## 2022-03-18 PROCEDURE — 99214 OFFICE O/P EST MOD 30 MIN: CPT | Performed by: FAMILY MEDICINE

## 2022-03-18 PROCEDURE — 83540 ASSAY OF IRON: CPT

## 2022-03-18 PROCEDURE — 80061 LIPID PANEL: CPT

## 2022-03-18 RX ORDER — PEN NEEDLE, DIABETIC 31 G X1/4"
1 NEEDLE, DISPOSABLE MISCELLANEOUS 3 TIMES DAILY PRN
Qty: 100 EACH | Refills: 2 | Status: SHIPPED | OUTPATIENT
Start: 2022-03-18 | End: 2022-06-21 | Stop reason: SDUPTHER

## 2022-03-18 RX ORDER — CYCLOBENZAPRINE HCL 10 MG
10 TABLET ORAL NIGHTLY PRN
Qty: 30 TABLET | Refills: 3 | Status: SHIPPED | OUTPATIENT
Start: 2022-03-18 | End: 2022-06-21 | Stop reason: SDUPTHER

## 2022-03-18 RX ORDER — LEVOTHYROXINE SODIUM 0.03 MG/1
25 TABLET ORAL DAILY
Qty: 30 TABLET | Refills: 3 | Status: SHIPPED | OUTPATIENT
Start: 2022-03-18 | End: 2022-04-05 | Stop reason: SDUPTHER

## 2022-03-18 RX ORDER — FLUTICASONE PROPIONATE 50 MCG
2 SPRAY, SUSPENSION (ML) NASAL DAILY
Qty: 16 G | Refills: 3 | Status: SHIPPED | OUTPATIENT
Start: 2022-03-18 | End: 2022-06-21 | Stop reason: SDUPTHER

## 2022-03-18 RX ORDER — MONTELUKAST SODIUM 10 MG/1
10 TABLET ORAL NIGHTLY
Qty: 30 TABLET | Refills: 3 | Status: SHIPPED | OUTPATIENT
Start: 2022-03-18 | End: 2022-06-21 | Stop reason: SDUPTHER

## 2022-03-18 RX ORDER — FERROUS SULFATE 325(65) MG
325 TABLET ORAL
Qty: 30 TABLET | Refills: 3 | Status: SHIPPED | OUTPATIENT
Start: 2022-03-18 | End: 2022-04-05 | Stop reason: SDUPTHER

## 2022-03-18 RX ORDER — LANCETS
1 EACH MISCELLANEOUS 3 TIMES DAILY
Qty: 100 EACH | Refills: 3 | Status: SHIPPED | OUTPATIENT
Start: 2022-03-18 | End: 2022-06-21 | Stop reason: ALTCHOICE

## 2022-03-18 RX ORDER — OMEPRAZOLE 40 MG/1
40 CAPSULE, DELAYED RELEASE ORAL DAILY
Qty: 30 CAPSULE | Refills: 3 | Status: SHIPPED | OUTPATIENT
Start: 2022-03-18 | End: 2022-06-21 | Stop reason: SDUPTHER

## 2022-03-18 RX ORDER — FLUTICASONE PROPIONATE 220 UG/1
1 AEROSOL, METERED RESPIRATORY (INHALATION)
Qty: 1 EACH | Refills: 3 | Status: SHIPPED | OUTPATIENT
Start: 2022-03-18 | End: 2022-06-21 | Stop reason: SDUPTHER

## 2022-03-18 RX ORDER — PROMETHAZINE HYDROCHLORIDE 12.5 MG/1
12.5 TABLET ORAL EVERY 6 HOURS PRN
Qty: 12 TABLET | Refills: 1 | Status: SHIPPED | OUTPATIENT
Start: 2022-03-18 | End: 2022-11-23

## 2022-03-18 RX ORDER — ATORVASTATIN CALCIUM 40 MG/1
40 TABLET, FILM COATED ORAL DAILY
Qty: 90 TABLET | Refills: 3 | Status: SHIPPED | OUTPATIENT
Start: 2022-03-18 | End: 2022-06-21 | Stop reason: SDUPTHER

## 2022-03-18 RX ORDER — CHOLECALCIFEROL (VITAMIN D3) 1250 MCG
50000 CAPSULE ORAL WEEKLY
Qty: 12 CAPSULE | Refills: 0 | Status: SHIPPED | OUTPATIENT
Start: 2022-03-18 | End: 2022-08-04 | Stop reason: SDUPTHER

## 2022-03-18 RX ORDER — DULAGLUTIDE 0.75 MG/.5ML
0.75 INJECTION, SOLUTION SUBCUTANEOUS WEEKLY
Qty: 4 PEN | Refills: 2 | Status: SHIPPED | OUTPATIENT
Start: 2022-03-18 | End: 2022-06-21 | Stop reason: SDUPTHER

## 2022-03-18 RX ORDER — LOPERAMIDE HYDROCHLORIDE 2 MG/1
2 TABLET ORAL 4 TIMES DAILY PRN
Qty: 24 TABLET | Refills: 0 | Status: SHIPPED | OUTPATIENT
Start: 2022-03-18 | End: 2022-06-21 | Stop reason: SDUPTHER

## 2022-03-18 NOTE — PROGRESS NOTES
Chief Complaint  Hypertension, Hyperlipidemia, Diabetes, Asthma, Heartburn, Abdominal Pain, nausea and vomiting, Diarrhea, and Dizziness  ' Have Diarrhea, N/V since Wed,  Daughter had similar illness'  Subjective          Review of Systems   Constitutional: Positive for fatigue. Negative for activity change, appetite change, chills, diaphoresis and fever.   HENT: Negative for congestion, postnasal drip, rhinorrhea, sinus pressure, sinus pain, sneezing, sore throat, trouble swallowing and voice change.    Respiratory: Negative for cough, choking, chest tightness, shortness of breath, wheezing and stridor.    Cardiovascular: Negative for chest pain.   Gastrointestinal: Positive for diarrhea, nausea and vomiting.   Musculoskeletal: Positive for arthralgias.   Allergic/Immunologic: Positive for environmental allergies.   Neurological: Positive for weakness and numbness. Negative for headaches.       Lizzy Restrepo presents to [facility] [place]  GI Virus, N/V/D x 3 days    Diabetes  Pertinent negatives for hypoglycemia include no headaches. Associated symptoms include fatigue and weakness. Pertinent negatives for diabetes include no chest pain. Her weight is decreasing steadily. Her overall blood glucose range is 110-130 mg/dl.   Hypertension  This is a chronic problem. The current episode started more than 1 year ago. The problem has been waxing and waning since onset. Pertinent negatives include no chest pain, headaches or shortness of breath. Current antihypertension treatment includes lifestyle changes (Diabetic med). The current treatment provides significant improvement.   Asthma  There is no cough, shortness of breath or wheezing. Pertinent negatives include no appetite change, chest pain, fever, headaches, postnasal drip, rhinorrhea, sneezing, sore throat or trouble swallowing. Her past medical history is significant for asthma.   Hypothyroidism  This is a chronic  "problem. The current episode started more than 1 year ago. Progression since onset: Stable. Associated symptoms include arthralgias, fatigue, nausea, numbness, vomiting and weakness. Pertinent negatives include no chest pain, chills, congestion, coughing, diaphoresis, fever, headaches or sore throat. Treatments tried: Levothyroxine. The treatment provided significant relief.       Objective   Vital Signs:   /86 (BP Location: Left arm, Patient Position: Sitting, Cuff Size: Adult)   Pulse 96   Temp 97.3 °F (36.3 °C) (Temporal)   Ht 160 cm (63\")   Wt 122 kg (269 lb 11.2 oz)   SpO2 96%   BMI 47.78 kg/m²     Physical Exam  Constitutional:       Appearance: Normal appearance. She is well-developed. She is obese.   HENT:      Head: Normocephalic and atraumatic.      Right Ear: Tympanic membrane, ear canal and external ear normal.      Left Ear: Tympanic membrane, ear canal and external ear normal.      Nose: Nose normal.      Mouth/Throat:      Mouth: Mucous membranes are moist.      Pharynx: Oropharynx is clear.   Eyes:      Extraocular Movements: Extraocular movements intact.      Conjunctiva/sclera: Conjunctivae normal.      Pupils: Pupils are equal, round, and reactive to light.   Cardiovascular:      Rate and Rhythm: Normal rate and regular rhythm.      Heart sounds: Normal heart sounds.   Pulmonary:      Effort: Pulmonary effort is normal.      Breath sounds: Normal breath sounds.   Abdominal:      General: Bowel sounds are normal. There is no distension.      Palpations: Abdomen is soft.      Tenderness: There is no abdominal tenderness.   Musculoskeletal:         General: Normal range of motion.      Cervical back: Normal range of motion and neck supple.   Skin:     General: Skin is warm and dry.      Capillary Refill: Capillary refill takes 2 to 3 seconds.      Coloration: Skin is not jaundiced or pale.      Findings: No bruising, erythema, lesion or rash.   Neurological:      General: No focal deficit " present.      Mental Status: She is alert and oriented to person, place, and time.   Psychiatric:         Mood and Affect: Mood normal.         Speech: Speech normal.         Behavior: Behavior normal.         Thought Content: Thought content normal.         Judgment: Judgment normal.        Result Review :     A1C Last 3 Results    HGBA1C Last 3 Results 11/8/21 3/18/22   Hemoglobin A1C 8.23 (A) 6.6   (A) Abnormal value                      Assessment and Plan    Diagnoses and all orders for this visit:    1. Diarrhea, unspecified type (Primary)  -     loperamide (Imodium A-D) 2 MG tablet; Take 1 tablet by mouth 4 (Four) Times a Day As Needed for Diarrhea.  Dispense: 24 tablet; Refill: 0    2. Uncontrolled type 2 diabetes mellitus with hyperglycemia (HCC)  -     POC Glycated Hemoglobin, Total  -     Dulaglutide (Trulicity) 0.75 MG/0.5ML solution pen-injector; Inject 0.75 mg under the skin into the appropriate area as directed 1 (One) Time Per Week.  Dispense: 4 pen; Refill: 2  -     Ertugliflozin L-PyroglutamicAc (Steglatro) 15 MG tablet; Take 1 tablet by mouth Every Morning.  Dispense: 30 tablet; Refill: 3  -     glucose blood test strip; 1 each by Other route 3 (Three) Times a Day.  Dispense: 100 each; Refill: 3  -     ReliOn Ultra Thin Lancets misc; 1 each 3 (Three) Times a Day.  Dispense: 100 each; Refill: 3    3. Essential hypertension    4. Nausea and vomiting, intractability of vomiting not specified, unspecified vomiting type  -     promethazine (PHENERGAN) 12.5 MG tablet; Take 1 tablet by mouth Every 6 (Six) Hours As Needed for Nausea or Vomiting.  Dispense: 12 tablet; Refill: 1  -     omeprazole (priLOSEC) 40 MG capsule; Take 1 capsule by mouth Daily.  Dispense: 30 capsule; Refill: 3    5. Chronic bilateral low back pain with sciatica, sciatica laterality unspecified  -     cyclobenzaprine (FLEXERIL) 10 MG tablet; Take 1 tablet by mouth At Night As Needed for Muscle Spasms.  Dispense: 30 tablet; Refill:  3    6. Hyperlipidemia associated with type 2 diabetes mellitus (HCC)  -     atorvastatin (Lipitor) 40 MG tablet; Take 1 tablet by mouth Daily.  Dispense: 90 tablet; Refill: 3    7. Acquired hypothyroidism  -     levothyroxine (SYNTHROID, LEVOTHROID) 25 MCG tablet; Take 1 tablet by mouth Daily.  Dispense: 30 tablet; Refill: 3    8. Moderate persistent asthma, unspecified whether complicated  -     fluticasone (Flonase) 50 MCG/ACT nasal spray; 2 sprays into the nostril(s) as directed by provider Daily.  Dispense: 16 g; Refill: 3  -     fluticasone (FLOVENT HFA) 220 MCG/ACT inhaler; Inhale 1 puff 2 (Two) Times a Day.  Dispense: 1 each; Refill: 3  -     montelukast (Singulair) 10 MG tablet; Take 1 tablet by mouth Every Night.  Dispense: 30 tablet; Refill: 3    Other orders  -     Cholecalciferol (Vitamin D3) 1.25 MG (29341 UT) capsule; Take 1 capsule by mouth 1 (One) Time Per Week.  Dispense: 12 capsule; Refill: 0  -     ferrous sulfate 325 (65 FE) MG tablet; Take 1 tablet by mouth Daily With Breakfast.  Dispense: 30 tablet; Refill: 3  -     Insulin Pen Needle ([employer] Pen Needles) 31G X 6 MM misc; Inject 1 Device as directed 3 (Three) Times a Day As Needed (Diabetes).  Dispense: 100 each; Refill: 2        Follow Up   Return in about 3 months (around 6/18/2022).  Patient was given instructions and counseling regarding her condition or for health maintenance advice. Please see specific information pulled into the AVS if appropriate.         This document has been electronically signed by Nils Almaguer MD on March 18, 2022

## 2022-03-19 LAB
25(OH)D3 SERPL-MCNC: 72.1 NG/ML (ref 30–100)
ALBUMIN SERPL-MCNC: 4.1 G/DL (ref 3.5–5.2)
ALBUMIN/GLOB SERPL: 1.1 G/DL
ALP SERPL-CCNC: 63 U/L (ref 39–117)
ALT SERPL W P-5'-P-CCNC: 27 U/L (ref 1–33)
ANION GAP SERPL CALCULATED.3IONS-SCNC: 8 MMOL/L (ref 5–15)
AST SERPL-CCNC: 25 U/L (ref 1–32)
BASOPHILS # BLD AUTO: 0.04 10*3/MM3 (ref 0–0.2)
BASOPHILS NFR BLD AUTO: 0.6 % (ref 0–1.5)
BILIRUB SERPL-MCNC: 0.3 MG/DL (ref 0–1.2)
BUN SERPL-MCNC: 11 MG/DL (ref 6–20)
BUN/CREAT SERPL: 12.8 (ref 7–25)
CALCIUM SPEC-SCNC: 9.1 MG/DL (ref 8.6–10.5)
CHLORIDE SERPL-SCNC: 104 MMOL/L (ref 98–107)
CHOLEST SERPL-MCNC: 137 MG/DL (ref 0–200)
CO2 SERPL-SCNC: 26 MMOL/L (ref 22–29)
CREAT SERPL-MCNC: 0.86 MG/DL (ref 0.57–1)
DEPRECATED RDW RBC AUTO: 39.1 FL (ref 37–54)
EGFRCR SERPLBLD CKD-EPI 2021: 84 ML/MIN/1.73
EOSINOPHIL # BLD AUTO: 0.5 10*3/MM3 (ref 0–0.4)
EOSINOPHIL NFR BLD AUTO: 7.1 % (ref 0.3–6.2)
ERYTHROCYTE [DISTWIDTH] IN BLOOD BY AUTOMATED COUNT: 18 % (ref 12.3–15.4)
GLOBULIN UR ELPH-MCNC: 3.6 GM/DL
GLUCOSE SERPL-MCNC: 114 MG/DL (ref 65–99)
HBA1C MFR BLD: 7.1 % (ref 4.8–5.6)
HCT VFR BLD AUTO: 45 % (ref 34–46.6)
HDLC SERPL-MCNC: 38 MG/DL (ref 40–60)
HGB BLD-MCNC: 13.3 G/DL (ref 12–15.9)
IRON 24H UR-MRATE: 30 MCG/DL (ref 37–145)
IRON SATN MFR SERPL: 11 % (ref 20–50)
LDLC SERPL CALC-MCNC: 82 MG/DL (ref 0–100)
LDLC/HDLC SERPL: 2.15 {RATIO}
LYMPHOCYTES # BLD AUTO: 2.81 10*3/MM3 (ref 0.7–3.1)
LYMPHOCYTES NFR BLD AUTO: 40.1 % (ref 19.6–45.3)
MCH RBC QN AUTO: 20 PG (ref 26.6–33)
MCHC RBC AUTO-ENTMCNC: 29.6 G/DL (ref 31.5–35.7)
MCV RBC AUTO: 67.6 FL (ref 79–97)
MONOCYTES # BLD AUTO: 0.67 10*3/MM3 (ref 0.1–0.9)
MONOCYTES NFR BLD AUTO: 9.6 % (ref 5–12)
NEUTROPHILS NFR BLD AUTO: 2.96 10*3/MM3 (ref 1.7–7)
NEUTROPHILS NFR BLD AUTO: 42.3 % (ref 42.7–76)
PLATELET # BLD AUTO: 271 10*3/MM3 (ref 140–450)
POTASSIUM SERPL-SCNC: 4.1 MMOL/L (ref 3.5–5.2)
PROT SERPL-MCNC: 7.7 G/DL (ref 6–8.5)
RBC # BLD AUTO: 6.66 10*6/MM3 (ref 3.77–5.28)
SODIUM SERPL-SCNC: 138 MMOL/L (ref 136–145)
T3FREE SERPL-MCNC: 1.8 PG/ML (ref 2–4.4)
T4 FREE SERPL-MCNC: 0.85 NG/DL (ref 0.93–1.7)
TIBC SERPL-MCNC: 267 MCG/DL (ref 298–536)
TRANSFERRIN SERPL-MCNC: 179 MG/DL (ref 200–360)
TRIGL SERPL-MCNC: 86 MG/DL (ref 0–150)
TSH SERPL DL<=0.05 MIU/L-ACNC: 2.18 UIU/ML (ref 0.27–4.2)
VIT B12 BLD-MCNC: 956 PG/ML (ref 211–946)
VLDLC SERPL-MCNC: 17 MG/DL (ref 5–40)
WBC NRBC COR # BLD: 7 10*3/MM3 (ref 3.4–10.8)

## 2022-04-01 ENCOUNTER — TELEPHONE (OUTPATIENT)
Dept: FAMILY MEDICINE CLINIC | Facility: CLINIC | Age: 47
End: 2022-04-01

## 2022-04-01 NOTE — TELEPHONE ENCOUNTER
----- Message from Solitario Li MD sent at 3/31/2022  4:27 AM CDT -----  Please let pt know that labwork shows on thyroid studies  TSH normal with T4 low. Have pt go up on synthroid from 25 to 50 mcg PO q daily give 30 pills and 3 refills. Make sure pt is taking this separately from her omeprazole     Hga1c at 7.1 and continue good work managing her sugars and diabetes    Iron studies show low saturation and recommend going up on ferrous sulfate 325 mg PO q daily to BID give 60 pills and 3 refills. Recommend taking with vitamin C for better absorption    On lipid panel HDL low and recommend heart healthy diet     Recheck on next visit thanks

## 2022-04-04 ENCOUNTER — TELEPHONE (OUTPATIENT)
Dept: FAMILY MEDICINE CLINIC | Facility: CLINIC | Age: 47
End: 2022-04-04

## 2022-04-04 NOTE — TELEPHONE ENCOUNTER
Patient called for lab results. She said if you call and get her voicemail, it is OK to leave a message.     Thanks

## 2022-04-05 DIAGNOSIS — E03.9 ACQUIRED HYPOTHYROIDISM: ICD-10-CM

## 2022-04-05 NOTE — TELEPHONE ENCOUNTER
Result letter sent through [employer].    Pt needs new RX for increased doses on synthroid and ferrous sulfate.

## 2022-04-07 RX ORDER — LEVOTHYROXINE SODIUM 0.05 MG/1
50 TABLET ORAL DAILY
Qty: 30 TABLET | Refills: 3 | Status: SHIPPED | OUTPATIENT
Start: 2022-04-07 | End: 2022-06-21 | Stop reason: SDUPTHER

## 2022-04-07 RX ORDER — FERROUS SULFATE 325(65) MG
325 TABLET ORAL 2 TIMES DAILY
Qty: 60 TABLET | Refills: 3 | Status: SHIPPED | OUTPATIENT
Start: 2022-04-07 | End: 2022-06-21 | Stop reason: SDUPTHER

## 2022-04-25 RX ORDER — INSULIN GLARGINE 100 [IU]/ML
INJECTION, SOLUTION SUBCUTANEOUS
Qty: 6 ML | OUTPATIENT
Start: 2022-04-25

## 2022-06-14 NOTE — PROGRESS NOTES
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 patient/family/caregiver, ordering medications, tests, or procedures, referring and communicating with other health care professionals, documenting information in the medical record and independently interpreting results and communicating that information with the patient/family/caregiver.   -recheck in 6 weeks         This document has been electronically signed by Solitario Li MD on June 21, 2022 09:30 CDT                    This document has been electronically signed by Solitario Li MD on June 21, 2022 09:30 CDT

## 2022-06-21 ENCOUNTER — OFFICE VISIT (OUTPATIENT)
Dept: FAMILY MEDICINE CLINIC | Facility: CLINIC | Age: 47
End: 2022-06-21

## 2022-06-21 ENCOUNTER — LAB (OUTPATIENT)
Dept: LAB | Facility: HOSPITAL | Age: 47
End: 2022-06-21

## 2022-06-21 VITALS
BODY MASS INDEX: 49.36 KG/M2 | WEIGHT: 278.6 LBS | OXYGEN SATURATION: 95 % | SYSTOLIC BLOOD PRESSURE: 138 MMHG | TEMPERATURE: 97.8 F | HEIGHT: 63 IN | HEART RATE: 86 BPM | DIASTOLIC BLOOD PRESSURE: 80 MMHG

## 2022-06-21 DIAGNOSIS — E11.69 HYPERLIPIDEMIA ASSOCIATED WITH TYPE 2 DIABETES MELLITUS: ICD-10-CM

## 2022-06-21 DIAGNOSIS — M54.40 CHRONIC BILATERAL LOW BACK PAIN WITH SCIATICA, SCIATICA LATERALITY UNSPECIFIED: ICD-10-CM

## 2022-06-21 DIAGNOSIS — R19.7 DIARRHEA, UNSPECIFIED TYPE: ICD-10-CM

## 2022-06-21 DIAGNOSIS — J45.40 MODERATE PERSISTENT ASTHMA, UNSPECIFIED WHETHER COMPLICATED: ICD-10-CM

## 2022-06-21 DIAGNOSIS — E11.649 UNCONTROLLED TYPE 2 DIABETES MELLITUS WITH HYPOGLYCEMIA WITHOUT COMA: ICD-10-CM

## 2022-06-21 DIAGNOSIS — G89.29 CHRONIC BILATERAL LOW BACK PAIN WITH SCIATICA, SCIATICA LATERALITY UNSPECIFIED: ICD-10-CM

## 2022-06-21 DIAGNOSIS — E66.01 MORBID OBESITY: Primary | ICD-10-CM

## 2022-06-21 DIAGNOSIS — Z12.11 ENCOUNTER FOR SCREENING COLONOSCOPY: ICD-10-CM

## 2022-06-21 DIAGNOSIS — M79.89 BILATERAL SWELLING OF FEET: ICD-10-CM

## 2022-06-21 DIAGNOSIS — E11.65 UNCONTROLLED TYPE 2 DIABETES MELLITUS WITH HYPERGLYCEMIA: ICD-10-CM

## 2022-06-21 DIAGNOSIS — E55.9 VITAMIN D DEFICIENCY: ICD-10-CM

## 2022-06-21 DIAGNOSIS — E03.9 ACQUIRED HYPOTHYROIDISM: ICD-10-CM

## 2022-06-21 DIAGNOSIS — R11.2 NAUSEA AND VOMITING: ICD-10-CM

## 2022-06-21 DIAGNOSIS — D50.9 IRON DEFICIENCY ANEMIA, UNSPECIFIED IRON DEFICIENCY ANEMIA TYPE: ICD-10-CM

## 2022-06-21 DIAGNOSIS — E78.2 MIXED HYPERLIPIDEMIA: ICD-10-CM

## 2022-06-21 DIAGNOSIS — E66.01 MORBID OBESITY: ICD-10-CM

## 2022-06-21 DIAGNOSIS — E78.5 HYPERLIPIDEMIA ASSOCIATED WITH TYPE 2 DIABETES MELLITUS: ICD-10-CM

## 2022-06-21 DIAGNOSIS — I10 ESSENTIAL HYPERTENSION: ICD-10-CM

## 2022-06-21 LAB
25(OH)D3 SERPL-MCNC: 57.1 NG/ML (ref 30–100)
ALBUMIN SERPL-MCNC: 3.9 G/DL (ref 3.5–5.2)
ALBUMIN/GLOB SERPL: 1.2 G/DL
ALP SERPL-CCNC: 68 U/L (ref 39–117)
ALT SERPL W P-5'-P-CCNC: 19 U/L (ref 1–33)
ANION GAP SERPL CALCULATED.3IONS-SCNC: 8.6 MMOL/L (ref 5–15)
AST SERPL-CCNC: 17 U/L (ref 1–32)
BASOPHILS # BLD AUTO: 0.08 10*3/MM3 (ref 0–0.2)
BASOPHILS NFR BLD AUTO: 0.9 % (ref 0–1.5)
BILIRUB SERPL-MCNC: <0.2 MG/DL (ref 0–1.2)
BUN SERPL-MCNC: 11 MG/DL (ref 6–20)
BUN/CREAT SERPL: 12.6 (ref 7–25)
CALCIUM SPEC-SCNC: 9.6 MG/DL (ref 8.6–10.5)
CHLORIDE SERPL-SCNC: 99 MMOL/L (ref 98–107)
CHOLEST SERPL-MCNC: 144 MG/DL (ref 0–200)
CO2 SERPL-SCNC: 28.4 MMOL/L (ref 22–29)
CREAT SERPL-MCNC: 0.87 MG/DL (ref 0.57–1)
DEPRECATED RDW RBC AUTO: 35.3 FL (ref 37–54)
EGFRCR SERPLBLD CKD-EPI 2021: 82.8 ML/MIN/1.73
EOSINOPHIL # BLD AUTO: 0.69 10*3/MM3 (ref 0–0.4)
EOSINOPHIL NFR BLD AUTO: 7.5 % (ref 0.3–6.2)
ERYTHROCYTE [DISTWIDTH] IN BLOOD BY AUTOMATED COUNT: 16.6 % (ref 12.3–15.4)
FERRITIN SERPL-MCNC: 171 NG/ML (ref 13–150)
GLOBULIN UR ELPH-MCNC: 3.3 GM/DL
GLUCOSE SERPL-MCNC: 111 MG/DL (ref 65–99)
HBA1C MFR BLD: 8.1 % (ref 4.8–5.6)
HCT VFR BLD AUTO: 39.7 % (ref 34–46.6)
HDLC SERPL-MCNC: 44 MG/DL (ref 40–60)
HGB BLD-MCNC: 12.5 G/DL (ref 12–15.9)
IRON 24H UR-MRATE: 38 MCG/DL (ref 37–145)
IRON SATN MFR SERPL: 13 % (ref 20–50)
LDLC SERPL CALC-MCNC: 79 MG/DL (ref 0–100)
LDLC/HDLC SERPL: 1.75 {RATIO}
LYMPHOCYTES # BLD AUTO: 3.3 10*3/MM3 (ref 0.7–3.1)
LYMPHOCYTES NFR BLD AUTO: 36 % (ref 19.6–45.3)
MCH RBC QN AUTO: 20.8 PG (ref 26.6–33)
MCHC RBC AUTO-ENTMCNC: 31.5 G/DL (ref 31.5–35.7)
MCV RBC AUTO: 66.1 FL (ref 79–97)
MONOCYTES # BLD AUTO: 0.61 10*3/MM3 (ref 0.1–0.9)
MONOCYTES NFR BLD AUTO: 6.7 % (ref 5–12)
NEUTROPHILS NFR BLD AUTO: 4.45 10*3/MM3 (ref 1.7–7)
NEUTROPHILS NFR BLD AUTO: 48.5 % (ref 42.7–76)
PLATELET # BLD AUTO: 276 10*3/MM3 (ref 140–450)
PMV BLD AUTO: 10.7 FL (ref 6–12)
POTASSIUM SERPL-SCNC: 4 MMOL/L (ref 3.5–5.2)
PROT SERPL-MCNC: 7.2 G/DL (ref 6–8.5)
RBC # BLD AUTO: 6.01 10*6/MM3 (ref 3.77–5.28)
SODIUM SERPL-SCNC: 136 MMOL/L (ref 136–145)
T3FREE SERPL-MCNC: 2.3 PG/ML (ref 2–4.4)
T4 FREE SERPL-MCNC: 0.86 NG/DL (ref 0.93–1.7)
TIBC SERPL-MCNC: 304 MCG/DL (ref 298–536)
TRANSFERRIN SERPL-MCNC: 204 MG/DL (ref 200–360)
TRIGL SERPL-MCNC: 114 MG/DL (ref 0–150)
TSH SERPL DL<=0.05 MIU/L-ACNC: 2.47 UIU/ML (ref 0.27–4.2)
VIT B12 BLD-MCNC: 1201 PG/ML (ref 211–946)
VLDLC SERPL-MCNC: 21 MG/DL (ref 5–40)
WBC NRBC COR # BLD: 9.17 10*3/MM3 (ref 3.4–10.8)

## 2022-06-21 PROCEDURE — 82728 ASSAY OF FERRITIN: CPT

## 2022-06-21 PROCEDURE — 83036 HEMOGLOBIN GLYCOSYLATED A1C: CPT

## 2022-06-21 PROCEDURE — 84439 ASSAY OF FREE THYROXINE: CPT

## 2022-06-21 PROCEDURE — 84466 ASSAY OF TRANSFERRIN: CPT

## 2022-06-21 PROCEDURE — 83540 ASSAY OF IRON: CPT

## 2022-06-21 PROCEDURE — 80061 LIPID PANEL: CPT

## 2022-06-21 PROCEDURE — 99214 OFFICE O/P EST MOD 30 MIN: CPT | Performed by: FAMILY MEDICINE

## 2022-06-21 PROCEDURE — 82607 VITAMIN B-12: CPT

## 2022-06-21 PROCEDURE — 84481 FREE ASSAY (FT-3): CPT

## 2022-06-21 PROCEDURE — 80050 GENERAL HEALTH PANEL: CPT

## 2022-06-21 PROCEDURE — 82306 VITAMIN D 25 HYDROXY: CPT

## 2022-06-21 RX ORDER — ATORVASTATIN CALCIUM 40 MG/1
40 TABLET, FILM COATED ORAL DAILY
Qty: 90 TABLET | Refills: 3 | Status: SHIPPED | OUTPATIENT
Start: 2022-06-21 | End: 2022-10-12 | Stop reason: SDUPTHER

## 2022-06-21 RX ORDER — LOPERAMIDE HYDROCHLORIDE 2 MG/1
2 TABLET ORAL 4 TIMES DAILY PRN
Qty: 24 TABLET | Refills: 0 | Status: SHIPPED | OUTPATIENT
Start: 2022-06-21 | End: 2022-11-23

## 2022-06-21 RX ORDER — LORATADINE 10 MG/1
10 TABLET ORAL DAILY
Qty: 30 TABLET | Refills: 3 | Status: SHIPPED | OUTPATIENT
Start: 2022-06-21 | End: 2022-08-04 | Stop reason: SDUPTHER

## 2022-06-21 RX ORDER — CYCLOBENZAPRINE HCL 10 MG
10 TABLET ORAL NIGHTLY PRN
Qty: 30 TABLET | Refills: 3 | Status: SHIPPED | OUTPATIENT
Start: 2022-06-21 | End: 2022-08-04 | Stop reason: SDUPTHER

## 2022-06-21 RX ORDER — PEN NEEDLE, DIABETIC 31 G X1/4"
1 NEEDLE, DISPOSABLE MISCELLANEOUS 3 TIMES DAILY PRN
Qty: 100 EACH | Refills: 2 | Status: SHIPPED | OUTPATIENT
Start: 2022-06-21

## 2022-06-21 RX ORDER — LANCETS 30 GAUGE
EACH MISCELLANEOUS
Qty: 100 EACH | Refills: 3 | Status: SHIPPED | OUTPATIENT
Start: 2022-06-21

## 2022-06-21 RX ORDER — FLUTICASONE PROPIONATE 220 UG/1
1 AEROSOL, METERED RESPIRATORY (INHALATION)
Qty: 1 EACH | Refills: 3 | Status: SHIPPED | OUTPATIENT
Start: 2022-06-21

## 2022-06-21 RX ORDER — FUROSEMIDE 20 MG/1
20 TABLET ORAL 2 TIMES DAILY
Qty: 60 TABLET | Refills: 3 | Status: SHIPPED | OUTPATIENT
Start: 2022-06-21

## 2022-06-21 RX ORDER — MONTELUKAST SODIUM 10 MG/1
10 TABLET ORAL NIGHTLY
Qty: 30 TABLET | Refills: 3 | Status: SHIPPED | OUTPATIENT
Start: 2022-06-21 | End: 2022-10-12 | Stop reason: SDUPTHER

## 2022-06-21 RX ORDER — FLUTICASONE PROPIONATE 50 MCG
2 SPRAY, SUSPENSION (ML) NASAL DAILY
Qty: 16 G | Refills: 3 | Status: SHIPPED | OUTPATIENT
Start: 2022-06-21

## 2022-06-21 RX ORDER — INSULIN GLARGINE 100 [IU]/ML
INJECTION, SOLUTION SUBCUTANEOUS
Qty: 9 ML | Refills: 12 | Status: SHIPPED | OUTPATIENT
Start: 2022-06-21 | End: 2022-08-04 | Stop reason: SDUPTHER

## 2022-06-21 RX ORDER — DULAGLUTIDE 0.75 MG/.5ML
0.75 INJECTION, SOLUTION SUBCUTANEOUS WEEKLY
Qty: 4 PEN | Refills: 2 | Status: SHIPPED | OUTPATIENT
Start: 2022-06-21 | End: 2022-06-22 | Stop reason: DRUGHIGH

## 2022-06-21 RX ORDER — OMEPRAZOLE 40 MG/1
40 CAPSULE, DELAYED RELEASE ORAL DAILY
Qty: 30 CAPSULE | Refills: 3 | Status: SHIPPED | OUTPATIENT
Start: 2022-06-21 | End: 2022-08-04 | Stop reason: SDUPTHER

## 2022-06-21 RX ORDER — LEVOTHYROXINE SODIUM 0.05 MG/1
50 TABLET ORAL DAILY
Qty: 30 TABLET | Refills: 3 | Status: SHIPPED | OUTPATIENT
Start: 2022-06-21 | End: 2022-06-22 | Stop reason: DRUGHIGH

## 2022-06-21 RX ORDER — FERROUS SULFATE 325(65) MG
325 TABLET ORAL 2 TIMES DAILY
Qty: 60 TABLET | Refills: 3 | Status: SHIPPED | OUTPATIENT
Start: 2022-06-21

## 2022-06-22 ENCOUNTER — TELEPHONE (OUTPATIENT)
Dept: FAMILY MEDICINE CLINIC | Facility: CLINIC | Age: 47
End: 2022-06-22

## 2022-06-22 DIAGNOSIS — E03.9 HYPOTHYROIDISM, UNSPECIFIED TYPE: Primary | ICD-10-CM

## 2022-06-22 RX ORDER — LEVOTHYROXINE SODIUM 0.07 MG/1
75 TABLET ORAL DAILY
Qty: 30 TABLET | Refills: 3 | Status: SHIPPED | OUTPATIENT
Start: 2022-06-22 | End: 2022-08-04 | Stop reason: SDUPTHER

## 2022-06-22 NOTE — TELEPHONE ENCOUNTER
----- Message from Solitario Li MD sent at 6/22/2022  7:30 AM CDT -----  Please call pt     B12 levels high likely through diet     On CMP kidney and liver function stable     Lipid panel stable     Iron levels show low saturation . Recommend iron rich diet    Hga1c at 8.10 from 7.10 recommend pt go up on trulicity from 0.75 to 1.5 mg sub q weekly give 4 pens and 3 refills.      On thyroid studies. TSH normal but T4 low. Recommend pt go up on synthroid from 50 to 75 mcg PO q daily. Give 30 pills and 3 refills. Will need to recheck in about 6 weeks I will order labwork. Also please advise pt to take this separately from prilosec about 1 hours apart     CBC shows stable hemoglobin .     Recheck on next visit thanks

## 2022-08-04 ENCOUNTER — OFFICE VISIT (OUTPATIENT)
Dept: FAMILY MEDICINE CLINIC | Facility: CLINIC | Age: 47
End: 2022-08-04

## 2022-08-04 VITALS
OXYGEN SATURATION: 97 % | HEIGHT: 63 IN | BODY MASS INDEX: 49.4 KG/M2 | HEART RATE: 88 BPM | DIASTOLIC BLOOD PRESSURE: 82 MMHG | TEMPERATURE: 97.8 F | SYSTOLIC BLOOD PRESSURE: 140 MMHG | WEIGHT: 278.8 LBS

## 2022-08-04 DIAGNOSIS — D50.9 IRON DEFICIENCY ANEMIA, UNSPECIFIED IRON DEFICIENCY ANEMIA TYPE: ICD-10-CM

## 2022-08-04 DIAGNOSIS — J45.40 MODERATE PERSISTENT ASTHMA, UNSPECIFIED WHETHER COMPLICATED: ICD-10-CM

## 2022-08-04 DIAGNOSIS — R53.83 OTHER FATIGUE: ICD-10-CM

## 2022-08-04 DIAGNOSIS — E03.9 HYPOTHYROIDISM, UNSPECIFIED TYPE: ICD-10-CM

## 2022-08-04 DIAGNOSIS — E78.2 MIXED HYPERLIPIDEMIA: ICD-10-CM

## 2022-08-04 DIAGNOSIS — E55.9 VITAMIN D DEFICIENCY: ICD-10-CM

## 2022-08-04 DIAGNOSIS — K21.9 GASTROESOPHAGEAL REFLUX DISEASE, UNSPECIFIED WHETHER ESOPHAGITIS PRESENT: ICD-10-CM

## 2022-08-04 DIAGNOSIS — M25.561 RIGHT KNEE PAIN, UNSPECIFIED CHRONICITY: Primary | ICD-10-CM

## 2022-08-04 DIAGNOSIS — I10 ESSENTIAL HYPERTENSION: ICD-10-CM

## 2022-08-04 DIAGNOSIS — E11.649 UNCONTROLLED TYPE 2 DIABETES MELLITUS WITH HYPOGLYCEMIA WITHOUT COMA: ICD-10-CM

## 2022-08-04 DIAGNOSIS — M54.40 CHRONIC BILATERAL LOW BACK PAIN WITH SCIATICA, SCIATICA LATERALITY UNSPECIFIED: ICD-10-CM

## 2022-08-04 DIAGNOSIS — E11.65 UNCONTROLLED TYPE 2 DIABETES MELLITUS WITH HYPERGLYCEMIA: ICD-10-CM

## 2022-08-04 DIAGNOSIS — E66.01 MORBID OBESITY: ICD-10-CM

## 2022-08-04 DIAGNOSIS — G47.33 OSA (OBSTRUCTIVE SLEEP APNEA): ICD-10-CM

## 2022-08-04 DIAGNOSIS — R11.2 NAUSEA AND VOMITING: ICD-10-CM

## 2022-08-04 DIAGNOSIS — G89.29 CHRONIC BILATERAL LOW BACK PAIN WITH SCIATICA, SCIATICA LATERALITY UNSPECIFIED: ICD-10-CM

## 2022-08-04 PROCEDURE — 99214 OFFICE O/P EST MOD 30 MIN: CPT | Performed by: FAMILY MEDICINE

## 2022-08-04 RX ORDER — CYCLOBENZAPRINE HCL 10 MG
10 TABLET ORAL NIGHTLY PRN
Qty: 30 TABLET | Refills: 3 | Status: SHIPPED | OUTPATIENT
Start: 2022-08-04

## 2022-08-04 RX ORDER — ONDANSETRON 8 MG/1
8 TABLET, ORALLY DISINTEGRATING ORAL EVERY 8 HOURS PRN
Qty: 30 TABLET | Refills: 3 | Status: SHIPPED | OUTPATIENT
Start: 2022-08-04

## 2022-08-04 RX ORDER — ERGOCALCIFEROL 1.25 MG/1
50000 CAPSULE ORAL
Qty: 5 CAPSULE | Refills: 3 | Status: SHIPPED | OUTPATIENT
Start: 2022-08-04

## 2022-08-04 RX ORDER — LOSARTAN POTASSIUM 25 MG/1
25 TABLET ORAL DAILY
Qty: 30 TABLET | Refills: 3 | Status: SHIPPED | OUTPATIENT
Start: 2022-08-04 | End: 2022-11-23

## 2022-08-04 RX ORDER — LEVOTHYROXINE SODIUM 0.07 MG/1
75 TABLET ORAL DAILY
Qty: 30 TABLET | Refills: 3 | Status: SHIPPED | OUTPATIENT
Start: 2022-08-04 | End: 2022-11-23

## 2022-08-04 RX ORDER — LORATADINE 10 MG/1
10 TABLET ORAL DAILY
Qty: 30 TABLET | Refills: 3 | Status: SHIPPED | OUTPATIENT
Start: 2022-08-04 | End: 2022-10-12 | Stop reason: SDUPTHER

## 2022-08-04 RX ORDER — OMEPRAZOLE 40 MG/1
40 CAPSULE, DELAYED RELEASE ORAL DAILY
Qty: 30 CAPSULE | Refills: 3 | Status: SHIPPED | OUTPATIENT
Start: 2022-08-04 | End: 2022-10-12 | Stop reason: SDUPTHER

## 2022-08-04 RX ORDER — ASPIRIN 81 MG/1
81 TABLET, CHEWABLE ORAL DAILY
Qty: 30 TABLET | Refills: 3 | Status: SHIPPED | OUTPATIENT
Start: 2022-08-04 | End: 2022-10-12 | Stop reason: SDUPTHER

## 2022-08-04 RX ORDER — INSULIN GLARGINE 100 [IU]/ML
INJECTION, SOLUTION SUBCUTANEOUS
Qty: 9 ML | Refills: 12 | Status: SHIPPED | OUTPATIENT
Start: 2022-08-04

## 2022-08-04 RX ORDER — CHOLECALCIFEROL (VITAMIN D3) 1250 MCG
50000 CAPSULE ORAL WEEKLY
Qty: 12 CAPSULE | Refills: 0 | Status: SHIPPED | OUTPATIENT
Start: 2022-08-04 | End: 2022-11-23

## 2022-08-04 RX ORDER — ALBUTEROL SULFATE 90 UG/1
2 AEROSOL, METERED RESPIRATORY (INHALATION) EVERY 4 HOURS PRN
Qty: 18 G | Refills: 3 | Status: SHIPPED | OUTPATIENT
Start: 2022-08-04 | End: 2022-10-12 | Stop reason: SDUPTHER

## 2022-08-04 NOTE — PATIENT INSTRUCTIONS
Get x-ray today    Start  on losartan 25 mg daily. Continue to monitor blood pressure at home.    Go up on trulicity from 0.75 to 1.5 mg subq weekly.     Recheck in 2 months

## 2022-09-08 ENCOUNTER — OFFICE VISIT (OUTPATIENT)
Dept: GASTROENTEROLOGY | Facility: CLINIC | Age: 47
End: 2022-09-08

## 2022-09-08 VITALS
WEIGHT: 274 LBS | SYSTOLIC BLOOD PRESSURE: 128 MMHG | HEART RATE: 70 BPM | DIASTOLIC BLOOD PRESSURE: 78 MMHG | BODY MASS INDEX: 48.55 KG/M2 | HEIGHT: 63 IN

## 2022-09-08 DIAGNOSIS — Z12.11 ENCOUNTER FOR SCREENING FOR MALIGNANT NEOPLASM OF COLON: Primary | ICD-10-CM

## 2022-09-08 PROCEDURE — S0260 H&P FOR SURGERY: HCPCS | Performed by: NURSE PRACTITIONER

## 2022-09-08 RX ORDER — DEXTROSE AND SODIUM CHLORIDE 5; .45 G/100ML; G/100ML
30 INJECTION, SOLUTION INTRAVENOUS CONTINUOUS PRN
Status: CANCELLED | OUTPATIENT
Start: 2022-09-22

## 2022-09-08 NOTE — H&P (VIEW-ONLY)
Chief Complaint   Patient presents with   • Colon Cancer Screening       Subjective    Lizzy Restrepo is a 47 y.o. female. she is here today for follow-up.    History of Present Illness  47-year-old female presents to discuss screening colonoscopy.  Concurrent medical history of hypertension, carpal tunnel syndrome, obesity, diabetes mellitus, anemia.  Previously seen by Dr. Hermosillo 2021 declined colonoscopy but reports due to work schedule she was not able to complete procedure.  States she has intermittent issues with constipation denies any current abdominal pain.  Denies any blood in stool.  She denies any known family history of colorectal cancer or polyps.  reports GERD is well controlled with PPI daily       The following portions of the patient's history were reviewed and updated as appropriate:   Past Medical History:   Diagnosis Date   • Abdominal pain     unspecified   • Benign hypertension    • Carpal tunnel syndrome    • Essential hypertension    • Gastro-esophageal reflux disease with esophagitis    • GERD (gastroesophageal reflux disease)    • Headache     improving   • Iron deficiency anemia     unspecified   • Morbid obesity due to excess calories (HCC)     severe   • Type 2 diabetes mellitus (HCC)    • Urinary tract infectious disease    • Vitamin D deficiency    • Weight loss     advised     Past Surgical History:   Procedure Laterality Date   •  SECTION  10/30/2000   • CHOLECYSTECTOMY  1995   • COLONOSCOPY      normal per pt   • CYSTOSCOPY  2016    And bilateral retrograde pyelograms. Performed at [facility].   • TONSILLECTOMY  1985   • UMBILICAL HERNIA REPAIR  2015   • UMBILICAL HERNIA REPAIR  2015     Family History   Problem Relation Age of Onset   • Diabetes Mother    • Heart disease Mother    • Heart failure Mother         congestive   • Hypertension Mother    • Asthma Father    • Osteoarthritis Father      OB History    No  "obstetric history on file.       Prior to Admission medications    Medication Sig Start Date End Date Taking? Authorizing Provider   albuterol sulfate HFA (Ventolin HFA) 108 (90 Base) MCG/ACT inhaler Inhale 2 puffs Every 4 (Four) Hours As Needed for Shortness of Air. 8/4/22  Yes Solitario Li MD   aspirin 81 MG chewable tablet Chew 1 tablet Daily. 8/4/22  Yes Solitario Li MD   atorvastatin (Lipitor) 40 MG tablet Take 1 tablet by mouth Daily. 6/21/22  Yes Solitario Li MD   BD INSULIN SYRINGE U/F 31G X 5/16\" 1 ML misc  4/22/20  Yes ProviderElijah MD   Cholecalciferol (Vitamin D3) 1.25 MG (13678 UT) capsule Take 1 capsule by mouth 1 (One) Time Per Week. 8/4/22  Yes Solitario Li MD   cyclobenzaprine (FLEXERIL) 10 MG tablet Take 1 tablet by mouth At Night As Needed for Muscle Spasms. 8/4/22  Yes Solitario Li MD   Dulaglutide 1.5 MG/0.5ML solution pen-injector Inject 1.5 mg under the skin into the appropriate area as directed 1 (One) Time Per Week. 8/4/22  Yes Solitario Li MD   Ertugliflozin L-PyroglutamicAc (Steglatro) 15 MG tablet Take 1 tablet by mouth Every Morning. 8/4/22  Yes Solitario Li MD   ferrous sulfate 325 (65 FE) MG tablet Take 1 tablet by mouth 2 (Two) Times a Day. 6/21/22  Yes Solitario Li MD   fluticasone (Flonase) 50 MCG/ACT nasal spray 2 sprays into the nostril(s) as directed by provider Daily. 6/21/22  Yes Solitario Li MD   fluticasone (FLOVENT HFA) 220 MCG/ACT inhaler Inhale 1 puff 2 (Two) Times a Day. 6/21/22  Yes Solitario Li MD   furosemide (Lasix) 20 MG tablet Take 1 tablet by mouth 2 (Two) Times a Day. 6/21/22  Yes Solitario Li MD   glucose blood test strip 1 each by Other route 3 (Three) Times a Day. 6/21/22  Yes Solitario Li MD   insulin glargine (Lantus) 100 UNIT/ML injection START WITH 16 UNITS UNDER THE SKIN AT BEDTIME CAN GO UP BY 3 UNITS EVERY 3 DAYS UNTIL IN THE MORNING SUGARS RUNNING  MDD 25 8/4/22  Yes Solitario Li MD "   Insulin Pen Needle ([employer] Pen Needles) 31G X 6 MM misc Inject 1 Device as directed 3 (Three) Times a Day As Needed (Diabetes). 6/21/22  Yes Solitario Li MD   Lancets misc Use to check sugars three times a day for diabetes E11 6/21/22  Yes Solitario Li MD   levothyroxine (Synthroid) 75 MCG tablet Take 1 tablet by mouth Daily. 8/4/22  Yes Solitario Li MD   loperamide (Imodium A-D) 2 MG tablet Take 1 tablet by mouth 4 (Four) Times a Day As Needed for Diarrhea. 6/21/22  Yes Solitario Li MD   loratadine (CLARITIN) 10 MG tablet Take 1 tablet by mouth Daily. 8/4/22  Yes Solitario Li MD   losartan (Cozaar) 25 MG tablet Take 1 tablet by mouth Daily. 8/4/22  Yes Solitario Li MD   montelukast (Singulair) 10 MG tablet Take 1 tablet by mouth Every Night. 6/21/22  Yes Solitario Li MD   omeprazole (priLOSEC) 40 MG capsule Take 1 capsule by mouth Daily. 8/4/22  Yes Solitario Li MD   ondansetron ODT (ZOFRAN-ODT) 8 MG disintegrating tablet Place 1 tablet on the tongue Every 8 (Eight) Hours As Needed for Nausea or Vomiting. 8/4/22  Yes Solitario Li MD   promethazine (PHENERGAN) 12.5 MG tablet Take 1 tablet by mouth Every 6 (Six) Hours As Needed for Nausea or Vomiting. 3/18/22  Yes Nils Almaguer MD   vitamin D (ERGOCALCIFEROL) 1.25 MG (70233 UT) capsule capsule Take 1 capsule by mouth Every 7 (Seven) Days. 8/4/22  Yes Solitario Li MD     No Known Allergies  Social History     Socioeconomic History   • Marital status: Single   Tobacco Use   • Smoking status: Never Smoker   • Smokeless tobacco: Never Used   Vaping Use   • Vaping Use: Never used   Substance and Sexual Activity   • Alcohol use: No   • Drug use: No   • Sexual activity: Defer       Review of Systems  Review of Systems   Constitutional: Negative for activity change, appetite change, chills, diaphoresis, fatigue, fever and unexpected weight change.   HENT: Negative for sore throat and trouble swallowing.    Respiratory:  "Negative for shortness of breath.    Gastrointestinal: Positive for constipation (at times otc probiotic gummy helping). Negative for abdominal distention, abdominal pain, anal bleeding, blood in stool, diarrhea, nausea, rectal pain and vomiting.   Musculoskeletal: Negative for arthralgias.   Skin: Negative for pallor.   Neurological: Negative for light-headedness.        /78 (BP Location: Right arm)   Pulse 70   Ht 160 cm (63\")   Wt 124 kg (274 lb)   BMI 48.54 kg/m²     Objective    Physical Exam  Constitutional:       General: She is not in acute distress.     Appearance: Normal appearance. She is obese. She is not ill-appearing.   HENT:      Head: Normocephalic and atraumatic.   Pulmonary:      Effort: Pulmonary effort is normal.   Abdominal:      General: Abdomen is flat. Bowel sounds are normal. There is no distension.      Palpations: Abdomen is soft. There is no mass.      Tenderness: There is no abdominal tenderness.   Neurological:      Mental Status: She is alert.       Lab on 06/21/2022   Component Date Value Ref Range Status   • WBC 06/21/2022 9.17  3.40 - 10.80 10*3/mm3 Final   • RBC 06/21/2022 6.01 (A) 3.77 - 5.28 10*6/mm3 Final   • Hemoglobin 06/21/2022 12.5  12.0 - 15.9 g/dL Final   • Hematocrit 06/21/2022 39.7  34.0 - 46.6 % Final   • MCV 06/21/2022 66.1 (A) 79.0 - 97.0 fL Final   • MCH 06/21/2022 20.8 (A) 26.6 - 33.0 pg Final   • MCHC 06/21/2022 31.5  31.5 - 35.7 g/dL Final   • RDW 06/21/2022 16.6 (A) 12.3 - 15.4 % Final   • RDW-SD 06/21/2022 35.3 (A) 37.0 - 54.0 fl Final   • MPV 06/21/2022 10.7  6.0 - 12.0 fL Final   • Platelets 06/21/2022 276  140 - 450 10*3/mm3 Final   • Neutrophil % 06/21/2022 48.5  42.7 - 76.0 % Final   • Lymphocyte % 06/21/2022 36.0  19.6 - 45.3 % Final   • Monocyte % 06/21/2022 6.7  5.0 - 12.0 % Final   • Eosinophil % 06/21/2022 7.5 (A) 0.3 - 6.2 % Final   • Basophil % 06/21/2022 0.9  0.0 - 1.5 % Final   • Neutrophils, Absolute 06/21/2022 4.45  1.70 - 7.00 10*3/mm3 " Final   • Lymphocytes, Absolute 06/21/2022 3.30 (A) 0.70 - 3.10 10*3/mm3 Final   • Monocytes, Absolute 06/21/2022 0.61  0.10 - 0.90 10*3/mm3 Final   • Eosinophils, Absolute 06/21/2022 0.69 (A) 0.00 - 0.40 10*3/mm3 Final   • Basophils, Absolute 06/21/2022 0.08  0.00 - 0.20 10*3/mm3 Final   • Glucose 06/21/2022 111 (A) 65 - 99 mg/dL Final   • BUN 06/21/2022 11  6 - 20 mg/dL Final   • Creatinine 06/21/2022 0.87  0.57 - 1.00 mg/dL Final   • Sodium 06/21/2022 136  136 - 145 mmol/L Final   • Potassium 06/21/2022 4.0  3.5 - 5.2 mmol/L Final   • Chloride 06/21/2022 99  98 - 107 mmol/L Final   • CO2 06/21/2022 28.4  22.0 - 29.0 mmol/L Final   • Calcium 06/21/2022 9.6  8.6 - 10.5 mg/dL Final   • Total Protein 06/21/2022 7.2  6.0 - 8.5 g/dL Final   • Albumin 06/21/2022 3.90  3.50 - 5.20 g/dL Final   • ALT (SGPT) 06/21/2022 19  1 - 33 U/L Final   • AST (SGOT) 06/21/2022 17  1 - 32 U/L Final   • Alkaline Phosphatase 06/21/2022 68  39 - 117 U/L Final   • Total Bilirubin 06/21/2022 <0.2  0.0 - 1.2 mg/dL Final   • Globulin 06/21/2022 3.3  gm/dL Final   • A/G Ratio 06/21/2022 1.2  g/dL Final   • BUN/Creatinine Ratio 06/21/2022 12.6  7.0 - 25.0 Final   • Anion Gap 06/21/2022 8.6  5.0 - 15.0 mmol/L Final   • eGFR 06/21/2022 82.8  >60.0 mL/min/1.73 Final    National Kidney Foundation and American Society of Nephrology (ASN) Task Force recommended calculation based on the Chronic Kidney Disease Epidemiology Collaboration (CKD-EPI) equation refit without adjustment for race.   • Hemoglobin A1C 06/21/2022 8.10 (A) 4.80 - 5.60 % Final   • Total Cholesterol 06/21/2022 144  0 - 200 mg/dL Final   • Triglycerides 06/21/2022 114  0 - 150 mg/dL Final   • HDL Cholesterol 06/21/2022 44  40 - 60 mg/dL Final   • LDL Cholesterol  06/21/2022 79  0 - 100 mg/dL Final   • VLDL Cholesterol 06/21/2022 21  5 - 40 mg/dL Final   • LDL/HDL Ratio 06/21/2022 1.75   Final   • TSH 06/21/2022 2.470  0.270 - 4.200 uIU/mL Final   • Free T4 06/21/2022 0.86 (A) 0.93 -  1.70 ng/dL Final   • T3, Free 06/21/2022 2.30  2.00 - 4.40 pg/mL Final   • 25 Hydroxy, Vitamin D 06/21/2022 57.1  30.0 - 100.0 ng/ml Final   • Vitamin B-12 06/21/2022 1,201 (A) 211 - 946 pg/mL Final   • Iron 06/21/2022 38  37 - 145 mcg/dL Final   • Iron Saturation 06/21/2022 13 (A) 20 - 50 % Final   • Transferrin 06/21/2022 204  200 - 360 mg/dL Final   • TIBC 06/21/2022 304  298 - 536 mcg/dL Final   • Ferritin 06/21/2022 171.00 (A) 13.00 - 150.00 ng/mL Final     Assessment & Plan      1. Encounter for screening for malignant neoplasm of colon    .   Schedule patient for initial screening colonoscopy.    Orders placed during this encounter include:  Orders Placed This Encounter   Procedures   • Follow Anesthesia Guidelines / Standing Orders     Standing Status:   Future   • Obtain Informed Consent     Standing Status:   Future     Order Specific Question:   Informed Consent Given For     Answer:   colonoscopy       COLONOSCOPY (N/A)    Review and/or summary of lab tests, radiology, procedures, medications. Review and summary of old records and obtaining of history. The risks and benefits of my recommendations, as well as other treatment options were discussed with the patient today. Questions were answered.    New Medications Ordered This Visit   Medications   • polyethylene glycol (GoLYTELY) 236 g solution     Sig: Starting at noon on day prior to procedure, drink 8 ounces every 30 minutes until all gone or stools are clear. May add flavor packet.     Dispense:  4000 mL     Refill:  0       Follow-up: Return in about 4 weeks (around 10/6/2022) for Recheck, After test.          This document has been electronically signed by JEFFREY Spangler on September 8, 2022 11:13 CDT           I spent 16 minutes caring for Lizzy on this date of service. This time includes time spent by me in the following activities:preparing for the visit, reviewing tests, obtaining and/or reviewing a separately obtained history, performing  a medically appropriate examination and/or evaluation , counseling and educating the patient/family/caregiver, ordering medications, tests, or procedures, referring and communicating with other health care professionals , documenting information in the medical record and care coordination    Results for orders placed or performed in visit on 06/21/22   CBC Auto Differential    Specimen: Blood   Result Value Ref Range    WBC 9.17 3.40 - 10.80 10*3/mm3    RBC 6.01 (H) 3.77 - 5.28 10*6/mm3    Hemoglobin 12.5 12.0 - 15.9 g/dL    Hematocrit 39.7 34.0 - 46.6 %    MCV 66.1 (L) 79.0 - 97.0 fL    MCH 20.8 (L) 26.6 - 33.0 pg    MCHC 31.5 31.5 - 35.7 g/dL    RDW 16.6 (H) 12.3 - 15.4 %    RDW-SD 35.3 (L) 37.0 - 54.0 fl    MPV 10.7 6.0 - 12.0 fL    Platelets 276 140 - 450 10*3/mm3    Neutrophil % 48.5 42.7 - 76.0 %    Lymphocyte % 36.0 19.6 - 45.3 %    Monocyte % 6.7 5.0 - 12.0 %    Eosinophil % 7.5 (H) 0.3 - 6.2 %    Basophil % 0.9 0.0 - 1.5 %    Neutrophils, Absolute 4.45 1.70 - 7.00 10*3/mm3    Lymphocytes, Absolute 3.30 (H) 0.70 - 3.10 10*3/mm3    Monocytes, Absolute 0.61 0.10 - 0.90 10*3/mm3    Eosinophils, Absolute 0.69 (H) 0.00 - 0.40 10*3/mm3    Basophils, Absolute 0.08 0.00 - 0.20 10*3/mm3   Iron Profile    Specimen: Blood   Result Value Ref Range    Iron 38 37 - 145 mcg/dL    Iron Saturation 13 (L) 20 - 50 %    Transferrin 204 200 - 360 mg/dL    TIBC 304 298 - 536 mcg/dL   Vitamin D 25 Hydroxy    Specimen: Blood   Result Value Ref Range    25 Hydroxy, Vitamin D 57.1 30.0 - 100.0 ng/ml   T3, Free    Specimen: Blood   Result Value Ref Range    T3, Free 2.30 2.00 - 4.40 pg/mL   TSH    Specimen: Blood   Result Value Ref Range    TSH 2.470 0.270 - 4.200 uIU/mL   T4, Free    Specimen: Blood   Result Value Ref Range    Free T4 0.86 (L) 0.93 - 1.70 ng/dL   Hemoglobin A1c    Specimen: Blood   Result Value Ref Range    Hemoglobin A1C 8.10 (H) 4.80 - 5.60 %   Ferritin    Specimen: Blood   Result Value Ref Range    Ferritin 171.00  (H) 13.00 - 150.00 ng/mL   Vitamin B12    Specimen: Blood   Result Value Ref Range    Vitamin B-12 1,201 (H) 211 - 946 pg/mL   Lipid Panel    Specimen: Blood   Result Value Ref Range    Total Cholesterol 144 0 - 200 mg/dL    Triglycerides 114 0 - 150 mg/dL    HDL Cholesterol 44 40 - 60 mg/dL    LDL Cholesterol  79 0 - 100 mg/dL    VLDL Cholesterol 21 5 - 40 mg/dL    LDL/HDL Ratio 1.75    Comprehensive Metabolic Panel    Specimen: Blood   Result Value Ref Range    Glucose 111 (H) 65 - 99 mg/dL    BUN 11 6 - 20 mg/dL    Creatinine 0.87 0.57 - 1.00 mg/dL    Sodium 136 136 - 145 mmol/L    Potassium 4.0 3.5 - 5.2 mmol/L    Chloride 99 98 - 107 mmol/L    CO2 28.4 22.0 - 29.0 mmol/L    Calcium 9.6 8.6 - 10.5 mg/dL    Total Protein 7.2 6.0 - 8.5 g/dL    Albumin 3.90 3.50 - 5.20 g/dL    ALT (SGPT) 19 1 - 33 U/L    AST (SGOT) 17 1 - 32 U/L    Alkaline Phosphatase 68 39 - 117 U/L    Total Bilirubin <0.2 0.0 - 1.2 mg/dL    Globulin 3.3 gm/dL    A/G Ratio 1.2 g/dL    BUN/Creatinine Ratio 12.6 7.0 - 25.0    Anion Gap 8.6 5.0 - 15.0 mmol/L    eGFR 82.8 >60.0 mL/min/1.73   Results for orders placed or performed in visit on 03/18/22   CBC Auto Differential    Specimen: Blood   Result Value Ref Range    WBC 7.00 3.40 - 10.80 10*3/mm3    RBC 6.66 (H) 3.77 - 5.28 10*6/mm3    Hemoglobin 13.3 12.0 - 15.9 g/dL    Hematocrit 45.0 34.0 - 46.6 %    MCV 67.6 (L) 79.0 - 97.0 fL    MCH 20.0 (L) 26.6 - 33.0 pg    MCHC 29.6 (L) 31.5 - 35.7 g/dL    RDW 18.0 (H) 12.3 - 15.4 %    RDW-SD 39.1 37.0 - 54.0 fl    Platelets 271 140 - 450 10*3/mm3    Neutrophil % 42.3 (L) 42.7 - 76.0 %    Lymphocyte % 40.1 19.6 - 45.3 %    Monocyte % 9.6 5.0 - 12.0 %    Eosinophil % 7.1 (H) 0.3 - 6.2 %    Basophil % 0.6 0.0 - 1.5 %    Neutrophils, Absolute 2.96 1.70 - 7.00 10*3/mm3    Lymphocytes, Absolute 2.81 0.70 - 3.10 10*3/mm3    Monocytes, Absolute 0.67 0.10 - 0.90 10*3/mm3    Eosinophils, Absolute 0.50 (H) 0.00 - 0.40 10*3/mm3    Basophils, Absolute 0.04 0.00 -  0.20 10*3/mm3   Iron Profile    Specimen: Blood   Result Value Ref Range    Iron 30 (L) 37 - 145 mcg/dL    Iron Saturation 11 (L) 20 - 50 %    Transferrin 179 (L) 200 - 360 mg/dL    TIBC 267 (L) 298 - 536 mcg/dL   Vitamin D 25 Hydroxy    Specimen: Blood   Result Value Ref Range    25 Hydroxy, Vitamin D 72.1 30.0 - 100.0 ng/ml   T3, Free    Specimen: Blood   Result Value Ref Range    T3, Free 1.80 (L) 2.00 - 4.40 pg/mL   TSH    Specimen: Blood   Result Value Ref Range    TSH 2.180 0.270 - 4.200 uIU/mL   T4, Free    Specimen: Blood   Result Value Ref Range    Free T4 0.85 (L) 0.93 - 1.70 ng/dL   Hemoglobin A1c    Specimen: Blood   Result Value Ref Range    Hemoglobin A1C 7.10 (H) 4.80 - 5.60 %   Vitamin B12    Specimen: Blood   Result Value Ref Range    Vitamin B-12 956 (H) 211 - 946 pg/mL   Lipid Panel    Specimen: Blood   Result Value Ref Range    Total Cholesterol 137 0 - 200 mg/dL    Triglycerides 86 0 - 150 mg/dL    HDL Cholesterol 38 (L) 40 - 60 mg/dL    LDL Cholesterol  82 0 - 100 mg/dL    VLDL Cholesterol 17 5 - 40 mg/dL    LDL/HDL Ratio 2.15    Comprehensive Metabolic Panel    Specimen: Blood   Result Value Ref Range    Glucose 114 (H) 65 - 99 mg/dL    BUN 11 6 - 20 mg/dL    Creatinine 0.86 0.57 - 1.00 mg/dL    Sodium 138 136 - 145 mmol/L    Potassium 4.1 3.5 - 5.2 mmol/L    Chloride 104 98 - 107 mmol/L    CO2 26.0 22.0 - 29.0 mmol/L    Calcium 9.1 8.6 - 10.5 mg/dL    Total Protein 7.7 6.0 - 8.5 g/dL    Albumin 4.10 3.50 - 5.20 g/dL    ALT (SGPT) 27 1 - 33 U/L    AST (SGOT) 25 1 - 32 U/L    Alkaline Phosphatase 63 39 - 117 U/L    Total Bilirubin 0.3 0.0 - 1.2 mg/dL    Globulin 3.6 gm/dL    A/G Ratio 1.1 g/dL    BUN/Creatinine Ratio 12.8 7.0 - 25.0    Anion Gap 8.0 5.0 - 15.0 mmol/L    eGFR 84.0 >60.0 mL/min/1.73     *Note: Due to a large number of results and/or encounters for the requested time period, some results have not been displayed. A complete set of results can be found in Results Review.

## 2022-09-08 NOTE — PROGRESS NOTES
Chief Complaint   Patient presents with   • Colon Cancer Screening       Subjective    Lizzy Restrepo is a 47 y.o. female. she is here today for follow-up.    History of Present Illness  47-year-old female presents to discuss screening colonoscopy.  Concurrent medical history of hypertension, carpal tunnel syndrome, obesity, diabetes mellitus, anemia.  Previously seen by Dr. Hermosillo 2021 declined colonoscopy but reports due to work schedule she was not able to complete procedure.  States she has intermittent issues with constipation denies any current abdominal pain.  Denies any blood in stool.  She denies any known family history of colorectal cancer or polyps.  reports GERD is well controlled with PPI daily       The following portions of the patient's history were reviewed and updated as appropriate:   Past Medical History:   Diagnosis Date   • Abdominal pain     unspecified   • Benign hypertension    • Carpal tunnel syndrome    • Essential hypertension    • Gastro-esophageal reflux disease with esophagitis    • GERD (gastroesophageal reflux disease)    • Headache     improving   • Iron deficiency anemia     unspecified   • Morbid obesity due to excess calories (HCC)     severe   • Type 2 diabetes mellitus (HCC)    • Urinary tract infectious disease    • Vitamin D deficiency    • Weight loss     advised     Past Surgical History:   Procedure Laterality Date   •  SECTION  10/30/2000   • CHOLECYSTECTOMY  1995   • COLONOSCOPY      normal per pt   • CYSTOSCOPY  2016    And bilateral retrograde pyelograms. Performed at [facility].   • TONSILLECTOMY  1985   • UMBILICAL HERNIA REPAIR  2015   • UMBILICAL HERNIA REPAIR  2015     Family History   Problem Relation Age of Onset   • Diabetes Mother    • Heart disease Mother    • Heart failure Mother         congestive   • Hypertension Mother    • Asthma Father    • Osteoarthritis Father      OB History    No  "obstetric history on file.       Prior to Admission medications    Medication Sig Start Date End Date Taking? Authorizing Provider   albuterol sulfate HFA (Ventolin HFA) 108 (90 Base) MCG/ACT inhaler Inhale 2 puffs Every 4 (Four) Hours As Needed for Shortness of Air. 8/4/22  Yes Solitario Li MD   aspirin 81 MG chewable tablet Chew 1 tablet Daily. 8/4/22  Yes Solitario Li MD   atorvastatin (Lipitor) 40 MG tablet Take 1 tablet by mouth Daily. 6/21/22  Yes Solitario Li MD   BD INSULIN SYRINGE U/F 31G X 5/16\" 1 ML misc  4/22/20  Yes ProviderElijah MD   Cholecalciferol (Vitamin D3) 1.25 MG (13990 UT) capsule Take 1 capsule by mouth 1 (One) Time Per Week. 8/4/22  Yes Solitario Li MD   cyclobenzaprine (FLEXERIL) 10 MG tablet Take 1 tablet by mouth At Night As Needed for Muscle Spasms. 8/4/22  Yes Solitario Li MD   Dulaglutide 1.5 MG/0.5ML solution pen-injector Inject 1.5 mg under the skin into the appropriate area as directed 1 (One) Time Per Week. 8/4/22  Yes Solitario Li MD   Ertugliflozin L-PyroglutamicAc (Steglatro) 15 MG tablet Take 1 tablet by mouth Every Morning. 8/4/22  Yes Solitario Li MD   ferrous sulfate 325 (65 FE) MG tablet Take 1 tablet by mouth 2 (Two) Times a Day. 6/21/22  Yes Solitario Li MD   fluticasone (Flonase) 50 MCG/ACT nasal spray 2 sprays into the nostril(s) as directed by provider Daily. 6/21/22  Yes Solitario Li MD   fluticasone (FLOVENT HFA) 220 MCG/ACT inhaler Inhale 1 puff 2 (Two) Times a Day. 6/21/22  Yes Solitario Li MD   furosemide (Lasix) 20 MG tablet Take 1 tablet by mouth 2 (Two) Times a Day. 6/21/22  Yes Solitario Li MD   glucose blood test strip 1 each by Other route 3 (Three) Times a Day. 6/21/22  Yes Solitario Li MD   insulin glargine (Lantus) 100 UNIT/ML injection START WITH 16 UNITS UNDER THE SKIN AT BEDTIME CAN GO UP BY 3 UNITS EVERY 3 DAYS UNTIL IN THE MORNING SUGARS RUNNING  MDD 25 8/4/22  Yes Solitario Li MD "   Insulin Pen Needle ([employer] Pen Needles) 31G X 6 MM misc Inject 1 Device as directed 3 (Three) Times a Day As Needed (Diabetes). 6/21/22  Yes Solitario Li MD   Lancets misc Use to check sugars three times a day for diabetes E11 6/21/22  Yes Solitario Li MD   levothyroxine (Synthroid) 75 MCG tablet Take 1 tablet by mouth Daily. 8/4/22  Yes Solitario Li MD   loperamide (Imodium A-D) 2 MG tablet Take 1 tablet by mouth 4 (Four) Times a Day As Needed for Diarrhea. 6/21/22  Yes Solitario Li MD   loratadine (CLARITIN) 10 MG tablet Take 1 tablet by mouth Daily. 8/4/22  Yes Solitario Li MD   losartan (Cozaar) 25 MG tablet Take 1 tablet by mouth Daily. 8/4/22  Yes Solitario Li MD   montelukast (Singulair) 10 MG tablet Take 1 tablet by mouth Every Night. 6/21/22  Yes Solitario Li MD   omeprazole (priLOSEC) 40 MG capsule Take 1 capsule by mouth Daily. 8/4/22  Yes Solitario Li MD   ondansetron ODT (ZOFRAN-ODT) 8 MG disintegrating tablet Place 1 tablet on the tongue Every 8 (Eight) Hours As Needed for Nausea or Vomiting. 8/4/22  Yes Solitario Li MD   promethazine (PHENERGAN) 12.5 MG tablet Take 1 tablet by mouth Every 6 (Six) Hours As Needed for Nausea or Vomiting. 3/18/22  Yes Nils Almaguer MD   vitamin D (ERGOCALCIFEROL) 1.25 MG (22792 UT) capsule capsule Take 1 capsule by mouth Every 7 (Seven) Days. 8/4/22  Yes Solitario Li MD     No Known Allergies  Social History     Socioeconomic History   • Marital status: Single   Tobacco Use   • Smoking status: Never Smoker   • Smokeless tobacco: Never Used   Vaping Use   • Vaping Use: Never used   Substance and Sexual Activity   • Alcohol use: No   • Drug use: No   • Sexual activity: Defer       Review of Systems  Review of Systems   Constitutional: Negative for activity change, appetite change, chills, diaphoresis, fatigue, fever and unexpected weight change.   HENT: Negative for sore throat and trouble swallowing.    Respiratory:  "Negative for shortness of breath.    Gastrointestinal: Positive for constipation (at times otc probiotic gummy helping). Negative for abdominal distention, abdominal pain, anal bleeding, blood in stool, diarrhea, nausea, rectal pain and vomiting.   Musculoskeletal: Negative for arthralgias.   Skin: Negative for pallor.   Neurological: Negative for light-headedness.        /78 (BP Location: Right arm)   Pulse 70   Ht 160 cm (63\")   Wt 124 kg (274 lb)   BMI 48.54 kg/m²     Objective    Physical Exam  Constitutional:       General: She is not in acute distress.     Appearance: Normal appearance. She is obese. She is not ill-appearing.   HENT:      Head: Normocephalic and atraumatic.   Pulmonary:      Effort: Pulmonary effort is normal.   Abdominal:      General: Abdomen is flat. Bowel sounds are normal. There is no distension.      Palpations: Abdomen is soft. There is no mass.      Tenderness: There is no abdominal tenderness.   Neurological:      Mental Status: She is alert.       Lab on 06/21/2022   Component Date Value Ref Range Status   • WBC 06/21/2022 9.17  3.40 - 10.80 10*3/mm3 Final   • RBC 06/21/2022 6.01 (A) 3.77 - 5.28 10*6/mm3 Final   • Hemoglobin 06/21/2022 12.5  12.0 - 15.9 g/dL Final   • Hematocrit 06/21/2022 39.7  34.0 - 46.6 % Final   • MCV 06/21/2022 66.1 (A) 79.0 - 97.0 fL Final   • MCH 06/21/2022 20.8 (A) 26.6 - 33.0 pg Final   • MCHC 06/21/2022 31.5  31.5 - 35.7 g/dL Final   • RDW 06/21/2022 16.6 (A) 12.3 - 15.4 % Final   • RDW-SD 06/21/2022 35.3 (A) 37.0 - 54.0 fl Final   • MPV 06/21/2022 10.7  6.0 - 12.0 fL Final   • Platelets 06/21/2022 276  140 - 450 10*3/mm3 Final   • Neutrophil % 06/21/2022 48.5  42.7 - 76.0 % Final   • Lymphocyte % 06/21/2022 36.0  19.6 - 45.3 % Final   • Monocyte % 06/21/2022 6.7  5.0 - 12.0 % Final   • Eosinophil % 06/21/2022 7.5 (A) 0.3 - 6.2 % Final   • Basophil % 06/21/2022 0.9  0.0 - 1.5 % Final   • Neutrophils, Absolute 06/21/2022 4.45  1.70 - 7.00 10*3/mm3 " Final   • Lymphocytes, Absolute 06/21/2022 3.30 (A) 0.70 - 3.10 10*3/mm3 Final   • Monocytes, Absolute 06/21/2022 0.61  0.10 - 0.90 10*3/mm3 Final   • Eosinophils, Absolute 06/21/2022 0.69 (A) 0.00 - 0.40 10*3/mm3 Final   • Basophils, Absolute 06/21/2022 0.08  0.00 - 0.20 10*3/mm3 Final   • Glucose 06/21/2022 111 (A) 65 - 99 mg/dL Final   • BUN 06/21/2022 11  6 - 20 mg/dL Final   • Creatinine 06/21/2022 0.87  0.57 - 1.00 mg/dL Final   • Sodium 06/21/2022 136  136 - 145 mmol/L Final   • Potassium 06/21/2022 4.0  3.5 - 5.2 mmol/L Final   • Chloride 06/21/2022 99  98 - 107 mmol/L Final   • CO2 06/21/2022 28.4  22.0 - 29.0 mmol/L Final   • Calcium 06/21/2022 9.6  8.6 - 10.5 mg/dL Final   • Total Protein 06/21/2022 7.2  6.0 - 8.5 g/dL Final   • Albumin 06/21/2022 3.90  3.50 - 5.20 g/dL Final   • ALT (SGPT) 06/21/2022 19  1 - 33 U/L Final   • AST (SGOT) 06/21/2022 17  1 - 32 U/L Final   • Alkaline Phosphatase 06/21/2022 68  39 - 117 U/L Final   • Total Bilirubin 06/21/2022 <0.2  0.0 - 1.2 mg/dL Final   • Globulin 06/21/2022 3.3  gm/dL Final   • A/G Ratio 06/21/2022 1.2  g/dL Final   • BUN/Creatinine Ratio 06/21/2022 12.6  7.0 - 25.0 Final   • Anion Gap 06/21/2022 8.6  5.0 - 15.0 mmol/L Final   • eGFR 06/21/2022 82.8  >60.0 mL/min/1.73 Final    National Kidney Foundation and American Society of Nephrology (ASN) Task Force recommended calculation based on the Chronic Kidney Disease Epidemiology Collaboration (CKD-EPI) equation refit without adjustment for race.   • Hemoglobin A1C 06/21/2022 8.10 (A) 4.80 - 5.60 % Final   • Total Cholesterol 06/21/2022 144  0 - 200 mg/dL Final   • Triglycerides 06/21/2022 114  0 - 150 mg/dL Final   • HDL Cholesterol 06/21/2022 44  40 - 60 mg/dL Final   • LDL Cholesterol  06/21/2022 79  0 - 100 mg/dL Final   • VLDL Cholesterol 06/21/2022 21  5 - 40 mg/dL Final   • LDL/HDL Ratio 06/21/2022 1.75   Final   • TSH 06/21/2022 2.470  0.270 - 4.200 uIU/mL Final   • Free T4 06/21/2022 0.86 (A) 0.93 -  1.70 ng/dL Final   • T3, Free 06/21/2022 2.30  2.00 - 4.40 pg/mL Final   • 25 Hydroxy, Vitamin D 06/21/2022 57.1  30.0 - 100.0 ng/ml Final   • Vitamin B-12 06/21/2022 1,201 (A) 211 - 946 pg/mL Final   • Iron 06/21/2022 38  37 - 145 mcg/dL Final   • Iron Saturation 06/21/2022 13 (A) 20 - 50 % Final   • Transferrin 06/21/2022 204  200 - 360 mg/dL Final   • TIBC 06/21/2022 304  298 - 536 mcg/dL Final   • Ferritin 06/21/2022 171.00 (A) 13.00 - 150.00 ng/mL Final     Assessment & Plan      1. Encounter for screening for malignant neoplasm of colon    .   Schedule patient for initial screening colonoscopy.    Orders placed during this encounter include:  Orders Placed This Encounter   Procedures   • Follow Anesthesia Guidelines / Standing Orders     Standing Status:   Future   • Obtain Informed Consent     Standing Status:   Future     Order Specific Question:   Informed Consent Given For     Answer:   colonoscopy       COLONOSCOPY (N/A)    Review and/or summary of lab tests, radiology, procedures, medications. Review and summary of old records and obtaining of history. The risks and benefits of my recommendations, as well as other treatment options were discussed with the patient today. Questions were answered.    New Medications Ordered This Visit   Medications   • polyethylene glycol (GoLYTELY) 236 g solution     Sig: Starting at noon on day prior to procedure, drink 8 ounces every 30 minutes until all gone or stools are clear. May add flavor packet.     Dispense:  4000 mL     Refill:  0       Follow-up: Return in about 4 weeks (around 10/6/2022) for Recheck, After test.          This document has been electronically signed by JEFFREY Spangler on September 8, 2022 11:13 CDT           I spent 16 minutes caring for Lizzy on this date of service. This time includes time spent by me in the following activities:preparing for the visit, reviewing tests, obtaining and/or reviewing a separately obtained history, performing  a medically appropriate examination and/or evaluation , counseling and educating the patient/family/caregiver, ordering medications, tests, or procedures, referring and communicating with other health care professionals , documenting information in the medical record and care coordination    Results for orders placed or performed in visit on 06/21/22   CBC Auto Differential    Specimen: Blood   Result Value Ref Range    WBC 9.17 3.40 - 10.80 10*3/mm3    RBC 6.01 (H) 3.77 - 5.28 10*6/mm3    Hemoglobin 12.5 12.0 - 15.9 g/dL    Hematocrit 39.7 34.0 - 46.6 %    MCV 66.1 (L) 79.0 - 97.0 fL    MCH 20.8 (L) 26.6 - 33.0 pg    MCHC 31.5 31.5 - 35.7 g/dL    RDW 16.6 (H) 12.3 - 15.4 %    RDW-SD 35.3 (L) 37.0 - 54.0 fl    MPV 10.7 6.0 - 12.0 fL    Platelets 276 140 - 450 10*3/mm3    Neutrophil % 48.5 42.7 - 76.0 %    Lymphocyte % 36.0 19.6 - 45.3 %    Monocyte % 6.7 5.0 - 12.0 %    Eosinophil % 7.5 (H) 0.3 - 6.2 %    Basophil % 0.9 0.0 - 1.5 %    Neutrophils, Absolute 4.45 1.70 - 7.00 10*3/mm3    Lymphocytes, Absolute 3.30 (H) 0.70 - 3.10 10*3/mm3    Monocytes, Absolute 0.61 0.10 - 0.90 10*3/mm3    Eosinophils, Absolute 0.69 (H) 0.00 - 0.40 10*3/mm3    Basophils, Absolute 0.08 0.00 - 0.20 10*3/mm3   Iron Profile    Specimen: Blood   Result Value Ref Range    Iron 38 37 - 145 mcg/dL    Iron Saturation 13 (L) 20 - 50 %    Transferrin 204 200 - 360 mg/dL    TIBC 304 298 - 536 mcg/dL   Vitamin D 25 Hydroxy    Specimen: Blood   Result Value Ref Range    25 Hydroxy, Vitamin D 57.1 30.0 - 100.0 ng/ml   T3, Free    Specimen: Blood   Result Value Ref Range    T3, Free 2.30 2.00 - 4.40 pg/mL   TSH    Specimen: Blood   Result Value Ref Range    TSH 2.470 0.270 - 4.200 uIU/mL   T4, Free    Specimen: Blood   Result Value Ref Range    Free T4 0.86 (L) 0.93 - 1.70 ng/dL   Hemoglobin A1c    Specimen: Blood   Result Value Ref Range    Hemoglobin A1C 8.10 (H) 4.80 - 5.60 %   Ferritin    Specimen: Blood   Result Value Ref Range    Ferritin 171.00  (H) 13.00 - 150.00 ng/mL   Vitamin B12    Specimen: Blood   Result Value Ref Range    Vitamin B-12 1,201 (H) 211 - 946 pg/mL   Lipid Panel    Specimen: Blood   Result Value Ref Range    Total Cholesterol 144 0 - 200 mg/dL    Triglycerides 114 0 - 150 mg/dL    HDL Cholesterol 44 40 - 60 mg/dL    LDL Cholesterol  79 0 - 100 mg/dL    VLDL Cholesterol 21 5 - 40 mg/dL    LDL/HDL Ratio 1.75    Comprehensive Metabolic Panel    Specimen: Blood   Result Value Ref Range    Glucose 111 (H) 65 - 99 mg/dL    BUN 11 6 - 20 mg/dL    Creatinine 0.87 0.57 - 1.00 mg/dL    Sodium 136 136 - 145 mmol/L    Potassium 4.0 3.5 - 5.2 mmol/L    Chloride 99 98 - 107 mmol/L    CO2 28.4 22.0 - 29.0 mmol/L    Calcium 9.6 8.6 - 10.5 mg/dL    Total Protein 7.2 6.0 - 8.5 g/dL    Albumin 3.90 3.50 - 5.20 g/dL    ALT (SGPT) 19 1 - 33 U/L    AST (SGOT) 17 1 - 32 U/L    Alkaline Phosphatase 68 39 - 117 U/L    Total Bilirubin <0.2 0.0 - 1.2 mg/dL    Globulin 3.3 gm/dL    A/G Ratio 1.2 g/dL    BUN/Creatinine Ratio 12.6 7.0 - 25.0    Anion Gap 8.6 5.0 - 15.0 mmol/L    eGFR 82.8 >60.0 mL/min/1.73   Results for orders placed or performed in visit on 03/18/22   CBC Auto Differential    Specimen: Blood   Result Value Ref Range    WBC 7.00 3.40 - 10.80 10*3/mm3    RBC 6.66 (H) 3.77 - 5.28 10*6/mm3    Hemoglobin 13.3 12.0 - 15.9 g/dL    Hematocrit 45.0 34.0 - 46.6 %    MCV 67.6 (L) 79.0 - 97.0 fL    MCH 20.0 (L) 26.6 - 33.0 pg    MCHC 29.6 (L) 31.5 - 35.7 g/dL    RDW 18.0 (H) 12.3 - 15.4 %    RDW-SD 39.1 37.0 - 54.0 fl    Platelets 271 140 - 450 10*3/mm3    Neutrophil % 42.3 (L) 42.7 - 76.0 %    Lymphocyte % 40.1 19.6 - 45.3 %    Monocyte % 9.6 5.0 - 12.0 %    Eosinophil % 7.1 (H) 0.3 - 6.2 %    Basophil % 0.6 0.0 - 1.5 %    Neutrophils, Absolute 2.96 1.70 - 7.00 10*3/mm3    Lymphocytes, Absolute 2.81 0.70 - 3.10 10*3/mm3    Monocytes, Absolute 0.67 0.10 - 0.90 10*3/mm3    Eosinophils, Absolute 0.50 (H) 0.00 - 0.40 10*3/mm3    Basophils, Absolute 0.04 0.00 -  0.20 10*3/mm3   Iron Profile    Specimen: Blood   Result Value Ref Range    Iron 30 (L) 37 - 145 mcg/dL    Iron Saturation 11 (L) 20 - 50 %    Transferrin 179 (L) 200 - 360 mg/dL    TIBC 267 (L) 298 - 536 mcg/dL   Vitamin D 25 Hydroxy    Specimen: Blood   Result Value Ref Range    25 Hydroxy, Vitamin D 72.1 30.0 - 100.0 ng/ml   T3, Free    Specimen: Blood   Result Value Ref Range    T3, Free 1.80 (L) 2.00 - 4.40 pg/mL   TSH    Specimen: Blood   Result Value Ref Range    TSH 2.180 0.270 - 4.200 uIU/mL   T4, Free    Specimen: Blood   Result Value Ref Range    Free T4 0.85 (L) 0.93 - 1.70 ng/dL   Hemoglobin A1c    Specimen: Blood   Result Value Ref Range    Hemoglobin A1C 7.10 (H) 4.80 - 5.60 %   Vitamin B12    Specimen: Blood   Result Value Ref Range    Vitamin B-12 956 (H) 211 - 946 pg/mL   Lipid Panel    Specimen: Blood   Result Value Ref Range    Total Cholesterol 137 0 - 200 mg/dL    Triglycerides 86 0 - 150 mg/dL    HDL Cholesterol 38 (L) 40 - 60 mg/dL    LDL Cholesterol  82 0 - 100 mg/dL    VLDL Cholesterol 17 5 - 40 mg/dL    LDL/HDL Ratio 2.15    Comprehensive Metabolic Panel    Specimen: Blood   Result Value Ref Range    Glucose 114 (H) 65 - 99 mg/dL    BUN 11 6 - 20 mg/dL    Creatinine 0.86 0.57 - 1.00 mg/dL    Sodium 138 136 - 145 mmol/L    Potassium 4.1 3.5 - 5.2 mmol/L    Chloride 104 98 - 107 mmol/L    CO2 26.0 22.0 - 29.0 mmol/L    Calcium 9.1 8.6 - 10.5 mg/dL    Total Protein 7.7 6.0 - 8.5 g/dL    Albumin 4.10 3.50 - 5.20 g/dL    ALT (SGPT) 27 1 - 33 U/L    AST (SGOT) 25 1 - 32 U/L    Alkaline Phosphatase 63 39 - 117 U/L    Total Bilirubin 0.3 0.0 - 1.2 mg/dL    Globulin 3.6 gm/dL    A/G Ratio 1.1 g/dL    BUN/Creatinine Ratio 12.8 7.0 - 25.0    Anion Gap 8.0 5.0 - 15.0 mmol/L    eGFR 84.0 >60.0 mL/min/1.73     *Note: Due to a large number of results and/or encounters for the requested time period, some results have not been displayed. A complete set of results can be found in Results Review.

## 2022-09-22 ENCOUNTER — HOSPITAL ENCOUNTER (OUTPATIENT)
Facility: HOSPITAL | Age: 47
Setting detail: HOSPITAL OUTPATIENT SURGERY
Discharge: HOME | End: 2022-09-22
Attending: INTERNAL MEDICINE | Admitting: INTERNAL MEDICINE

## 2022-09-22 ENCOUNTER — ANESTHESIA EVENT (OUTPATIENT)
Dept: GASTROENTEROLOGY | Facility: HOSPITAL | Age: 47
End: 2022-09-22

## 2022-09-22 ENCOUNTER — ANESTHESIA (OUTPATIENT)
Dept: GASTROENTEROLOGY | Facility: HOSPITAL | Age: 47
End: 2022-09-22

## 2022-09-22 VITALS
TEMPERATURE: 96.9 F | BODY MASS INDEX: 48.37 KG/M2 | SYSTOLIC BLOOD PRESSURE: 100 MMHG | RESPIRATION RATE: 19 BRPM | OXYGEN SATURATION: 98 % | HEART RATE: 80 BPM | DIASTOLIC BLOOD PRESSURE: 50 MMHG | WEIGHT: 273 LBS | HEIGHT: 63 IN

## 2022-09-22 DIAGNOSIS — Z12.11 ENCOUNTER FOR SCREENING FOR MALIGNANT NEOPLASM OF COLON: ICD-10-CM

## 2022-09-22 PROCEDURE — 45378 DIAGNOSTIC COLONOSCOPY: CPT | Performed by: INTERNAL MEDICINE

## 2022-09-22 PROCEDURE — 25010000002 PROPOFOL 10 MG/ML EMULSION: Performed by: NURSE ANESTHETIST, CERTIFIED REGISTERED

## 2022-09-22 RX ORDER — PROPOFOL 10 MG/ML
VIAL (ML) INTRAVENOUS AS NEEDED
Status: DISCONTINUED | OUTPATIENT
Start: 2022-09-22 | End: 2022-09-22 | Stop reason: SURG

## 2022-09-22 RX ORDER — LIDOCAINE HYDROCHLORIDE 20 MG/ML
INJECTION, SOLUTION INTRAVENOUS AS NEEDED
Status: DISCONTINUED | OUTPATIENT
Start: 2022-09-22 | End: 2022-09-22 | Stop reason: SURG

## 2022-09-22 RX ORDER — DEXTROSE AND SODIUM CHLORIDE 5; .45 G/100ML; G/100ML
30 INJECTION, SOLUTION INTRAVENOUS CONTINUOUS PRN
Status: DISCONTINUED | OUTPATIENT
Start: 2022-09-22 | End: 2022-09-22 | Stop reason: HOSPADM

## 2022-09-22 RX ADMIN — PROPOFOL 30 MG: 10 INJECTION, EMULSION INTRAVENOUS at 11:12

## 2022-09-22 RX ADMIN — PROPOFOL 20 MG: 10 INJECTION, EMULSION INTRAVENOUS at 11:16

## 2022-09-22 RX ADMIN — DEXTROSE AND SODIUM CHLORIDE 30 ML/HR: 5; 450 INJECTION, SOLUTION INTRAVENOUS at 10:46

## 2022-09-22 RX ADMIN — PROPOFOL 100 MG: 10 INJECTION, EMULSION INTRAVENOUS at 11:09

## 2022-09-22 RX ADMIN — PROPOFOL 20 MG: 10 INJECTION, EMULSION INTRAVENOUS at 11:18

## 2022-09-22 RX ADMIN — PROPOFOL 30 MG: 10 INJECTION, EMULSION INTRAVENOUS at 11:14

## 2022-09-22 RX ADMIN — LIDOCAINE HYDROCHLORIDE 50 MG: 20 INJECTION, SOLUTION INTRAVENOUS at 11:09

## 2022-09-22 NOTE — ANESTHESIA PREPROCEDURE EVALUATION
Anesthesia Evaluation     Patient summary reviewed and Nursing notes reviewed   NPO Solid Status: > 8 hours  NPO Liquid Status: > 4 hours           Airway   Mallampati: II  No difficulty expected  Dental - normal exam     Pulmonary - normal exam   (+) asthma,sleep apnea on CPAP,   Cardiovascular     Rhythm: regular  Rate: normal    (+) hypertension well controlled less than 2 medications, hyperlipidemia,       Neuro/Psych  (+) headaches,    GI/Hepatic/Renal/Endo    (+) obesity, morbid obesity, GERD well controlled,  diabetes mellitus type 2 well controlled,     Musculoskeletal     Abdominal    Substance History      OB/GYN          Other   arthritis,                      Anesthesia Plan    ASA 3     MAC     intravenous induction     Anesthetic plan, risks, benefits, and alternatives have been provided, discussed and informed consent has been obtained with: patient.    Plan discussed with CRNA.        CODE STATUS:

## 2022-09-22 NOTE — ANESTHESIA POSTPROCEDURE EVALUATION
Patient: Sarina Lashone Jackson    Procedure Summary     Date: 09/22/22 Room / Location: [facility] ENDOSCOPY 1 / [facility] ENDOSCOPY    Anesthesia Start: 1102 Anesthesia Stop:     Procedure: COLONOSCOPY (N/A ) Diagnosis:       Encounter for screening for malignant neoplasm of colon      (Encounter for screening for malignant neoplasm of colon [Z12.11])    Surgeons: Caitlin Hermosillo MD Provider: Allan Koenig CRNA    Anesthesia Type: MAC ASA Status: 3          Anesthesia Type: MAC    Vitals  No vitals data found for the desired time range.          Post Anesthesia Care and Evaluation    Patient location during evaluation: bedside  Patient participation: complete - patient participated  Level of consciousness: sleepy but conscious  Pain score: 0  Pain management: adequate    Airway patency: patent  Anesthetic complications: No anesthetic complications  PONV Status: none  Cardiovascular status: acceptable  Respiratory status: acceptable  Hydration status: acceptable    Comments: ---------------------------               09/22/22                      1123        ---------------------------   BP:          149/94         Pulse:         77           Resp:          18           Temp:   97.7 °F (36.5 °C)   SpO2:          99%         ---------------------------

## 2022-10-12 ENCOUNTER — OFFICE VISIT (OUTPATIENT)
Dept: FAMILY MEDICINE CLINIC | Facility: CLINIC | Age: 47
End: 2022-10-12

## 2022-10-12 VITALS
HEIGHT: 63 IN | DIASTOLIC BLOOD PRESSURE: 80 MMHG | HEART RATE: 82 BPM | WEIGHT: 288 LBS | BODY MASS INDEX: 51.03 KG/M2 | TEMPERATURE: 98.5 F | OXYGEN SATURATION: 96 % | SYSTOLIC BLOOD PRESSURE: 110 MMHG

## 2022-10-12 DIAGNOSIS — Z12.31 SCREENING MAMMOGRAM, ENCOUNTER FOR: ICD-10-CM

## 2022-10-12 DIAGNOSIS — E11.69 HYPERLIPIDEMIA ASSOCIATED WITH TYPE 2 DIABETES MELLITUS: ICD-10-CM

## 2022-10-12 DIAGNOSIS — J45.40 MODERATE PERSISTENT ASTHMA, UNSPECIFIED WHETHER COMPLICATED: ICD-10-CM

## 2022-10-12 DIAGNOSIS — E66.01 MORBID OBESITY: ICD-10-CM

## 2022-10-12 DIAGNOSIS — R11.2 NAUSEA AND VOMITING: ICD-10-CM

## 2022-10-12 DIAGNOSIS — K57.90 DIVERTICULOSIS: ICD-10-CM

## 2022-10-12 DIAGNOSIS — D50.9 IRON DEFICIENCY ANEMIA, UNSPECIFIED IRON DEFICIENCY ANEMIA TYPE: ICD-10-CM

## 2022-10-12 DIAGNOSIS — M17.11 ARTHRITIS OF RIGHT KNEE: ICD-10-CM

## 2022-10-12 DIAGNOSIS — E55.9 VITAMIN D DEFICIENCY: ICD-10-CM

## 2022-10-12 DIAGNOSIS — I10 ESSENTIAL HYPERTENSION: ICD-10-CM

## 2022-10-12 DIAGNOSIS — E78.2 MIXED HYPERLIPIDEMIA: ICD-10-CM

## 2022-10-12 DIAGNOSIS — M25.561 RIGHT KNEE PAIN, UNSPECIFIED CHRONICITY: Primary | ICD-10-CM

## 2022-10-12 DIAGNOSIS — M23.41 LOOSE BODY OF RIGHT KNEE: ICD-10-CM

## 2022-10-12 DIAGNOSIS — M25.461 EFFUSION, RIGHT KNEE: ICD-10-CM

## 2022-10-12 DIAGNOSIS — Z79.4 CONTROLLED TYPE 2 DIABETES MELLITUS WITH COMPLICATION, WITH LONG-TERM CURRENT USE OF INSULIN: ICD-10-CM

## 2022-10-12 DIAGNOSIS — E78.5 HYPERLIPIDEMIA ASSOCIATED WITH TYPE 2 DIABETES MELLITUS: ICD-10-CM

## 2022-10-12 DIAGNOSIS — E11.8 CONTROLLED TYPE 2 DIABETES MELLITUS WITH COMPLICATION, WITH LONG-TERM CURRENT USE OF INSULIN: ICD-10-CM

## 2022-10-12 PROCEDURE — 99214 OFFICE O/P EST MOD 30 MIN: CPT | Performed by: FAMILY MEDICINE

## 2022-10-12 RX ORDER — ATORVASTATIN CALCIUM 40 MG/1
40 TABLET, FILM COATED ORAL DAILY
Qty: 90 TABLET | Refills: 3 | Status: SHIPPED | OUTPATIENT
Start: 2022-10-12

## 2022-10-12 RX ORDER — LORATADINE 10 MG/1
10 TABLET ORAL DAILY
Qty: 30 TABLET | Refills: 3 | Status: SHIPPED | OUTPATIENT
Start: 2022-10-12

## 2022-10-12 RX ORDER — MONTELUKAST SODIUM 10 MG/1
10 TABLET ORAL NIGHTLY
Qty: 30 TABLET | Refills: 3 | Status: SHIPPED | OUTPATIENT
Start: 2022-10-12

## 2022-10-12 RX ORDER — ALBUTEROL SULFATE 90 UG/1
2 AEROSOL, METERED RESPIRATORY (INHALATION) EVERY 4 HOURS PRN
Qty: 18 G | Refills: 3 | Status: SHIPPED | OUTPATIENT
Start: 2022-10-12

## 2022-10-12 RX ORDER — ASPIRIN 81 MG/1
81 TABLET, CHEWABLE ORAL DAILY
Qty: 30 TABLET | Refills: 3 | Status: SHIPPED | OUTPATIENT
Start: 2022-10-12

## 2022-10-12 RX ORDER — OMEPRAZOLE 40 MG/1
40 CAPSULE, DELAYED RELEASE ORAL DAILY
Qty: 30 CAPSULE | Refills: 3 | Status: SHIPPED | OUTPATIENT
Start: 2022-10-12

## 2022-11-04 NOTE — PROGRESS NOTES
Subjective:  Sarina Lashone Jackson is a 47 y.o. female who presents for       Patient Active Problem List   Diagnosis   • Encounter for immunization   • Diabetes type 2, uncontrolled   • Hyperlipidemia associated with type 2 diabetes mellitus (HCC)   • Morbid obesity (HCC)   • Vitamin D deficiency   • Essential hypertension   • Hyperlipidemia   • Urinary tract infection   • Acute pain of right shoulder   • Right arm pain   • Right elbow pain   • Lateral epicondylitis of right elbow   • Medial epicondylitis   • Encounter for screening mammogram for malignant neoplasm of breast   • Iron deficiency anemia   • Mild intermittent asthma   • Encounter for screening for endocrine disorder   • Gastroesophageal reflux disease   • Pain in both knees   • Controlled type 2 diabetes mellitus with complication, without long-term current use of insulin (HCC)   • Anemia   • Right knee pain   • Controlled type 2 diabetes mellitus with complication, with long-term current use of insulin (HCC)   • General medical examination   • Leukocytosis   • Chronic bilateral low back pain with sciatica   • Encounter for Papanicolaou smear of cervix   • Screening mammogram, encounter for   • Other fatigue   • MIGEL (obstructive sleep apnea)   • Moderate persistent asthma   • Acute bronchitis   • Right wrist pain   • Nausea and vomiting   • Grief   • Carpal tunnel syndrome on right   • Encounter for screening for malignant neoplasm of colon   • Uncontrolled type 2 diabetes mellitus with hypoglycemia without coma (HCC)   • Bilateral swelling of feet   • Constipation           Current Outpatient Medications:   •  albuterol sulfate HFA (Ventolin HFA) 108 (90 Base) MCG/ACT inhaler, Inhale 2 puffs Every 4 (Four) Hours As Needed for Shortness of Air., Disp: 18 g, Rfl: 3  •  aspirin 81 MG chewable tablet, Chew 1 tablet Daily., Disp: 30 tablet, Rfl: 3  •  atorvastatin (Lipitor) 40 MG tablet, Take 1 tablet by mouth Daily., Disp: 90 tablet, Rfl: 3  •  BD  "INSULIN SYRINGE U/F 31G X 5/16\" 1 ML misc, , Disp: , Rfl:   •  cyclobenzaprine (FLEXERIL) 10 MG tablet, Take 1 tablet by mouth At Night As Needed for Muscle Spasms., Disp: 30 tablet, Rfl: 3  •  Dulaglutide 1.5 MG/0.5ML solution pen-injector, Inject 1.5 mg under the skin into the appropriate area as directed 1 (One) Time Per Week., Disp: 4 pen, Rfl: 3  •  Ertugliflozin L-PyroglutamicAc (Steglatro) 15 MG tablet, Take 1 tablet by mouth Every Morning., Disp: 30 tablet, Rfl: 3  •  ferrous sulfate 325 (65 FE) MG tablet, Take 1 tablet by mouth 2 (Two) Times a Day., Disp: 60 tablet, Rfl: 3  •  fluticasone (Flonase) 50 MCG/ACT nasal spray, 2 sprays into the nostril(s) as directed by provider Daily., Disp: 16 g, Rfl: 3  •  fluticasone (FLOVENT HFA) 220 MCG/ACT inhaler, Inhale 1 puff 2 (Two) Times a Day., Disp: 1 each, Rfl: 3  •  furosemide (Lasix) 20 MG tablet, Take 1 tablet by mouth 2 (Two) Times a Day., Disp: 60 tablet, Rfl: 3  •  glucose blood test strip, 1 each by Other route 3 (Three) Times a Day., Disp: 100 each, Rfl: 3  •  insulin glargine (Lantus) 100 UNIT/ML injection, START WITH 16 UNITS UNDER THE SKIN AT BEDTIME CAN GO UP BY 3 UNITS EVERY 3 DAYS UNTIL IN THE MORNING SUGARS RUNNING  MDD 25, Disp: 9 mL, Rfl: 12  •  Insulin Pen Needle ([employer] Pen Needles) 31G X 6 MM misc, Inject 1 Device as directed 3 (Three) Times a Day As Needed (Diabetes)., Disp: 100 each, Rfl: 2  •  Lancets misc, Use to check sugars three times a day for diabetes E11, Disp: 100 each, Rfl: 3  •  loratadine (CLARITIN) 10 MG tablet, Take 1 tablet by mouth Daily., Disp: 30 tablet, Rfl: 3  •  losartan (Cozaar) 50 MG tablet, Take 1 tablet by mouth Daily., Disp: 30 tablet, Rfl: 3  •  montelukast (Singulair) 10 MG tablet, Take 1 tablet by mouth Every Night., Disp: 30 tablet, Rfl: 3  •  omeprazole (priLOSEC) 40 MG capsule, Take 1 capsule by mouth Daily., Disp: 30 capsule, Rfl: 3  •  ondansetron ODT (ZOFRAN-ODT) 8 MG disintegrating tablet, Place 1 " tablet on the tongue Every 8 (Eight) Hours As Needed for Nausea or Vomiting., Disp: 30 tablet, Rfl: 3  •  vitamin D (ERGOCALCIFEROL) 1.25 MG (73904 UT) capsule capsule, Take 1 capsule by mouth Every 7 (Seven) Days., Disp: 5 capsule, Rfl: 3  •  Continuous Blood Gluc  (Dexcom G6 ) device, 1 application 3 (Three) Times a Day., Disp: 1 each, Rfl: 3  •  Continuous Blood Gluc Transmit (Dexcom G6 Transmitter) misc, 1 application 3 (Three) Times a Day., Disp: 1 each, Rfl: 3  •  levothyroxine (Synthroid) 88 MCG tablet, Take 1 tablet by mouth Daily., Disp: 30 tablet, Rfl: 3  •  linaclotide (Linzess) 72 MCG capsule capsule, Take 1 capsule by mouth Every Morning Before Breakfast., Disp: 30 capsule, Rfl: 3  Pt is 48 yo female with management of morbid obesity, moderate persistent asthma, allergic rhinitis, IDDM type 2,, HLP, HTN,  Vitamin D deficiency,GERD, chronic fatigue, MIGEL, multiple joint pain, sp cholecystectomy sp tonsillectomy, sp umbilical hernia repair, sp , iron deficiency anemia , CKD stage 2, carpal tunnel on right hand/wrist , internal hemorrhoids, diverticulosis        10/12/22 in office visit for recheck. Since last visit pt saw GI on 22 and had colonsocopy on 22 that showed diverticulosis and internal hemorrhoids. pth as yet to get labwork ordered on 22 and 22. She is also due for a mammogram screening. She is needing refills on medications.  Her x-ray of right knee showed no fracture or dislocation there is moderate narrowing of medial joint space along with subchondral cyst formation of lateral femoral condyle with 1.3 cm smaller lose bodies femoral tibial joint there is also small suprapatteller effusion. Pt doing fair. No chest pain. She did have episode of dizziness at work her sugar was low.   Her sugars have been running on average in 150s. She is taking lantus 10 units     22 in office visit for recheck. Pt had labwork done on 22 that showed on  thyroid studies. TSH was normal with T4 at 0.92 from 0.86. pt is on synthroid 75 mcg PO q daily. On CBC hemoglobin stable RBC at 6.04  MCV at 70.2  CMP showed glucose at 257 with GFR at 79.0. hga1c is at 9.60 from 8.10.  Lipid panel showed stable LDL but HDL low at 36. Vitamin D was normal vitamin B12 was high at 1014.  She is non compliant with her medications. She is not taking her trulicity weekly along with synthroid daily. She is not taking her insulin regularly.  She is also constipated and has a bowel movement 2-3 times a week   Constipation  This is a recurrent problem. The current episode started more than 1 month ago. The problem is unchanged. Pertinent negatives include no abdominal pain, anorexia, back pain, bloating, diarrhea, difficulty urinating, fecal incontinence, fever, flatus, hematochezia, hemorrhoids, melena, nausea, rectal pain, vomiting or weight loss. She has tried nothing for the symptoms. The treatment provided no relief. There is no history of abdominal surgery, endocrine disease, inflammatory bowel disease, irritable bowel syndrome, metabolic disease, neurologic disease, neuromuscular disease, psychiatric history or radiation treatment.    Hypertension  This is a chronic problem. The current episode started more than 1 year ago. The problem has been resolved since onset. The problem is uncontrolled. Pertinent negatives include no anxiety, blurred vision, chest pain, headaches, malaise/fatigue, neck pain, orthopnea, palpitations, peripheral edema, PND, shortness of breath or sweats. Risk factors for coronary artery disease include diabetes mellitus, sedentary lifestyle and obesity. Past treatments include ACE inhibitors. Current antihypertension treatment includes ACE inhibitors. The current treatment provides significant improvement. There is no history of angina, kidney disease, CAD/MI, CVA, heart failure, left ventricular hypertrophy, PVD or retinopathy. There is no history of chronic  renal disease, coarctation of the aorta, hyperaldosteronism, hypercortisolism, hyperparathyroidism, a hypertension causing med, pheochromocytoma, renovascular disease, sleep apnea or a thyroid problem.   Diabetes   She presents for her follow-up diabetic visit. She has type 2 diabetes mellitus. Her disease course has been controlled . Pertinent negatives for hypoglycemia include no confusion, dizziness, headaches, hunger, mood changes, nervousness/anxiousness, pallor, seizures, sleepiness, speech difficulty, sweats or tremors. Associated symptoms include fatigue. Pertinent negatives for diabetes include no blurred vision, no chest pain, no foot paresthesias, no foot ulcerations, no polydipsia, no polyphagia, no polyuria, no visual change, no weakness and no weight loss. Pertinent negatives for hypoglycemia complications include no blackouts, no hospitalization, no nocturnal hypoglycemia, no required assistance and no required glucagon injection. Symptoms are stable. Pertinent negatives for diabetic complications include no autonomic neuropathy, CVA, heart disease, nephropathy, peripheral neuropathy, PVD or retinopathy. Risk factors for coronary artery disease include diabetes mellitus, dyslipidemia and obesity. Current diabetic treatment includes insulin injections. She is following a diabetic diet. When asked about meal planning, she reported none. She has not had a previous visit with a dietitian. She participates in exercise intermittently. She does not see a podiatrist.Eye exam is not current.   Obesity   This is a chronic problem. The current episode started more than 1 year ago. The problem has been unchanged. Associated symptoms include arthralgias, fatigue and nausea. Pertinent negatives include no abdominal pain, anorexia, change in bowel habit, chest pain, chills, congestion, coughing, fever, headaches, joint swelling, myalgias, neck pain, numbness, rash, sore throat, swollen glands, urinary  symptoms, vertigo, visual change, vomiting or weakness. Nothing aggravates the symptoms. She has tried nothing for the symptoms. The treatment provided no relief    Review of Systems  Review of Systems   Constitutional: Positive for activity change and fatigue. Negative for appetite change, chills, diaphoresis, fever and weight loss.   HENT: Negative for congestion, postnasal drip, rhinorrhea, sinus pressure, sinus pain, sneezing, sore throat, trouble swallowing and voice change.    Respiratory: Positive for shortness of breath. Negative for cough, choking, chest tightness, wheezing and stridor.    Cardiovascular: Negative for chest pain.   Gastrointestinal: Positive for constipation. Negative for abdominal pain, anorexia, bloating, diarrhea, flatus, hematochezia, hemorrhoids, melena, nausea, rectal pain and vomiting.   Genitourinary: Negative for difficulty urinating.   Musculoskeletal: Positive for arthralgias. Negative for back pain.   Neurological: Positive for weakness and numbness. Negative for headaches.   Psychiatric/Behavioral: The patient is nervous/anxious.        Patient Active Problem List   Diagnosis   • Encounter for immunization   • Diabetes type 2, uncontrolled   • Hyperlipidemia associated with type 2 diabetes mellitus (HCC)   • Morbid obesity (HCC)   • Vitamin D deficiency   • Essential hypertension   • Hyperlipidemia   • Urinary tract infection   • Acute pain of right shoulder   • Right arm pain   • Right elbow pain   • Lateral epicondylitis of right elbow   • Medial epicondylitis   • Encounter for screening mammogram for malignant neoplasm of breast   • Iron deficiency anemia   • Mild intermittent asthma   • Encounter for screening for endocrine disorder   • Gastroesophageal reflux disease   • Pain in both knees   • Controlled type 2 diabetes mellitus with complication, without long-term current use of insulin (HCC)   • Anemia   • Right knee pain   • Controlled type 2 diabetes mellitus with  complication, with long-term current use of insulin (HCC)   • General medical examination   • Leukocytosis   • Chronic bilateral low back pain with sciatica   • Encounter for Papanicolaou smear of cervix   • Screening mammogram, encounter for   • Other fatigue   • MIGEL (obstructive sleep apnea)   • Moderate persistent asthma   • Acute bronchitis   • Right wrist pain   • Nausea and vomiting   • Grief   • Carpal tunnel syndrome on right   • Encounter for screening for malignant neoplasm of colon   • Uncontrolled type 2 diabetes mellitus with hypoglycemia without coma (HCC)   • Bilateral swelling of feet   • Constipation     Past Surgical History:   Procedure Laterality Date   •  SECTION  10/30/2000   • CHOLECYSTECTOMY  1995   • COLONOSCOPY  2013    normal per pt   • COLONOSCOPY N/A 2022    Procedure: COLONOSCOPY;  Surgeon: Caitlin Hermosillo MD;  Location: [facility] ENDOSCOPY;  Service: Gastroenterology;  Laterality: N/A;   • CYSTOSCOPY  2016    And bilateral retrograde pyelograms. Performed at [facility].   • TONSILLECTOMY  1985   • UMBILICAL HERNIA REPAIR  2015   • UMBILICAL HERNIA REPAIR  2015     Social History     Socioeconomic History   • Marital status: Single   Tobacco Use   • Smoking status: Never   • Smokeless tobacco: Never   Vaping Use   • Vaping Use: Never used   Substance and Sexual Activity   • Alcohol use: No   • Drug use: No   • Sexual activity: Defer     Family History   Problem Relation Age of Onset   • Diabetes Mother    • Heart disease Mother    • Heart failure Mother         congestive   • Hypertension Mother    • Asthma Father    • Osteoarthritis Father      Lab on 2022   Component Date Value Ref Range Status   • TSH 2022 2.090  0.270 - 4.200 uIU/mL Final   • Free T4 2022 0.92 (L)  0.93 - 1.70 ng/dL Final   • T3, Free 2022 2.46  2.00 - 4.40 pg/mL Final   • WBC 2022 10.22  3.40 - 10.80 10*3/mm3 Final   • RBC  11/22/2022 6.04 (H)  3.77 - 5.28 10*6/mm3 Final   • Hemoglobin 11/22/2022 12.3  12.0 - 15.9 g/dL Final   • Hematocrit 11/22/2022 42.4  34.0 - 46.6 % Final   • MCV 11/22/2022 70.2 (L)  79.0 - 97.0 fL Final   • MCH 11/22/2022 20.4 (L)  26.6 - 33.0 pg Final   • MCHC 11/22/2022 29.0 (L)  31.5 - 35.7 g/dL Final   • RDW 11/22/2022 15.1  12.3 - 15.4 % Final   • RDW-SD 11/22/2022 36.6 (L)  37.0 - 54.0 fl Final   • MPV 11/22/2022 11.5  6.0 - 12.0 fL Final   • Platelets 11/22/2022 287  140 - 450 10*3/mm3 Final   • Neutrophil % 11/22/2022 52.5  42.7 - 76.0 % Final   • Lymphocyte % 11/22/2022 35.1  19.6 - 45.3 % Final   • Monocyte % 11/22/2022 6.1  5.0 - 12.0 % Final   • Eosinophil % 11/22/2022 5.4  0.3 - 6.2 % Final   • Basophil % 11/22/2022 0.5  0.0 - 1.5 % Final   • Immature Grans % 11/22/2022 0.4  0.0 - 0.5 % Final   • Neutrophils, Absolute 11/22/2022 5.37  1.70 - 7.00 10*3/mm3 Final   • Lymphocytes, Absolute 11/22/2022 3.59 (H)  0.70 - 3.10 10*3/mm3 Final   • Monocytes, Absolute 11/22/2022 0.62  0.10 - 0.90 10*3/mm3 Final   • Eosinophils, Absolute 11/22/2022 0.55 (H)  0.00 - 0.40 10*3/mm3 Final   • Basophils, Absolute 11/22/2022 0.05  0.00 - 0.20 10*3/mm3 Final   • Immature Grans, Absolute 11/22/2022 0.04  0.00 - 0.05 10*3/mm3 Final   • nRBC 11/22/2022 0.1  0.0 - 0.2 /100 WBC Final   • Glucose 11/22/2022 257 (H)  65 - 99 mg/dL Final   • BUN 11/22/2022 11  6 - 20 mg/dL Final   • Creatinine 11/22/2022 0.90  0.57 - 1.00 mg/dL Final   • Sodium 11/22/2022 138  136 - 145 mmol/L Final   • Potassium 11/22/2022 4.5  3.5 - 5.2 mmol/L Final   • Chloride 11/22/2022 98  98 - 107 mmol/L Final   • CO2 11/22/2022 28.9  22.0 - 29.0 mmol/L Final   • Calcium 11/22/2022 9.9  8.6 - 10.5 mg/dL Final   • Total Protein 11/22/2022 7.6  6.0 - 8.5 g/dL Final   • Albumin 11/22/2022 3.90  3.50 - 5.20 g/dL Final   • ALT (SGPT) 11/22/2022 19  1 - 33 U/L Final   • AST (SGOT) 11/22/2022 23  1 - 32 U/L Final   • Alkaline Phosphatase 11/22/2022 76  39 - 117  U/L Final   • Total Bilirubin 11/22/2022 0.2  0.0 - 1.2 mg/dL Final   • Globulin 11/22/2022 3.7  gm/dL Final   • A/G Ratio 11/22/2022 1.1  g/dL Final   • BUN/Creatinine Ratio 11/22/2022 12.2  7.0 - 25.0 Final   • Anion Gap 11/22/2022 11.1  5.0 - 15.0 mmol/L Final   • eGFR 11/22/2022 79.0  >60.0 mL/min/1.73 Final    National Kidney Foundation and American Society of Nephrology (ASN) Task Force recommended calculation based on the Chronic Kidney Disease Epidemiology Collaboration (CKD-EPI) equation refit without adjustment for race.   • Hemoglobin A1C 11/22/2022 9.60 (H)  4.80 - 5.60 % Final   • Total Cholesterol 11/22/2022 113  0 - 200 mg/dL Final   • Triglycerides 11/22/2022 96  0 - 150 mg/dL Final   • HDL Cholesterol 11/22/2022 36 (L)  40 - 60 mg/dL Final   • LDL Cholesterol  11/22/2022 59  0 - 100 mg/dL Final   • VLDL Cholesterol 11/22/2022 18  5 - 40 mg/dL Final   • LDL/HDL Ratio 11/22/2022 1.61   Final   • 25 Hydroxy, Vitamin D 11/22/2022 55.4  30.0 - 100.0 ng/ml Final   • Vitamin B-12 11/22/2022 1,014 (H)  211 - 946 pg/mL Final   • Microalbumin/Creatinine Ratio 11/22/2022 9.8  mg/g Final   • Creatinine, Urine 11/22/2022 367.2  mg/dL Final   • Microalbumin, Urine 11/22/2022 3.6  mg/dL Final   Lab on 06/21/2022   Component Date Value Ref Range Status   • WBC 06/21/2022 9.17  3.40 - 10.80 10*3/mm3 Final   • RBC 06/21/2022 6.01 (H)  3.77 - 5.28 10*6/mm3 Final   • Hemoglobin 06/21/2022 12.5  12.0 - 15.9 g/dL Final   • Hematocrit 06/21/2022 39.7  34.0 - 46.6 % Final   • MCV 06/21/2022 66.1 (L)  79.0 - 97.0 fL Final   • MCH 06/21/2022 20.8 (L)  26.6 - 33.0 pg Final   • MCHC 06/21/2022 31.5  31.5 - 35.7 g/dL Final   • RDW 06/21/2022 16.6 (H)  12.3 - 15.4 % Final   • RDW-SD 06/21/2022 35.3 (L)  37.0 - 54.0 fl Final   • MPV 06/21/2022 10.7  6.0 - 12.0 fL Final   • Platelets 06/21/2022 276  140 - 450 10*3/mm3 Final   • Neutrophil % 06/21/2022 48.5  42.7 - 76.0 % Final   • Lymphocyte % 06/21/2022 36.0  19.6 - 45.3 % Final    • Monocyte % 06/21/2022 6.7  5.0 - 12.0 % Final   • Eosinophil % 06/21/2022 7.5 (H)  0.3 - 6.2 % Final   • Basophil % 06/21/2022 0.9  0.0 - 1.5 % Final   • Neutrophils, Absolute 06/21/2022 4.45  1.70 - 7.00 10*3/mm3 Final   • Lymphocytes, Absolute 06/21/2022 3.30 (H)  0.70 - 3.10 10*3/mm3 Final   • Monocytes, Absolute 06/21/2022 0.61  0.10 - 0.90 10*3/mm3 Final   • Eosinophils, Absolute 06/21/2022 0.69 (H)  0.00 - 0.40 10*3/mm3 Final   • Basophils, Absolute 06/21/2022 0.08  0.00 - 0.20 10*3/mm3 Final   • Glucose 06/21/2022 111 (H)  65 - 99 mg/dL Final   • BUN 06/21/2022 11  6 - 20 mg/dL Final   • Creatinine 06/21/2022 0.87  0.57 - 1.00 mg/dL Final   • Sodium 06/21/2022 136  136 - 145 mmol/L Final   • Potassium 06/21/2022 4.0  3.5 - 5.2 mmol/L Final   • Chloride 06/21/2022 99  98 - 107 mmol/L Final   • CO2 06/21/2022 28.4  22.0 - 29.0 mmol/L Final   • Calcium 06/21/2022 9.6  8.6 - 10.5 mg/dL Final   • Total Protein 06/21/2022 7.2  6.0 - 8.5 g/dL Final   • Albumin 06/21/2022 3.90  3.50 - 5.20 g/dL Final   • ALT (SGPT) 06/21/2022 19  1 - 33 U/L Final   • AST (SGOT) 06/21/2022 17  1 - 32 U/L Final   • Alkaline Phosphatase 06/21/2022 68  39 - 117 U/L Final   • Total Bilirubin 06/21/2022 <0.2  0.0 - 1.2 mg/dL Final   • Globulin 06/21/2022 3.3  gm/dL Final   • A/G Ratio 06/21/2022 1.2  g/dL Final   • BUN/Creatinine Ratio 06/21/2022 12.6  7.0 - 25.0 Final   • Anion Gap 06/21/2022 8.6  5.0 - 15.0 mmol/L Final   • eGFR 06/21/2022 82.8  >60.0 mL/min/1.73 Final    National Kidney Foundation and American Society of Nephrology (ASN) Task Force recommended calculation based on the Chronic Kidney Disease Epidemiology Collaboration (CKD-EPI) equation refit without adjustment for race.   • Hemoglobin A1C 06/21/2022 8.10 (H)  4.80 - 5.60 % Final   • Total Cholesterol 06/21/2022 144  0 - 200 mg/dL Final   • Triglycerides 06/21/2022 114  0 - 150 mg/dL Final   • HDL Cholesterol 06/21/2022 44  40 - 60 mg/dL Final   • LDL Cholesterol   06/21/2022 79  0 - 100 mg/dL Final   • VLDL Cholesterol 06/21/2022 21  5 - 40 mg/dL Final   • LDL/HDL Ratio 06/21/2022 1.75   Final   • TSH 06/21/2022 2.470  0.270 - 4.200 uIU/mL Final   • Free T4 06/21/2022 0.86 (L)  0.93 - 1.70 ng/dL Final   • T3, Free 06/21/2022 2.30  2.00 - 4.40 pg/mL Final   • 25 Hydroxy, Vitamin D 06/21/2022 57.1  30.0 - 100.0 ng/ml Final   • Vitamin B-12 06/21/2022 1,201 (H)  211 - 946 pg/mL Final   • Iron 06/21/2022 38  37 - 145 mcg/dL Final   • Iron Saturation 06/21/2022 13 (L)  20 - 50 % Final   • Transferrin 06/21/2022 204  200 - 360 mg/dL Final   • TIBC 06/21/2022 304  298 - 536 mcg/dL Final   • Ferritin 06/21/2022 171.00 (H)  13.00 - 150.00 ng/mL Final      XR knee 4+ vw right  Narrative: Four view right knee    HISTORY: Right knee pain    AP, lateral, tunnel and oblique views obtained.    COMPARISON: None    FINDINGS:   No fracture or dislocation.  Patellar, femoral and tibial osteophytes.  Moderate narrowing medial joint space.  Subchondral cyst formation lateral femoral condyle.  1.3 cm and smaller loose bodies femoral tibial joint.  Small suprapatellar effusion.  No other osseous or articular abnormality.  Impression: CONCLUSION:  As above    [id number]    Electronically signed by:  Elvis Mancera MD  8/6/2022 3:31 PM CDT  Workstation: [id number]    @[email or web address]  Immunization History   Administered Date(s) Administered   • Flu Vaccine Quad PF >36MO 10/14/2016   • FluLaval/Fluzone >6mos 10/14/2016   • Hepatitis B 07/19/2016, 08/23/2016, 02/17/2017   • Hepatitis B Vaccine Adult IM 07/19/2016, 08/23/2016, 02/17/2017   • Tdap 02/17/2017       The following portions of the patient's history were reviewed and updated as appropriate: allergies, current medications, past family history, past medical history, past social history, past surgical history and problem list.        Physical Exam  /86 (BP Location: Right arm, Patient Position: Sitting, Cuff Size: Adult)   Pulse 90   Temp 97.6 °F  "(36.4 °C)   Ht 160 cm (63\")   Wt 129 kg (285 lb 3.2 oz)   SpO2 94%   BMI 50.52 kg/m²     Physical Exam  Vitals and nursing note reviewed.   Constitutional:       Appearance: She is well-developed. She is not diaphoretic.   HENT:      Head: Normocephalic and atraumatic.      Right Ear: External ear normal.   Eyes:      Conjunctiva/sclera: Conjunctivae normal.      Pupils: Pupils are equal, round, and reactive to light.   Cardiovascular:      Rate and Rhythm: Normal rate and regular rhythm.      Heart sounds: Normal heart sounds. No murmur heard.  Pulmonary:      Effort: Pulmonary effort is normal. No respiratory distress.      Breath sounds: Normal breath sounds.   Abdominal:      General: Bowel sounds are normal. There is no distension.      Palpations: Abdomen is soft.      Tenderness: There is no abdominal tenderness.   Musculoskeletal:         General: Tenderness present. No deformity. Normal range of motion.      Cervical back: Normal range of motion and neck supple.   Skin:     General: Skin is warm.      Coloration: Skin is not pale.      Findings: No erythema or rash.   Neurological:      Mental Status: She is alert and oriented to person, place, and time.      Cranial Nerves: No cranial nerve deficit.   Psychiatric:         Behavior: Behavior normal.         [unfilled]   Diagnosis Plan   1. Morbid obesity (HCC)        2. Vitamin D deficiency        3. Moderate persistent asthma, unspecified whether complicated        4. Uncontrolled type 2 diabetes mellitus with hypoglycemia without coma (HCC)        5. Essential hypertension        6. Mixed hyperlipidemia        7. Iron deficiency anemia, unspecified iron deficiency anemia type        8. High serum vitamin B12        9. Uncontrolled type 2 diabetes mellitus with hyperglycemia (HCC)  Ertugliflozin L-PyroglutamicAc (Steglatro) 15 MG tablet      10. Constipation, unspecified constipation type        11. Noncompliance                   -went over " labwork   -recommend diabetic eye exam  -recommend influenza vaccination  -recommend mammogram screening -[facility] at [facility]   -knee pain - refer to Orthopedic. Reviewed x-ray   -nausea/vomiting - on zofran 8 mg ODT every 8 hours PRN  -MIGEL- sleep medicine following. She will need to make a call for appt for CPAP   Essential Hypertension -resent losartan 50 mg daily to pharmacy  Advised pt to monitor Blood pressure at home and bring readings next visit   -hyperlipidemia-  Recheck lipid panel The current medical regimen is effective;  continue present plan and medications.  -bilateral feet swelling -  on lasix 20 mg PO BID. Drug information provided. Recommend low sodium diet   -hypopthyroidism - on synthroid 75  mcg daily. Will increase synthroid from 75 to 88 mcg PO q daily.    -DM type 2-  on Lantus 10  unites units at bedtime on steglatro 15 mg daily. On truliicity 1.5 mg subq weekly.  Advised pt to restart all her medications. Will send in dexcom advised importance of blood sugar control   -allergic rhinitis - on claritin 10 mg daily.    -vitamin D deficiency -on Vitamin D3 50,000 units once a week   -morbid obesity - BMI >40 - pt gained lbs since last visit. Continue with ketogenic diet.  Gave diet information to go. Gave bariatric weight loss surgery to go home with.  She has been reluctant for weight loss surgery.  BMI at 50.52   -iron deficiency anemia/microcytosis  -continue on iron pill TID. Her last CBC showed stable hemoglobin. Recommend Hematology referral and possible IV iron therapy. Consultation appreciated.    -moderate persistent asthma - on flovent 220 mcg 1 puff BID:. Albuterol PRN  on singulair 10 mg at bedtime   -GERD/diverticulosis - GI following. Recommend high fiber diet  on prilosec 40 mg daily.   -advised pt to be safe and call with questions and concerns  -advised to go to ER or call 911 if symptoms worrisome or severe  -advised to followup with Specialist and referrals  -adivsed  pt to be safe during COVID-19 pandemic  I spent 30  minutes caring for Lizzy on this date of service. This time includes time spent by me in the following activities: preparing for the visit, reviewing tests, obtaining and/or reviewing a separately obtained history, performing a medically appropriate examination and/or evaluation, counseling and educating the patient/family/caregiver, ordering medications, tests, or procedures, referring and communicating with other health care professionals, documenting information in the medical record and independently interpreting results and communicating that information with the patient/family/caregiver.   -recheck in 2 months         This document has been electronically signed by Solitario Li MD on November 23, 2022 11:55 CST

## 2022-11-22 ENCOUNTER — LAB (OUTPATIENT)
Dept: LAB | Facility: HOSPITAL | Age: 47
End: 2022-11-22

## 2022-11-22 DIAGNOSIS — K21.9 GASTROESOPHAGEAL REFLUX DISEASE, UNSPECIFIED WHETHER ESOPHAGITIS PRESENT: ICD-10-CM

## 2022-11-22 DIAGNOSIS — E55.9 VITAMIN D DEFICIENCY: ICD-10-CM

## 2022-11-22 DIAGNOSIS — E66.01 MORBID OBESITY: ICD-10-CM

## 2022-11-22 DIAGNOSIS — D50.9 IRON DEFICIENCY ANEMIA, UNSPECIFIED IRON DEFICIENCY ANEMIA TYPE: ICD-10-CM

## 2022-11-22 DIAGNOSIS — E03.9 HYPOTHYROIDISM, UNSPECIFIED TYPE: ICD-10-CM

## 2022-11-22 DIAGNOSIS — J45.40 MODERATE PERSISTENT ASTHMA, UNSPECIFIED WHETHER COMPLICATED: ICD-10-CM

## 2022-11-22 DIAGNOSIS — G47.33 OSA (OBSTRUCTIVE SLEEP APNEA): ICD-10-CM

## 2022-11-22 DIAGNOSIS — R53.83 OTHER FATIGUE: ICD-10-CM

## 2022-11-22 DIAGNOSIS — I10 ESSENTIAL HYPERTENSION: ICD-10-CM

## 2022-11-22 DIAGNOSIS — E78.2 MIXED HYPERLIPIDEMIA: ICD-10-CM

## 2022-11-22 LAB
25(OH)D3 SERPL-MCNC: 55.4 NG/ML (ref 30–100)
ALBUMIN SERPL-MCNC: 3.9 G/DL (ref 3.5–5.2)
ALBUMIN UR-MCNC: 3.6 MG/DL
ALBUMIN/GLOB SERPL: 1.1 G/DL
ALP SERPL-CCNC: 76 U/L (ref 39–117)
ALT SERPL W P-5'-P-CCNC: 19 U/L (ref 1–33)
ANION GAP SERPL CALCULATED.3IONS-SCNC: 11.1 MMOL/L (ref 5–15)
AST SERPL-CCNC: 23 U/L (ref 1–32)
BASOPHILS # BLD AUTO: 0.05 10*3/MM3 (ref 0–0.2)
BASOPHILS NFR BLD AUTO: 0.5 % (ref 0–1.5)
BILIRUB SERPL-MCNC: 0.2 MG/DL (ref 0–1.2)
BUN SERPL-MCNC: 11 MG/DL (ref 6–20)
BUN/CREAT SERPL: 12.2 (ref 7–25)
CALCIUM SPEC-SCNC: 9.9 MG/DL (ref 8.6–10.5)
CHLORIDE SERPL-SCNC: 98 MMOL/L (ref 98–107)
CHOLEST SERPL-MCNC: 113 MG/DL (ref 0–200)
CO2 SERPL-SCNC: 28.9 MMOL/L (ref 22–29)
CREAT SERPL-MCNC: 0.9 MG/DL (ref 0.57–1)
CREAT UR-MCNC: 367.2 MG/DL
DEPRECATED RDW RBC AUTO: 36.6 FL (ref 37–54)
EGFRCR SERPLBLD CKD-EPI 2021: 79 ML/MIN/1.73
EOSINOPHIL # BLD AUTO: 0.55 10*3/MM3 (ref 0–0.4)
EOSINOPHIL NFR BLD AUTO: 5.4 % (ref 0.3–6.2)
ERYTHROCYTE [DISTWIDTH] IN BLOOD BY AUTOMATED COUNT: 15.1 % (ref 12.3–15.4)
GLOBULIN UR ELPH-MCNC: 3.7 GM/DL
GLUCOSE SERPL-MCNC: 257 MG/DL (ref 65–99)
HBA1C MFR BLD: 9.6 % (ref 4.8–5.6)
HCT VFR BLD AUTO: 42.4 % (ref 34–46.6)
HDLC SERPL-MCNC: 36 MG/DL (ref 40–60)
HGB BLD-MCNC: 12.3 G/DL (ref 12–15.9)
IMM GRANULOCYTES # BLD AUTO: 0.04 10*3/MM3 (ref 0–0.05)
IMM GRANULOCYTES NFR BLD AUTO: 0.4 % (ref 0–0.5)
LDLC SERPL CALC-MCNC: 59 MG/DL (ref 0–100)
LDLC/HDLC SERPL: 1.61 {RATIO}
LYMPHOCYTES # BLD AUTO: 3.59 10*3/MM3 (ref 0.7–3.1)
LYMPHOCYTES NFR BLD AUTO: 35.1 % (ref 19.6–45.3)
MCH RBC QN AUTO: 20.4 PG (ref 26.6–33)
MCHC RBC AUTO-ENTMCNC: 29 G/DL (ref 31.5–35.7)
MCV RBC AUTO: 70.2 FL (ref 79–97)
MICROALBUMIN/CREAT UR: 9.8 MG/G
MONOCYTES # BLD AUTO: 0.62 10*3/MM3 (ref 0.1–0.9)
MONOCYTES NFR BLD AUTO: 6.1 % (ref 5–12)
NEUTROPHILS NFR BLD AUTO: 5.37 10*3/MM3 (ref 1.7–7)
NEUTROPHILS NFR BLD AUTO: 52.5 % (ref 42.7–76)
NRBC BLD AUTO-RTO: 0.1 /100 WBC (ref 0–0.2)
PLATELET # BLD AUTO: 287 10*3/MM3 (ref 140–450)
PMV BLD AUTO: 11.5 FL (ref 6–12)
POTASSIUM SERPL-SCNC: 4.5 MMOL/L (ref 3.5–5.2)
PROT SERPL-MCNC: 7.6 G/DL (ref 6–8.5)
RBC # BLD AUTO: 6.04 10*6/MM3 (ref 3.77–5.28)
SODIUM SERPL-SCNC: 138 MMOL/L (ref 136–145)
T3FREE SERPL-MCNC: 2.46 PG/ML (ref 2–4.4)
T4 FREE SERPL-MCNC: 0.92 NG/DL (ref 0.93–1.7)
TRIGL SERPL-MCNC: 96 MG/DL (ref 0–150)
TSH SERPL DL<=0.05 MIU/L-ACNC: 2.09 UIU/ML (ref 0.27–4.2)
VIT B12 BLD-MCNC: 1014 PG/ML (ref 211–946)
VLDLC SERPL-MCNC: 18 MG/DL (ref 5–40)
WBC NRBC COR # BLD: 10.22 10*3/MM3 (ref 3.4–10.8)

## 2022-11-22 PROCEDURE — 82306 VITAMIN D 25 HYDROXY: CPT

## 2022-11-22 PROCEDURE — 82607 VITAMIN B-12: CPT

## 2022-11-22 PROCEDURE — 82570 ASSAY OF URINE CREATININE: CPT

## 2022-11-22 PROCEDURE — 84481 FREE ASSAY (FT-3): CPT

## 2022-11-22 PROCEDURE — 82043 UR ALBUMIN QUANTITATIVE: CPT

## 2022-11-22 PROCEDURE — 84439 ASSAY OF FREE THYROXINE: CPT

## 2022-11-22 PROCEDURE — 80061 LIPID PANEL: CPT

## 2022-11-22 PROCEDURE — 80050 GENERAL HEALTH PANEL: CPT

## 2022-11-22 PROCEDURE — 83036 HEMOGLOBIN GLYCOSYLATED A1C: CPT

## 2022-11-23 ENCOUNTER — OFFICE VISIT (OUTPATIENT)
Dept: FAMILY MEDICINE CLINIC | Facility: CLINIC | Age: 47
End: 2022-11-23

## 2022-11-23 VITALS
DIASTOLIC BLOOD PRESSURE: 86 MMHG | BODY MASS INDEX: 50.53 KG/M2 | SYSTOLIC BLOOD PRESSURE: 142 MMHG | HEART RATE: 90 BPM | HEIGHT: 63 IN | WEIGHT: 285.2 LBS | OXYGEN SATURATION: 94 % | TEMPERATURE: 97.6 F

## 2022-11-23 DIAGNOSIS — K59.00 CONSTIPATION, UNSPECIFIED CONSTIPATION TYPE: ICD-10-CM

## 2022-11-23 DIAGNOSIS — D50.9 IRON DEFICIENCY ANEMIA, UNSPECIFIED IRON DEFICIENCY ANEMIA TYPE: ICD-10-CM

## 2022-11-23 DIAGNOSIS — I10 ESSENTIAL HYPERTENSION: ICD-10-CM

## 2022-11-23 DIAGNOSIS — E55.9 VITAMIN D DEFICIENCY: ICD-10-CM

## 2022-11-23 DIAGNOSIS — E11.65 UNCONTROLLED TYPE 2 DIABETES MELLITUS WITH HYPERGLYCEMIA: ICD-10-CM

## 2022-11-23 DIAGNOSIS — E11.649 UNCONTROLLED TYPE 2 DIABETES MELLITUS WITH HYPOGLYCEMIA WITHOUT COMA: ICD-10-CM

## 2022-11-23 DIAGNOSIS — R79.89 HIGH SERUM VITAMIN B12: ICD-10-CM

## 2022-11-23 DIAGNOSIS — E66.01 MORBID OBESITY: Primary | ICD-10-CM

## 2022-11-23 DIAGNOSIS — J45.40 MODERATE PERSISTENT ASTHMA, UNSPECIFIED WHETHER COMPLICATED: ICD-10-CM

## 2022-11-23 DIAGNOSIS — E78.2 MIXED HYPERLIPIDEMIA: ICD-10-CM

## 2022-11-23 DIAGNOSIS — Z91.199 NONCOMPLIANCE: ICD-10-CM

## 2022-11-23 PROCEDURE — 99214 OFFICE O/P EST MOD 30 MIN: CPT | Performed by: FAMILY MEDICINE

## 2022-11-23 RX ORDER — PROCHLORPERAZINE 25 MG/1
1 SUPPOSITORY RECTAL 3 TIMES DAILY
Qty: 1 EACH | Refills: 3 | Status: SHIPPED | OUTPATIENT
Start: 2022-11-23

## 2022-11-23 RX ORDER — LEVOTHYROXINE SODIUM 88 UG/1
88 TABLET ORAL DAILY
Qty: 30 TABLET | Refills: 3 | Status: SHIPPED | OUTPATIENT
Start: 2022-11-23

## 2022-11-23 RX ORDER — LOSARTAN POTASSIUM 50 MG/1
50 TABLET ORAL DAILY
Qty: 30 TABLET | Refills: 3 | Status: SHIPPED | OUTPATIENT
Start: 2022-11-23 | End: 2022-11-23 | Stop reason: SDUPTHER

## 2022-11-23 RX ORDER — LOSARTAN POTASSIUM 50 MG/1
50 TABLET ORAL DAILY
Qty: 30 TABLET | Refills: 3 | Status: SHIPPED | OUTPATIENT
Start: 2022-11-23

## 2022-11-23 NOTE — PATIENT INSTRUCTIONS
Go up on synthroid from 75 to 88 mcg PO q daily -recheck thyroid studies on next visit do not take with prilosec/omeprazole space each medication about 30 minutes apart     Start on losartan 50 mg daily  for blood pressure . Monitor blood pressure at home and bring readings next visit. Goal <130/80    Restart trulicity 1.5 mg sub qweekly.   continue monitoring sugars at home. Restart on steglatro 15 mgdaily. Restart insulin.  Will send dexcom.      For constipation linzess 72 mcg daily.     Get a blood pressure machine.     BRING ALL MEDICATIONS NEXT TIME    Recheck in 2 months

## 2022-11-30 DIAGNOSIS — M25.561 RIGHT KNEE PAIN, UNSPECIFIED CHRONICITY: Primary | ICD-10-CM

## 2022-12-05 ENCOUNTER — OFFICE VISIT (OUTPATIENT)
Dept: ORTHOPEDIC SURGERY | Facility: CLINIC | Age: 47
End: 2022-12-05

## 2022-12-05 VITALS — HEIGHT: 63 IN | BODY MASS INDEX: 50.5 KG/M2 | WEIGHT: 285 LBS

## 2022-12-05 DIAGNOSIS — G89.29 CHRONIC PAIN OF RIGHT KNEE: Primary | ICD-10-CM

## 2022-12-05 DIAGNOSIS — Z79.1 NSAID LONG-TERM USE: ICD-10-CM

## 2022-12-05 DIAGNOSIS — M25.561 CHRONIC PAIN OF RIGHT KNEE: Primary | ICD-10-CM

## 2022-12-05 DIAGNOSIS — M17.11 PRIMARY OSTEOARTHRITIS OF RIGHT KNEE: ICD-10-CM

## 2022-12-05 PROCEDURE — 99214 OFFICE O/P EST MOD 30 MIN: CPT | Performed by: NURSE PRACTITIONER

## 2022-12-05 RX ORDER — CELECOXIB 100 MG/1
100 CAPSULE ORAL 2 TIMES DAILY
Qty: 60 CAPSULE | Refills: 2 | Status: SHIPPED | OUTPATIENT
Start: 2022-12-05 | End: 2023-03-06 | Stop reason: SDUPTHER

## 2022-12-05 NOTE — PROGRESS NOTES
"  Sarina Lashone Jackson is a 47 y.o. female   Primary provider:  Solitario Li MD       Chief Complaint   Patient presents with   • Right Knee - Pain   • Establish Care       HISTORY OF PRESENT ILLNESS:      Mrs. Restrepo is a 47-year-old female who presents today with complaints of right knee pain that has been present for the past several months.  No injury or known precipitating factor.  Pain is an aching and grinding pain that is associated with occasional popping and clicking.  She has tried ibuprofen, RICE therapy, diclofenac topically, and Biofreeze without relief of symptoms.  She has pain with walking.  Her current BMI is 50.49.  Her current A1c is 9.6.  She is sent for additional x-rays.    Pain  This is a new problem. The current episode started more than 1 month ago. Associated symptoms comments: Aching, clicking.        CONCURRENT MEDICAL HISTORY:    Past Medical History:   Diagnosis Date   • Abdominal pain     unspecified   • Benign hypertension    • Carpal tunnel syndrome    • Essential hypertension    • Gastro-esophageal reflux disease with esophagitis    • GERD (gastroesophageal reflux disease)    • Headache     improving   • Iron deficiency anemia     unspecified   • Morbid obesity due to excess calories (HCC)     severe   • Type 2 diabetes mellitus (HCC)    • Urinary tract infectious disease    • Vitamin D deficiency    • Weight loss     advised       No Known Allergies      Current Outpatient Medications:   •  albuterol sulfate HFA (Ventolin HFA) 108 (90 Base) MCG/ACT inhaler, Inhale 2 puffs Every 4 (Four) Hours As Needed for Shortness of Air., Disp: 18 g, Rfl: 3  •  aspirin 81 MG chewable tablet, Chew 1 tablet Daily., Disp: 30 tablet, Rfl: 3  •  atorvastatin (Lipitor) 40 MG tablet, Take 1 tablet by mouth Daily., Disp: 90 tablet, Rfl: 3  •  BD INSULIN SYRINGE U/F 31G X 5/16\" 1 ML misc, , Disp: , Rfl:   •  Continuous Blood Gluc  (Dexcom G6 ) device, 1 application 3 (Three) Times " a Day., Disp: 1 each, Rfl: 3  •  Continuous Blood Gluc Transmit (Dexcom G6 Transmitter) misc, 1 application 3 (Three) Times a Day., Disp: 1 each, Rfl: 3  •  cyclobenzaprine (FLEXERIL) 10 MG tablet, Take 1 tablet by mouth At Night As Needed for Muscle Spasms., Disp: 30 tablet, Rfl: 3  •  Dulaglutide 1.5 MG/0.5ML solution pen-injector, Inject 1.5 mg under the skin into the appropriate area as directed 1 (One) Time Per Week., Disp: 4 pen, Rfl: 3  •  Ertugliflozin L-PyroglutamicAc (Steglatro) 15 MG tablet, Take 1 tablet by mouth Every Morning., Disp: 30 tablet, Rfl: 3  •  ferrous sulfate 325 (65 FE) MG tablet, Take 1 tablet by mouth 2 (Two) Times a Day., Disp: 60 tablet, Rfl: 3  •  fluticasone (Flonase) 50 MCG/ACT nasal spray, 2 sprays into the nostril(s) as directed by provider Daily., Disp: 16 g, Rfl: 3  •  fluticasone (FLOVENT HFA) 220 MCG/ACT inhaler, Inhale 1 puff 2 (Two) Times a Day., Disp: 1 each, Rfl: 3  •  furosemide (Lasix) 20 MG tablet, Take 1 tablet by mouth 2 (Two) Times a Day., Disp: 60 tablet, Rfl: 3  •  glucose blood test strip, 1 each by Other route 3 (Three) Times a Day., Disp: 100 each, Rfl: 3  •  insulin glargine (Lantus) 100 UNIT/ML injection, START WITH 16 UNITS UNDER THE SKIN AT BEDTIME CAN GO UP BY 3 UNITS EVERY 3 DAYS UNTIL IN THE MORNING SUGARS RUNNING  MDD 25, Disp: 9 mL, Rfl: 12  •  Insulin Pen Needle ([employer] Pen Needles) 31G X 6 MM misc, Inject 1 Device as directed 3 (Three) Times a Day As Needed (Diabetes)., Disp: 100 each, Rfl: 2  •  Lancets misc, Use to check sugars three times a day for diabetes E11, Disp: 100 each, Rfl: 3  •  levothyroxine (Synthroid) 88 MCG tablet, Take 1 tablet by mouth Daily., Disp: 30 tablet, Rfl: 3  •  linaclotide (Linzess) 72 MCG capsule capsule, Take 1 capsule by mouth Every Morning Before Breakfast., Disp: 30 capsule, Rfl: 3  •  loratadine (CLARITIN) 10 MG tablet, Take 1 tablet by mouth Daily., Disp: 30 tablet, Rfl: 3  •  losartan (Cozaar) 50 MG tablet,  Take 1 tablet by mouth Daily., Disp: 30 tablet, Rfl: 3  •  montelukast (Singulair) 10 MG tablet, Take 1 tablet by mouth Every Night., Disp: 30 tablet, Rfl: 3  •  omeprazole (priLOSEC) 40 MG capsule, Take 1 capsule by mouth Daily., Disp: 30 capsule, Rfl: 3  •  ondansetron ODT (ZOFRAN-ODT) 8 MG disintegrating tablet, Place 1 tablet on the tongue Every 8 (Eight) Hours As Needed for Nausea or Vomiting., Disp: 30 tablet, Rfl: 3  •  vitamin D (ERGOCALCIFEROL) 1.25 MG (48924 UT) capsule capsule, Take 1 capsule by mouth Every 7 (Seven) Days., Disp: 5 capsule, Rfl: 3  •  celecoxib (CeleBREX) 100 MG capsule, Take 1 capsule by mouth 2 (Two) Times a Day for 90 days., Disp: 60 capsule, Rfl: 2    Past Surgical History:   Procedure Laterality Date   •  SECTION  10/30/2000   • CHOLECYSTECTOMY  1995   • COLONOSCOPY      normal per pt   • COLONOSCOPY N/A 2022    Procedure: COLONOSCOPY;  Surgeon: Caitlin Hermosillo MD;  Location: [facility] ENDOSCOPY;  Service: Gastroenterology;  Laterality: N/A;   • CYSTOSCOPY  2016    And bilateral retrograde pyelograms. Performed at [facility].   • TONSILLECTOMY  1985   • UMBILICAL HERNIA REPAIR  2015   • UMBILICAL HERNIA REPAIR  2015       Family History   Problem Relation Age of Onset   • Diabetes Mother    • Heart disease Mother    • Heart failure Mother         congestive   • Hypertension Mother    • Asthma Father    • Osteoarthritis Father        Social History     Socioeconomic History   • Marital status: Single   Tobacco Use   • Smoking status: Never   • Smokeless tobacco: Never   Vaping Use   • Vaping Use: Never used   Substance and Sexual Activity   • Alcohol use: No   • Drug use: No   • Sexual activity: Defer        Review of Systems   Constitutional: Negative.    HENT: Negative.    Eyes: Negative.    Respiratory: Negative.    Cardiovascular: Negative.    Gastrointestinal: Negative.    Endocrine: Negative.    Genitourinary: Negative.   "  Musculoskeletal:        Right knee pain   Skin: Negative.    Allergic/Immunologic: Negative.    Neurological: Negative.    Hematological: Negative.    Psychiatric/Behavioral: Negative.        PHYSICAL EXAMINATION:       Ht 160 cm (63\")   Wt 129 kg (285 lb)   BMI 50.49 kg/m²     Physical Exam  Vitals and nursing note reviewed.   Constitutional:       General: She is not in acute distress.     Appearance: She is well-developed. She is not toxic-appearing.   HENT:      Head: Normocephalic.   Pulmonary:      Effort: Pulmonary effort is normal. No respiratory distress.   Skin:     General: Skin is warm and dry.   Neurological:      Mental Status: She is alert and oriented to person, place, and time.   Psychiatric:         Behavior: Behavior normal.         Thought Content: Thought content normal.         Judgment: Judgment normal.         GAIT:     [x]  Normal  []  Antalgic    Assistive device: [x]  None  []  Walker     []  Crutches  []  Cane     []  Wheelchair  []  Stretcher    Right Knee Exam     Tenderness   Right knee tenderness location: Diffuse.    Range of Motion   Extension: -5   Flexion: 90     Other   Erythema: absent  Sensation: normal  Pulse: present  Swelling: mild    Comments:  Crepitus and pain/limitations with arc of motion.                  No results found.    Four view right knee     HISTORY: Right knee pain     AP, lateral, tunnel and oblique views obtained.     COMPARISON: None     FINDINGS:   No fracture or dislocation.  Patellar, femoral and tibial osteophytes.  Moderate narrowing medial joint space.  Subchondral cyst formation lateral femoral condyle.  1.3 cm and smaller loose bodies femoral tibial joint.  Small suprapatellar effusion.  No other osseous or articular abnormality.     IMPRESSION:  CONCLUSION:  As above     [id number]     Electronically signed by:  Elvis Mancera MD  8/6/2022 3:31 PM CDT  Workstation: [id number]      ASSESSMENT:    Diagnoses and all orders for this visit:    Chronic pain " of right knee    Primary osteoarthritis of right knee    NSAID long-term use  -     Comprehensive Metabolic Panel; Future    Other orders  -     celecoxib (CeleBREX) 100 MG capsule; Take 1 capsule by mouth 2 (Two) Times a Day for 90 days.          PLAN      X-rays reviewed, arthritic knee noted.  Patient's options are somewhat limited due to BMI of 50 and hemoglobin of 9.6.  Patient has [employer] which is currently not covering gel injections.  Will attempt to avoid steroid use while patient attempts to decrease hemoglobin A1c.  Weight loss also encouraged so that patient will be a better elective surgical candidate.  Patient's most recent GFR is 79.  After discussing risk, benefits, alternatives, patient desires to proceed with Celebrex.  How to take medication safely and properly explained.  CMP ordered, patient is to have CMP drawn 3 months after starting Celebrex to recheck kidney function.  Signs and symptoms to report and when to seek care explained to patient.  Patient verbalized understanding of instructions.    Recommend the following:    -Rest and activity modification as tolerated and based on pain.  -Modified weightbearing of the affected extremity with use of a cane or walker as needed.  -Gradual progression of weightbearing and activity as pain and swelling allow.  -Conditioning and strengthening exercises of the bilateral knees/legs.  -Elevation and ice therapy to the affected knee to minimize pain/swelling/inflammation.           Return in about 3 months (around 3/5/2023).    EMR Dragon/Transciption Disclaimer: Some of this note may be an electronic transcription/translation of spoken language to printed text.  The electronic translation of spoken language may permit erroneous, or at times, nonsensical words or phrases to be inadvertently transcribed. Although I have reviewed the note for such errors, some may still exist.     Time spent of a minimum of 30 minutes including the face to face  evaluation, reviewing of medical history and prior medial records, reviewing of diagnostic studies, ordering additional tests, documentation, patient education and coordination of care.         This document has been electronically signed by Radha MURPHY on December 5, 2022 09:40 CST

## 2023-01-06 NOTE — PROGRESS NOTES
Subjective:  Sarina Lashone Jackson is a 47 y.o. female who presents for       Patient Active Problem List   Diagnosis   • Encounter for immunization   • Diabetes type 2, uncontrolled   • Hyperlipidemia associated with type 2 diabetes mellitus (HCC)   • Morbid obesity (HCC)   • Vitamin D deficiency   • Essential hypertension   • Hyperlipidemia   • Urinary tract infection   • Acute pain of right shoulder   • Right arm pain   • Right elbow pain   • Lateral epicondylitis of right elbow   • Medial epicondylitis   • Encounter for screening mammogram for malignant neoplasm of breast   • Iron deficiency anemia   • Mild intermittent asthma   • Encounter for screening for endocrine disorder   • Gastroesophageal reflux disease   • Pain in both knees   • Controlled type 2 diabetes mellitus with complication, without long-term current use of insulin (HCC)   • Anemia   • Right knee pain   • Controlled type 2 diabetes mellitus with complication, with long-term current use of insulin (HCC)   • General medical examination   • Leukocytosis   • Chronic bilateral low back pain with sciatica   • Encounter for Papanicolaou smear of cervix   • Screening mammogram, encounter for   • Other fatigue   • MIGEL (obstructive sleep apnea)   • Moderate persistent asthma   • Acute bronchitis   • Right wrist pain   • Nausea and vomiting   • Grief   • Carpal tunnel syndrome on right   • Encounter for screening for malignant neoplasm of colon   • Uncontrolled type 2 diabetes mellitus with hypoglycemia without coma (HCC)   • Bilateral swelling of feet   • Constipation   • Hypothyroidism   • Stage 2 chronic kidney disease           Current Outpatient Medications:   •  albuterol sulfate HFA (Ventolin HFA) 108 (90 Base) MCG/ACT inhaler, Inhale 2 puffs Every 4 (Four) Hours As Needed for Shortness of Air., Disp: 18 g, Rfl: 3  •  aspirin 81 MG chewable tablet, Chew 1 tablet Daily., Disp: 30 tablet, Rfl: 3  •  atorvastatin (Lipitor) 40 MG tablet, Take 1  "tablet by mouth Daily., Disp: 90 tablet, Rfl: 3  •  BD INSULIN SYRINGE U/F 31G X 5/16\" 1 ML misc, , Disp: , Rfl:   •  celecoxib (CeleBREX) 100 MG capsule, Take 1 capsule by mouth 2 (Two) Times a Day for 90 days., Disp: 60 capsule, Rfl: 2  •  Continuous Blood Gluc  (Dexcom G6 ) device, 1 application 3 (Three) Times a Day., Disp: 1 each, Rfl: 3  •  Continuous Blood Gluc Transmit (Dexcom G6 Transmitter) misc, 1 application 3 (Three) Times a Day., Disp: 1 each, Rfl: 3  •  cyclobenzaprine (FLEXERIL) 10 MG tablet, Take 1 tablet by mouth At Night As Needed for Muscle Spasms., Disp: 30 tablet, Rfl: 3  •  Dulaglutide 1.5 MG/0.5ML solution pen-injector, Inject 1.5 mg under the skin into the appropriate area as directed 1 (One) Time Per Week., Disp: 2 mL, Rfl: 3  •  Ertugliflozin L-PyroglutamicAc (Steglatro) 15 MG tablet, Take 1 tablet by mouth Every Morning., Disp: 30 tablet, Rfl: 3  •  ferrous sulfate 325 (65 FE) MG tablet, Take 1 tablet by mouth 2 (Two) Times a Day., Disp: 60 tablet, Rfl: 3  •  fluticasone (Flonase) 50 MCG/ACT nasal spray, 2 sprays into the nostril(s) as directed by provider Daily., Disp: 16 g, Rfl: 3  •  fluticasone (FLOVENT HFA) 220 MCG/ACT inhaler, Inhale 1 puff 2 (Two) Times a Day., Disp: 1 each, Rfl: 3  •  furosemide (Lasix) 20 MG tablet, Take 1 tablet by mouth 2 (Two) Times a Day., Disp: 60 tablet, Rfl: 3  •  glucose blood test strip, 1 each by Other route 3 (Three) Times a Day., Disp: 100 each, Rfl: 3  •  insulin glargine (Lantus) 100 UNIT/ML injection, START WITH 16 UNITS UNDER THE SKIN AT BEDTIME CAN GO UP BY 3 UNITS EVERY 3 DAYS UNTIL IN THE MORNING SUGARS RUNNING  MDD 25, Disp: 9 mL, Rfl: 12  •  Insulin Pen Needle ([employer] Pen Needles) 31G X 6 MM misc, Inject 1 Device as directed 3 (Three) Times a Day As Needed (Diabetes)., Disp: 100 each, Rfl: 2  •  Lancets misc, Use to check sugars three times a day for diabetes E11, Disp: 100 each, Rfl: 3  •  levothyroxine (Synthroid) 88 " MCG tablet, Take 1 tablet by mouth Daily., Disp: 30 tablet, Rfl: 3  •  linaclotide (Linzess) 72 MCG capsule capsule, Take 1 capsule by mouth Every Morning Before Breakfast., Disp: 30 capsule, Rfl: 3  •  loratadine (CLARITIN) 10 MG tablet, Take 1 tablet by mouth Daily., Disp: 30 tablet, Rfl: 3  •  losartan (Cozaar) 50 MG tablet, Take 1 tablet by mouth Daily., Disp: 30 tablet, Rfl: 3  •  montelukast (Singulair) 10 MG tablet, Take 1 tablet by mouth Every Night., Disp: 30 tablet, Rfl: 3  •  omeprazole (priLOSEC) 40 MG capsule, Take 1 capsule by mouth Daily., Disp: 30 capsule, Rfl: 3  •  ondansetron ODT (ZOFRAN-ODT) 8 MG disintegrating tablet, Place 1 tablet on the tongue Every 8 (Eight) Hours As Needed for Nausea or Vomiting., Disp: 30 tablet, Rfl: 3  •  vitamin D (ERGOCALCIFEROL) 1.25 MG (20933 UT) capsule capsule, Take 1 capsule by mouth Every 7 (Seven) Days., Disp: 5 capsule, Rfl: 3  •  Continuous Blood Gluc Sensor (Dexcom G6 Sensor), Every 10 (Ten) Days., Disp: 1 each, Rfl: 1       Pt is 48 yo female with management of morbid obesity, moderate persistent asthma, allergic rhinitis, IDDM type 2,, HLP, HTN,  Vitamin D deficiency,GERD, chronic fatigue, MIGEL, multiple joint pain, sp cholecystectomy sp tonsillectomy, sp umbilical hernia repair, sp , iron deficiency anemia , CKD stage 2, carpal tunnel on right hand/wrist , internal hemorrhoids, diverticulosis        10/12/22 in office visit for recheck. Since last visit pt saw GI on 22 and had colonsocopy on 22 that showed diverticulosis and internal hemorrhoids. pth as yet to get labwork ordered on 22 and 22. She is also due for a mammogram screening. She is needing refills on medications.  Her x-ray of right knee showed no fracture or dislocation there is moderate narrowing of medial joint space along with subchondral cyst formation of lateral femoral condyle with 1.3 cm smaller lose bodies femoral tibial joint there is also small suprapatteller  effusion. Pt doing fair. No chest pain. She did have episode of dizziness at work her sugar was low.   Her sugars have been running on average in 150s. She is taking lantus 10 units     11/23/22 in office visit for recheck. Pt had labwork done on 11/22/22 that showed on thyroid studies. TSH was normal with T4 at 0.92 from 0.86. pt is on synthroid 75 mcg PO q daily. On CBC hemoglobin stable RBC at 6.04  MCV at 70.2  CMP showed glucose at 257 with GFR at 79.0. hga1c is at 9.60 from 8.10.  Lipid panel showed stable LDL but HDL low at 36. Vitamin D was normal vitamin B12 was high at 1014.  She is non compliant with her medications. She is not taking her trulicity weekly along with synthroid daily. She is not taking her insulin regularly.  She is also constipated and has a bowel movement 2-3 times a week     1/25/23 in office visit for recheck. Pt saw Orthopedic for her right knee pain/arthritis. She agreed to proceed with celebrex. Pt is back to taking her medications for DM type 2 and HTN. BP stable today.   Her sugars have been stable. Has not been able to take steglatro.  She gained 12 lbs since  Last visit.        Constipation  This is a recurrent problem. The current episode started more than 1 month ago. The problem is unchanged. Pertinent negatives include no abdominal pain, anorexia, back pain, bloating, diarrhea, difficulty urinating, fecal incontinence, fever, flatus, hematochezia, hemorrhoids, melena, nausea, rectal pain, vomiting or weight loss. She has tried nothing for the symptoms. The treatment provided no relief. There is no history of abdominal surgery, endocrine disease, inflammatory bowel disease, irritable bowel syndrome, metabolic disease, neurologic disease, neuromuscular disease, psychiatric history or radiation treatment.    Hypertension  This is a chronic problem. The current episode started more than 1 year ago. The problem has been resolved since onset. The problem is uncontrolled. Pertinent  negatives include no anxiety, blurred vision, chest pain, headaches, malaise/fatigue, neck pain, orthopnea, palpitations, peripheral edema, PND, shortness of breath or sweats. Risk factors for coronary artery disease include diabetes mellitus, sedentary lifestyle and obesity. Past treatments include ACE inhibitors. Current antihypertension treatment includes ACE inhibitors. The current treatment provides significant improvement. There is no history of angina, kidney disease, CAD/MI, CVA, heart failure, left ventricular hypertrophy, PVD or retinopathy. There is no history of chronic renal disease, coarctation of the aorta, hyperaldosteronism, hypercortisolism, hyperparathyroidism, a hypertension causing med, pheochromocytoma, renovascular disease, sleep apnea or a thyroid problem.   Diabetes   She presents for her follow-up diabetic visit. She has type 2 diabetes mellitus. Her disease course has been controlled . Pertinent negatives for hypoglycemia include no confusion, dizziness, headaches, hunger, mood changes, nervousness/anxiousness, pallor, seizures, sleepiness, speech difficulty, sweats or tremors. Associated symptoms include fatigue. Pertinent negatives for diabetes include no blurred vision, no chest pain, no foot paresthesias, no foot ulcerations, no polydipsia, no polyphagia, no polyuria, no visual change, no weakness and no weight loss. Pertinent negatives for hypoglycemia complications include no blackouts, no hospitalization, no nocturnal hypoglycemia, no required assistance and no required glucagon injection. Symptoms are stable. Pertinent negatives for diabetic complications include no autonomic neuropathy, CVA, heart disease, nephropathy, peripheral neuropathy, PVD or retinopathy. Risk factors for coronary artery disease include diabetes mellitus, dyslipidemia and obesity. Current diabetic treatment includes insulin injections. She is following a diabetic diet. When asked about meal planning, she  reported none. She has not had a previous visit with a dietitian. She participates in exercise intermittently. She does not see a podiatrist.Eye exam is not current.   Obesity   This is a chronic problem. The current episode started more than 1 year ago. The problem has been unchanged. Associated symptoms include arthralgias, fatigue and nausea. Pertinent negatives include no abdominal pain, anorexia, change in bowel habit, chest pain, chills, congestion, coughing, fever, headaches, joint swelling, myalgias, neck pain, numbness, rash, sore throat, swollen glands, urinary symptoms, vertigo, visual change, vomiting or weakness. Nothing aggravates the symptoms. She has tried nothing for the symptoms. The treatment provided no relief    Review of Systems  Review of Systems   Constitutional: Positive for activity change and fatigue. Negative for appetite change, chills, diaphoresis, fever and weight loss.   HENT: Negative for congestion, postnasal drip, rhinorrhea, sinus pressure, sinus pain, sneezing, sore throat, trouble swallowing and voice change.    Respiratory: Positive for shortness of breath. Negative for cough, choking, chest tightness, wheezing and stridor.    Cardiovascular: Negative for chest pain.   Gastrointestinal: Positive for constipation. Negative for abdominal pain, anorexia, bloating, diarrhea, flatus, hematochezia, hemorrhoids, melena, nausea, rectal pain and vomiting.   Genitourinary: Negative for difficulty urinating.   Musculoskeletal: Positive for arthralgias. Negative for back pain.   Neurological: Positive for weakness and numbness. Negative for headaches.   Psychiatric/Behavioral: The patient is nervous/anxious.        Patient Active Problem List   Diagnosis   • Encounter for immunization   • Diabetes type 2, uncontrolled   • Hyperlipidemia associated with type 2 diabetes mellitus (HCC)   • Morbid obesity (HCC)   • Vitamin D deficiency   • Essential hypertension   • Hyperlipidemia   • Urinary  tract infection   • Acute pain of right shoulder   • Right arm pain   • Right elbow pain   • Lateral epicondylitis of right elbow   • Medial epicondylitis   • Encounter for screening mammogram for malignant neoplasm of breast   • Iron deficiency anemia   • Mild intermittent asthma   • Encounter for screening for endocrine disorder   • Gastroesophageal reflux disease   • Pain in both knees   • Controlled type 2 diabetes mellitus with complication, without long-term current use of insulin (HCC)   • Anemia   • Right knee pain   • Controlled type 2 diabetes mellitus with complication, with long-term current use of insulin (HCC)   • General medical examination   • Leukocytosis   • Chronic bilateral low back pain with sciatica   • Encounter for Papanicolaou smear of cervix   • Screening mammogram, encounter for   • Other fatigue   • MIGEL (obstructive sleep apnea)   • Moderate persistent asthma   • Acute bronchitis   • Right wrist pain   • Nausea and vomiting   • Grief   • Carpal tunnel syndrome on right   • Encounter for screening for malignant neoplasm of colon   • Uncontrolled type 2 diabetes mellitus with hypoglycemia without coma (HCC)   • Bilateral swelling of feet   • Constipation   • Hypothyroidism   • Stage 2 chronic kidney disease     Past Surgical History:   Procedure Laterality Date   •  SECTION  10/30/2000   • CHOLECYSTECTOMY  1995   • COLONOSCOPY  2013    normal per pt   • COLONOSCOPY N/A 2022    Procedure: COLONOSCOPY;  Surgeon: Caitlin Hermosillo MD;  Location: [facility] ENDOSCOPY;  Service: Gastroenterology;  Laterality: N/A;   • CYSTOSCOPY  2016    And bilateral retrograde pyelograms. Performed at [facility].   • TONSILLECTOMY  1985   • UMBILICAL HERNIA REPAIR  2015   • UMBILICAL HERNIA REPAIR  2015     Social History     Socioeconomic History   • Marital status: Single   Tobacco Use   • Smoking status: Never   • Smokeless tobacco: Never   Vaping Use   •  Vaping Use: Never used   Substance and Sexual Activity   • Alcohol use: No   • Drug use: No   • Sexual activity: Defer     Family History   Problem Relation Age of Onset   • Diabetes Mother    • Heart disease Mother    • Heart failure Mother         congestive   • Hypertension Mother    • Asthma Father    • Osteoarthritis Father      Lab on 11/22/2022   Component Date Value Ref Range Status   • TSH 11/22/2022 2.090  0.270 - 4.200 uIU/mL Final   • Free T4 11/22/2022 0.92 (L)  0.93 - 1.70 ng/dL Final   • T3, Free 11/22/2022 2.46  2.00 - 4.40 pg/mL Final   • WBC 11/22/2022 10.22  3.40 - 10.80 10*3/mm3 Final   • RBC 11/22/2022 6.04 (H)  3.77 - 5.28 10*6/mm3 Final   • Hemoglobin 11/22/2022 12.3  12.0 - 15.9 g/dL Final   • Hematocrit 11/22/2022 42.4  34.0 - 46.6 % Final   • MCV 11/22/2022 70.2 (L)  79.0 - 97.0 fL Final   • MCH 11/22/2022 20.4 (L)  26.6 - 33.0 pg Final   • MCHC 11/22/2022 29.0 (L)  31.5 - 35.7 g/dL Final   • RDW 11/22/2022 15.1  12.3 - 15.4 % Final   • RDW-SD 11/22/2022 36.6 (L)  37.0 - 54.0 fl Final   • MPV 11/22/2022 11.5  6.0 - 12.0 fL Final   • Platelets 11/22/2022 287  140 - 450 10*3/mm3 Final   • Neutrophil % 11/22/2022 52.5  42.7 - 76.0 % Final   • Lymphocyte % 11/22/2022 35.1  19.6 - 45.3 % Final   • Monocyte % 11/22/2022 6.1  5.0 - 12.0 % Final   • Eosinophil % 11/22/2022 5.4  0.3 - 6.2 % Final   • Basophil % 11/22/2022 0.5  0.0 - 1.5 % Final   • Immature Grans % 11/22/2022 0.4  0.0 - 0.5 % Final   • Neutrophils, Absolute 11/22/2022 5.37  1.70 - 7.00 10*3/mm3 Final   • Lymphocytes, Absolute 11/22/2022 3.59 (H)  0.70 - 3.10 10*3/mm3 Final   • Monocytes, Absolute 11/22/2022 0.62  0.10 - 0.90 10*3/mm3 Final   • Eosinophils, Absolute 11/22/2022 0.55 (H)  0.00 - 0.40 10*3/mm3 Final   • Basophils, Absolute 11/22/2022 0.05  0.00 - 0.20 10*3/mm3 Final   • Immature Grans, Absolute 11/22/2022 0.04  0.00 - 0.05 10*3/mm3 Final   • nRBC 11/22/2022 0.1  0.0 - 0.2 /100 WBC Final   • Glucose 11/22/2022 257 (H)   65 - 99 mg/dL Final   • BUN 11/22/2022 11  6 - 20 mg/dL Final   • Creatinine 11/22/2022 0.90  0.57 - 1.00 mg/dL Final   • Sodium 11/22/2022 138  136 - 145 mmol/L Final   • Potassium 11/22/2022 4.5  3.5 - 5.2 mmol/L Final   • Chloride 11/22/2022 98  98 - 107 mmol/L Final   • CO2 11/22/2022 28.9  22.0 - 29.0 mmol/L Final   • Calcium 11/22/2022 9.9  8.6 - 10.5 mg/dL Final   • Total Protein 11/22/2022 7.6  6.0 - 8.5 g/dL Final   • Albumin 11/22/2022 3.90  3.50 - 5.20 g/dL Final   • ALT (SGPT) 11/22/2022 19  1 - 33 U/L Final   • AST (SGOT) 11/22/2022 23  1 - 32 U/L Final   • Alkaline Phosphatase 11/22/2022 76  39 - 117 U/L Final   • Total Bilirubin 11/22/2022 0.2  0.0 - 1.2 mg/dL Final   • Globulin 11/22/2022 3.7  gm/dL Final   • A/G Ratio 11/22/2022 1.1  g/dL Final   • BUN/Creatinine Ratio 11/22/2022 12.2  7.0 - 25.0 Final   • Anion Gap 11/22/2022 11.1  5.0 - 15.0 mmol/L Final   • eGFR 11/22/2022 79.0  >60.0 mL/min/1.73 Final    National Kidney Foundation and American Society of Nephrology (ASN) Task Force recommended calculation based on the Chronic Kidney Disease Epidemiology Collaboration (CKD-EPI) equation refit without adjustment for race.   • Hemoglobin A1C 11/22/2022 9.60 (H)  4.80 - 5.60 % Final   • Total Cholesterol 11/22/2022 113  0 - 200 mg/dL Final   • Triglycerides 11/22/2022 96  0 - 150 mg/dL Final   • HDL Cholesterol 11/22/2022 36 (L)  40 - 60 mg/dL Final   • LDL Cholesterol  11/22/2022 59  0 - 100 mg/dL Final   • VLDL Cholesterol 11/22/2022 18  5 - 40 mg/dL Final   • LDL/HDL Ratio 11/22/2022 1.61   Final   • 25 Hydroxy, Vitamin D 11/22/2022 55.4  30.0 - 100.0 ng/ml Final   • Vitamin B-12 11/22/2022 1,014 (H)  211 - 946 pg/mL Final   • Microalbumin/Creatinine Ratio 11/22/2022 9.8  mg/g Final   • Creatinine, Urine 11/22/2022 367.2  mg/dL Final   • Microalbumin, Urine 11/22/2022 3.6  mg/dL Final      XR Knee Bilateral AP Standing  Narrative: Procedure: AP bilateral knees standing views    Reason for exam:  "Right knee pain, unspecified chronicity.    FINDINGS: Right knee mild to moderate loss of lateral knee joint  compartment height suspicious for osteoarthritic changes.  Otherwise bony structures of bilateral knees are normal. There is  no evidence of fracture or dislocation. Soft tissue structures  are normal.  Impression: 1.  Right knee mild to moderate loss of lateral knee joint  compartment height suspicious for osteoarthritic changes.   2.  Otherwise unremarkable AP knee bilateral standing views.    Electronically signed by:  Frank Crowley MD  12/5/2022 2:05 PM [facility]  Workstation: [id number]    @[email or web address]@  Immunization History   Administered Date(s) Administered   • Flu Vaccine Quad PF >36MO 10/14/2016   • FluLaval/Fluzone >6mos 10/14/2016   • Hepatitis B 07/19/2016, 08/23/2016, 02/17/2017   • Hepatitis B Vaccine Adult IM 07/19/2016, 08/23/2016, 02/17/2017   • Tdap 02/17/2017       The following portions of the patient's history were reviewed and updated as appropriate: allergies, current medications, past family history, past medical history, past social history, past surgical history and problem list.        Physical Exam  /76 (BP Location: Left arm, Patient Position: Sitting)   Pulse 82   Temp 97.8 °F (36.6 °C)   Ht 160 cm (63\")   Wt 135 kg (297 lb)   SpO2 97%   BMI 52.61 kg/m²       Physical Exam  Vitals and nursing note reviewed.   Constitutional:       Appearance: She is well-developed. She is not diaphoretic.   HENT:      Head: Normocephalic and atraumatic.      Right Ear: External ear normal.   Eyes:      Conjunctiva/sclera: Conjunctivae normal.      Pupils: Pupils are equal, round, and reactive to light.   Cardiovascular:      Rate and Rhythm: Normal rate and regular rhythm.      Heart sounds: Normal heart sounds. No murmur heard.  Pulmonary:      Effort: Pulmonary effort is normal. No respiratory distress.      Breath sounds: Normal breath sounds.   Abdominal:      General: Bowel sounds " are normal. There is no distension.      Palpations: Abdomen is soft.      Tenderness: There is no abdominal tenderness.   Musculoskeletal:         General: Tenderness present. No deformity. Normal range of motion.      Cervical back: Normal range of motion and neck supple.   Skin:     General: Skin is warm.      Coloration: Skin is not pale.      Findings: No erythema or rash.   Neurological:      Mental Status: She is alert and oriented to person, place, and time.      Cranial Nerves: No cranial nerve deficit.   Psychiatric:         Behavior: Behavior normal.         [unfilled]   Diagnosis Plan   1. Uncontrolled type 2 diabetes mellitus with hyperglycemia (HCC)  Ertugliflozin L-PyroglutamicAc (Steglatro) 15 MG tablet    Iron Profile    Ferritin      2. Mixed hyperlipidemia  CBC Auto Differential    Comprehensive Metabolic Panel    Hemoglobin A1c    Lipid Panel    TSH    T4, Free    Vitamin D,25-Hydroxy    Vitamin B12    Iron Profile    Ferritin      3. Vitamin D deficiency  CBC Auto Differential    Comprehensive Metabolic Panel    Hemoglobin A1c    Lipid Panel    TSH    T4, Free    Vitamin D,25-Hydroxy    Vitamin B12    Iron Profile    Ferritin      4. Morbid obesity (HCC)  CBC Auto Differential    Comprehensive Metabolic Panel    Hemoglobin A1c    Lipid Panel    TSH    T4, Free    Vitamin D,25-Hydroxy    Vitamin B12    Iron Profile    Ferritin      5. Essential hypertension  CBC Auto Differential    Comprehensive Metabolic Panel    Hemoglobin A1c    Lipid Panel    TSH    T4, Free    Vitamin D,25-Hydroxy    Vitamin B12    Iron Profile    Ferritin      6. Iron deficiency anemia, unspecified iron deficiency anemia type  CBC Auto Differential    Comprehensive Metabolic Panel    Hemoglobin A1c    Lipid Panel    TSH    T4, Free    Vitamin D,25-Hydroxy    Vitamin B12    Iron Profile    Ferritin      7. MIGEL (obstructive sleep apnea)  CBC Auto Differential    Comprehensive Metabolic Panel    Hemoglobin A1c    Lipid  Panel    TSH    T4, Free    Vitamin D,25-Hydroxy    Vitamin B12    Iron Profile    Ferritin      8. Moderate persistent asthma, unspecified whether complicated  CBC Auto Differential    Comprehensive Metabolic Panel    Hemoglobin A1c    Lipid Panel    TSH    T4, Free    Vitamin D,25-Hydroxy    Vitamin B12    Iron Profile    Ferritin      9. Hypothyroidism, unspecified type  CBC Auto Differential    Comprehensive Metabolic Panel    Hemoglobin A1c    Lipid Panel    TSH    T4, Free    Vitamin D,25-Hydroxy    Vitamin B12    Iron Profile    Ferritin      10. Stage 2 chronic kidney disease                  -recommend labwork  -recommend diabetic eye exam  -recommend influenza vaccination  -CKD stage 2 - continue to monitor   -knee pain/arthritis of right knee -orthopedic following on celebrex    -nausea/vomiting - on zofran 8 mg ODT every 8 hours PRN  -MIGEL- sleep medicine following. She will need to make a call for appt for CPAP   Essential Hypertension osartan 50 mg daily to pharmacy  Advised pt to monitor Blood pressure at home and bring readings next visit   -hyperlipidemia-  Recheck lipid panel The current medical regimen is effective;  continue present plan and medications.  -bilateral feet swelling -  on lasix 20 mg PO BID. Drug information provided. Recommend low sodium diet   -hypopthyroidism - on synthroid  88 mcg PO q daily.     -DM type 2-  on Lantus 10  unites units at bedtime on steglatro 15 mg daily. On truliicity 1.5 mg subq weekly.  Advised pt to restart all her medications. Will send in dexcom advised importance of blood sugar control   -allergic rhinitis - on claritin 10 mg daily.    -vitamin D deficiency -on Vitamin D3 50,000 units once a week   -morbid obesity -. Gave bariatric weight loss surgery to go home with.  She has been reluctant for weight loss surgery.  BMI at 52.61. counseled weight loss >5 minutes   -iron deficiency anemia/microcytosis  -continue on iron pill TID. Her last CBC showed stable  hemoglobin. Recommend Hematology referral and possible IV iron therapy. Consultation appreciated.    -moderate persistent asthma - on flovent 220 mcg 1 puff BID:. Albuterol PRN  on singulair 10 mg at bedtime   -GERD/diverticulosis - GI following. Recommend high fiber diet  on prilosec 40 mg daily.   -advised pt to be safe and call with questions and concerns  -advised to go to ER or call 911 if symptoms worrisome or severe  -advised to followup with Specialist and referrals  -adivsed pt to be safe during COVID-19 pandemic  I spent 30  minutes caring for Lizzy on this date of service. This time includes time spent by me in the following activities: preparing for the visit, reviewing tests, obtaining and/or reviewing a separately obtained history, performing a medically appropriate examination and/or evaluation, counseling and educating the patient/family/caregiver, ordering medications, tests, or procedures, referring and communicating with other health care professionals, documenting information in the medical record and independently interpreting results and communicating that information with the patient/family/caregiver.   -recheck in 6 weeks         This document has been electronically signed by Solitario Li MD on January 25, 2023 10:36 CST

## 2023-01-10 ENCOUNTER — TELEPHONE (OUTPATIENT)
Dept: FAMILY MEDICINE CLINIC | Facility: CLINIC | Age: 48
End: 2023-01-10
Payer: MEDICAID

## 2023-01-10 NOTE — TELEPHONE ENCOUNTER
----- Message from Solitario Li MD sent at 1/10/2023  7:21 AM CST -----  Regarding: mammogram screening  Please call pt    Mammogram screening on 12/8/22 shows no mammographic evidence of malignancy    Repeat in 1 year thanks

## 2023-01-12 DIAGNOSIS — Z12.31 SCREENING MAMMOGRAM, ENCOUNTER FOR: ICD-10-CM

## 2023-01-25 ENCOUNTER — OFFICE VISIT (OUTPATIENT)
Dept: FAMILY MEDICINE CLINIC | Facility: CLINIC | Age: 48
End: 2023-01-25
Payer: MEDICAID

## 2023-01-25 VITALS
OXYGEN SATURATION: 97 % | HEART RATE: 82 BPM | TEMPERATURE: 97.8 F | BODY MASS INDEX: 51.91 KG/M2 | HEIGHT: 63 IN | WEIGHT: 293 LBS | SYSTOLIC BLOOD PRESSURE: 126 MMHG | DIASTOLIC BLOOD PRESSURE: 76 MMHG

## 2023-01-25 DIAGNOSIS — E11.65 UNCONTROLLED TYPE 2 DIABETES MELLITUS WITH HYPERGLYCEMIA: Primary | ICD-10-CM

## 2023-01-25 DIAGNOSIS — I10 ESSENTIAL HYPERTENSION: ICD-10-CM

## 2023-01-25 DIAGNOSIS — E78.2 MIXED HYPERLIPIDEMIA: ICD-10-CM

## 2023-01-25 DIAGNOSIS — D50.9 IRON DEFICIENCY ANEMIA, UNSPECIFIED IRON DEFICIENCY ANEMIA TYPE: ICD-10-CM

## 2023-01-25 DIAGNOSIS — G47.33 OSA (OBSTRUCTIVE SLEEP APNEA): ICD-10-CM

## 2023-01-25 DIAGNOSIS — J45.40 MODERATE PERSISTENT ASTHMA, UNSPECIFIED WHETHER COMPLICATED: ICD-10-CM

## 2023-01-25 DIAGNOSIS — N18.2 STAGE 2 CHRONIC KIDNEY DISEASE: ICD-10-CM

## 2023-01-25 DIAGNOSIS — E55.9 VITAMIN D DEFICIENCY: ICD-10-CM

## 2023-01-25 DIAGNOSIS — E66.01 MORBID OBESITY: ICD-10-CM

## 2023-01-25 DIAGNOSIS — E03.9 HYPOTHYROIDISM, UNSPECIFIED TYPE: ICD-10-CM

## 2023-01-25 PROCEDURE — 99214 OFFICE O/P EST MOD 30 MIN: CPT | Performed by: FAMILY MEDICINE

## 2023-01-25 RX ORDER — PROCHLORPERAZINE 25 MG/1
SUPPOSITORY RECTAL
Qty: 1 EACH | Refills: 1 | Status: SHIPPED | OUTPATIENT
Start: 2023-01-25

## 2023-02-09 ENCOUNTER — TELEPHONE (OUTPATIENT)
Dept: FAMILY MEDICINE CLINIC | Facility: CLINIC | Age: 48
End: 2023-02-09
Payer: MEDICAID

## 2023-03-02 ENCOUNTER — LAB (OUTPATIENT)
Dept: LAB | Facility: HOSPITAL | Age: 48
End: 2023-03-02
Payer: MEDICAID

## 2023-03-02 DIAGNOSIS — G47.33 OSA (OBSTRUCTIVE SLEEP APNEA): ICD-10-CM

## 2023-03-02 DIAGNOSIS — D50.9 IRON DEFICIENCY ANEMIA, UNSPECIFIED IRON DEFICIENCY ANEMIA TYPE: ICD-10-CM

## 2023-03-02 DIAGNOSIS — E03.9 HYPOTHYROIDISM, UNSPECIFIED TYPE: ICD-10-CM

## 2023-03-02 DIAGNOSIS — E11.65 UNCONTROLLED TYPE 2 DIABETES MELLITUS WITH HYPERGLYCEMIA: ICD-10-CM

## 2023-03-02 DIAGNOSIS — E55.9 VITAMIN D DEFICIENCY: ICD-10-CM

## 2023-03-02 DIAGNOSIS — I10 ESSENTIAL HYPERTENSION: ICD-10-CM

## 2023-03-02 DIAGNOSIS — J45.40 MODERATE PERSISTENT ASTHMA, UNSPECIFIED WHETHER COMPLICATED: ICD-10-CM

## 2023-03-02 DIAGNOSIS — E78.2 MIXED HYPERLIPIDEMIA: ICD-10-CM

## 2023-03-02 DIAGNOSIS — Z79.1 NSAID LONG-TERM USE: ICD-10-CM

## 2023-03-02 DIAGNOSIS — E66.01 MORBID OBESITY: ICD-10-CM

## 2023-03-02 PROCEDURE — 84466 ASSAY OF TRANSFERRIN: CPT

## 2023-03-02 PROCEDURE — 80050 GENERAL HEALTH PANEL: CPT

## 2023-03-02 PROCEDURE — 82728 ASSAY OF FERRITIN: CPT

## 2023-03-02 PROCEDURE — 84439 ASSAY OF FREE THYROXINE: CPT

## 2023-03-02 PROCEDURE — 82306 VITAMIN D 25 HYDROXY: CPT

## 2023-03-02 PROCEDURE — 80061 LIPID PANEL: CPT

## 2023-03-02 PROCEDURE — 83036 HEMOGLOBIN GLYCOSYLATED A1C: CPT

## 2023-03-02 PROCEDURE — 83540 ASSAY OF IRON: CPT

## 2023-03-02 PROCEDURE — 82607 VITAMIN B-12: CPT

## 2023-03-03 LAB
25(OH)D3 SERPL-MCNC: 55.6 NG/ML (ref 30–100)
ALBUMIN SERPL-MCNC: 3.9 G/DL (ref 3.5–5.2)
ALBUMIN/GLOB SERPL: 1.2 G/DL
ALP SERPL-CCNC: 63 U/L (ref 39–117)
ALT SERPL W P-5'-P-CCNC: 17 U/L (ref 1–33)
ANION GAP SERPL CALCULATED.3IONS-SCNC: 10 MMOL/L (ref 5–15)
AST SERPL-CCNC: 13 U/L (ref 1–32)
BASOPHILS # BLD AUTO: 0.05 10*3/MM3 (ref 0–0.2)
BASOPHILS NFR BLD AUTO: 0.6 % (ref 0–1.5)
BILIRUB SERPL-MCNC: 0.2 MG/DL (ref 0–1.2)
BUN SERPL-MCNC: 10 MG/DL (ref 6–20)
BUN/CREAT SERPL: 13.2 (ref 7–25)
CALCIUM SPEC-SCNC: 9.5 MG/DL (ref 8.6–10.5)
CHLORIDE SERPL-SCNC: 104 MMOL/L (ref 98–107)
CHOLEST SERPL-MCNC: 117 MG/DL (ref 0–200)
CO2 SERPL-SCNC: 24 MMOL/L (ref 22–29)
CREAT SERPL-MCNC: 0.76 MG/DL (ref 0.57–1)
DEPRECATED RDW RBC AUTO: 37.5 FL (ref 37–54)
EGFRCR SERPLBLD CKD-EPI 2021: 96.8 ML/MIN/1.73
EOSINOPHIL # BLD AUTO: 0.6 10*3/MM3 (ref 0–0.4)
EOSINOPHIL NFR BLD AUTO: 7.1 % (ref 0.3–6.2)
ERYTHROCYTE [DISTWIDTH] IN BLOOD BY AUTOMATED COUNT: 16.3 % (ref 12.3–15.4)
FERRITIN SERPL-MCNC: 178 NG/ML (ref 13–150)
GLOBULIN UR ELPH-MCNC: 3.3 GM/DL
GLUCOSE SERPL-MCNC: 179 MG/DL (ref 65–99)
HBA1C MFR BLD: 9.6 % (ref 4.8–5.6)
HCT VFR BLD AUTO: 41.3 % (ref 34–46.6)
HDLC SERPL-MCNC: 38 MG/DL (ref 40–60)
HGB BLD-MCNC: 12.4 G/DL (ref 12–15.9)
IMM GRANULOCYTES # BLD AUTO: 0.05 10*3/MM3 (ref 0–0.05)
IMM GRANULOCYTES NFR BLD AUTO: 0.6 % (ref 0–0.5)
IRON 24H UR-MRATE: 62 MCG/DL (ref 37–145)
IRON SATN MFR SERPL: 21 % (ref 20–50)
LDLC SERPL CALC-MCNC: 61 MG/DL (ref 0–100)
LDLC/HDLC SERPL: 1.57 {RATIO}
LYMPHOCYTES # BLD AUTO: 3.28 10*3/MM3 (ref 0.7–3.1)
LYMPHOCYTES NFR BLD AUTO: 38.7 % (ref 19.6–45.3)
MCH RBC QN AUTO: 20.6 PG (ref 26.6–33)
MCHC RBC AUTO-ENTMCNC: 30 G/DL (ref 31.5–35.7)
MCV RBC AUTO: 68.5 FL (ref 79–97)
MONOCYTES # BLD AUTO: 0.54 10*3/MM3 (ref 0.1–0.9)
MONOCYTES NFR BLD AUTO: 6.4 % (ref 5–12)
NEUTROPHILS NFR BLD AUTO: 3.95 10*3/MM3 (ref 1.7–7)
NEUTROPHILS NFR BLD AUTO: 46.6 % (ref 42.7–76)
NRBC BLD AUTO-RTO: 0 /100 WBC (ref 0–0.2)
PLATELET # BLD AUTO: 266 10*3/MM3 (ref 140–450)
PMV BLD AUTO: 11.6 FL (ref 6–12)
POTASSIUM SERPL-SCNC: 4.4 MMOL/L (ref 3.5–5.2)
PROT SERPL-MCNC: 7.2 G/DL (ref 6–8.5)
RBC # BLD AUTO: 6.03 10*6/MM3 (ref 3.77–5.28)
SODIUM SERPL-SCNC: 138 MMOL/L (ref 136–145)
T4 FREE SERPL-MCNC: 0.81 NG/DL (ref 0.93–1.7)
TIBC SERPL-MCNC: 297 MCG/DL (ref 298–536)
TRANSFERRIN SERPL-MCNC: 199 MG/DL (ref 200–360)
TRIGL SERPL-MCNC: 97 MG/DL (ref 0–150)
TSH SERPL DL<=0.05 MIU/L-ACNC: 1.88 UIU/ML (ref 0.27–4.2)
VIT B12 BLD-MCNC: 746 PG/ML (ref 211–946)
VLDLC SERPL-MCNC: 18 MG/DL (ref 5–40)
WBC NRBC COR # BLD: 8.47 10*3/MM3 (ref 3.4–10.8)

## 2023-03-06 ENCOUNTER — TELEPHONE (OUTPATIENT)
Dept: ORTHOPEDIC SURGERY | Facility: CLINIC | Age: 48
End: 2023-03-06
Payer: MEDICAID

## 2023-03-06 RX ORDER — CELECOXIB 100 MG/1
100 CAPSULE ORAL 2 TIMES DAILY
Qty: 60 CAPSULE | Refills: 5 | Status: SHIPPED | OUTPATIENT
Start: 2023-03-06 | End: 2023-09-02

## 2023-03-06 NOTE — TELEPHONE ENCOUNTER
----- Message from JEFFREY Gill sent at 3/6/2023  9:26 AM CST -----  Please let patient know her kidney function looked good and I have sent her in refills for Celebrex, thank you.

## 2023-03-06 NOTE — PROGRESS NOTES
Please let patient know her kidney function looked good and I have sent her in refills for Celebrex, thank you.

## 2023-03-09 ENCOUNTER — OFFICE VISIT (OUTPATIENT)
Dept: FAMILY MEDICINE CLINIC | Facility: CLINIC | Age: 48
End: 2023-03-09
Payer: MEDICAID

## 2023-03-09 VITALS
BODY MASS INDEX: 51.03 KG/M2 | HEART RATE: 90 BPM | TEMPERATURE: 97.5 F | DIASTOLIC BLOOD PRESSURE: 70 MMHG | HEIGHT: 63 IN | OXYGEN SATURATION: 95 % | SYSTOLIC BLOOD PRESSURE: 112 MMHG | WEIGHT: 288 LBS

## 2023-03-09 DIAGNOSIS — E11.649 UNCONTROLLED TYPE 2 DIABETES MELLITUS WITH HYPOGLYCEMIA WITHOUT COMA: Primary | ICD-10-CM

## 2023-03-09 DIAGNOSIS — D50.9 IRON DEFICIENCY ANEMIA, UNSPECIFIED IRON DEFICIENCY ANEMIA TYPE: ICD-10-CM

## 2023-03-09 DIAGNOSIS — I10 ESSENTIAL HYPERTENSION: ICD-10-CM

## 2023-03-09 DIAGNOSIS — E55.9 VITAMIN D DEFICIENCY: ICD-10-CM

## 2023-03-09 DIAGNOSIS — E66.01 MORBID OBESITY: ICD-10-CM

## 2023-03-09 DIAGNOSIS — E78.2 MIXED HYPERLIPIDEMIA: ICD-10-CM

## 2023-03-09 DIAGNOSIS — N18.2 STAGE 2 CHRONIC KIDNEY DISEASE: ICD-10-CM

## 2023-03-09 DIAGNOSIS — Z12.4 ENCOUNTER FOR PAPANICOLAOU SMEAR OF CERVIX: ICD-10-CM

## 2023-03-09 DIAGNOSIS — J45.40 MODERATE PERSISTENT ASTHMA, UNSPECIFIED WHETHER COMPLICATED: ICD-10-CM

## 2023-03-09 PROCEDURE — 3074F SYST BP LT 130 MM HG: CPT | Performed by: FAMILY MEDICINE

## 2023-03-09 PROCEDURE — 99214 OFFICE O/P EST MOD 30 MIN: CPT | Performed by: FAMILY MEDICINE

## 2023-03-09 PROCEDURE — 3078F DIAST BP <80 MM HG: CPT | Performed by: FAMILY MEDICINE

## 2023-03-09 PROCEDURE — 3046F HEMOGLOBIN A1C LEVEL >9.0%: CPT | Performed by: FAMILY MEDICINE

## 2023-03-09 NOTE — PATIENT INSTRUCTIONS
CoQ10 at pharmacy take with lipitor    Start on Dexcom and monitor your sugars readings next visit on paper before breakfast and 2 hours meal    Continue trulicity go up from 1.5 to 3.0 mg subq weekly      Recheck in 6 weeks

## 2023-03-23 NOTE — PROGRESS NOTES
Subjective:  Sarina Lashone Jackson is a 47 y.o. female who presents for       Patient Active Problem List   Diagnosis   • Encounter for immunization   • Diabetes type 2, uncontrolled   • Hyperlipidemia associated with type 2 diabetes mellitus   • Morbid obesity   • Vitamin D deficiency   • Essential hypertension   • Hyperlipidemia   • Urinary tract infection   • Acute pain of right shoulder   • Right arm pain   • Right elbow pain   • Lateral epicondylitis of right elbow   • Medial epicondylitis   • Encounter for screening mammogram for malignant neoplasm of breast   • Iron deficiency anemia   • Mild intermittent asthma   • Encounter for screening for endocrine disorder   • Gastroesophageal reflux disease   • Pain in both knees   • Controlled type 2 diabetes mellitus with complication, without long-term current use of insulin   • Anemia   • Right knee pain   • Controlled type 2 diabetes mellitus with complication, with long-term current use of insulin   • General medical examination   • Leukocytosis   • Chronic bilateral low back pain with sciatica   • Encounter for Papanicolaou smear of cervix   • Screening mammogram, encounter for   • Other fatigue   • MIGEL (obstructive sleep apnea)   • Moderate persistent asthma   • Acute bronchitis   • Right wrist pain   • Nausea and vomiting   • Grief   • Carpal tunnel syndrome on right   • Encounter for screening for malignant neoplasm of colon   • Uncontrolled type 2 diabetes mellitus with hypoglycemia without coma   • Bilateral swelling of feet   • Constipation   • Hypothyroidism   • Stage 2 chronic kidney disease           Current Outpatient Medications:   •  albuterol sulfate HFA (Ventolin HFA) 108 (90 Base) MCG/ACT inhaler, Inhale 2 puffs Every 4 (Four) Hours As Needed for Shortness of Air., Disp: 18 g, Rfl: 3  •  aspirin 81 MG chewable tablet, Chew 1 tablet Daily., Disp: 30 tablet, Rfl: 3  •  atorvastatin (Lipitor) 40 MG tablet, Take 1 tablet by mouth Daily., Disp: 90  "tablet, Rfl: 3  •  BD INSULIN SYRINGE U/F 31G X 5/16\" 1 ML misc, , Disp: , Rfl:   •  celecoxib (CeleBREX) 100 MG capsule, Take 1 capsule by mouth 2 (Two) Times a Day for 180 days., Disp: 60 capsule, Rfl: 5  •  Continuous Blood Gluc  (Dexcom G6 ) device, 1 application 3 (Three) Times a Day., Disp: 1 each, Rfl: 3  •  Continuous Blood Gluc Sensor (Dexcom G6 Sensor), Every 10 (Ten) Days., Disp: 1 each, Rfl: 1  •  Continuous Blood Gluc Transmit (Dexcom G6 Transmitter) misc, 1 application 3 (Three) Times a Day., Disp: 1 each, Rfl: 3  •  cyclobenzaprine (FLEXERIL) 10 MG tablet, Take 1 tablet by mouth At Night As Needed for Muscle Spasms., Disp: 30 tablet, Rfl: 3  •  Dulaglutide 3 MG/0.5ML solution pen-injector, Inject 0.5 mL under the skin into the appropriate area as directed 1 (One) Time Per Week., Disp: 2 mL, Rfl: 3  •  Ertugliflozin L-PyroglutamicAc (Steglatro) 15 MG tablet, Take 1 tablet by mouth Every Morning., Disp: 30 tablet, Rfl: 3  •  ferrous sulfate 325 (65 FE) MG tablet, Take 1 tablet by mouth 2 (Two) Times a Day., Disp: 60 tablet, Rfl: 3  •  fluticasone (Flonase) 50 MCG/ACT nasal spray, 2 sprays into the nostril(s) as directed by provider Daily., Disp: 16 g, Rfl: 3  •  fluticasone (FLOVENT HFA) 220 MCG/ACT inhaler, Inhale 1 puff 2 (Two) Times a Day., Disp: 1 each, Rfl: 3  •  furosemide (Lasix) 20 MG tablet, Take 1 tablet by mouth 2 (Two) Times a Day., Disp: 60 tablet, Rfl: 3  •  glucose blood test strip, 1 each by Other route 3 (Three) Times a Day., Disp: 100 each, Rfl: 3  •  insulin glargine (Lantus) 100 UNIT/ML injection, START WITH 16 UNITS UNDER THE SKIN AT BEDTIME CAN GO UP BY 3 UNITS EVERY 3 DAYS UNTIL IN THE MORNING SUGARS RUNNING  MDD 25, Disp: 9 mL, Rfl: 12  •  Insulin Pen Needle ([employer] Pen Needles) 31G X 6 MM misc, Inject 1 Device as directed 3 (Three) Times a Day As Needed (Diabetes)., Disp: 100 each, Rfl: 2  •  Lancets misc, Use to check sugars three times a day for diabetes " E11, Disp: 100 each, Rfl: 3  •  levothyroxine (Synthroid) 88 MCG tablet, Take 1 tablet by mouth Daily., Disp: 30 tablet, Rfl: 3  •  linaclotide (Linzess) 72 MCG capsule capsule, Take 1 capsule by mouth Every Morning Before Breakfast., Disp: 30 capsule, Rfl: 3  •  loratadine (CLARITIN) 10 MG tablet, Take 1 tablet by mouth Daily., Disp: 30 tablet, Rfl: 3  •  losartan (Cozaar) 50 MG tablet, Take 1 tablet by mouth Daily., Disp: 30 tablet, Rfl: 3  •  montelukast (Singulair) 10 MG tablet, Take 1 tablet by mouth Every Night., Disp: 30 tablet, Rfl: 3  •  omeprazole (priLOSEC) 40 MG capsule, Take 1 capsule by mouth Daily., Disp: 30 capsule, Rfl: 3  •  ondansetron ODT (ZOFRAN-ODT) 8 MG disintegrating tablet, Place 1 tablet on the tongue Every 8 (Eight) Hours As Needed for Nausea or Vomiting., Disp: 30 tablet, Rfl: 3  •  vitamin D (ERGOCALCIFEROL) 1.25 MG (49020 UT) capsule capsule, Take 1 capsule by mouth Every 7 (Seven) Days., Disp: 5 capsule, Rfl: 3       Pt is 48 yo female with management of morbid obesity, moderate persistent asthma, allergic rhinitis, IDDM type 2,, HLP, HTN,  Vitamin D deficiency,GERD, chronic fatigue, MIGEL, multiple joint pain, sp cholecystectomy sp tonsillectomy, sp umbilical hernia repair, sp , iron deficiency anemia , CKD stage 2, carpal tunnel on right hand/wrist , internal hemorrhoids, diverticulosis       23 in office visit for recheck. Pt saw Orthopedic for her right knee pain/arthritis. She agreed to proceed with celebrex. Pt is back to taking her medications for DM type 2 and HTN. BP stable today.   Her sugars have been stable. Has not been able to take steglatro.  She gained 12 lbs since  Last visit.      3/9/23 in office visit for recheck. Pt had labwork done on 3/2/23 that showed vitamin D and b12 stable iron profile stable. On thyroid studies TSH normal and free T4 is at 0.81. lipid panel showed HDL at 38. hga1c was at 9.60. CBC showed normal hemoglbin and RBC at 6.03 MCV at  68.5. CMP showed GFR At 96.8 and normal liver enzymes. ttrulicity was increased from 1.5 to 3.0 mg subq weekly. She has not set up her Dexcom yet. She is on Lantus 25-30 units at bedtime. Pt lost 9 lbs since her last visit     4/20/23 in office visit for recheck. BP is better controlled. Her sugars have been better controlled but she has some hypoglycemic episodes. She is on trulicity 3 mg sub q weekly.  She is on steglatro. She now has DEXCOM and is liking it She is having pain in her left hip and lower back. For past few weeks.   She is using her back brace. She is using celebrex for pain. She also has pain near her left hip area along with her lower miracle       Constipation  This is a recurrent problem. The current episode started more than 1 month ago. The problem is unchanged. Pertinent negatives include no abdominal pain, anorexia, back pain, bloating, diarrhea, difficulty urinating, fecal incontinence, fever, flatus, hematochezia, hemorrhoids, melena, nausea, rectal pain, vomiting or weight loss. She has tried nothing for the symptoms. The treatment provided no relief. There is no history of abdominal surgery, endocrine disease, inflammatory bowel disease, irritable bowel syndrome, metabolic disease, neurologic disease, neuromuscular disease, psychiatric history or radiation treatment.   Hip Pain   The incident occurred more than 1 week ago. The incident occurred at home. There was no injury mechanism. The pain is present in the left leg, left hip and left thigh. The quality of the pain is described as aching. The pain is at a severity of 3/10. The pain is moderate. The pain has been improving since onset. Associated symptoms include a loss of motion, a loss of sensation, muscle weakness and numbness. Pertinent negatives include no inability to bear weight or tingling. She reports no foreign bodies present. The symptoms are aggravated by movement. She has tried nothing for the symptoms. The treatment provided  no relief.   Back Pain  This is a recurrent problem. The current episode started more than 1 month ago. The problem occurs constantly. The problem is unchanged. The pain is present in the gluteal and lumbar spine. The quality of the pain is described as aching. The pain does not radiate. The pain is at a severity of 4/10. The pain is moderate. The pain is the same all the time. The symptoms are aggravated by bending, lying down and position. Stiffness is present all day. Associated symptoms include numbness and weakness. Pertinent negatives include no abdominal pain, bladder incontinence, bowel incontinence, chest pain, dysuria, fever, headaches, paresthesias, pelvic pain, perianal numbness, tingling or weight loss. She has tried NSAIDs for the symptoms.   Hypertension  This is a chronic problem. The current episode started more than 1 year ago. The problem has been resolved since onset. The problem is controlled. Pertinent negatives include no anxiety, blurred vision, chest pain, headaches, malaise/fatigue, neck pain, orthopnea, palpitations, peripheral edema, PND, shortness of breath or sweats. Risk factors for coronary artery disease include diabetes mellitus, sedentary lifestyle and obesity. Past treatments include ACE inhibitors. Current antihypertension treatment includes ACE inhibitors. The current treatment provides significant improvement. There is no history of angina, kidney disease, CAD/MI, CVA, heart failure, left ventricular hypertrophy, PVD or retinopathy. There is no history of chronic renal disease, coarctation of the aorta, hyperaldosteronism, hypercortisolism, hyperparathyroidism, a hypertension causing med, pheochromocytoma, renovascular disease, sleep apnea or a thyroid problem.   Diabetes   She presents for her follow-up diabetic visit. She has type 2 diabetes mellitus. Her disease course has been uncontrolled . Pertinent negatives for hypoglycemia include  no confusion, dizziness, headaches, hunger, mood changes, nervousness/anxiousness, pallor, seizures, sleepiness, speech difficulty, sweats or tremors. Associated symptoms include fatigue. Pertinent negatives for diabetes include no blurred vision, no chest pain, no foot paresthesias, no foot ulcerations, no polydipsia, no polyphagia, no polyuria, no visual change, no weakness and no weight loss. Pertinent negatives for hypoglycemia complications include no blackouts, no hospitalization, no nocturnal hypoglycemia, no required assistance and no required glucagon injection. Symptoms are stable. Pertinent negatives for diabetic complications include no autonomic neuropathy, CVA, heart disease, nephropathy, peripheral neuropathy, PVD or retinopathy. Risk factors for coronary artery disease include diabetes mellitus, dyslipidemia and obesity. Current diabetic treatment includes insulin injections. She is following a diabetic diet. When asked about meal planning, she reported none. She has not had a previous visit with a dietitian. She participates in exercise intermittently. She does not see a podiatrist.Eye exam is not current.   Obesity   This is a chronic problem. The current episode started more than 1 year ago. The problem has been unchanged. Associated symptoms include arthralgias, fatigue and nausea. Pertinent negatives include no abdominal pain, anorexia, change in bowel habit, chest pain, chills, congestion, coughing, fever, headaches, joint swelling, myalgias, neck pain, numbness, rash, sore throat, swollen glands, urinary symptoms, vertigo, visual change, vomiting or weakness. Nothing aggravates the symptoms. She has tried nothing for the symptoms. The treatment provided no relief    Review of Systems  Review of Systems   Constitutional: Positive for activity change and fatigue. Negative for appetite change, chills, diaphoresis, fever and weight loss.   HENT: Negative for congestion, postnasal drip, rhinorrhea,  sinus pressure, sinus pain, sneezing, sore throat, trouble swallowing and voice change.    Respiratory: Positive for shortness of breath. Negative for cough, choking, chest tightness, wheezing and stridor.    Cardiovascular: Negative for chest pain.   Gastrointestinal: Positive for constipation. Negative for abdominal pain, anorexia, bloating, bowel incontinence, diarrhea, flatus, hematochezia, hemorrhoids, melena, nausea, rectal pain and vomiting.   Genitourinary: Negative for bladder incontinence, difficulty urinating, dysuria and pelvic pain.   Musculoskeletal: Positive for arthralgias. Negative for back pain.   Neurological: Positive for weakness and numbness. Negative for tingling, headaches and paresthesias.   Psychiatric/Behavioral: The patient is nervous/anxious.        Patient Active Problem List   Diagnosis   • Encounter for immunization   • Diabetes type 2, uncontrolled   • Hyperlipidemia associated with type 2 diabetes mellitus   • Morbid obesity   • Vitamin D deficiency   • Essential hypertension   • Hyperlipidemia   • Urinary tract infection   • Acute pain of right shoulder   • Right arm pain   • Right elbow pain   • Lateral epicondylitis of right elbow   • Medial epicondylitis   • Encounter for screening mammogram for malignant neoplasm of breast   • Iron deficiency anemia   • Mild intermittent asthma   • Encounter for screening for endocrine disorder   • Gastroesophageal reflux disease   • Pain in both knees   • Controlled type 2 diabetes mellitus with complication, without long-term current use of insulin   • Anemia   • Right knee pain   • Controlled type 2 diabetes mellitus with complication, with long-term current use of insulin   • General medical examination   • Leukocytosis   • Chronic bilateral low back pain with sciatica   • Encounter for Papanicolaou smear of cervix   • Screening mammogram, encounter for   • Other fatigue   • MIGEL (obstructive sleep apnea)   • Moderate persistent asthma   •  Acute bronchitis   • Right wrist pain   • Nausea and vomiting   • Grief   • Carpal tunnel syndrome on right   • Encounter for screening for malignant neoplasm of colon   • Uncontrolled type 2 diabetes mellitus with hypoglycemia without coma   • Bilateral swelling of feet   • Constipation   • Hypothyroidism   • Stage 2 chronic kidney disease     Past Surgical History:   Procedure Laterality Date   •  SECTION  10/30/2000   • CHOLECYSTECTOMY  1995   • COLONOSCOPY  2013    normal per pt   • COLONOSCOPY N/A 2022    Procedure: COLONOSCOPY;  Surgeon: Caitlin Hermosillo MD;  Location: [facility] ENDOSCOPY;  Service: Gastroenterology;  Laterality: N/A;   • CYSTOSCOPY  2016    And bilateral retrograde pyelograms. Performed at [facility].   • TONSILLECTOMY  1985   • UMBILICAL HERNIA REPAIR  2015   • UMBILICAL HERNIA REPAIR  2015     Social History     Socioeconomic History   • Marital status: Single   Tobacco Use   • Smoking status: Never   • Smokeless tobacco: Never   Vaping Use   • Vaping Use: Never used   Substance and Sexual Activity   • Alcohol use: No   • Drug use: No   • Sexual activity: Defer     Family History   Problem Relation Age of Onset   • Diabetes Mother    • Heart disease Mother    • Heart failure Mother         congestive   • Hypertension Mother    • Asthma Father    • Osteoarthritis Father      Lab on 2023   Component Date Value Ref Range Status   • Glucose 2023 179 (H)  65 - 99 mg/dL Final   • BUN 2023 10  6 - 20 mg/dL Final   • Creatinine 2023 0.76  0.57 - 1.00 mg/dL Final   • Sodium 2023 138  136 - 145 mmol/L Final   • Potassium 2023 4.4  3.5 - 5.2 mmol/L Final   • Chloride 2023 104  98 - 107 mmol/L Final   • CO2 2023 24.0  22.0 - 29.0 mmol/L Final   • Calcium 2023 9.5  8.6 - 10.5 mg/dL Final   • Total Protein 2023 7.2  6.0 - 8.5 g/dL Final   • Albumin 2023 3.9  3.5 - 5.2 g/dL Final   • ALT  (SGPT) 03/02/2023 17  1 - 33 U/L Final   • AST (SGOT) 03/02/2023 13  1 - 32 U/L Final   • Alkaline Phosphatase 03/02/2023 63  39 - 117 U/L Final   • Total Bilirubin 03/02/2023 0.2  0.0 - 1.2 mg/dL Final   • Globulin 03/02/2023 3.3  gm/dL Final   • A/G Ratio 03/02/2023 1.2  g/dL Final   • BUN/Creatinine Ratio 03/02/2023 13.2  7.0 - 25.0 Final   • Anion Gap 03/02/2023 10.0  5.0 - 15.0 mmol/L Final   • eGFR 03/02/2023 96.8  >60.0 mL/min/1.73 Final   • WBC 03/02/2023 8.47  3.40 - 10.80 10*3/mm3 Final   • RBC 03/02/2023 6.03 (H)  3.77 - 5.28 10*6/mm3 Final   • Hemoglobin 03/02/2023 12.4  12.0 - 15.9 g/dL Final   • Hematocrit 03/02/2023 41.3  34.0 - 46.6 % Final   • MCV 03/02/2023 68.5 (L)  79.0 - 97.0 fL Final   • MCH 03/02/2023 20.6 (L)  26.6 - 33.0 pg Final   • MCHC 03/02/2023 30.0 (L)  31.5 - 35.7 g/dL Final   • RDW 03/02/2023 16.3 (H)  12.3 - 15.4 % Final   • RDW-SD 03/02/2023 37.5  37.0 - 54.0 fl Final   • MPV 03/02/2023 11.6  6.0 - 12.0 fL Final   • Platelets 03/02/2023 266  140 - 450 10*3/mm3 Final   • Neutrophil % 03/02/2023 46.6  42.7 - 76.0 % Final   • Lymphocyte % 03/02/2023 38.7  19.6 - 45.3 % Final   • Monocyte % 03/02/2023 6.4  5.0 - 12.0 % Final   • Eosinophil % 03/02/2023 7.1 (H)  0.3 - 6.2 % Final   • Basophil % 03/02/2023 0.6  0.0 - 1.5 % Final   • Immature Grans % 03/02/2023 0.6 (H)  0.0 - 0.5 % Final   • Neutrophils, Absolute 03/02/2023 3.95  1.70 - 7.00 10*3/mm3 Final   • Lymphocytes, Absolute 03/02/2023 3.28 (H)  0.70 - 3.10 10*3/mm3 Final   • Monocytes, Absolute 03/02/2023 0.54  0.10 - 0.90 10*3/mm3 Final   • Eosinophils, Absolute 03/02/2023 0.60 (H)  0.00 - 0.40 10*3/mm3 Final   • Basophils, Absolute 03/02/2023 0.05  0.00 - 0.20 10*3/mm3 Final   • Immature Grans, Absolute 03/02/2023 0.05  0.00 - 0.05 10*3/mm3 Final   • nRBC 03/02/2023 0.0  0.0 - 0.2 /100 WBC Final   • Hemoglobin A1C 03/02/2023 9.60 (H)  4.80 - 5.60 % Final   • Total Cholesterol 03/02/2023 117  0 - 200 mg/dL Final   • Triglycerides  03/02/2023 97  0 - 150 mg/dL Final   • HDL Cholesterol 03/02/2023 38 (L)  40 - 60 mg/dL Final   • LDL Cholesterol  03/02/2023 61  0 - 100 mg/dL Final   • VLDL Cholesterol 03/02/2023 18  5 - 40 mg/dL Final   • LDL/HDL Ratio 03/02/2023 1.57   Final   • TSH 03/02/2023 1.880  0.270 - 4.200 uIU/mL Final   • Free T4 03/02/2023 0.81 (L)  0.93 - 1.70 ng/dL Final   • 25 Hydroxy, Vitamin D 03/02/2023 55.6  30.0 - 100.0 ng/ml Final   • Vitamin B-12 03/02/2023 746  211 - 946 pg/mL Final   • Iron 03/02/2023 62  37 - 145 mcg/dL Final   • Iron Saturation 03/02/2023 21  20 - 50 % Final   • Transferrin 03/02/2023 199 (L)  200 - 360 mg/dL Final   • TIBC 03/02/2023 297 (L)  298 - 536 mcg/dL Final   • Ferritin 03/02/2023 178.00 (H)  13.00 - 150.00 ng/mL Final   Lab on 11/22/2022   Component Date Value Ref Range Status   • TSH 11/22/2022 2.090  0.270 - 4.200 uIU/mL Final   • Free T4 11/22/2022 0.92 (L)  0.93 - 1.70 ng/dL Final   • T3, Free 11/22/2022 2.46  2.00 - 4.40 pg/mL Final   • WBC 11/22/2022 10.22  3.40 - 10.80 10*3/mm3 Final   • RBC 11/22/2022 6.04 (H)  3.77 - 5.28 10*6/mm3 Final   • Hemoglobin 11/22/2022 12.3  12.0 - 15.9 g/dL Final   • Hematocrit 11/22/2022 42.4  34.0 - 46.6 % Final   • MCV 11/22/2022 70.2 (L)  79.0 - 97.0 fL Final   • MCH 11/22/2022 20.4 (L)  26.6 - 33.0 pg Final   • MCHC 11/22/2022 29.0 (L)  31.5 - 35.7 g/dL Final   • RDW 11/22/2022 15.1  12.3 - 15.4 % Final   • RDW-SD 11/22/2022 36.6 (L)  37.0 - 54.0 fl Final   • MPV 11/22/2022 11.5  6.0 - 12.0 fL Final   • Platelets 11/22/2022 287  140 - 450 10*3/mm3 Final   • Neutrophil % 11/22/2022 52.5  42.7 - 76.0 % Final   • Lymphocyte % 11/22/2022 35.1  19.6 - 45.3 % Final   • Monocyte % 11/22/2022 6.1  5.0 - 12.0 % Final   • Eosinophil % 11/22/2022 5.4  0.3 - 6.2 % Final   • Basophil % 11/22/2022 0.5  0.0 - 1.5 % Final   • Immature Grans % 11/22/2022 0.4  0.0 - 0.5 % Final   • Neutrophils, Absolute 11/22/2022 5.37  1.70 - 7.00 10*3/mm3 Final   • Lymphocytes,  Absolute 11/22/2022 3.59 (H)  0.70 - 3.10 10*3/mm3 Final   • Monocytes, Absolute 11/22/2022 0.62  0.10 - 0.90 10*3/mm3 Final   • Eosinophils, Absolute 11/22/2022 0.55 (H)  0.00 - 0.40 10*3/mm3 Final   • Basophils, Absolute 11/22/2022 0.05  0.00 - 0.20 10*3/mm3 Final   • Immature Grans, Absolute 11/22/2022 0.04  0.00 - 0.05 10*3/mm3 Final   • nRBC 11/22/2022 0.1  0.0 - 0.2 /100 WBC Final   • Glucose 11/22/2022 257 (H)  65 - 99 mg/dL Final   • BUN 11/22/2022 11  6 - 20 mg/dL Final   • Creatinine 11/22/2022 0.90  0.57 - 1.00 mg/dL Final   • Sodium 11/22/2022 138  136 - 145 mmol/L Final   • Potassium 11/22/2022 4.5  3.5 - 5.2 mmol/L Final   • Chloride 11/22/2022 98  98 - 107 mmol/L Final   • CO2 11/22/2022 28.9  22.0 - 29.0 mmol/L Final   • Calcium 11/22/2022 9.9  8.6 - 10.5 mg/dL Final   • Total Protein 11/22/2022 7.6  6.0 - 8.5 g/dL Final   • Albumin 11/22/2022 3.90  3.50 - 5.20 g/dL Final   • ALT (SGPT) 11/22/2022 19  1 - 33 U/L Final   • AST (SGOT) 11/22/2022 23  1 - 32 U/L Final   • Alkaline Phosphatase 11/22/2022 76  39 - 117 U/L Final   • Total Bilirubin 11/22/2022 0.2  0.0 - 1.2 mg/dL Final   • Globulin 11/22/2022 3.7  gm/dL Final   • A/G Ratio 11/22/2022 1.1  g/dL Final   • BUN/Creatinine Ratio 11/22/2022 12.2  7.0 - 25.0 Final   • Anion Gap 11/22/2022 11.1  5.0 - 15.0 mmol/L Final   • eGFR 11/22/2022 79.0  >60.0 mL/min/1.73 Final    National Kidney Foundation and American Society of Nephrology (ASN) Task Force recommended calculation based on the Chronic Kidney Disease Epidemiology Collaboration (CKD-EPI) equation refit without adjustment for race.   • Hemoglobin A1C 11/22/2022 9.60 (H)  4.80 - 5.60 % Final   • Total Cholesterol 11/22/2022 113  0 - 200 mg/dL Final   • Triglycerides 11/22/2022 96  0 - 150 mg/dL Final   • HDL Cholesterol 11/22/2022 36 (L)  40 - 60 mg/dL Final   • LDL Cholesterol  11/22/2022 59  0 - 100 mg/dL Final   • VLDL Cholesterol 11/22/2022 18  5 - 40 mg/dL Final   • LDL/HDL Ratio  "11/22/2022 1.61   Final   • 25 Hydroxy, Vitamin D 11/22/2022 55.4  30.0 - 100.0 ng/ml Final   • Vitamin B-12 11/22/2022 1,014 (H)  211 - 946 pg/mL Final   • Microalbumin/Creatinine Ratio 11/22/2022 9.8  mg/g Final   • Creatinine, Urine 11/22/2022 367.2  mg/dL Final   • Microalbumin, Urine 11/22/2022 3.6  mg/dL Final      XR Knee Bilateral AP Standing  Narrative: Procedure: AP bilateral knees standing views    Reason for exam: Right knee pain, unspecified chronicity.    FINDINGS: Right knee mild to moderate loss of lateral knee joint  compartment height suspicious for osteoarthritic changes.  Otherwise bony structures of bilateral knees are normal. There is  no evidence of fracture or dislocation. Soft tissue structures  are normal.  Impression: 1.  Right knee mild to moderate loss of lateral knee joint  compartment height suspicious for osteoarthritic changes.   2.  Otherwise unremarkable AP knee bilateral standing views.    Electronically signed by:  Frank Crowley MD  12/5/2022 2:05 PM [facility]  Workstation: [id number]    [email or web address]  Immunization History   Administered Date(s) Administered   • Flu Vaccine Quad PF >36MO 10/14/2016   • FluLaval/Fluzone >6mos 10/14/2016   • Hepatitis B 07/19/2016, 08/23/2016, 02/17/2017   • Hepatitis B Adult/Adolescent IM 07/19/2016, 08/23/2016, 02/17/2017   • Tdap 02/17/2017       The following portions of the patient's history were reviewed and updated as appropriate: allergies, current medications, past family history, past medical history, past social history, past surgical history and problem list.        Physical Exam  /84 (BP Location: Right arm, Patient Position: Sitting, Cuff Size: Large Adult)   Pulse 92   Temp 98.5 °F (36.9 °C)   Ht 160 cm (63\")   Wt 132 kg (290 lb 12.8 oz)   LMP 04/10/2023   SpO2 100%   BMI 51.51 kg/m²     /84 (BP Location: Right arm, Patient Position: Sitting, Cuff Size: Large Adult)   Pulse 92   Temp 98.5 °F (36.9 °C)   Ht 160 cm " "(63\")   Wt 132 kg (290 lb 12.8 oz)   LMP 04/10/2023   SpO2 100%   BMI 51.51 kg/m²       Physical Exam  Vitals and nursing note reviewed.   Constitutional:       Appearance: She is well-developed. She is not diaphoretic.   HENT:      Head: Normocephalic and atraumatic.      Right Ear: External ear normal.   Eyes:      Conjunctiva/sclera: Conjunctivae normal.      Pupils: Pupils are equal, round, and reactive to light.   Cardiovascular:      Rate and Rhythm: Normal rate and regular rhythm.      Heart sounds: Normal heart sounds. No murmur heard.  Pulmonary:      Effort: Pulmonary effort is normal. No respiratory distress.      Breath sounds: Normal breath sounds.   Abdominal:      General: Bowel sounds are normal. There is no distension.      Palpations: Abdomen is soft.      Tenderness: There is no abdominal tenderness.   Musculoskeletal:         General: Tenderness present. No deformity.      Cervical back: Normal range of motion and neck supple.      Lumbar back: Spasms, tenderness and bony tenderness present. Decreased range of motion.      Left hip: Tenderness and bony tenderness present. Decreased range of motion.   Skin:     General: Skin is warm.      Coloration: Skin is not pale.      Findings: No erythema or rash.   Neurological:      Mental Status: She is alert and oriented to person, place, and time.      Cranial Nerves: No cranial nerve deficit.   Psychiatric:         Behavior: Behavior normal.         [unfilled]   Diagnosis Plan   1. Pain of left hip  XR Hip With or Without Pelvis 2 - 3 View Left      2. Uncontrolled type 2 diabetes mellitus with hypoglycemia without coma        3. Stage 2 chronic kidney disease        4. Morbid obesity        5. Moderate persistent asthma, unspecified whether complicated        6. Mixed hyperlipidemia        7. Iron deficiency anemia, unspecified iron deficiency anemia type        8. Hypothyroidism, unspecified type        9. Essential hypertension        10. " Gastroesophageal reflux disease, unspecified whether esophagitis present        11. Vitamin D deficiency        12. Midline low back pain with left-sided sciatica, unspecified chronicity  XR Hip With or Without Pelvis 2 - 3 View Left                -went over labwork   -recommend diabetic eye exam  -CKD stage 2 - continue to monitor   -knee pain/arthritis of right knee -orthopedic following on celebrex    -nausea/vomiting - on zofran 8 mg ODT every 8 hours PRN  -MIGEL- sleep medicine following. She will need to make a call for appt for CPAP  -Hypertension  on losartan 50 mg dailyAdvised pt to monitor Blood pressure at home and bring readings next visit   -hip pain/lower back pain - will get x-rays of hip and back today. Consider Orthoped  -hyperlipidemia-  Recheck lipid panel The current medical regimen is effective;  continue present plan and medications.  -bilateral feet swelling -  on lasix 20 mg PO BID. Drug information provided. Recommend low sodium diet   -hypopthyroidism - on synthroid  88 mcg PO q daily.     -DM type 2-  now on insulin 10-15 units. Advised pt to continue on 10 units and if sugars still low to try 5    units at bedtime on steglatro 15 mg daily. On truliicity 3  mg subq weekly.  Advised pt to restart all her medications. On DEXCOM  -allergic rhinitis - on claritin 10 mg daily.    -vitamin D deficiency -on Vitamin D3 50,000 units once a week   -morbid obesity -. Gave bariatric weight loss surgery to go home with.  She has been reluctant for weight loss surgery.  BMI at 51.51 . counseled weight loss >5 minutes   -iron deficiency anemia/microcytosis  -continue on iron pill TID. Her last CBC showed stable hemoglobin. Recommend Hematology referral and possible IV iron therapy. Consultation appreciated.    -moderate persistent asthma - on flovent 220 mcg 1 puff BID:. Albuterol PRN  on singulair 10 mg at bedtime   -GERD/diverticulosis - GI following. Recommend high fiber diet  on prilosec 40 mg  daily.   -advised pt to be safe and call with questions and concerns  -advised to go to ER or call 911 if symptoms worrisome or severe  -advised to followup with Specialist and referrals  -adivsed pt to be safe during COVID-19 pandemic  I spent 33  minutes caring for Lizzy on this date of service. This time includes time spent by me in the following activities: preparing for the visit, reviewing tests, obtaining and/or reviewing a separately obtained history, performing a medically appropriate examination and/or evaluation, counseling and educating the patient/family/caregiver, ordering medications, tests, or procedures, referring and communicating with other health care professionals, documenting information in the medical record and independently interpreting results and communicating that information with the patient/family/caregiver.         This document has been electronically signed by Solitario Li MD on April 20, 2023 10:43 CDT

## 2023-04-06 ENCOUNTER — TELEPHONE (OUTPATIENT)
Dept: FAMILY MEDICINE CLINIC | Facility: CLINIC | Age: 48
End: 2023-04-06
Payer: MEDICAID

## 2023-04-20 ENCOUNTER — OFFICE VISIT (OUTPATIENT)
Dept: FAMILY MEDICINE CLINIC | Facility: CLINIC | Age: 48
End: 2023-04-20
Payer: MEDICAID

## 2023-04-20 VITALS
DIASTOLIC BLOOD PRESSURE: 84 MMHG | TEMPERATURE: 98.5 F | SYSTOLIC BLOOD PRESSURE: 130 MMHG | HEIGHT: 63 IN | HEART RATE: 92 BPM | BODY MASS INDEX: 51.52 KG/M2 | OXYGEN SATURATION: 100 % | WEIGHT: 290.8 LBS

## 2023-04-20 DIAGNOSIS — E66.01 MORBID OBESITY: ICD-10-CM

## 2023-04-20 DIAGNOSIS — E78.2 MIXED HYPERLIPIDEMIA: ICD-10-CM

## 2023-04-20 DIAGNOSIS — K21.9 GASTROESOPHAGEAL REFLUX DISEASE, UNSPECIFIED WHETHER ESOPHAGITIS PRESENT: ICD-10-CM

## 2023-04-20 DIAGNOSIS — E11.649 UNCONTROLLED TYPE 2 DIABETES MELLITUS WITH HYPOGLYCEMIA WITHOUT COMA: ICD-10-CM

## 2023-04-20 DIAGNOSIS — J45.40 MODERATE PERSISTENT ASTHMA, UNSPECIFIED WHETHER COMPLICATED: ICD-10-CM

## 2023-04-20 DIAGNOSIS — I10 ESSENTIAL HYPERTENSION: ICD-10-CM

## 2023-04-20 DIAGNOSIS — E03.9 HYPOTHYROIDISM, UNSPECIFIED TYPE: ICD-10-CM

## 2023-04-20 DIAGNOSIS — M54.42 MIDLINE LOW BACK PAIN WITH LEFT-SIDED SCIATICA, UNSPECIFIED CHRONICITY: ICD-10-CM

## 2023-04-20 DIAGNOSIS — M25.552 PAIN OF LEFT HIP: Primary | ICD-10-CM

## 2023-04-20 DIAGNOSIS — E55.9 VITAMIN D DEFICIENCY: ICD-10-CM

## 2023-04-20 DIAGNOSIS — N18.2 STAGE 2 CHRONIC KIDNEY DISEASE: ICD-10-CM

## 2023-04-20 DIAGNOSIS — D50.9 IRON DEFICIENCY ANEMIA, UNSPECIFIED IRON DEFICIENCY ANEMIA TYPE: ICD-10-CM

## 2023-04-20 NOTE — PATIENT INSTRUCTIONS
Labwork before next visit     Start on insulin 10 units at bedtime. If your sugars continues to be low cut back on Lantus 5     Get x-ray of hip and lower back pain     Recheck in 2 months    Labwork before next visit

## 2023-05-11 RX ORDER — PROCHLORPERAZINE 25 MG/1
SUPPOSITORY RECTAL
Qty: 3 EACH | Refills: 3 | Status: SHIPPED | OUTPATIENT
Start: 2023-05-11

## 2023-05-11 RX ORDER — FERROUS SULFATE 325(65) MG
TABLET ORAL
Qty: 60 TABLET | Refills: 3 | Status: SHIPPED | OUTPATIENT
Start: 2023-05-11

## 2023-05-11 RX ORDER — FUROSEMIDE 20 MG/1
TABLET ORAL
Qty: 60 TABLET | Refills: 3 | Status: SHIPPED | OUTPATIENT
Start: 2023-05-11

## 2023-06-05 RX ORDER — ERGOCALCIFEROL 1.25 MG/1
CAPSULE ORAL
Qty: 4 CAPSULE | Refills: 3 | Status: SHIPPED | OUTPATIENT
Start: 2023-06-05

## 2023-06-06 RX ORDER — ASPIRIN 81 MG
TABLET,CHEWABLE ORAL
Qty: 30 TABLET | Refills: 3 | Status: SHIPPED | OUTPATIENT
Start: 2023-06-06

## 2023-08-01 NOTE — PROGRESS NOTES
Subjective:  Sarina Lashone Jackson is a 48 y.o. female who presents for       Patient Active Problem List   Diagnosis    Encounter for immunization    Diabetes type 2, uncontrolled    Hyperlipidemia associated with type 2 diabetes mellitus    Morbid obesity    Vitamin D deficiency    Essential hypertension    Hyperlipidemia    Urinary tract infection    Acute pain of right shoulder    Right arm pain    Right elbow pain    Lateral epicondylitis of right elbow    Medial epicondylitis    Encounter for screening mammogram for malignant neoplasm of breast    Iron deficiency anemia    Mild intermittent asthma    Encounter for screening for endocrine disorder    Gastroesophageal reflux disease    Pain in both knees    Controlled type 2 diabetes mellitus with complication, without long-term current use of insulin    Anemia    Right knee pain    Controlled type 2 diabetes mellitus with complication, with long-term current use of insulin    General medical examination    Leukocytosis    Chronic bilateral low back pain with sciatica    Encounter for Papanicolaou smear of cervix    Screening mammogram, encounter for    Other fatigue    MIGEL (obstructive sleep apnea)    Moderate persistent asthma    Acute bronchitis    Right wrist pain    Nausea and vomiting    Grief    Carpal tunnel syndrome on right    Encounter for screening for malignant neoplasm of colon    Uncontrolled type 2 diabetes mellitus with hypoglycemia without coma    Bilateral swelling of feet    Constipation    Hypothyroidism    Stage 2 chronic kidney disease    Abnormal CBC           Current Outpatient Medications:     albuterol sulfate HFA (Ventolin HFA) 108 (90 Base) MCG/ACT inhaler, Inhale 2 puffs Every 4 (Four) Hours As Needed for Shortness of Air., Disp: 18 g, Rfl: 3    Aspirin Low Dose 81 MG chewable tablet, CHEW ONE TABLET BY MOUTH DAILY, Disp: 30 tablet, Rfl: 3    atorvastatin (Lipitor) 40 MG tablet, Take 1 tablet by mouth Daily., Disp: 90 tablet,  "Rfl: 3    BD INSULIN SYRINGE U/F 31G X 5/16\" 1 ML misc, , Disp: , Rfl:     celecoxib (CeleBREX) 100 MG capsule, Take 1 capsule by mouth 2 (Two) Times a Day for 180 days., Disp: 60 capsule, Rfl: 5    Continuous Blood Gluc  (Dexcom G6 ) device, 1 application 3 (Three) Times a Day., Disp: 1 each, Rfl: 3    Continuous Blood Gluc Sensor (Dexcom G6 Sensor), USE TO MONITOR BLOOD SUGAR AS DIRECTED. CHANGE EVERY 10 DAYS, Disp: 3 each, Rfl: 3    Continuous Blood Gluc Transmit (Dexcom G6 Transmitter) misc, 1 application 3 (Three) Times a Day., Disp: 1 each, Rfl: 3    cyclobenzaprine (FLEXERIL) 10 MG tablet, Take 1 tablet by mouth At Night As Needed for Muscle Spasms., Disp: 30 tablet, Rfl: 3    Ertugliflozin L-PyroglutamicAc (Steglatro) 15 MG tablet, Take 1 tablet by mouth Every Morning., Disp: 30 tablet, Rfl: 3    FeroSul 325 (65 Fe) MG tablet, TAKE ONE TABLET BY MOUTH TWICE A DAY, Disp: 60 tablet, Rfl: 3    fluticasone (Flonase) 50 MCG/ACT nasal spray, 2 sprays into the nostril(s) as directed by provider Daily., Disp: 16 g, Rfl: 3    fluticasone (FLOVENT HFA) 220 MCG/ACT inhaler, Inhale 1 puff 2 (Two) Times a Day., Disp: 1 each, Rfl: 3    furosemide (LASIX) 20 MG tablet, TAKE ONE TABLET BY MOUTH TWICE A DAY, Disp: 180 tablet, Rfl: 0    glucose blood test strip, 1 each by Other route 3 (Three) Times a Day., Disp: 100 each, Rfl: 3    insulin glargine (Lantus) 100 UNIT/ML injection, START WITH 16 UNITS UNDER THE SKIN AT BEDTIME CAN GO UP BY 3 UNITS EVERY 3 DAYS UNTIL IN THE MORNING SUGARS RUNNING  MDD 25, Disp: 9 mL, Rfl: 12    Insulin Pen Needle ([employer] Pen Needles) 31G X 6 MM misc, Inject 1 Device as directed 3 (Three) Times a Day As Needed (Diabetes)., Disp: 100 each, Rfl: 2    Lancets misc, Use to check sugars three times a day for diabetes E11, Disp: 100 each, Rfl: 3    levothyroxine (Synthroid) 100 MCG tablet, Take 1 tablet by mouth Daily., Disp: 30 tablet, Rfl: 3    linaclotide (Linzess) 72 MCG " capsule capsule, Take 1 capsule by mouth Every Morning Before Breakfast., Disp: 30 capsule, Rfl: 3    loratadine (CLARITIN) 10 MG tablet, TAKE ONE TABLET BY MOUTH DAILY, Disp: 30 tablet, Rfl: 3    losartan (Cozaar) 50 MG tablet, Take 1 tablet by mouth Daily., Disp: 30 tablet, Rfl: 3    montelukast (SINGULAIR) 10 MG tablet, TAKE ONE TABLET BY MOUTH ONCE NIGHTLY, Disp: 30 tablet, Rfl: 3    omeprazole (priLOSEC) 40 MG capsule, Take 1 capsule by mouth Daily., Disp: 30 capsule, Rfl: 3    ondansetron ODT (ZOFRAN-ODT) 8 MG disintegrating tablet, Place 1 tablet on the tongue Every 8 (Eight) Hours As Needed for Nausea or Vomiting., Disp: 30 tablet, Rfl: 3    vitamin D (ERGOCALCIFEROL) 1.25 MG (55087 UT) capsule capsule, TAKE ONE CAPSULE BY MOUTH ONCE WEEKLY, Disp: 4 capsule, Rfl: 3    Semaglutide,0.25 or 0.5MG/DOS, (OZEMPIC) 2 MG/1.5ML solution pen-injector, Inject 0.5 mg under the skin into the appropriate area as directed 1 (One) Time Per Week., Disp: 3 mL, Rfl: 3       Pt is 48 yo female with management of morbid obesity, moderate persistent asthma, allergic rhinitis, IDDM type 2,, HLP, HTN,  Vitamin D deficiency,GERD, chronic fatigue, MIGEL, multiple joint pain, sp cholecystectomy sp tonsillectomy, sp umbilical hernia repair, sp , iron deficiency anemia , CKD stage 2, carpal tunnel on right hand/wrist , internal hemorrhoids, diverticulosis       23 in office visit for recheck. BP is better controlled. Her sugars have been better controlled but she has some hypoglycemic episodes. She is on trulicity 3 mg sub q weekly.  She is on steglatro. She now has DEXCOM and is liking it She is having pain in her left hip and lower back. For past few weeks.   She is using her back brace. She is using celebrex for pain. She also has pain near her left hip area along with her lower back    23 in office visit for recheck. Pt has yet to get labwork ordered on 3/9/23.  She states sugars are stable but has been having  trouble losing weight.  She is on trulity 3 mg subq weekly.      8/24/23 in office visit for. Pt had labwork on 6/22/23 that showed On thyroid studies TSH normal T4 is low have pt go up on synthroid from 88 to  100 mcg PO q daily On CMP fasting sugars at 139 from 179. Kidney function stableIron studies show low iron saturation., continue with iron rich dietOn lipid panel triglycerides high. Recommend OTC fish oil supplement with omega 3 fatty acids with DHA and EPAOn CBC WBC is elevated along with lymphocyte count high.  Eosinophils and lymphocytes are elevated pt has presence of anisocytosis, microcytosis stomatocytes and smudge cells. Pt has abnormal shaped red blood cells. Recommended she see a Hematologist for further evaluation. Can discuss on next visit. Hga1c at 8.10 from 9.60. pt is on ozempic and she gained 2 lbs sincehr last visit  she has been stress eating lately.  Pt is tolerating ozempic okay.        Constipation  This is a recurrent problem. The current episode started more than 1 month ago. The problem is unchanged. Pertinent negatives include no abdominal pain, anorexia, back pain, bloating, diarrhea, difficulty urinating, fecal incontinence, fever, flatus, hematochezia, hemorrhoids, melena, nausea, rectal pain, vomiting or weight loss. She has tried nothing for the symptoms. The treatment provided no relief. There is no history of abdominal surgery, endocrine disease, inflammatory bowel disease, irritable bowel syndrome, metabolic disease, neurologic disease, neuromuscular disease, psychiatric history or radiation treatment.   Hip Pain   The incident occurred more than 1 week ago. The incident occurred at home. There was no injury mechanism. The pain is present in the left leg, left hip and left thigh. The quality of the pain is described as aching. The pain is at a severity of 3/10. The pain is moderate. The pain has been Improving since onset. Associated symptoms include a loss of motion, a loss  of sensation, muscle weakness and numbness. Pertinent negatives include no inability to bear weight or tingling. She reports no foreign bodies present. The symptoms are aggravated by movement. She has tried nothing for the symptoms. The treatment provided no relief.   Back Pain  This is a recurrent problem. The current episode started more than 1 month ago. The problem occurs constantly. The problem is unchanged. The pain is present in the gluteal and lumbar spine. The quality of the pain is described as aching. The pain does not radiate. The pain is at a severity of 4/10. The pain is moderate. The pain is The same all the time. The symptoms are aggravated by bending, lying down and position. Stiffness is present All day. Associated symptoms include numbness and weakness. Pertinent negatives include no abdominal pain, bladder incontinence, bowel incontinence, chest pain, dysuria, fever, headaches, paresthesias, pelvic pain, perianal numbness, tingling or weight loss. She has tried NSAIDs for the symptoms.   Hypertension  This is a chronic problem. The current episode started more than 1 year ago. The problem has been resolved since onset. The problem is controlled. Pertinent negatives include no anxiety, blurred vision, chest pain, headaches, malaise/fatigue, neck pain, orthopnea, palpitations, peripheral edema, PND, shortness of breath or sweats. Risk factors for coronary artery disease include diabetes mellitus, sedentary lifestyle and obesity. Past treatments include ACE inhibitors. Current antihypertension treatment includes ACE inhibitors. The current treatment provides significant improvement. There is no history of angina, kidney disease, CAD/MI, CVA, heart failure, left ventricular hypertrophy, PVD or retinopathy. There is no history of chronic renal disease, coarctation of the aorta, hyperaldosteronism, hypercortisolism, hyperparathyroidism, a hypertension causing med, pheochromocytoma, renovascular  disease, sleep apnea or a thyroid problem.   Diabetes   She presents for her follow-up diabetic visit. She has type 2 diabetes mellitus. Her disease course has been uncontrolled . Pertinent negatives for hypoglycemia include no confusion, dizziness, headaches, hunger, mood changes, nervousness/anxiousness, pallor, seizures, sleepiness, speech difficulty, sweats or tremors. Associated symptoms include fatigue. Pertinent negatives for diabetes include no blurred vision, no chest pain, no foot paresthesias, no foot ulcerations, no polydipsia, no polyphagia, no polyuria, no visual change, no weakness and no weight loss. Pertinent negatives for hypoglycemia complications include no blackouts, no hospitalization, no nocturnal hypoglycemia, no required assistance and no required glucagon injection. Symptoms are stable. Pertinent negatives for diabetic complications include no autonomic neuropathy, CVA, heart disease, nephropathy, peripheral neuropathy, PVD or retinopathy. Risk factors for coronary artery disease include diabetes mellitus, dyslipidemia and obesity. Current diabetic treatment includes insulin injections. She is following a diabetic diet. When asked about meal planning, she reported none. She has not had a previous visit with a dietitian. She participates in exercise intermittently. She does not see a podiatrist.Eye exam is not current.   Obesity   This is a chronic problem. The current episode started more than 1 year ago. The problem has been unchanged. Associated symptoms include arthralgias, fatigue and nausea. Pertinent negatives include no abdominal pain, anorexia, change in bowel habit, chest pain, chills, congestion, coughing, fever, headaches, joint swelling, myalgias, neck pain, numbness, rash, sore throat, swollen glands, urinary symptoms, vertigo, visual change, vomiting or weakness. Nothing aggravates the symptoms. She has tried nothing for the symptoms. The treatment provided no relief    Review  of Systems  Review of Systems   Constitutional:  Positive for activity change and fatigue. Negative for appetite change, chills, diaphoresis, fever and weight loss.   HENT:  Negative for congestion, postnasal drip, rhinorrhea, sinus pressure, sinus pain, sneezing, sore throat, trouble swallowing and voice change.    Respiratory:  Positive for shortness of breath. Negative for cough, choking, chest tightness, wheezing and stridor.    Cardiovascular:  Negative for chest pain.   Gastrointestinal:  Positive for constipation. Negative for abdominal pain, anorexia, bloating, bowel incontinence, diarrhea, flatus, hematochezia, hemorrhoids, melena, nausea, rectal pain and vomiting.   Genitourinary:  Negative for bladder incontinence, difficulty urinating, dysuria and pelvic pain.   Musculoskeletal:  Positive for arthralgias. Negative for back pain.   Neurological:  Positive for weakness and numbness. Negative for tingling, headaches and paresthesias.   Psychiatric/Behavioral:  The patient is nervous/anxious.      Patient Active Problem List   Diagnosis    Encounter for immunization    Diabetes type 2, uncontrolled    Hyperlipidemia associated with type 2 diabetes mellitus    Morbid obesity    Vitamin D deficiency    Essential hypertension    Hyperlipidemia    Urinary tract infection    Acute pain of right shoulder    Right arm pain    Right elbow pain    Lateral epicondylitis of right elbow    Medial epicondylitis    Encounter for screening mammogram for malignant neoplasm of breast    Iron deficiency anemia    Mild intermittent asthma    Encounter for screening for endocrine disorder    Gastroesophageal reflux disease    Pain in both knees    Controlled type 2 diabetes mellitus with complication, without long-term current use of insulin    Anemia    Right knee pain    Controlled type 2 diabetes mellitus with complication, with long-term current use of insulin    General medical examination    Leukocytosis    Chronic  bilateral low back pain with sciatica    Encounter for Papanicolaou smear of cervix    Screening mammogram, encounter for    Other fatigue    MIGEL (obstructive sleep apnea)    Moderate persistent asthma    Acute bronchitis    Right wrist pain    Nausea and vomiting    Grief    Carpal tunnel syndrome on right    Encounter for screening for malignant neoplasm of colon    Uncontrolled type 2 diabetes mellitus with hypoglycemia without coma    Bilateral swelling of feet    Constipation    Hypothyroidism    Stage 2 chronic kidney disease    Abnormal CBC     Past Surgical History:   Procedure Laterality Date     SECTION  10/30/2000    CHOLECYSTECTOMY  1995    COLONOSCOPY  2013    normal per pt    COLONOSCOPY N/A 2022    Procedure: COLONOSCOPY;  Surgeon: Caitlin Hermosillo MD;  Location: [facility] ENDOSCOPY;  Service: Gastroenterology;  Laterality: N/A;    CYSTOSCOPY  2016    And bilateral retrograde pyelograms. Performed at [facility].    TONSILLECTOMY  1985    UMBILICAL HERNIA REPAIR  2015    UMBILICAL HERNIA REPAIR  2015     Social History     Socioeconomic History    Marital status: Single   Tobacco Use    Smoking status: Never    Smokeless tobacco: Never   Vaping Use    Vaping Use: Never used   Substance and Sexual Activity    Alcohol use: No    Drug use: No    Sexual activity: Defer     Family History   Problem Relation Age of Onset    Diabetes Mother     Heart disease Mother     Heart failure Mother         congestive    Hypertension Mother     Asthma Father     Osteoarthritis Father      Lab on 2023   Component Date Value Ref Range Status    WBC 2023 11.41 (H)  3.40 - 10.80 10*3/mm3 Final    RBC 2023 6.10 (H)  3.77 - 5.28 10*6/mm3 Final    Hemoglobin 2023 12.4  12.0 - 15.9 g/dL Final    Hematocrit 2023 41.2  34.0 - 46.6 % Final    MCV 2023 67.5 (L)  79.0 - 97.0 fL Final    MCH 2023 20.3 (L)  26.6 - 33.0 pg Final    MCHC  06/22/2023 30.1 (L)  31.5 - 35.7 g/dL Final    RDW 06/22/2023 15.7 (H)  12.3 - 15.4 % Final    RDW-SD 06/22/2023 36.7 (L)  37.0 - 54.0 fl Final    MPV 06/22/2023 10.9  6.0 - 12.0 fL Final    Platelets 06/22/2023 292  140 - 450 10*3/mm3 Final    Glucose 06/22/2023 139 (H)  65 - 99 mg/dL Final    BUN 06/22/2023 11  6 - 20 mg/dL Final    Creatinine 06/22/2023 0.76  0.57 - 1.00 mg/dL Final    Sodium 06/22/2023 136  136 - 145 mmol/L Final    Potassium 06/22/2023 4.2  3.5 - 5.2 mmol/L Final    Chloride 06/22/2023 101  98 - 107 mmol/L Final    CO2 06/22/2023 24.6  22.0 - 29.0 mmol/L Final    Calcium 06/22/2023 9.5  8.6 - 10.5 mg/dL Final    Total Protein 06/22/2023 7.2  6.0 - 8.5 g/dL Final    Albumin 06/22/2023 3.9  3.5 - 5.2 g/dL Final    ALT (SGPT) 06/22/2023 18  1 - 33 U/L Final    AST (SGOT) 06/22/2023 16  1 - 32 U/L Final    Alkaline Phosphatase 06/22/2023 73  39 - 117 U/L Final    Total Bilirubin 06/22/2023 0.3  0.0 - 1.2 mg/dL Final    Globulin 06/22/2023 3.3  gm/dL Final    A/G Ratio 06/22/2023 1.2  g/dL Final    BUN/Creatinine Ratio 06/22/2023 14.5  7.0 - 25.0 Final    Anion Gap 06/22/2023 10.4  5.0 - 15.0 mmol/L Final    eGFR 06/22/2023 96.8  >60.0 mL/min/1.73 Final    Hemoglobin A1C 06/22/2023 8.10 (H)  4.80 - 5.60 % Final    Total Cholesterol 06/22/2023 144  0 - 200 mg/dL Final    Triglycerides 06/22/2023 151 (H)  0 - 150 mg/dL Final    HDL Cholesterol 06/22/2023 41  40 - 60 mg/dL Final    LDL Cholesterol  06/22/2023 77  0 - 100 mg/dL Final    VLDL Cholesterol 06/22/2023 26  5 - 40 mg/dL Final    LDL/HDL Ratio 06/22/2023 1.78   Final    TSH 06/22/2023 2.540  0.270 - 4.200 uIU/mL Final    Free T4 06/22/2023 0.83 (L)  0.93 - 1.70 ng/dL Final    Iron 06/22/2023 55  37 - 145 mcg/dL Final    Iron Saturation (TSAT) 06/22/2023 17 (L)  20 - 50 % Final    Transferrin 06/22/2023 216  200 - 360 mg/dL Final    TIBC 06/22/2023 322  298 - 536 mcg/dL Final    Microalbumin/Creatinine Ratio 06/22/2023    Final    Unable to  calculate    Creatinine, Urine 06/22/2023 110.2  mg/dL Final    Microalbumin, Urine 06/22/2023 <1.2  mg/dL Final    Neutrophil % 06/22/2023 41.2 (L)  42.7 - 76.0 % Final    Lymphocyte % 06/22/2023 44.3  19.6 - 45.3 % Final    Monocyte % 06/22/2023 6.2  5.0 - 12.0 % Final    Eosinophil % 06/22/2023 7.2 (H)  0.3 - 6.2 % Final    Basophil % 06/22/2023 1.0  0.0 - 1.5 % Final    Neutrophils Absolute 06/22/2023 4.70  1.70 - 7.00 10*3/mm3 Final    Lymphocytes Absolute 06/22/2023 5.05 (H)  0.70 - 3.10 10*3/mm3 Final    Monocytes Absolute 06/22/2023 0.71  0.10 - 0.90 10*3/mm3 Final    Eosinophils Absolute 06/22/2023 0.82 (H)  0.00 - 0.40 10*3/mm3 Final    Basophils Absolute 06/22/2023 0.11  0.00 - 0.20 10*3/mm3 Final    Anisocytosis 06/22/2023 Slight/1+  None Seen Final    Crenated RBC's 06/22/2023 Slight/1+  None Seen Final    Elliptocytes 06/22/2023 Slight/1+  None Seen Final    Microcytes 06/22/2023 Slight/1+  None Seen Final    Stomatocytes 06/22/2023 Slight/1+  None Seen Final    Smudge Cells 06/22/2023 Slight/1+  None Seen Final    Platelet Morphology 06/22/2023 Normal  Normal Final   Lab on 03/02/2023   Component Date Value Ref Range Status    Glucose 03/02/2023 179 (H)  65 - 99 mg/dL Final    BUN 03/02/2023 10  6 - 20 mg/dL Final    Creatinine 03/02/2023 0.76  0.57 - 1.00 mg/dL Final    Sodium 03/02/2023 138  136 - 145 mmol/L Final    Potassium 03/02/2023 4.4  3.5 - 5.2 mmol/L Final    Chloride 03/02/2023 104  98 - 107 mmol/L Final    CO2 03/02/2023 24.0  22.0 - 29.0 mmol/L Final    Calcium 03/02/2023 9.5  8.6 - 10.5 mg/dL Final    Total Protein 03/02/2023 7.2  6.0 - 8.5 g/dL Final    Albumin 03/02/2023 3.9  3.5 - 5.2 g/dL Final    ALT (SGPT) 03/02/2023 17  1 - 33 U/L Final    AST (SGOT) 03/02/2023 13  1 - 32 U/L Final    Alkaline Phosphatase 03/02/2023 63  39 - 117 U/L Final    Total Bilirubin 03/02/2023 0.2  0.0 - 1.2 mg/dL Final    Globulin 03/02/2023 3.3  gm/dL Final    A/G Ratio 03/02/2023 1.2  g/dL Final     BUN/Creatinine Ratio 03/02/2023 13.2  7.0 - 25.0 Final    Anion Gap 03/02/2023 10.0  5.0 - 15.0 mmol/L Final    eGFR 03/02/2023 96.8  >60.0 mL/min/1.73 Final    WBC 03/02/2023 8.47  3.40 - 10.80 10*3/mm3 Final    RBC 03/02/2023 6.03 (H)  3.77 - 5.28 10*6/mm3 Final    Hemoglobin 03/02/2023 12.4  12.0 - 15.9 g/dL Final    Hematocrit 03/02/2023 41.3  34.0 - 46.6 % Final    MCV 03/02/2023 68.5 (L)  79.0 - 97.0 fL Final    MCH 03/02/2023 20.6 (L)  26.6 - 33.0 pg Final    MCHC 03/02/2023 30.0 (L)  31.5 - 35.7 g/dL Final    RDW 03/02/2023 16.3 (H)  12.3 - 15.4 % Final    RDW-SD 03/02/2023 37.5  37.0 - 54.0 fl Final    MPV 03/02/2023 11.6  6.0 - 12.0 fL Final    Platelets 03/02/2023 266  140 - 450 10*3/mm3 Final    Neutrophil % 03/02/2023 46.6  42.7 - 76.0 % Final    Lymphocyte % 03/02/2023 38.7  19.6 - 45.3 % Final    Monocyte % 03/02/2023 6.4  5.0 - 12.0 % Final    Eosinophil % 03/02/2023 7.1 (H)  0.3 - 6.2 % Final    Basophil % 03/02/2023 0.6  0.0 - 1.5 % Final    Immature Grans % 03/02/2023 0.6 (H)  0.0 - 0.5 % Final    Neutrophils, Absolute 03/02/2023 3.95  1.70 - 7.00 10*3/mm3 Final    Lymphocytes, Absolute 03/02/2023 3.28 (H)  0.70 - 3.10 10*3/mm3 Final    Monocytes, Absolute 03/02/2023 0.54  0.10 - 0.90 10*3/mm3 Final    Eosinophils, Absolute 03/02/2023 0.60 (H)  0.00 - 0.40 10*3/mm3 Final    Basophils, Absolute 03/02/2023 0.05  0.00 - 0.20 10*3/mm3 Final    Immature Grans, Absolute 03/02/2023 0.05  0.00 - 0.05 10*3/mm3 Final    nRBC 03/02/2023 0.0  0.0 - 0.2 /100 WBC Final    Hemoglobin A1C 03/02/2023 9.60 (H)  4.80 - 5.60 % Final    Total Cholesterol 03/02/2023 117  0 - 200 mg/dL Final    Triglycerides 03/02/2023 97  0 - 150 mg/dL Final    HDL Cholesterol 03/02/2023 38 (L)  40 - 60 mg/dL Final    LDL Cholesterol  03/02/2023 61  0 - 100 mg/dL Final    VLDL Cholesterol 03/02/2023 18  5 - 40 mg/dL Final    LDL/HDL Ratio 03/02/2023 1.57   Final    TSH 03/02/2023 1.880  0.270 - 4.200 uIU/mL Final    Free T4 03/02/2023  "0.81 (L)  0.93 - 1.70 ng/dL Final    25 Hydroxy, Vitamin D 03/02/2023 55.6  30.0 - 100.0 ng/ml Final    Vitamin B-12 03/02/2023 746  211 - 946 pg/mL Final    Iron 03/02/2023 62  37 - 145 mcg/dL Final    Iron Saturation (TSAT) 03/02/2023 21  20 - 50 % Final    Transferrin 03/02/2023 199 (L)  200 - 360 mg/dL Final    TIBC 03/02/2023 297 (L)  298 - 536 mcg/dL Final    Ferritin 03/02/2023 178.00 (H)  13.00 - 150.00 ng/mL Final      XR Hip With or Without Pelvis 2 - 3 View Left  XR HIP, LEFT, W-OR-WO PELVIS, 2-3 VIEWS    HISTORY:  Left hip pain.    FINDINGS:  No acute osseous abnormality.  Normal alignment of the bones.  No significant  soft tissue swelling.    [unfilled]  Immunization History   Administered Date(s) Administered    Flu Vaccine Quad PF >36MO 10/14/2016    Fluzone >6mos 10/14/2016    Hepatitis B 07/19/2016, 08/23/2016, 02/17/2017    Hepatitis B Adult/Adolescent IM 07/19/2016, 08/23/2016, 02/17/2017    Tdap 02/17/2017       The following portions of the patient's history were reviewed and updated as appropriate: allergies, current medications, past family history, past medical history, past social history, past surgical history and problem list.        Physical Exam  /76 (BP Location: Left arm, Patient Position: Sitting, Cuff Size: Large Adult)   Pulse 80   Temp 98.2 øF (36.8 øC) (Oral)   Ht 160 cm (62.99\")   Wt 133 kg (293 lb)   LMP 08/03/2023   SpO2 95%   BMI 51.92 kg/mý     /76 (BP Location: Left arm, Patient Position: Sitting, Cuff Size: Large Adult)   Pulse 80   Temp 98.2 øF (36.8 øC) (Oral)   Ht 160 cm (62.99\")   Wt 133 kg (293 lb)   LMP 08/03/2023   SpO2 95%   BMI 51.92 kg/mý       Physical Exam  Vitals and nursing note reviewed.   Constitutional:       Appearance: She is well-developed. She is not diaphoretic.   HENT:      Head: Normocephalic and atraumatic.      Right Ear: External ear normal.   Eyes:      Conjunctiva/sclera: Conjunctivae normal.      Pupils: Pupils are " equal, round, and reactive to light.   Cardiovascular:      Rate and Rhythm: Normal rate and regular rhythm.      Heart sounds: Normal heart sounds. No murmur heard.  Pulmonary:      Effort: Pulmonary effort is normal. No respiratory distress.      Breath sounds: Normal breath sounds.   Abdominal:      General: Bowel sounds are normal. There is no distension.      Palpations: Abdomen is soft.      Tenderness: There is no abdominal tenderness.   Musculoskeletal:         General: Tenderness present. No deformity.      Cervical back: Normal range of motion and neck supple.      Lumbar back: Spasms, tenderness and bony tenderness present. Decreased range of motion.      Left hip: Tenderness and bony tenderness present. Decreased range of motion.   Skin:     General: Skin is warm.      Coloration: Skin is not pale.      Findings: No erythema or rash.   Neurological:      Mental Status: She is alert and oriented to person, place, and time.      Cranial Nerves: No cranial nerve deficit.   Psychiatric:         Behavior: Behavior normal.       [unfilled]   Diagnosis Plan   1. Uncontrolled type 2 diabetes mellitus with hypoglycemia and coma  CBC Auto Differential    Comprehensive Metabolic Panel    Hemoglobin A1c    Lipid Panel    TSH    T4, Free    Iron Profile      2. Mixed hyperlipidemia  CBC Auto Differential    Comprehensive Metabolic Panel    Hemoglobin A1c    Lipid Panel    TSH    T4, Free    Iron Profile      3. Essential hypertension  CBC Auto Differential    Comprehensive Metabolic Panel    Hemoglobin A1c    Lipid Panel    TSH    T4, Free    Iron Profile      4. Morbid obesity  CBC Auto Differential    Comprehensive Metabolic Panel    Hemoglobin A1c    Lipid Panel    TSH    T4, Free    Iron Profile      5. Vitamin D deficiency  CBC Auto Differential    Comprehensive Metabolic Panel    Hemoglobin A1c    Lipid Panel    TSH    T4, Free    Iron Profile      6. Gastroesophageal reflux disease, unspecified whether  esophagitis present  CBC Auto Differential    Comprehensive Metabolic Panel    Hemoglobin A1c    Lipid Panel    TSH    T4, Free    Iron Profile      7. Stage 2 chronic kidney disease  CBC Auto Differential    Comprehensive Metabolic Panel    Hemoglobin A1c    Lipid Panel    TSH    T4, Free    Iron Profile      8. Iron deficiency anemia, unspecified iron deficiency anemia type  CBC Auto Differential    Comprehensive Metabolic Panel    Hemoglobin A1c    Lipid Panel    TSH    T4, Free    Iron Profile      9. Moderate persistent asthma, unspecified whether complicated  CBC Auto Differential    Comprehensive Metabolic Panel    Hemoglobin A1c    Lipid Panel    TSH    T4, Free    Iron Profile      10. MIGEL (obstructive sleep apnea)  CBC Auto Differential    Comprehensive Metabolic Panel    Hemoglobin A1c    Lipid Panel    TSH    T4, Free    Iron Profile      11. Hypothyroidism, unspecified type  CBC Auto Differential    Comprehensive Metabolic Panel    Hemoglobin A1c    Lipid Panel    TSH    T4, Free    Iron Profile      12. Constipation, unspecified constipation type  CBC Auto Differential    Comprehensive Metabolic Panel    Hemoglobin A1c    Lipid Panel    TSH    T4, Free    Iron Profile      13. Abnormal CBC  CBC Auto Differential    Comprehensive Metabolic Panel    Hemoglobin A1c    Lipid Panel    TSH    T4, Free    Iron Profile                -went over labwork   -recommend diabetic eye exam  -CKD stage 2 - continue to monitor   -knee pain/arthritis of right knee -orthopedic following on celebrex    -nausea/vomiting - on zofran 8 mg ODT every 8 hours PRN  -MIGEL- sleep medicine following. She will need to make a call for appt for CPAP  -Hypertension  on losartan 50 mg dailyAdvised pt to monitor Blood pressure at home and bring readings next visit   -hip pain/lower back pain - will get x-rays of hip and back today. Consider Orthoped  -hyperlipidemia-  Recheck lipid panel The current medical regimen is effective;  continue  present plan and medications.  -bilateral feet swelling -  on lasix 20 mg PO BID. Drug information provided. Recommend low sodium diet   -hypopthyroidism - on synthroid  100 mcg PO q daily.     -DM type 2-  now on insulin 10-15 units. Advised pt to continue on 10 units and if sugars still low to try 5    units at bedtime on steglatro 15 mg daily. On truliicity 3  mg subq weekly.   stopped trulcity and on ozempic 0.25 mg sub q weekly.  On DEXCOM.  Go up on ozempic from 0.25 to 0.5 mg injection weekly.   -allergic rhinitis - on claritin 10 mg daily.    -vitamin D deficiency -on Vitamin D3 50,000 units once a week   -morbid obesity -. Gave bariatric weight loss surgery to go home with.  She has been reluctant for weight loss surgery.  BMI at 51.62  . counseled weight loss >5 minutes   -iron deficiency anemia/microcytosis  -continue on iron pill TID. Her last CBC showed stable hemoglobin. Recommend Hematology referral and possible IV iron therapy. Consultation appreciated.    -moderate persistent asthma - on flovent 220 mcg 1 puff BID:. Albuterol PRN  on singulair 10 mg at bedtime   -GERD/diverticulosis - GI following. Recommend high fiber diet  on prilosec 40 mg daily.   -advised pt to be safe and call with questions and concerns  -advised to go to ER or call 911 if symptoms worrisome or severe  -advised to followup with Specialist and referrals  -adivsed pt to be safe during COVID-19 pandemic  I spent 33  minutes caring for Lizzy on this date of service. This time includes time spent by me in the following activities: preparing for the visit, reviewing tests, obtaining and/or reviewing a separately obtained history, performing a medically appropriate examination and/or evaluation, counseling and educating the patient/family/caregiver, ordering medications, tests, or procedures, referring and communicating with other health care professionals, documenting information in the medical record and independently  interpreting results and communicating that information with the patient/family/caregiver.  -recheck in 2 months        This document has been electronically signed by Solitario Li MD on August 24, 2023 10:27 CDT

## 2023-08-15 RX ORDER — FUROSEMIDE 20 MG/1
TABLET ORAL
Qty: 180 TABLET | Refills: 0 | Status: SHIPPED | OUTPATIENT
Start: 2023-08-15

## 2023-08-17 PROBLEM — R79.89 ABNORMAL CBC: Status: ACTIVE | Noted: 2023-08-17

## 2023-08-24 ENCOUNTER — OFFICE VISIT (OUTPATIENT)
Dept: FAMILY MEDICINE CLINIC | Facility: CLINIC | Age: 48
End: 2023-08-24
Payer: MEDICAID

## 2023-08-24 VITALS
SYSTOLIC BLOOD PRESSURE: 120 MMHG | WEIGHT: 293 LBS | HEART RATE: 80 BPM | OXYGEN SATURATION: 95 % | HEIGHT: 63 IN | DIASTOLIC BLOOD PRESSURE: 76 MMHG | TEMPERATURE: 98.2 F | BODY MASS INDEX: 51.91 KG/M2

## 2023-08-24 DIAGNOSIS — D50.9 IRON DEFICIENCY ANEMIA, UNSPECIFIED IRON DEFICIENCY ANEMIA TYPE: ICD-10-CM

## 2023-08-24 DIAGNOSIS — E11.641 UNCONTROLLED TYPE 2 DIABETES MELLITUS WITH HYPOGLYCEMIA AND COMA: Primary | ICD-10-CM

## 2023-08-24 DIAGNOSIS — K21.9 GASTROESOPHAGEAL REFLUX DISEASE, UNSPECIFIED WHETHER ESOPHAGITIS PRESENT: ICD-10-CM

## 2023-08-24 DIAGNOSIS — G47.33 OSA (OBSTRUCTIVE SLEEP APNEA): ICD-10-CM

## 2023-08-24 DIAGNOSIS — I10 ESSENTIAL HYPERTENSION: ICD-10-CM

## 2023-08-24 DIAGNOSIS — E03.9 HYPOTHYROIDISM, UNSPECIFIED TYPE: ICD-10-CM

## 2023-08-24 DIAGNOSIS — E78.2 MIXED HYPERLIPIDEMIA: ICD-10-CM

## 2023-08-24 DIAGNOSIS — J45.40 MODERATE PERSISTENT ASTHMA, UNSPECIFIED WHETHER COMPLICATED: ICD-10-CM

## 2023-08-24 DIAGNOSIS — E55.9 VITAMIN D DEFICIENCY: ICD-10-CM

## 2023-08-24 DIAGNOSIS — E66.01 MORBID OBESITY: ICD-10-CM

## 2023-08-24 DIAGNOSIS — R79.89 ABNORMAL CBC: ICD-10-CM

## 2023-08-24 DIAGNOSIS — N18.2 STAGE 2 CHRONIC KIDNEY DISEASE: ICD-10-CM

## 2023-08-24 DIAGNOSIS — K59.00 CONSTIPATION, UNSPECIFIED CONSTIPATION TYPE: ICD-10-CM

## 2023-08-24 NOTE — PATIENT INSTRUCTIONS
Go up from ozempic from 0.25 to 0.5 mg injection weekly    Recheck in 2 months     Labwork before next visit

## 2023-08-28 DIAGNOSIS — E78.5 HYPERLIPIDEMIA ASSOCIATED WITH TYPE 2 DIABETES MELLITUS: ICD-10-CM

## 2023-08-28 DIAGNOSIS — E11.69 HYPERLIPIDEMIA ASSOCIATED WITH TYPE 2 DIABETES MELLITUS: ICD-10-CM

## 2023-08-28 DIAGNOSIS — M54.40 CHRONIC BILATERAL LOW BACK PAIN WITH SCIATICA, SCIATICA LATERALITY UNSPECIFIED: ICD-10-CM

## 2023-08-28 DIAGNOSIS — G89.29 CHRONIC BILATERAL LOW BACK PAIN WITH SCIATICA, SCIATICA LATERALITY UNSPECIFIED: ICD-10-CM

## 2023-08-28 RX ORDER — INSULIN GLARGINE 100 [IU]/ML
INJECTION, SOLUTION SUBCUTANEOUS
Qty: 9 ML | Refills: 1 | Status: SHIPPED | OUTPATIENT
Start: 2023-08-28

## 2023-08-29 RX ORDER — LOSARTAN POTASSIUM 50 MG/1
50 TABLET ORAL DAILY
Qty: 30 TABLET | Refills: 3 | Status: SHIPPED | OUTPATIENT
Start: 2023-08-29

## 2023-08-29 RX ORDER — ONDANSETRON 8 MG/1
8 TABLET, ORALLY DISINTEGRATING ORAL EVERY 8 HOURS PRN
Qty: 30 TABLET | Refills: 3 | OUTPATIENT
Start: 2023-08-29

## 2023-08-29 RX ORDER — CYCLOBENZAPRINE HCL 10 MG
10 TABLET ORAL NIGHTLY PRN
Qty: 30 TABLET | Refills: 3 | Status: SHIPPED | OUTPATIENT
Start: 2023-08-29

## 2023-08-29 RX ORDER — ATORVASTATIN CALCIUM 40 MG/1
40 TABLET, FILM COATED ORAL DAILY
Qty: 90 TABLET | Refills: 3 | Status: SHIPPED | OUTPATIENT
Start: 2023-08-29

## 2023-08-29 RX ORDER — INSULIN GLARGINE 100 [IU]/ML
INJECTION, SOLUTION SUBCUTANEOUS
Qty: 9 ML | Refills: 1 | OUTPATIENT
Start: 2023-08-29

## 2023-08-29 RX ORDER — ONDANSETRON 8 MG/1
TABLET, ORALLY DISINTEGRATING ORAL
Qty: 30 TABLET | Refills: 3 | Status: SHIPPED | OUTPATIENT
Start: 2023-08-29

## 2023-09-05 DIAGNOSIS — J45.40 MODERATE PERSISTENT ASTHMA, UNSPECIFIED WHETHER COMPLICATED: ICD-10-CM

## 2023-09-05 DIAGNOSIS — R11.2 NAUSEA AND VOMITING: ICD-10-CM

## 2023-09-06 RX ORDER — OMEPRAZOLE 40 MG/1
CAPSULE, DELAYED RELEASE ORAL
Qty: 30 CAPSULE | Refills: 3 | Status: SHIPPED | OUTPATIENT
Start: 2023-09-06

## 2023-09-06 RX ORDER — FLUTICASONE PROPIONATE 220 UG/1
AEROSOL, METERED RESPIRATORY (INHALATION)
Qty: 12 G | Refills: 0 | Status: SHIPPED | OUTPATIENT
Start: 2023-09-06

## 2023-09-06 RX ORDER — ALBUTEROL SULFATE 90 UG/1
AEROSOL, METERED RESPIRATORY (INHALATION)
Qty: 18 G | Refills: 3 | Status: SHIPPED | OUTPATIENT
Start: 2023-09-06

## 2023-09-11 RX ORDER — FERROUS SULFATE 325(65) MG
TABLET ORAL
Qty: 60 TABLET | Refills: 3 | Status: SHIPPED | OUTPATIENT
Start: 2023-09-11

## 2023-09-11 RX ORDER — LEVOTHYROXINE SODIUM 0.1 MG/1
100 TABLET ORAL DAILY
Qty: 30 TABLET | Refills: 3 | Status: SHIPPED | OUTPATIENT
Start: 2023-09-11

## 2023-09-11 RX ORDER — LEVOTHYROXINE SODIUM 88 UG/1
TABLET ORAL
Qty: 30 TABLET | Refills: 3 | OUTPATIENT
Start: 2023-09-11

## 2023-09-11 NOTE — TELEPHONE ENCOUNTER
LEVOTHYROXINE 88 MCG: discontinued on 6/23/2023 by Noreen Castillo MA for the following reason: Dose adjustment

## 2023-09-29 RX ORDER — ERGOCALCIFEROL 1.25 MG/1
CAPSULE ORAL
Qty: 4 CAPSULE | Refills: 3 | Status: SHIPPED | OUTPATIENT
Start: 2023-09-29

## 2023-09-30 DIAGNOSIS — J45.40 MODERATE PERSISTENT ASTHMA, UNSPECIFIED WHETHER COMPLICATED: ICD-10-CM

## 2023-10-01 RX ORDER — LOPERAMIDE HYDROCHLORIDE 2 MG/1
CAPSULE ORAL
Qty: 24 CAPSULE | Refills: 3 | Status: SHIPPED | OUTPATIENT
Start: 2023-10-01

## 2023-10-01 RX ORDER — FUROSEMIDE 20 MG/1
TABLET ORAL
Qty: 180 TABLET | Refills: 0 | Status: SHIPPED | OUTPATIENT
Start: 2023-10-01

## 2023-10-01 RX ORDER — ASPIRIN 81 MG
TABLET,CHEWABLE ORAL
Qty: 30 TABLET | Refills: 3 | Status: SHIPPED | OUTPATIENT
Start: 2023-10-01

## 2023-10-01 RX ORDER — LEVOTHYROXINE SODIUM 88 UG/1
TABLET ORAL
Qty: 30 TABLET | Refills: 3 | Status: SHIPPED | OUTPATIENT
Start: 2023-10-01

## 2023-10-01 RX ORDER — PROCHLORPERAZINE 25 MG/1
SUPPOSITORY RECTAL
Qty: 3 EACH | Refills: 3 | Status: SHIPPED | OUTPATIENT
Start: 2023-10-01

## 2023-10-01 RX ORDER — MONTELUKAST SODIUM 10 MG/1
TABLET ORAL
Qty: 90 TABLET | Refills: 1 | Status: SHIPPED | OUTPATIENT
Start: 2023-10-01

## 2023-10-01 RX ORDER — FLUTICASONE PROPIONATE 220 UG/1
AEROSOL, METERED RESPIRATORY (INHALATION)
Qty: 12 G | Refills: 0 | Status: SHIPPED | OUTPATIENT
Start: 2023-10-01

## (undated) DEVICE — CANN SMPL SOFTECH BIFLO ETCO2 A/M 7FT